# Patient Record
Sex: FEMALE | Race: WHITE | NOT HISPANIC OR LATINO | URBAN - METROPOLITAN AREA
[De-identification: names, ages, dates, MRNs, and addresses within clinical notes are randomized per-mention and may not be internally consistent; named-entity substitution may affect disease eponyms.]

---

## 2017-01-03 ENCOUNTER — OFFICE VISIT (OUTPATIENT)
Dept: PSYCHIATRY | Facility: CLINIC | Age: 47
End: 2017-01-03
Attending: PSYCHIATRY & NEUROLOGY
Payer: MEDICARE

## 2017-01-03 VITALS
HEART RATE: 54 BPM | SYSTOLIC BLOOD PRESSURE: 116 MMHG | BODY MASS INDEX: 21.05 KG/M2 | DIASTOLIC BLOOD PRESSURE: 69 MMHG | WEIGHT: 138.4 LBS

## 2017-01-03 DIAGNOSIS — F33.1 MAJOR DEPRESSIVE DISORDER, RECURRENT EPISODE, MODERATE (H): Primary | ICD-10-CM

## 2017-01-03 DIAGNOSIS — F51.01 PRIMARY INSOMNIA: ICD-10-CM

## 2017-01-03 PROCEDURE — 99212 OFFICE O/P EST SF 10 MIN: CPT | Mod: ZF

## 2017-01-03 RX ORDER — DEXMETHYLPHENIDATE HYDROCHLORIDE 10 MG/1
10 TABLET ORAL DAILY
Qty: 30 TABLET | Refills: 0 | Status: SHIPPED | OUTPATIENT
Start: 2017-01-03 | End: 2017-03-07

## 2017-01-03 RX ORDER — ESZOPICLONE 3 MG/1
3 TABLET, FILM COATED ORAL AT BEDTIME
Qty: 30 TABLET | Refills: 3 | Status: SHIPPED | OUTPATIENT
Start: 2017-01-03 | End: 2017-03-07

## 2017-01-03 RX ORDER — DEXMETHYLPHENIDATE HYDROCHLORIDE 10 MG/1
10 TABLET ORAL DAILY
Qty: 30 TABLET | Refills: 0 | Status: SHIPPED | OUTPATIENT
Start: 2017-01-03 | End: 2017-01-03

## 2017-01-03 NOTE — PROGRESS NOTES
PSYCHIATRY VISIT    The patient was seen for evaluation and management. [id number] . The patient was seen for anorexia and depression.   She was last seen 3 mo ago.    INTERIM HX: She reports that she is doing well. Holidays have gone well; visit to relatives in [place], [place].  The plan was to stop tube feedings and see how she did then if it went well, dc the tube. I wrote a letter in support of this plan but her therapist did not. Pt was very upset with her therapist. It has been going on for 2 months.  Finally, her therapist agreed to write a letter.  Apparently, her therapist thought that she was not bringing things into therapy. The patient has had years of therapy and not feeling that she has any current pressing issues. The patient is still seeing her therapist.  The tube feeding was stopped a month ago. She hasn't seen Dr. Zarate since Sept. Home health nurse, Daphne will check on pt monthly and report back to MD, then if all is stable for 3 months, then tube could be removed in 3 months.  She has less anxiety about food even over the holidays.  She knew what was to happen over the holidays.  Mood is good, energy is lacking.  She usually goes to bed at 1030; she takes meds at 930 pm.  Every night she wakes 4 hrs later.  She tried to take meds a little later but still wakes at 2 am.  This has been over the past 2 months.  Once she awakens, she does not fall back asleep. She is tired when she goes to bed.    The Lunesta is definitely helping  ROS: Headaches. Syncopal episodes - random, 1/week.    - [place],  She makes odd comments.  [facility] worker- sees twice yearly, Trisha   Home health nurse: Daphne - good relationship      MEDICATIONS:   1. Lamictal 400 mg daily.   2. Zoloft 150 mg daily.   3. Focalin 10 mg daily. Beneficial for energy, mood and no indications of problematic use.   4. Synthroid 150 mcg  5. Zofran  7. Mitodrine  8. Lunesta 3 mg - this appears to have made a huge difference in her sleep     9. Carafate  11. Omeprazole  12. Pyridostigmine  15. Tizanidine   16. Fluorinef-   17. Maxalt for migraines. It works very well.  She has been having a few more migraines.  18. Prevastatin  /69 mmHg  Pulse 54  Wt 62.778 kg (138 lb 6.4 oz)      MENTAL STATUS EXAM: The patient is neat, oriented x3, on time for appointment. Mood is good. Good range of affect. Thought processes, associations are logical and goal directed. Thought content is normal. Fund of knowledge good, speech RRR, language intact, memory intact, concentration good, nsight and judgment are good,   ASSESSMENT: Anorexia nervosa, weight stable; major depressive disorder,, stable., stable; chronic sleep problems,improved; migraine HA, treated  with maxalt  PLAN:   Lunesta continue at 3 mg QHS . Continue other meds. Add Benadryl 25-50 mg QHS prn. This has helped in the past. She will continue to see her therapist, Noemi.  I do support the plan for discontinuation of tube feedings (she has been on them for 16 yrs) as she has been very stable. Anticipate she would have a few more months of no tube feedings before removing the tube.  All providers would need to be in agreement. She will return again in 2 mo. . Focalin Rxs printed.   RADHA MAURER MD   I spent a total of 30 minutes face-to-face with Keisha Somers during today's office visit. Over 50% of this time was spent counseling the patient and/or coordinating care regarding  discontinuing tube feedings.. See note for details.

## 2017-01-03 NOTE — NURSING NOTE
Chief Complaint   Patient presents with     Recheck Medication     anorexia nervose; syncope; major depressive disorder     Reviewed allergies, smoking status, and pharmacy preference  Administered abuse screening questions   Obtained weight, blood pressure and heart rate

## 2017-01-20 ENCOUNTER — RECORDS - HEALTHEAST (OUTPATIENT)
Dept: ADMINISTRATIVE | Facility: OTHER | Age: 47
End: 2017-01-20

## 2017-01-20 ENCOUNTER — HOSPITAL ENCOUNTER (OUTPATIENT)
Dept: CT IMAGING | Facility: HOSPITAL | Age: 47
Discharge: HOME | End: 2017-01-20
Attending: UROLOGY

## 2017-01-20 DIAGNOSIS — N20.0 KIDNEY STONES: ICD-10-CM

## 2017-01-20 ASSESSMENT — MIFFLIN-ST. JEOR: SCORE: 1275.86

## 2017-01-23 ENCOUNTER — SURGERY - HEALTHEAST (OUTPATIENT)
Dept: SURGERY | Facility: HOSPITAL | Age: 47
End: 2017-01-23

## 2017-01-23 ENCOUNTER — ANESTHESIA - HEALTHEAST (OUTPATIENT)
Dept: SURGERY | Facility: HOSPITAL | Age: 47
End: 2017-01-23

## 2017-01-30 ENCOUNTER — PRE VISIT (OUTPATIENT)
Dept: OTHER | Facility: CLINIC | Age: 47
End: 2017-01-30

## 2017-01-30 NOTE — TELEPHONE ENCOUNTER
"  HPI:   Keisha Somers is a pleasant 46 yo woman with symptoms that are primarily orthostatic in nature. In past 6 mos , fore  Unclear reasons, has had more orthostatic lightheadedness and syncope. Fortunately no injury    7/15/2017 Has not had any evident major illness or med change to account for why she is doing worse now than last summer. Main issue occurs after change of posture to upright position and is almost immediate suggesting \"immediate OH\"We reviewed prev recommendations re salt, volume, meds (midodrine 16 TID and fludrocortisone 0.1).  She lives independently but in an Assisted Living facility so she has access to help if needed.   Currently on 3 drugs (Fludro, midodrine and pyridiostigmine). At her last visit we reduced fludro to 0.1 mg daily.  Dx Autonomic dysfunction, Immediate OH syndrome on multiple meds that may be complicating her picture BUT none are clearcut causes of OH.  PLAN: Continue current medication regimen.  Support continued aggressive electrolyte-based hydration. Encourage recumbent exercise. We discussed options in detail for 30 min including standing training and lower body isometrics as well as lower body constrictive clothing (Spanx)    2/2/2017  Continues to see psychiatry for anorexia and depression.  Receives TF     PAST MEDICAL HISTORY:  Past Medical History   Diagnosis Date     Eating disorder      Thyroid disease      Hypotension, postural      Syncope and collapse      Syncope      Palpitations        CURRENT MEDICATIONS:  Current Outpatient Prescriptions   Medication Sig Dispense Refill     dexmethylphenidate (FOCALIN) 10 MG tablet Take 1 tablet (10 mg) by mouth daily 30 tablet 0     eszopiclone (LUNESTA) 3 MG tablet Take 1 tablet (3 mg) by mouth At Bedtime 30 tablet 3     diphenhydrAMINE HCl, Sleep, 25 MG TABS 1-2 tabs QHS prn sleep 60 tablet 3     midodrine (PROAMATINE) 5 MG tablet TAKE 3 TABLETS (15MG) BY MOUTH THREE TIMES A  tablet 5     lamoTRIgine (LAMICTAL) " 200 MG tablet Take 2 tablets (400 mg) by mouth daily 60 tablet 4     sertraline (ZOLOFT) 100 MG tablet Take 1.5 tablets (150 mg) by mouth daily 45 tablet 4     fludrocortisone (FLORINEF) 0.1 MG tablet TAKE 1 TABLET (0.1MG) BY MOUTH DAILY 90 tablet 3     levothyroxine (SYNTHROID, LEVOTHROID) 175 MCG tablet Take  by mouth daily.       Cholecalciferol (VITAMIN D PO) Take  by mouth. 50, 000 units every 14 days       Emollient (OINTMENT BASE EX) Externally apply  topically daily. Mypirocin 2% apply to G Tube ulsers daily       multivitamin (OCUVITE) TABS Take 1 tablet by mouth 2 times daily.       sucralfate (CARAFATE) 1 GM/10ML suspension Take 1 g by mouth 2 times daily. Take 10 Ml po daily       acetaminophen (TYLENOL) 500 MG tablet Take 500-1,000 mg by mouth every 6 hours as needed.       ibuprofen (IBU) 800 MG tablet Take 800 mg by mouth every 8 hours as needed.       Calcium Carbonate-Vitamin D (CALCIUM 600+D PO) Take 1 tablet by mouth 2 times daily.       docusate sodium (COLACE) 100 MG capsule Take 1 capsule by mouth 2 times daily.       metoclopramide (REGLAN) 10 MG tablet 2 times daily        omeprazole (PRILOSEC) 20 MG capsule Take 2 capsules by mouth 2 times daily.       pyridostigmine (MESTINON) 60 MG tablet Take 1 tablet by mouth daily.         PAST SURGICAL HISTORY:  Past Surgical History   Procedure Laterality Date     Gt placed[  2001       ALLERGIES:     Allergies   Allergen Reactions     Codeine Rash     Codeine Sulfate Rash       FAMILY HISTORY:  None pertinent    SOCIAL HISTORY:  Social History   Substance Use Topics     Smoking status: Never Smoker      Smokeless tobacco: Never Used     Alcohol Use: No       ROS:   Constitutional: No fever, chills, or sweats. Weight stable.   ENT: No visual disturbance, ear ache, epistaxis, sore throat.   Cardiovascular: As per HPI.   Respiratory: No cough, hemoptysis.    GI: No nausea, vomiting, hematemesis, melena, or hematochezia.   : No hematuria.   Integument:  Negative.   Psychiatric: Negative.   Hematologic: no easy bleeding.  Neuro: Negative.   Endocrinology: No significant heat or cold intolerance   Musculoskeletal: No myalgia.    Exam:  GENERAL APPEARANCE: healthy, alert and no distress  HEENT: no icterus, no xanthelasmas.  NECK: no adenopathy, no asymmetry, masses, or scars, thyroid normal to palpation and no bruits, JVP not elevated  RESPIRATORY: lungs clear to auscultation - no rales, rhonchi or wheezes, no use of accessory muscles, no retractions, respirations are unlabored, normal respiratory rate  CARDIOVASCULAR: regular rhythm, normal S1 with physiologic split S2, no S3 or S4 and no murmur, click or rub,   ABDOMEN: soft, non tender, without hepatosplenomegaly,  no abdominal bruits. G-tube.     Labs:  CBC RESULTS:   Lab Results   Component Value Date    WBC 6.9 07/23/2015    RBC 3.94 07/23/2015    HGB 12.0 07/23/2015    HCT 35.8 07/23/2015    MCV 91 07/23/2015    MCH 30.5 07/23/2015    MCHC 33.5 07/23/2015    RDW 13.4 07/23/2015     07/23/2015       BMP RESULTS:  Lab Results   Component Value Date     07/23/2015    POTASSIUM 3.4 07/23/2015    CHLORIDE 108 07/23/2015    CO2 27 07/23/2015    ANIONGAP 8 07/23/2015    GLC 98 07/23/2015    BUN 15 07/23/2015    CR 0.78 07/23/2015    GFRESTIMATED 79 07/23/2015    GFRESTBLACK >90   GFR Calc   07/23/2015    KENNETH 9.4 07/23/2015        Procedures:  11/25/2013: Tilt/Autonomic Study   The following maneuvers were done sequentially:  1- Sitting position for 15 minutes: /80 mmHg,  bpm. No significant change in hemodynamic profile. No symptoms.  2- Orthostatism for 5 minutes:   A. Immediate: Chad BP 84/51 mmHg,  bpm, No symptoms.   B. Delayed: Chad /87 mmHg,  bpm, No symptoms.   In 5 minutes after patient was standing HR showed 32 bpm increase compared to sitting position.   3- Maneuvers in sitting position:   A. Carotid sinus massage:   Left: Chad /80 mmHg, HR  103 bpm, , No symptoms.   Right: Chad /77 mmHg, HR 99 bpm, , No symptoms.   B. Valsalva:   - Phase 1: present.   - Phase 2: present   - Phase 3: present   - Phase 4: present   Chad BP was 75/60 and HR was 121 during valsalva maneuver. Patient felt somewhat dizzy during valsalva however symptom immediately improved after she stopped valsalva.   C. Cough:   Couplets: Chad /81 mmHg,  bpm, No symptoms.   Spasm: Chad /78 mmHg,  bpm, No symptoms.   4- Supine for 10-15 minutes: /75 mmHg, HR 90 bpm. No significant change in hemodynamic profile. No symptoms.  5- TILT at 70 degrees for 20 minutes: BP 97/62 mmHg, HR 101pm. No significant change in hemodynamic profile. No symptoms.  6 TILT at 70 degrees AFTER ABDOMINAL BINDER for 5 minutes: /77 mmHg, HR 103pm. No significant change in hemodynamic profile. No symptoms.   7 TILT at 70 degrees AFTER 1 LITER OF FLUID for 5 minutes: /83 mmHg, HR 89pm. No significant change in hemodynamic profile. No symptoms.   DISCUSSION:  - Autonomic function test: consistent with normal physiologic response.  - TILT table test:   POTS   No vasovagal syncope  ECHOCARDIOGRAM: 11/25/2013    Interpretation Summary  Poor image quality Technically difficult study 1. Grossly normal LV size   and systolic function. Cannot assess diastolic function 2. Normal RV size   and function    Assessment and Plan:   46 yo woman with previous POTS-like complaints but now seems nikolay like Immediate OH presumably due to Autonomic  Dysfunction of unknown cause.

## 2017-02-02 ENCOUNTER — OFFICE VISIT (OUTPATIENT)
Dept: CARDIOLOGY | Facility: CLINIC | Age: 47
End: 2017-02-02
Attending: INTERNAL MEDICINE
Payer: MEDICARE

## 2017-02-02 VITALS
DIASTOLIC BLOOD PRESSURE: 77 MMHG | HEART RATE: 115 BPM | BODY MASS INDEX: 20.81 KG/M2 | WEIGHT: 137.3 LBS | OXYGEN SATURATION: 99 % | SYSTOLIC BLOOD PRESSURE: 107 MMHG | HEIGHT: 68 IN

## 2017-02-02 DIAGNOSIS — R55 SYNCOPE: ICD-10-CM

## 2017-02-02 DIAGNOSIS — G90.A POSTURAL ORTHOSTATIC TACHYCARDIA SYNDROME: ICD-10-CM

## 2017-02-02 DIAGNOSIS — R55 SYNCOPE, UNSPECIFIED SYNCOPE TYPE: Primary | ICD-10-CM

## 2017-02-02 DIAGNOSIS — R55 SYNCOPE, UNSPECIFIED SYNCOPE TYPE: ICD-10-CM

## 2017-02-02 LAB
ANION GAP SERPL CALCULATED.3IONS-SCNC: 8 MMOL/L (ref 3–14)
BUN SERPL-MCNC: 12 MG/DL (ref 7–30)
CALCIUM SERPL-MCNC: 9.1 MG/DL (ref 8.5–10.1)
CHLORIDE SERPL-SCNC: 105 MMOL/L (ref 94–109)
CO2 SERPL-SCNC: 29 MMOL/L (ref 20–32)
CREAT SERPL-MCNC: 0.9 MG/DL (ref 0.52–1.04)
CRP SERPL HS-MCNC: 0.5 MG/L
ERYTHROCYTE [DISTWIDTH] IN BLOOD BY AUTOMATED COUNT: 12.9 % (ref 10–15)
ERYTHROCYTE [SEDIMENTATION RATE] IN BLOOD BY WESTERGREN METHOD: 19 MM/H (ref 0–20)
GFR SERPL CREATININE-BSD FRML MDRD: 67 ML/MIN/1.7M2
GLUCOSE SERPL-MCNC: 112 MG/DL (ref 70–99)
HCT VFR BLD AUTO: 37 % (ref 35–47)
HGB BLD-MCNC: 11.7 G/DL (ref 11.7–15.7)
MCH RBC QN AUTO: 30.2 PG (ref 26.5–33)
MCHC RBC AUTO-ENTMCNC: 31.6 G/DL (ref 31.5–36.5)
MCV RBC AUTO: 96 FL (ref 78–100)
PLATELET # BLD AUTO: 313 10E9/L (ref 150–450)
POTASSIUM SERPL-SCNC: 4.2 MMOL/L (ref 3.4–5.3)
RBC # BLD AUTO: 3.87 10E12/L (ref 3.8–5.2)
SODIUM SERPL-SCNC: 142 MMOL/L (ref 133–144)
WBC # BLD AUTO: 6.4 10E9/L (ref 4–11)

## 2017-02-02 PROCEDURE — 99213 OFFICE O/P EST LOW 20 MIN: CPT | Mod: ZP | Performed by: INTERNAL MEDICINE

## 2017-02-02 PROCEDURE — 99212 OFFICE O/P EST SF 10 MIN: CPT | Mod: ZF

## 2017-02-02 PROCEDURE — 86141 C-REACTIVE PROTEIN HS: CPT | Performed by: INTERNAL MEDICINE

## 2017-02-02 PROCEDURE — 36415 COLL VENOUS BLD VENIPUNCTURE: CPT | Performed by: INTERNAL MEDICINE

## 2017-02-02 PROCEDURE — 93010 ELECTROCARDIOGRAM REPORT: CPT | Mod: ZP | Performed by: INTERNAL MEDICINE

## 2017-02-02 PROCEDURE — 80048 BASIC METABOLIC PNL TOTAL CA: CPT | Performed by: INTERNAL MEDICINE

## 2017-02-02 PROCEDURE — 85652 RBC SED RATE AUTOMATED: CPT | Performed by: INTERNAL MEDICINE

## 2017-02-02 PROCEDURE — 85027 COMPLETE CBC AUTOMATED: CPT | Performed by: INTERNAL MEDICINE

## 2017-02-02 PROCEDURE — 93005 ELECTROCARDIOGRAM TRACING: CPT | Mod: ZF

## 2017-02-02 NOTE — PATIENT INSTRUCTIONS
You will be scheduled for a follow up visit in 6 months with lab.   Lab today. Lu will mail your results if they are normal (call with any medication changes)    Instructions from today:   1. Continue with fluids- but try to use 1/2 strength pedialyte (1/2 water, 1/2 pedialyte)- instead of propel. This is due to higher amount of sodium.     2. Try to work on isometric exercises. Examples: rowing machine, swimming, recumbent bike, exercise bands.     If you have any questions regarding to your visit please contact your care team:     Cardiology  Telephone Number    Lu Howe -[phone]   Or send a message to your provider via my chart.   For scheduling appts or procedures:    Etelvina Donnelly     [phone] or  [phone]   For the Device Clinic (Pacemakers and ICD's)   RN's :   Willow Haney  During business hours: [phone]    After business hours:   [phone]- select option 4 and ask for job code [phone].    You can also contact us using My Chart. If you need assistance in setting this up, please contact our office or ask at your follow up visit.     If you need a medication refill please contact your pharmacy. Please allow 3 business days for your refill to be completed.     As always, Thank you for trusting us with your health care needs!

## 2017-02-02 NOTE — PROGRESS NOTES
"  HPI:   Keisha Somers is a pleasant 48 yo woman with symptoms that are primarily orthostatic in nature. In past 6 mos , fore  Unclear reasons, has had more orthostatic lightheadedness and syncope. Fortunately no injury    7/15/2016 Has not had any evident major illness or med change to account for why she is doing worse now than last summer. Main issue occurs after change of posture to upright position and is almost immediate suggesting \"immediate OH\"We reviewed prev recommendations re salt, volume, meds (midodrine 16 TID and fludrocortisone 0.1).  She lives independently but in an Assisted Living facility so she has access to help if needed.   Currently on 3 drugs (Fludro, midodrine and pyridiostigmine). At her last visit we reduced fludro to 0.1 mg daily.  Dx Autonomic dysfunction, Immediate OH syndrome on multiple meds that may be complicating her picture BUT none are clearcut causes of OH.  PLAN: Continue current medication regimen.  Support continued aggressive electrolyte-based hydration. Encourage recumbent exercise. We discussed options in detail for 30 min including standing training and lower body isometrics as well as lower body constrictive clothing (Spanx)    2/2/2017  Continues to see psychiatry for anorexia and depression.   Will have syncope 1-2x/ week usually occur from sitting to standing all times during the day.  Multiple times a day presyncope/dizziness.  Symptoms include vision going and palpitation.  Has hurt herself on a coffee table.  Other weeks no syncope but h as not found a pattern.  Does have life alert and goes off frequently.   Taking midodrine 5mg TID morning, noon, evening, florinef 0.1mg  in the am, and pyridostigmine in the pm.   Hydration drinking 50-60 oz half of it is propel.   Adding salt at table.  Standing training BID,  Not wearing Spandx or doing recumbent exercises.  Concerned about Feet, nose and fingers turn white particularly in the winter.    Currently has a feeding " last time used was 2-3 months ago.  BP at home .   Has Fluid by her at night.      Today's EKG - Normal sinus rhythm with ventriclar rate of 61    PAST MEDICAL HISTORY:  Past Medical History   Diagnosis Date     Eating disorder      Thyroid disease      Hypotension, postural      Syncope and collapse      Syncope      Palpitations        CURRENT MEDICATIONS:  Current Outpatient Prescriptions   Medication Sig Dispense Refill     dexmethylphenidate (FOCALIN) 10 MG tablet Take 1 tablet (10 mg) by mouth daily 30 tablet 0     eszopiclone (LUNESTA) 3 MG tablet Take 1 tablet (3 mg) by mouth At Bedtime 30 tablet 3     diphenhydrAMINE HCl, Sleep, 25 MG TABS 1-2 tabs QHS prn sleep 60 tablet 3     midodrine (PROAMATINE) 5 MG tablet TAKE 3 TABLETS (15MG) BY MOUTH THREE TIMES A  tablet 5     lamoTRIgine (LAMICTAL) 200 MG tablet Take 2 tablets (400 mg) by mouth daily 60 tablet 4     sertraline (ZOLOFT) 100 MG tablet Take 1.5 tablets (150 mg) by mouth daily 45 tablet 4     fludrocortisone (FLORINEF) 0.1 MG tablet TAKE 1 TABLET (0.1MG) BY MOUTH DAILY 90 tablet 3     levothyroxine (SYNTHROID, LEVOTHROID) 175 MCG tablet Take  by mouth daily.       Cholecalciferol (VITAMIN D PO) Take  by mouth. 50, 000 units every 14 days       Emollient (OINTMENT BASE EX) Externally apply  topically daily. Mypirocin 2% apply to G Tube ulsers daily       multivitamin (OCUVITE) TABS Take 1 tablet by mouth 2 times daily.       sucralfate (CARAFATE) 1 GM/10ML suspension Take 1 g by mouth 2 times daily. Take 10 Ml po daily       acetaminophen (TYLENOL) 500 MG tablet Take 500-1,000 mg by mouth every 6 hours as needed.       ibuprofen (IBU) 800 MG tablet Take 800 mg by mouth every 8 hours as needed.       Calcium Carbonate-Vitamin D (CALCIUM 600+D PO) Take 1 tablet by mouth 2 times daily.       docusate sodium (COLACE) 100 MG capsule Take 1 capsule by mouth 2 times daily.       metoclopramide (REGLAN) 10 MG tablet 2 times daily         omeprazole (PRILOSEC) 20 MG capsule Take 2 capsules by mouth 2 times daily.       pyridostigmine (MESTINON) 60 MG tablet Take 1 tablet by mouth daily.         PAST SURGICAL HISTORY:  Past Surgical History   Procedure Laterality Date     Gt placed[  2001       ALLERGIES:     No Known Allergies    FAMILY HISTORY:  None pertinent    SOCIAL HISTORY:  Social History   Substance Use Topics     Smoking status: Never Smoker      Smokeless tobacco: Never Used     Alcohol Use: No       ROS:   Constitutional: No fever, chills, or sweats. Weight stable.   ENT: No visual disturbance, ear ache, epistaxis, sore throat.   Cardiovascular: As per HPI.   Respiratory: No cough, hemoptysis.    GI: No nausea, vomiting, hematemesis, melena, or hematochezia.   : No hematuria.   Integument: Negative.   Psychiatric: Negative.   Hematologic: no easy bleeding.  Neuro: Negative.   Endocrinology: No significant heat or cold intolerance   Musculoskeletal: No myalgia.    Exam:   Orthostatic Vitals   BP Pulse Position Site Cuff Size Time Date   107/77 115 Standing Left Arm Regular 11:12 AM 2/2/2017   97/67 112 Standing Left Arm Regular 11:10 AM 2/2/2017   112/73 75 Sitting Left Arm Regular 11:09 AM 2/2/2017   123/78 66 Supine Left Arm Regular 11:07 AM 2/2/2017     No repeat blood pressure data filed.  No peak flow data filed.  No pain information filed.    GENERAL APPEARANCE: healthy, alert and no distress  HEENT: no icterus, no xanthelasmas.  NECK:  no asymmetry, masses, or scars, and no bruits, JVP not elevated  RESPIRATORY: lungs clear to auscultation - no rales, rhonchi or wheezes, no use of accessory muscles, no retractions, respirations are unlabored, normal respiratory rate  CARDIOVASCULAR: regular rhythm, normal S1 with physiologic split S2, no S3 or S4 and no murmur, click or rub,   ABDOMEN: soft, non tender, without hepatosplenomegaly,  no abdominal bruits. G-tube.     Labs:  CBC RESULTS:   Lab Results   Component Value Date    WBC 6.9  07/23/2015    RBC 3.94 07/23/2015    HGB 12.0 07/23/2015    HCT 35.8 07/23/2015    MCV 91 07/23/2015    MCH 30.5 07/23/2015    MCHC 33.5 07/23/2015    RDW 13.4 07/23/2015     07/23/2015       BMP RESULTS:  Lab Results   Component Value Date     07/23/2015    POTASSIUM 3.4 07/23/2015    CHLORIDE 108 07/23/2015    CO2 27 07/23/2015    ANIONGAP 8 07/23/2015    GLC 98 07/23/2015    BUN 15 07/23/2015    CR 0.78 07/23/2015    GFRESTIMATED 79 07/23/2015    GFRESTBLACK >90   GFR Calc   07/23/2015    KENNETH 9.4 07/23/2015        Procedures:  11/25/2013: Tilt/Autonomic Study   The following maneuvers were done sequentially:  1- Sitting position for 15 minutes: /80 mmHg,  bpm. No significant change in hemodynamic profile. No symptoms.  2- Orthostatism for 5 minutes:   A. Immediate: Chad BP 84/51 mmHg,  bpm, No symptoms.   B. Delayed: Chad /87 mmHg,  bpm, No symptoms.   In 5 minutes after patient was standing HR showed 32 bpm increase compared to sitting position.   3- Maneuvers in sitting position:   A. Carotid sinus massage:   Left: Chad /80 mmHg,  bpm, , No symptoms.   Right: Chad /77 mmHg, HR 99 bpm, , No symptoms.   B. Valsalva:   - Phase 1: present.   - Phase 2: present   - Phase 3: present   - Phase 4: present   Chad BP was 75/60 and HR was 121 during valsalva maneuver. Patient felt somewhat dizzy during valsalva however symptom immediately improved after she stopped valsalva.   C. Cough:   Couplets: Chad /81 mmHg,  bpm, No symptoms.   Spasm: Chad /78 mmHg,  bpm, No symptoms.   4- Supine for 10-15 minutes: /75 mmHg, HR 90 bpm. No significant change in hemodynamic profile. No symptoms.  5- TILT at 70 degrees for 20 minutes: BP 97/62 mmHg, HR 101pm. No significant change in hemodynamic profile. No symptoms.  6 TILT at 70 degrees AFTER ABDOMINAL BINDER for 5 minutes: /77 mmHg, HR 103pm. No significant change  in hemodynamic profile. No symptoms.   7 TILT at 70 degrees AFTER 1 LITER OF FLUID for 5 minutes: /83 mmHg, HR 89pm. No significant change in hemodynamic profile. No symptoms.   DISCUSSION:  - Autonomic function test: consistent with normal physiologic response.  - TILT table test:   POTS   No vasovagal syncope  ECHOCARDIOGRAM: 11/25/2013    Interpretation Summary  Poor image quality Technically difficult study 1. Grossly normal LV size   and systolic function. Cannot assess diastolic function 2. Normal RV size   and function    Assessment and Plan:   46 yo woman with previous POTS-like complaints but now seems nikolay like Immediate OH presumably due to Autonomic  Dysfunction of unknown cause.     1)  CBC, BNP, and CRP today and before 6 month visit  2)  Continue to consume 50-60 oz of water.  1/2 pedialyte,  (switch off propel and start pedialyte)  3)  Start isometric exercises  4)  Use spandx  5)  Move coffee table to avoid trauma  6)  When going from standing to sitting.  Stand still for 15 seconds before moving.   7)  Return in 6 months with labs prior.          I  appreciated the opportunity to see and assess Ms Keisha Somers in the clinic today.     Please do not hesitate to contact my office if you have any questions or concerns.        Lilly Rebolledo, APRN, CNP

## 2017-02-02 NOTE — MR AVS SNAPSHOT
After Visit Summary   2/2/2017    Keisha Somers    MRN: [id number]           Patient Information     Date Of Birth          1970        Visit Information        Provider Department      2/2/2017 11:30 AM Thang Galarza MD [facility]        Today's Diagnoses     Syncope, unspecified syncope type    -  1     Syncope           Care Instructions    You will be scheduled for a follow up visit in 6 months with lab.   Lab today. Lu will mail your results if they are normal (call with any medication changes)    Instructions from today:   1. Continue with fluids- but try to use 1/2 strength pedialyte (1/2 water, 1/2 pedialyte)- instead of propel. This is due to higher amount of sodium.     2. Try to work on isometric exercises. Examples: rowing machine, swimming, recumbent bike, exercise bands.     If you have any questions regarding to your visit please contact your care team:     Cardiology  Telephone Number    Lu Howe [phone]   Or send a message to your provider via my chart.   For scheduling appts or procedures:    Etelvina Donnelly     [phone] or  [phone]   For the Device Clinic (Pacemakers and ICD's)   RN's :   Willow Haney  During business hours: [phone]    After business hours:   [phone]- select option 4 and ask for job code [id number].    You can also contact us using My Chart. If you need assistance in setting this up, please contact our office or ask at your follow up visit.     If you need a medication refill please contact your pharmacy. Please allow 3 business days for your refill to be completed.     As always, Thank you for trusting us with your health care needs!        Follow-ups after your visit        Follow-up notes from your care team     Return in about 6 months (around 8/2/2017) for benditt, labs.      Your next 10 appointments already scheduled     Feb 02, 2017 12:45 PM   Lab with  LAB   [facility] ([facility])    [address]  [address] [facility] [address]   [phone]            Mar 07, 2017  2:00 PM   Adult Med Follow UP with Romana Tena MD   [facility] ([facility])    [address]  [address] [facility] [address]   [phone]            Aug 03, 2017 11:00 AM   Lab with  LAB   [facility] ([facility])    [address] [place] [address]  [address] [facility] [address]   [phone]            Aug 03, 2017 11:30 AM   (Arrive by 11:15 AM)   RETURN ARRHYTHMIA with Thang Galarza MD   [facility] ([facility])    [address] [place] [address]  [address] [facility] [address] [phone]              Future tests that were ordered for you today     Open Future Orders        Priority Expected Expires Ordered    Basic metabolic panel Routine 2/2/2017 2/2/2018 2/2/2017    CBC with platelets Routine 2/2/2017 2/2/2018 2/2/2017    Erythrocyte sedimentation rate auto Routine 2/2/2017 2/2/2018 2/2/2017    CRP cardiac risk Routine 2/2/2017 2/2/2018 2/2/2017            Who to contact     If you have questions or need follow up information about today's clinic visit or your schedule please contact [facility] directly at [phone].  Normal or non-critical lab and imaging results will be communicated to you by ilohohart, letter or phone within 4 business days after the clinic has received the results. If you do not hear from us within 7 days, please contact the clinic through Instabugt or phone. If you have a critical or abnormal lab result, we will notify you by phone as soon as possible.  Submit refill requests through [email or web address] or call your pharmacy and they will forward the refill request to us. Please allow 3 business days for your refill to be completed.          Additional Information About Your Visit        ilohohart Information     [email or web address] lets you send messages to your  "doctor, view your test results, renew your prescriptions, schedule appointments and more. To sign up, go to [email or web address] . Click on \"Log in\" on the left side of the screen, which will take you to the Welcome page. Then click on \"Sign up Now\" on the right side of the page.     You will be asked to enter the access code listed below, as well as some personal information. Please follow the directions to create your username and password.     Your access code is: [email or web address]  Expires: 2017  6:30 AM     Your access code will  in 90 days. If you need help or a new code, please call your [facility] clinic or [phone].        Care EveryWhere ID     This is your Care EveryWhere ID. This could be used by other organizations to access your [facility] medical records  [id number]        Your Vitals Were     Height BMI (Body Mass Index) Pulse Oximetry             1.727 m (5' 8\") 20.88 kg/m2 99%          Blood Pressure from Last 3 Encounters:   17 116/69   10/11/16 111/71   16 121/70    Weight from Last 3 Encounters:   17 62.279 kg (137 lb 4.8 oz)   17 62.778 kg (138 lb 6.4 oz)   10/11/16 62.506 kg (137 lb 12.8 oz)              We Performed the Following     EKG 12-lead, tracing only (Future)        Primary Care Provider Office Phone # Fax #    Lavonne Zarate [phone]       [facility] [address]  [place] [address]        Thank you!     Thank you for choosing [facility]  for your care. Our goal is always to provide you with excellent care. Hearing back from our patients is one way we can continue to improve our services. Please take a few minutes to complete the written survey that you may receive in the mail after your visit with us. Thank you!             Your Updated Medication List - Protect others around you: Learn how to safely use, store and throw away your medicines at www.disposemymeds.org.          This list is accurate as of: " 2/2/17 12:16 PM.  Always use your most recent med list.                   Brand Name Dispense Instructions for use    acetaminophen 500 MG tablet    TYLENOL     Take 500-1,000 mg by mouth every 6 hours as needed.       CALCIUM 600+D PO      Take 1 tablet by mouth 2 times daily.       CARAFATE 1 GM/10ML suspension   Generic drug:  sucralfate      Take 1 g by mouth 2 times daily. Take 10 Ml po daily       dexmethylphenidate 10 MG tablet    FOCALIN    30 tablet    Take 1 tablet (10 mg) by mouth daily       diphenhydrAMINE HCl (Sleep) 25 MG Tabs     60 tablet    1-2 tabs QHS prn sleep       docusate sodium 100 MG capsule    COLACE     Take 1 capsule by mouth 2 times daily.       eszopiclone 3 MG tablet    LUNESTA    30 tablet    Take 1 tablet (3 mg) by mouth At Bedtime       fludrocortisone 0.1 MG tablet    FLORINEF    90 tablet    TAKE 1 TABLET (0.1MG) BY MOUTH DAILY        MG tablet   Generic drug:  ibuprofen      Take 800 mg by mouth every 8 hours as needed.       lamoTRIgine 200 MG tablet    LaMICtal    60 tablet    Take 2 tablets (400 mg) by mouth daily       levothyroxine 175 MCG tablet    SYNTHROID/LEVOTHROID     Take  by mouth daily.       metoclopramide 10 MG tablet    REGLAN     2 times daily       midodrine 5 MG tablet    PROAMATINE    270 tablet    TAKE 3 TABLETS (15MG) BY MOUTH THREE TIMES A DAY       multivitamin Tabs tablet      Take 1 tablet by mouth 2 times daily.       OINTMENT BASE EX      Externally apply  topically daily. Mypirocin 2% apply to G Tube ulsers daily       omeprazole 20 MG CR capsule    priLOSEC     Take 2 capsules by mouth 2 times daily.       pyridostigmine 60 MG tablet    MESTINON     Take 1 tablet by mouth daily.       sertraline 100 MG tablet    ZOLOFT    45 tablet    Take 1.5 tablets (150 mg) by mouth daily       VITAMIN D PO      Take  by mouth. 50, 000 units every 14 days

## 2017-02-02 NOTE — NURSING NOTE
Chief Complaint   Patient presents with     Follow Up For     EKG and ortho.  Last visit 7/2015.  on 3 drugs (Fludro, midodrine and pyridiostigmine).  reduced fludro to 0.1 mg daily. receives TF for anorexia.  Tx for depression

## 2017-02-02 NOTE — Clinical Note
"2/2/2017      RE: Keisha Somers  [address]    [facility] [id number]       Dear Colleague,    Thank you for the opportunity to participate in the care of your patient, Keisha Somers, at the [facility] HEART [facility] at [facility]. Please see a copy of my visit note below.      HPI:   Keisha Somers is a pleasant 46 yo woman with symptoms that are primarily orthostatic in nature. In past 6 mos , fore  Unclear reasons, has had more orthostatic lightheadedness and syncope. Fortunately no injury    7/15/2016 Has not had any evident major illness or med change to account for why she is doing worse now than last summer. Main issue occurs after change of posture to upright position and is almost immediate suggesting \"immediate OH\"We reviewed prev recommendations re salt, volume, meds (midodrine 16 TID and fludrocortisone 0.1).  She lives independently but in an Assisted Living facility so she has access to help if needed.   Currently on 3 drugs (Fludro, midodrine and pyridiostigmine). At her last visit we reduced fludro to 0.1 mg daily.  Dx Autonomic dysfunction, Immediate OH syndrome on multiple meds that may be complicating her picture BUT none are clearcut causes of OH.  PLAN: Continue current medication regimen.  Support continued aggressive electrolyte-based hydration. Encourage recumbent exercise. We discussed options in detail for 30 min including standing training and lower body isometrics as well as lower body constrictive clothing (Spanx)    2/2/2017  Continues to see psychiatry for anorexia and depression.   Will have syncope 1-2x/ week usually occur from sitting to standing all times during the day.  Multiple times a day presyncope/dizziness.  Symptoms include vision going and palpitation.  Has hurt herself on a coffee table.  Other weeks no syncope but h as not found a pattern.  Does have life alert and goes off frequently.   Taking midodrine 5mg TID " morning, noon, evening, florinef 0.1mg  in the am, and pyridostigmine in the pm.   Hydration drinking 50-60 oz half of it is propel.   Adding salt at table.  Standing training BID,  Not wearing Spandx or doing recumbent exercises.  Concerned about Feet, nose and fingers turn white particularly in the winter.    Currently has a feeding last time used was 2-3 months ago.  BP at home .   Has Fluid by her at night.      Today's EKG - Normal sinus rhythm with ventriclar rate of 61    PAST MEDICAL HISTORY:  Past Medical History   Diagnosis Date     Eating disorder      Thyroid disease      Hypotension, postural      Syncope and collapse      Syncope      Palpitations        CURRENT MEDICATIONS:  Current Outpatient Prescriptions   Medication Sig Dispense Refill     dexmethylphenidate (FOCALIN) 10 MG tablet Take 1 tablet (10 mg) by mouth daily 30 tablet 0     eszopiclone (LUNESTA) 3 MG tablet Take 1 tablet (3 mg) by mouth At Bedtime 30 tablet 3     diphenhydrAMINE HCl, Sleep, 25 MG TABS 1-2 tabs QHS prn sleep 60 tablet 3     midodrine (PROAMATINE) 5 MG tablet TAKE 3 TABLETS (15MG) BY MOUTH THREE TIMES A  tablet 5     lamoTRIgine (LAMICTAL) 200 MG tablet Take 2 tablets (400 mg) by mouth daily 60 tablet 4     sertraline (ZOLOFT) 100 MG tablet Take 1.5 tablets (150 mg) by mouth daily 45 tablet 4     fludrocortisone (FLORINEF) 0.1 MG tablet TAKE 1 TABLET (0.1MG) BY MOUTH DAILY 90 tablet 3     levothyroxine (SYNTHROID, LEVOTHROID) 175 MCG tablet Take  by mouth daily.       Cholecalciferol (VITAMIN D PO) Take  by mouth. 50, 000 units every 14 days       Emollient (OINTMENT BASE EX) Externally apply  topically daily. Mypirocin 2% apply to G Tube ulsers daily       multivitamin (OCUVITE) TABS Take 1 tablet by mouth 2 times daily.       sucralfate (CARAFATE) 1 GM/10ML suspension Take 1 g by mouth 2 times daily. Take 10 Ml po daily       acetaminophen (TYLENOL) 500 MG tablet Take 500-1,000 mg by mouth every 6 hours as  needed.       ibuprofen (IBU) 800 MG tablet Take 800 mg by mouth every 8 hours as needed.       Calcium Carbonate-Vitamin D (CALCIUM 600+D PO) Take 1 tablet by mouth 2 times daily.       docusate sodium (COLACE) 100 MG capsule Take 1 capsule by mouth 2 times daily.       metoclopramide (REGLAN) 10 MG tablet 2 times daily        omeprazole (PRILOSEC) 20 MG capsule Take 2 capsules by mouth 2 times daily.       pyridostigmine (MESTINON) 60 MG tablet Take 1 tablet by mouth daily.         PAST SURGICAL HISTORY:  Past Surgical History   Procedure Laterality Date     Gt placed[  2001       ALLERGIES:     No Known Allergies    FAMILY HISTORY:  None pertinent    SOCIAL HISTORY:  Social History   Substance Use Topics     Smoking status: Never Smoker      Smokeless tobacco: Never Used     Alcohol Use: No       ROS:   Constitutional: No fever, chills, or sweats. Weight stable.   ENT: No visual disturbance, ear ache, epistaxis, sore throat.   Cardiovascular: As per HPI.   Respiratory: No cough, hemoptysis.    GI: No nausea, vomiting, hematemesis, melena, or hematochezia.   : No hematuria.   Integument: Negative.   Psychiatric: Negative.   Hematologic: no easy bleeding.  Neuro: Negative.   Endocrinology: No significant heat or cold intolerance   Musculoskeletal: No myalgia.    Exam:   Orthostatic Vitals   BP Pulse Position Site Cuff Size Time Date   107/77 115 Standing Left Arm Regular 11:12 AM 2/2/2017   97/67 112 Standing Left Arm Regular 11:10 AM 2/2/2017   112/73 75 Sitting Left Arm Regular 11:09 AM 2/2/2017   123/78 66 Supine Left Arm Regular 11:07 AM 2/2/2017     No repeat blood pressure data filed.  No peak flow data filed.  No pain information filed.    GENERAL APPEARANCE: healthy, alert and no distress  HEENT: no icterus, no xanthelasmas.  NECK:  no asymmetry, masses, or scars, and no bruits, JVP not elevated  RESPIRATORY: lungs clear to auscultation - no rales, rhonchi or wheezes, no use of accessory muscles, no  retractions, respirations are unlabored, normal respiratory rate  CARDIOVASCULAR: regular rhythm, normal S1 with physiologic split S2, no S3 or S4 and no murmur, click or rub,   ABDOMEN: soft, non tender, without hepatosplenomegaly,  no abdominal bruits. G-tube.     Labs:  CBC RESULTS:   Lab Results   Component Value Date    WBC 6.9 07/23/2015    RBC 3.94 07/23/2015    HGB 12.0 07/23/2015    HCT 35.8 07/23/2015    MCV 91 07/23/2015    MCH 30.5 07/23/2015    MCHC 33.5 07/23/2015    RDW 13.4 07/23/2015     07/23/2015       BMP RESULTS:  Lab Results   Component Value Date     07/23/2015    POTASSIUM 3.4 07/23/2015    CHLORIDE 108 07/23/2015    CO2 27 07/23/2015    ANIONGAP 8 07/23/2015    GLC 98 07/23/2015    BUN 15 07/23/2015    CR 0.78 07/23/2015    GFRESTIMATED 79 07/23/2015    GFRESTBLACK >90   GFR Calc   07/23/2015    KENNETH 9.4 07/23/2015        Procedures:  11/25/2013: Tilt/Autonomic Study   The following maneuvers were done sequentially:  1- Sitting position for 15 minutes: /80 mmHg,  bpm. No significant change in hemodynamic profile. No symptoms.  2- Orthostatism for 5 minutes:   A. Immediate: Chad BP 84/51 mmHg,  bpm, No symptoms.   B. Delayed: Chad /87 mmHg,  bpm, No symptoms.   In 5 minutes after patient was standing HR showed 32 bpm increase compared to sitting position.   3- Maneuvers in sitting position:   A. Carotid sinus massage:   Left: Chad /80 mmHg,  bpm, , No symptoms.   Right: Chad /77 mmHg, HR 99 bpm, , No symptoms.   B. Valsalva:   - Phase 1: present.   - Phase 2: present   - Phase 3: present   - Phase 4: present   Chad BP was 75/60 and HR was 121 during valsalva maneuver. Patient felt somewhat dizzy during valsalva however symptom immediately improved after she stopped valsalva.   C. Cough:   Couplets: Chad /81 mmHg,  bpm, No symptoms.   Spasm: Chad /78 mmHg,  bpm, No symptoms.   4- Supine  for 10-15 minutes: /75 mmHg, HR 90 bpm. No significant change in hemodynamic profile. No symptoms.  5- TILT at 70 degrees for 20 minutes: BP 97/62 mmHg, HR 101pm. No significant change in hemodynamic profile. No symptoms.  6 TILT at 70 degrees AFTER ABDOMINAL BINDER for 5 minutes: /77 mmHg, HR 103pm. No significant change in hemodynamic profile. No symptoms.   7 TILT at 70 degrees AFTER 1 LITER OF FLUID for 5 minutes: /83 mmHg, HR 89pm. No significant change in hemodynamic profile. No symptoms.   DISCUSSION:  - Autonomic function test: consistent with normal physiologic response.  - TILT table test:   POTS   No vasovagal syncope  ECHOCARDIOGRAM: 11/25/2013    Interpretation Summary  Poor image quality Technically difficult study 1. Grossly normal LV size   and systolic function. Cannot assess diastolic function 2. Normal RV size   and function    Assessment and Plan:   46 yo woman with previous POTS-like complaints but now seems nikolay like Immediate OH presumably due to Autonomic  Dysfunction of unknown cause.     1)  CBC, BNP, and CRP today and before 6 month visit  2)  Continue to consume 50-60 oz of water.  1/2 pedialyte,  (switch off propel and start pedialyte)  3)  Start isometric exercises  4)  Use spandx  5)  Move coffee table to avoid trauma  6)  When going from standing to sitting.  Stand still for 15 seconds before moving.   7)  Return in 6 months with labs prior.          I  appreciated the opportunity to see and assess Ms Keisha Somers in the clinic today.     Please do not hesitate to contact my office if you have any questions or concerns.        LACHELLE Munoz, CNP          HPI:   Keisha Somers is a pleasant 46 yo woman with symptoms that are primarily orthostatic in nature. In past 6 mos , fore  Unclear reasons, has had more orthostatic lightheadedness and syncope. Fortunately no injury    7/15/2016 Has not had any evident major illness or med change to account for why she is  "doing worse now than last summer. Main issue occurs after change of posture to upright position and is almost immediate suggesting \"immediate OH\"We reviewed prev recommendations re salt, volume, meds (midodrine 16 TID and fludrocortisone 0.1).  She lives independently but in an Assisted Living facility so she has access to help if needed.   Currently on 3 drugs (Fludro, midodrine and pyridiostigmine). At her last visit we reduced fludro to 0.1 mg daily.  Dx Autonomic dysfunction, Immediate OH syndrome on multiple meds that may be complicating her picture BUT none are clearcut causes of OH.  PLAN: Continue current medication regimen.  Support continued aggressive electrolyte-based hydration. Encourage recumbent exercise. We discussed options in detail for 30 min including standing training and lower body isometrics as well as lower body constrictive clothing (Spanx)    2/2/2017  Continues to see psychiatry for anorexia and depression.   Will have syncope 1-2x/ week usually occur from sitting to standing all times during the day.  Multiple times a day presyncope/dizziness.  Symptoms include vision going and palpitation.  Has hurt herself on a coffee table.  Other weeks no syncope but h as not found a pattern.  Does have life alert and goes off frequently.   Taking midodrine 5mg TID morning, noon, evening, florinef 0.1mg  in the am, and pyridostigmine in the pm.   Hydration drinking 50-60 oz half of it is propel.   Adding salt at table.  Standing training BID,  Not wearing Spandx or doing recumbent exercises.  Concerned about Feet, nose and fingers turn white particularly in the winter.    Currently has a feeding last time used was 2-3 months ago.  BP at home .   Has Fluid by her at night.      Today's EKG - Normal sinus rhythm with ventriclar rate of 61    PAST MEDICAL HISTORY:  Past Medical History   Diagnosis Date     Eating disorder      Thyroid disease      Hypotension, postural      Syncope and collapse  "     Syncope      Palpitations        CURRENT MEDICATIONS:  Current Outpatient Prescriptions   Medication Sig Dispense Refill     dexmethylphenidate (FOCALIN) 10 MG tablet Take 1 tablet (10 mg) by mouth daily 30 tablet 0     eszopiclone (LUNESTA) 3 MG tablet Take 1 tablet (3 mg) by mouth At Bedtime 30 tablet 3     diphenhydrAMINE HCl, Sleep, 25 MG TABS 1-2 tabs QHS prn sleep 60 tablet 3     midodrine (PROAMATINE) 5 MG tablet TAKE 3 TABLETS (15MG) BY MOUTH THREE TIMES A  tablet 5     lamoTRIgine (LAMICTAL) 200 MG tablet Take 2 tablets (400 mg) by mouth daily 60 tablet 4     sertraline (ZOLOFT) 100 MG tablet Take 1.5 tablets (150 mg) by mouth daily 45 tablet 4     fludrocortisone (FLORINEF) 0.1 MG tablet TAKE 1 TABLET (0.1MG) BY MOUTH DAILY 90 tablet 3     levothyroxine (SYNTHROID, LEVOTHROID) 175 MCG tablet Take  by mouth daily.       Cholecalciferol (VITAMIN D PO) Take  by mouth. 50, 000 units every 14 days       Emollient (OINTMENT BASE EX) Externally apply  topically daily. Mypirocin 2% apply to G Tube ulsers daily       multivitamin (OCUVITE) TABS Take 1 tablet by mouth 2 times daily.       sucralfate (CARAFATE) 1 GM/10ML suspension Take 1 g by mouth 2 times daily. Take 10 Ml po daily       acetaminophen (TYLENOL) 500 MG tablet Take 500-1,000 mg by mouth every 6 hours as needed.       ibuprofen (IBU) 800 MG tablet Take 800 mg by mouth every 8 hours as needed.       Calcium Carbonate-Vitamin D (CALCIUM 600+D PO) Take 1 tablet by mouth 2 times daily.       docusate sodium (COLACE) 100 MG capsule Take 1 capsule by mouth 2 times daily.       metoclopramide (REGLAN) 10 MG tablet 2 times daily        omeprazole (PRILOSEC) 20 MG capsule Take 2 capsules by mouth 2 times daily.       pyridostigmine (MESTINON) 60 MG tablet Take 1 tablet by mouth daily.         PAST SURGICAL HISTORY:  Past Surgical History   Procedure Laterality Date     Gt placed[  2001       ALLERGIES:     No Known Allergies    FAMILY HISTORY:  None  pertinent    SOCIAL HISTORY:  Social History   Substance Use Topics     Smoking status: Never Smoker      Smokeless tobacco: Never Used     Alcohol Use: No       ROS:   Constitutional: No fever, chills, or sweats. Weight stable.   ENT: No visual disturbance, ear ache, epistaxis, sore throat.   Cardiovascular: As per HPI.   Respiratory: No cough, hemoptysis.    GI: No nausea, vomiting, hematemesis, melena, or hematochezia.   : No hematuria.   Integument: Negative.   Psychiatric: Negative.   Hematologic: no easy bleeding.  Neuro: Negative.   Endocrinology: No significant heat or cold intolerance   Musculoskeletal: No myalgia.    Exam:   Orthostatic Vitals   BP Pulse Position Site Cuff Size Time Date   107/77 115 Standing Left Arm Regular 11:12 AM 2/2/2017   97/67 112 Standing Left Arm Regular 11:10 AM 2/2/2017   112/73 75 Sitting Left Arm Regular 11:09 AM 2/2/2017   123/78 66 Supine Left Arm Regular 11:07 AM 2/2/2017     No repeat blood pressure data filed.  No peak flow data filed.  No pain information filed.    GENERAL APPEARANCE: healthy, alert and no distress  HEENT: no icterus, no xanthelasmas.  NECK:  no asymmetry, masses, or scars, and no bruits, JVP not elevated  RESPIRATORY: lungs clear to auscultation - no rales, rhonchi or wheezes, no use of accessory muscles, no retractions, respirations are unlabored, normal respiratory rate  CARDIOVASCULAR: regular rhythm, normal S1 with physiologic split S2, no S3 or S4 and no murmur, click or rub,   ABDOMEN: soft, non tender, without hepatosplenomegaly,  no abdominal bruits. G-tube.     Labs:  CBC RESULTS:   Lab Results   Component Value Date    WBC 6.4 02/02/2017    RBC 3.87 02/02/2017    HGB 11.7 02/02/2017    HCT 37.0 02/02/2017    MCV 96 02/02/2017    MCH 30.2 02/02/2017    MCHC 31.6 02/02/2017    RDW 12.9 02/02/2017     02/02/2017       BMP RESULTS:  Lab Results   Component Value Date     02/02/2017    POTASSIUM 4.2 02/02/2017    CHLORIDE 105  02/02/2017    CO2 29 02/02/2017    ANIONGAP 8 02/02/2017    * 02/02/2017    BUN 12 02/02/2017    CR 0.90 02/02/2017    GFRESTIMATED 67 02/02/2017    GFRESTBLACK 82 02/02/2017    KENNETH 9.1 02/02/2017        Procedures:  11/25/2013: Tilt/Autonomic Study   The following maneuvers were done sequentially:  1- Sitting position for 15 minutes: /80 mmHg,  bpm. No significant change in hemodynamic profile. No symptoms.  2- Orthostatism for 5 minutes:   A. Immediate: Chad BP 84/51 mmHg,  bpm, No symptoms.   B. Delayed: Chad /87 mmHg,  bpm, No symptoms.   In 5 minutes after patient was standing HR showed 32 bpm increase compared to sitting position.   3- Maneuvers in sitting position:   A. Carotid sinus massage:   Left: Chad /80 mmHg,  bpm, , No symptoms.   Right: Chad /77 mmHg, HR 99 bpm, , No symptoms.   B. Valsalva:   - Phase 1: present.   - Phase 2: present   - Phase 3: present   - Phase 4: present   Chad BP was 75/60 and HR was 121 during valsalva maneuver. Patient felt somewhat dizzy during valsalva however symptom immediately improved after she stopped valsalva.   C. Cough:   Couplets: Chad /81 mmHg,  bpm, No symptoms.   Spasm: Chad /78 mmHg,  bpm, No symptoms.   4- Supine for 10-15 minutes: /75 mmHg, HR 90 bpm. No significant change in hemodynamic profile. No symptoms.  5- TILT at 70 degrees for 20 minutes: BP 97/62 mmHg, HR 101pm. No significant change in hemodynamic profile. No symptoms.  6 TILT at 70 degrees AFTER ABDOMINAL BINDER for 5 minutes: /77 mmHg, HR 103pm. No significant change in hemodynamic profile. No symptoms.   7 TILT at 70 degrees AFTER 1 LITER OF FLUID for 5 minutes: /83 mmHg, HR 89pm. No significant change in hemodynamic profile. No symptoms.   DISCUSSION:  - Autonomic function test: consistent with normal physiologic response.  - TILT table test:   POTS   No vasovagal syncope  ECHOCARDIOGRAM:  11/25/2013    Interpretation Summary  Poor image quality Technically difficult study 1. Grossly normal LV size   and systolic function. Cannot assess diastolic function 2. Normal RV size   and function    Assessment and Plan:   48 yo woman with previous POTS-like complaints but now seems nikolay like Immediate OH presumably due to Autonomic  Dysfunction of unknown cause.     1)  CBC, BNP, and CRP today and before 6 month visit  2)  Continue to consume 50-60 oz of water.  1/2 pedialyte,  (switch off propel and start pedialyte)  3)  Start isometric exercises  4)  Use spandx  5)  Move coffee table to avoid trauma  6)  When going from standing to sitting.  Stand still for 15 seconds before moving.   7)  Return in 6 months with labs prior.          I  appreciated the opportunity to see and assess Ms Keisha Somers in the clinic today.     Please do not hesitate to contact my office if you have any questions or concerns.        Lilly Rebolledo, LACHELLE, CNP    I very much appreciated the opportunity to see and assess Mrs Keisha Somers in the clinic with Lilly Rebolledo CNP . Please do not hesitate to contact my office if you have any questions or concerns.      Thang Galarza MD  Cardiac Arrhythmia Service  [facility]  [phone]

## 2017-02-02 NOTE — PROGRESS NOTES
"  HPI:   Keisha Somers is a pleasant 48 yo woman with symptoms that are primarily orthostatic in nature. In past 6 mos , fore  Unclear reasons, has had more orthostatic lightheadedness and syncope. Fortunately no injury    7/15/2016 Has not had any evident major illness or med change to account for why she is doing worse now than last summer. Main issue occurs after change of posture to upright position and is almost immediate suggesting \"immediate OH\"We reviewed prev recommendations re salt, volume, meds (midodrine 16 TID and fludrocortisone 0.1).  She lives independently but in an Assisted Living facility so she has access to help if needed.   Currently on 3 drugs (Fludro, midodrine and pyridiostigmine). At her last visit we reduced fludro to 0.1 mg daily.  Dx Autonomic dysfunction, Immediate OH syndrome on multiple meds that may be complicating her picture BUT none are clearcut causes of OH.  PLAN: Continue current medication regimen.  Support continued aggressive electrolyte-based hydration. Encourage recumbent exercise. We discussed options in detail for 30 min including standing training and lower body isometrics as well as lower body constrictive clothing (Spanx)    2/2/2017  Continues to see psychiatry for anorexia and depression.   Will have syncope 1-2x/ week usually occur from sitting to standing all times during the day.  Multiple times a day presyncope/dizziness.  Symptoms include vision going and palpitation.  Has hurt herself on a coffee table.  Other weeks no syncope but h as not found a pattern.  Does have life alert and goes off frequently.   Taking midodrine 5mg TID morning, noon, evening, florinef 0.1mg  in the am, and pyridostigmine in the pm.   Hydration drinking 50-60 oz half of it is propel.   Adding salt at table.  Standing training BID,  Not wearing Spandx or doing recumbent exercises.  Concerned about Feet, nose and fingers turn white particularly in the winter.    Currently has a feeding " last time used was 2-3 months ago.  BP at home .   Has Fluid by her at night.      Today's EKG - Normal sinus rhythm with ventriclar rate of 61    PAST MEDICAL HISTORY:  Past Medical History   Diagnosis Date     Eating disorder      Thyroid disease      Hypotension, postural      Syncope and collapse      Syncope      Palpitations        CURRENT MEDICATIONS:  Current Outpatient Prescriptions   Medication Sig Dispense Refill     dexmethylphenidate (FOCALIN) 10 MG tablet Take 1 tablet (10 mg) by mouth daily 30 tablet 0     eszopiclone (LUNESTA) 3 MG tablet Take 1 tablet (3 mg) by mouth At Bedtime 30 tablet 3     diphenhydrAMINE HCl, Sleep, 25 MG TABS 1-2 tabs QHS prn sleep 60 tablet 3     midodrine (PROAMATINE) 5 MG tablet TAKE 3 TABLETS (15MG) BY MOUTH THREE TIMES A  tablet 5     lamoTRIgine (LAMICTAL) 200 MG tablet Take 2 tablets (400 mg) by mouth daily 60 tablet 4     sertraline (ZOLOFT) 100 MG tablet Take 1.5 tablets (150 mg) by mouth daily 45 tablet 4     fludrocortisone (FLORINEF) 0.1 MG tablet TAKE 1 TABLET (0.1MG) BY MOUTH DAILY 90 tablet 3     levothyroxine (SYNTHROID, LEVOTHROID) 175 MCG tablet Take  by mouth daily.       Cholecalciferol (VITAMIN D PO) Take  by mouth. 50, 000 units every 14 days       Emollient (OINTMENT BASE EX) Externally apply  topically daily. Mypirocin 2% apply to G Tube ulsers daily       multivitamin (OCUVITE) TABS Take 1 tablet by mouth 2 times daily.       sucralfate (CARAFATE) 1 GM/10ML suspension Take 1 g by mouth 2 times daily. Take 10 Ml po daily       acetaminophen (TYLENOL) 500 MG tablet Take 500-1,000 mg by mouth every 6 hours as needed.       ibuprofen (IBU) 800 MG tablet Take 800 mg by mouth every 8 hours as needed.       Calcium Carbonate-Vitamin D (CALCIUM 600+D PO) Take 1 tablet by mouth 2 times daily.       docusate sodium (COLACE) 100 MG capsule Take 1 capsule by mouth 2 times daily.       metoclopramide (REGLAN) 10 MG tablet 2 times daily         omeprazole (PRILOSEC) 20 MG capsule Take 2 capsules by mouth 2 times daily.       pyridostigmine (MESTINON) 60 MG tablet Take 1 tablet by mouth daily.         PAST SURGICAL HISTORY:  Past Surgical History   Procedure Laterality Date     Gt placed[  2001       ALLERGIES:     No Known Allergies    FAMILY HISTORY:  None pertinent    SOCIAL HISTORY:  Social History   Substance Use Topics     Smoking status: Never Smoker      Smokeless tobacco: Never Used     Alcohol Use: No       ROS:   Constitutional: No fever, chills, or sweats. Weight stable.   ENT: No visual disturbance, ear ache, epistaxis, sore throat.   Cardiovascular: As per HPI.   Respiratory: No cough, hemoptysis.    GI: No nausea, vomiting, hematemesis, melena, or hematochezia.   : No hematuria.   Integument: Negative.   Psychiatric: Negative.   Hematologic: no easy bleeding.  Neuro: Negative.   Endocrinology: No significant heat or cold intolerance   Musculoskeletal: No myalgia.    Exam:   Orthostatic Vitals   BP Pulse Position Site Cuff Size Time Date   107/77 115 Standing Left Arm Regular 11:12 AM 2/2/2017   97/67 112 Standing Left Arm Regular 11:10 AM 2/2/2017   112/73 75 Sitting Left Arm Regular 11:09 AM 2/2/2017   123/78 66 Supine Left Arm Regular 11:07 AM 2/2/2017     No repeat blood pressure data filed.  No peak flow data filed.  No pain information filed.    GENERAL APPEARANCE: healthy, alert and no distress  HEENT: no icterus, no xanthelasmas.  NECK:  no asymmetry, masses, or scars, and no bruits, JVP not elevated  RESPIRATORY: lungs clear to auscultation - no rales, rhonchi or wheezes, no use of accessory muscles, no retractions, respirations are unlabored, normal respiratory rate  CARDIOVASCULAR: regular rhythm, normal S1 with physiologic split S2, no S3 or S4 and no murmur, click or rub,   ABDOMEN: soft, non tender, without hepatosplenomegaly,  no abdominal bruits. G-tube.     Labs:  CBC RESULTS:   Lab Results   Component Value Date    WBC 6.4  02/02/2017    RBC 3.87 02/02/2017    HGB 11.7 02/02/2017    HCT 37.0 02/02/2017    MCV 96 02/02/2017    MCH 30.2 02/02/2017    MCHC 31.6 02/02/2017    RDW 12.9 02/02/2017     02/02/2017       BMP RESULTS:  Lab Results   Component Value Date     02/02/2017    POTASSIUM 4.2 02/02/2017    CHLORIDE 105 02/02/2017    CO2 29 02/02/2017    ANIONGAP 8 02/02/2017    * 02/02/2017    BUN 12 02/02/2017    CR 0.90 02/02/2017    GFRESTIMATED 67 02/02/2017    GFRESTBLACK 82 02/02/2017    KENNETH 9.1 02/02/2017        Procedures:  11/25/2013: Tilt/Autonomic Study   The following maneuvers were done sequentially:  1- Sitting position for 15 minutes: /80 mmHg,  bpm. No significant change in hemodynamic profile. No symptoms.  2- Orthostatism for 5 minutes:   A. Immediate: Chad BP 84/51 mmHg,  bpm, No symptoms.   B. Delayed: Chad /87 mmHg,  bpm, No symptoms.   In 5 minutes after patient was standing HR showed 32 bpm increase compared to sitting position.   3- Maneuvers in sitting position:   A. Carotid sinus massage:   Left: Chad /80 mmHg,  bpm, , No symptoms.   Right: Chad /77 mmHg, HR 99 bpm, , No symptoms.   B. Valsalva:   - Phase 1: present.   - Phase 2: present   - Phase 3: present   - Phase 4: present   Chad BP was 75/60 and HR was 121 during valsalva maneuver. Patient felt somewhat dizzy during valsalva however symptom immediately improved after she stopped valsalva.   C. Cough:   Couplets: Chad /81 mmHg,  bpm, No symptoms.   Spasm: Chad /78 mmHg,  bpm, No symptoms.   4- Supine for 10-15 minutes: /75 mmHg, HR 90 bpm. No significant change in hemodynamic profile. No symptoms.  5- TILT at 70 degrees for 20 minutes: BP 97/62 mmHg, HR 101pm. No significant change in hemodynamic profile. No symptoms.  6 TILT at 70 degrees AFTER ABDOMINAL BINDER for 5 minutes: /77 mmHg, HR 103pm. No significant change in hemodynamic profile. No  symptoms.   7 TILT at 70 degrees AFTER 1 LITER OF FLUID for 5 minutes: /83 mmHg, HR 89pm. No significant change in hemodynamic profile. No symptoms.   DISCUSSION:  - Autonomic function test: consistent with normal physiologic response.  - TILT table test:   POTS   No vasovagal syncope  ECHOCARDIOGRAM: 11/25/2013    Interpretation Summary  Poor image quality Technically difficult study 1. Grossly normal LV size   and systolic function. Cannot assess diastolic function 2. Normal RV size   and function    Assessment and Plan:   48 yo woman with previous POTS-like complaints but now seems nikolay like Immediate OH presumably due to Autonomic  Dysfunction of unknown cause.     1)  CBC, BNP, and CRP today and before 6 month visit  2)  Continue to consume 50-60 oz of water.  1/2 pedialyte,  (switch off propel and start pedialyte)  3)  Start isometric exercises  4)  Use spandx  5)  Move coffee table to avoid trauma  6)  When going from standing to sitting.  Stand still for 15 seconds before moving.   7)  Return in 6 months with labs prior.          I  appreciated the opportunity to see and assess Ms Keisha Somers in the clinic today.     Please do not hesitate to contact my office if you have any questions or concerns.        Lilly Rebolledo, LACHELLE, CNP    I very much appreciated the opportunity to see and assess Mrs Keisha Somers in the clinic with Lilly Rebolledo CNP . Please do not hesitate to contact my office if you have any questions or concerns.      Thang Galarza MD  Cardiac Arrhythmia Service  [facility]  [phone]

## 2017-02-03 LAB — INTERPRETATION ECG - MUSE: NORMAL

## 2017-03-07 ENCOUNTER — OFFICE VISIT (OUTPATIENT)
Dept: PSYCHIATRY | Facility: CLINIC | Age: 47
End: 2017-03-07
Attending: PSYCHIATRY & NEUROLOGY
Payer: MEDICARE

## 2017-03-07 VITALS
BODY MASS INDEX: 20.37 KG/M2 | HEART RATE: 108 BPM | DIASTOLIC BLOOD PRESSURE: 84 MMHG | SYSTOLIC BLOOD PRESSURE: 117 MMHG | WEIGHT: 134 LBS

## 2017-03-07 DIAGNOSIS — F51.01 PRIMARY INSOMNIA: ICD-10-CM

## 2017-03-07 DIAGNOSIS — F33.1 MAJOR DEPRESSIVE DISORDER, RECURRENT EPISODE, MODERATE (H): ICD-10-CM

## 2017-03-07 DIAGNOSIS — F33.0 MAJOR DEPRESSIVE DISORDER, RECURRENT EPISODE, MILD (H): ICD-10-CM

## 2017-03-07 PROCEDURE — 99212 OFFICE O/P EST SF 10 MIN: CPT | Mod: ZF

## 2017-03-07 RX ORDER — DEXMETHYLPHENIDATE HYDROCHLORIDE 10 MG/1
10 TABLET ORAL DAILY
Qty: 30 TABLET | Refills: 0 | Status: SHIPPED | OUTPATIENT
Start: 2017-03-07 | End: 2017-03-07

## 2017-03-07 RX ORDER — ESZOPICLONE 3 MG/1
3 TABLET, FILM COATED ORAL AT BEDTIME
Qty: 30 TABLET | Refills: 3 | Status: SHIPPED | OUTPATIENT
Start: 2017-03-07 | End: 2017-07-07

## 2017-03-07 RX ORDER — DEXMETHYLPHENIDATE HYDROCHLORIDE 10 MG/1
10 TABLET ORAL DAILY
Qty: 30 TABLET | Refills: 0 | Status: SHIPPED | OUTPATIENT
Start: 2017-03-07 | End: 2017-05-02

## 2017-03-07 RX ORDER — SERTRALINE HYDROCHLORIDE 100 MG/1
150 TABLET, FILM COATED ORAL DAILY
Qty: 45 TABLET | Refills: 4 | Status: SHIPPED | OUTPATIENT
Start: 2017-03-07 | End: 2017-08-08

## 2017-03-07 RX ORDER — LAMOTRIGINE 200 MG/1
400 TABLET ORAL DAILY
Qty: 60 TABLET | Refills: 4 | Status: SHIPPED | OUTPATIENT
Start: 2017-03-07 | End: 2017-08-08

## 2017-03-07 NOTE — MR AVS SNAPSHOT
After Visit Summary   3/7/2017    Keisha Somers    MRN: [id number]           Patient Information     Date Of Birth          1970        Visit Information        Provider Department      3/7/2017 2:00 PM Romana Tena MD Psychiatry Clinic        Today's Diagnoses     Major depressive disorder, recurrent episode, moderate (H)        Major depressive disorder, recurrent episode, mild (H)        Primary insomnia           Follow-ups after your visit        Follow-up notes from your care team     Return in about 2 months (around 5/7/2017).      Your next 10 appointments already scheduled     May 02, 2017  1:00 PM CDT   Adult Med Follow UP with Romana Tena MD   Psychiatry Clinic ([facility])    [address]  [address] [facility] [id number] [phone]            Aug 03, 2017 11:00 AM CDT   Lab with  LAB    Health Lab ([facility])    [address]  1st Floor  [facility] [address] [phone]            Aug 03, 2017 11:30 AM CDT   (Arrive by 11:15 AM)   RETURN ARRHYTHMIA with Thang Galarza MD   [facility] ([facility])    [address]  3rd Floor  [facility] [address] [phone]              Who to contact     Please call your clinic at [phone] to:    Ask questions about your health    Make or cancel appointments    Discuss your medicines    Learn about your test results    Speak to your doctor   If you have compliments or concerns about an experience at your clinic, or if you wish to file a complaint, please contact [facility] Patient Relations at [phone] or email us at [email or web address]         Additional Information About Your Visit        MyChart Information     [employer] is an electronic gateway that provides easy, online access to your medical records. With [employer], you can request a clinic  appointment, read your test results, renew a prescription or communicate with your care team.     To sign up for [employer] visit the website at [email or web address]   You will be asked to enter the access code listed below, as well as some personal information. Please follow the directions to create your username and password.     Your access code is: [email or web address]  Expires: 2017  6:30 AM     Your access code will  in 90 days. If you need help or a new code, please contact your [facility] or call [phone] for assistance.        Care EveryWhere ID     This is your Care EveryWhere ID. This could be used by other organizations to access your [place] medical records  [id number]        Your Vitals Were     Pulse BMI (Body Mass Index)                108 20.37 kg/m2           Blood Pressure from Last 3 Encounters:   17 117/84   17 116/69   10/11/16 111/71    Weight from Last 3 Encounters:   17 60.8 kg (134 lb)   17 62.3 kg (137 lb 4.8 oz)   17 62.8 kg (138 lb 6.4 oz)              Today, you had the following     No orders found for display         Today's Medication Changes          These changes are accurate as of: 3/7/17  2:36 PM.  If you have any questions, ask your nurse or doctor.               Start taking these medicines.        Dose/Directions    dexmethylphenidate 10 MG tablet   Commonly known as:  FOCALIN   Used for:  Major depressive disorder, recurrent episode, moderate (H)   Started by:  Romana Tena MD        Dose:  10 mg   Take 1 tablet (10 mg) by mouth daily   Quantity:  30 tablet   Refills:  0            Where to get your medicines      These medications were [place] [facility] [place] [address], [place] [address]     Phone:  [phone]     lamoTRIgine 200 MG tablet    sertraline 100 MG tablet         Some of these will need a paper prescription and others can  be bought over the counter.  Ask your nurse if you have questions.     Bring a paper prescription for each of these medications     dexmethylphenidate 10 MG tablet    eszopiclone 3 MG tablet                Primary Care Provider Office Phone # Fax #    Lavonne Zarate [phone]       [facility] [address]  [place] [address]        Thank you!     Thank you for choosing PSYCHIATRY CLINIC  for your care. Our goal is always to provide you with excellent care. Hearing back from our patients is one way we can continue to improve our services. Please take a few minutes to complete the written survey that you may receive in the mail after your visit with us. Thank you!             Your Updated Medication List - Protect others around you: Learn how to safely use, store and throw away your medicines at www.disposemymeds.org.          This list is accurate as of: 3/7/17  2:36 PM.  Always use your most recent med list.                   Brand Name Dispense Instructions for use    acetaminophen 500 MG tablet    TYLENOL     Take 500-1,000 mg by mouth every 6 hours as needed.       CALCIUM 600+D PO      Take 1 tablet by mouth 2 times daily.       CARAFATE 1 GM/10ML suspension   Generic drug:  sucralfate      Take 1 g by mouth 2 times daily. Take 10 Ml po daily       dexmethylphenidate 10 MG tablet    FOCALIN    30 tablet    Take 1 tablet (10 mg) by mouth daily       diphenhydrAMINE HCl (Sleep) 25 MG Tabs     60 tablet    1-2 tabs QHS prn sleep       docusate sodium 100 MG capsule    COLACE     Take 1 capsule by mouth 2 times daily.       eszopiclone 3 MG tablet    LUNESTA    30 tablet    Take 1 tablet (3 mg) by mouth At Bedtime       fludrocortisone 0.1 MG tablet    FLORINEF    90 tablet    TAKE 1 TABLET (0.1MG) BY MOUTH DAILY        MG tablet   Generic drug:  ibuprofen      Take 800 mg by mouth every 8 hours as needed.       lamoTRIgine 200 MG tablet    LaMICtal    60 tablet    Take 2  tablets (400 mg) by mouth daily       levothyroxine 175 MCG tablet    SYNTHROID/LEVOTHROID     Take  by mouth daily.       metoclopramide 10 MG tablet    REGLAN     2 times daily       midodrine 5 MG tablet    PROAMATINE    270 tablet    TAKE 3 TABLETS (15MG) BY MOUTH THREE TIMES A DAY       multivitamin Tabs tablet      Take 1 tablet by mouth 2 times daily.       OINTMENT BASE EX      Externally apply  topically daily. Mypirocin 2% apply to G Tube ulsers daily       omeprazole 20 MG CR capsule    priLOSEC     Take 2 capsules by mouth 2 times daily.       pyridostigmine 60 MG tablet    MESTINON     Take 1 tablet by mouth daily.       sertraline 100 MG tablet    ZOLOFT    45 tablet    Take 1.5 tablets (150 mg) by mouth daily       VITAMIN D PO      Take  by mouth. 50, 000 units every 14 days

## 2017-03-07 NOTE — PROGRESS NOTES
Office Visit     3/7/2017  Psychiatry Clinic    Romana Tena MD   Psychiatry    Major depressive disorder, recurrent episode, moderate (H) +1 more   Dx    Recheck Medication ; Referred by Lavonne Zarate MD   Reason for visit      Progress Notes   Romana Tena MD (Physician)     Psychiatry      PSYCHIATRY VISIT     The patient was seen for evaluation and management. [id number] . The patient was seen for anorexia and depression.   She was last seen 2 mo ago.    INTERIM HX: Since the last visit she developed kidney stones and needed surgery and a stent placed.  This was same day surgery.  She had 9 stones.  She has had more migraines again.  No clear etiology.  Pt saw PCP in Jan regarding removal of tube.  She is awaiting a response from her MD about the details of this.  Patient says she is more than ready.  She does admit that she is a little scared, understandably since it has been in for yrs. . The last tube feeding was Jan early.  Weight has not changed with eating on her own.    Patient says she was really frustrated with her therapist. Her therapist said that she wasn't bringing her any concerns. They have since resolved the issues.   The patient saw Dr. Galarza regarding syncopal episodes. He was going to change meds but instead pushed fluids.  The episodes vary from 0-3/week and are unpredictable excep tmore so in the ams. One episode she injured her ribs.     Mood is good, energy is pretty good.Sleep is improved with addition of  benadryl at 2/hs. She takes meds between 930-1000pm This seems to work well.    The Lunesta is definitely helping especially in combination with benadryl 50 QHS  ROS: Headaches. Syncopal episodes - random,   - Aracelis [place],  She makes odd comments.  [facility] worker- sees twice yearly, Trisha   Home health nurse: Daphne - good relationship  , maybe monthly once the tube is out     MEDICATIONS:   1. Lamictal 400 mg daily.   2. Zoloft 150 mg daily.   3. Focalin 10 mg daily.  Beneficial for energy, mood and no indications of problematic use.   4. Synthroid 150 mcg  5. Zofran  6. Benadryl 50 mg/hs  7. Mitodrine  8. Lunesta 3 mg - this appears to have made a huge difference in her sleep    9. Carafate  11. Omeprazole  12. Pyridostigmine  15. Tizanidine   16. Fluorinef-   17. Maxalt for migraines. It works very well.  She has been having a few more migraines.  18. Prevastatin  19. Mg- added recently  /84  Pulse 108  Wt 60.8 kg (134 lb)  BMI 20.37 kg/m2          MENTAL STATUS EXAM: The patient is neat, oriented x3, on time for appointment. Mood is good. Good range of affect. Thought processes, associations are logical and goal directed. Thought content is normal. Fund of knowledge good, speech RRR, language intact, memory intact, concentration good, nsight and judgment are good,   ASSESSMENT: Anorexia nervosa, weight stable; major depressive disorder,, stable.,; chronic sleep problems,improved; migraine HA, treated  with maxalt, syncopal episodes  PLAN:   No change in medications.    She will continue to see her therapist, Noemi.    I do support the plan for discontinuation of tube feedings (she has been on them for 14 yrs) as she has been very stable. She will return again in 2 mo. . Focalin Rxs printed.   RADHA MAURER MD   I spent a total of 30 minutes face-to-face with Keisha Somers during today's office visit. Over 50% of this time was spent counseling the patient and/or coordinating care regarding discontinuing tube feedings.. See note for details.

## 2017-03-08 ENCOUNTER — TELEPHONE (OUTPATIENT)
Dept: PSYCHIATRY | Facility: CLINIC | Age: 47
End: 2017-03-08

## 2017-03-08 NOTE — LETTER
March 8, 2017      Re: Keisha Somers   [address]    [facility] [address]         Dear Dr. Zarate    I have been Keisha's psychiatrist for many years related to her anorexia nervosa and depression. She has been eating entirely on her own without any tube feedings for 2 months and weight has been stable. I totally support the removal of her tube. She is stable psychiatrically and has many supports in place.       Sincerely,      Romana Tena MD

## 2017-03-13 ENCOUNTER — TELEPHONE (OUTPATIENT)
Dept: PSYCHIATRY | Facility: CLINIC | Age: 47
End: 2017-03-13

## 2017-04-19 DIAGNOSIS — F33.1 MAJOR DEPRESSIVE DISORDER, RECURRENT EPISODE, MODERATE (H): ICD-10-CM

## 2017-04-19 DIAGNOSIS — F51.01 PRIMARY INSOMNIA: ICD-10-CM

## 2017-04-19 NOTE — TELEPHONE ENCOUNTER
Medication Requested: Dephenhydramine 25 mg caps  Last Written RX Date: 1-3-17  Last Pharmacy Fill Date: 2-28-17 (100 caps for insurance reasons)  Last RX Quantity: 60,   # refills: 3    Last Office Visit: 3-4-17  Recommended RTC: 2 mo  Next Scheduled Office Visit: 5-2-17    Since last Visit: # of CX 0 ,# of NOS 0    Last Visit Recommendations for:  Medications: no change  Labs: 0    Will refill 100 day supply per protocol and to  Meet insurance requirements.    Kathleen M Doege RN

## 2017-05-02 ENCOUNTER — OFFICE VISIT (OUTPATIENT)
Dept: PSYCHIATRY | Facility: CLINIC | Age: 47
End: 2017-05-02
Attending: PSYCHIATRY & NEUROLOGY
Payer: MEDICARE

## 2017-05-02 VITALS
HEART RATE: 87 BPM | SYSTOLIC BLOOD PRESSURE: 127 MMHG | WEIGHT: 136 LBS | DIASTOLIC BLOOD PRESSURE: 85 MMHG | BODY MASS INDEX: 20.68 KG/M2

## 2017-05-02 DIAGNOSIS — F51.01 PRIMARY INSOMNIA: ICD-10-CM

## 2017-05-02 DIAGNOSIS — F33.1 MAJOR DEPRESSIVE DISORDER, RECURRENT EPISODE, MODERATE (H): ICD-10-CM

## 2017-05-02 PROCEDURE — 99212 OFFICE O/P EST SF 10 MIN: CPT | Mod: ZF

## 2017-05-02 RX ORDER — DEXMETHYLPHENIDATE HYDROCHLORIDE 10 MG/1
10 TABLET ORAL DAILY
Qty: 30 TABLET | Refills: 0 | Status: SHIPPED | OUTPATIENT
Start: 2017-05-02 | End: 2017-08-01

## 2017-05-02 RX ORDER — DEXMETHYLPHENIDATE HYDROCHLORIDE 10 MG/1
10 TABLET ORAL DAILY
Qty: 30 TABLET | Refills: 0 | Status: SHIPPED | OUTPATIENT
Start: 2017-05-02 | End: 2017-05-02

## 2017-05-02 NOTE — PROGRESS NOTES
"     []Hide copied text  Office Visit   5/2//2017  Psychiatry Clinic    Romana Tena MD   Psychiatry    Major depressive disorder, recurrent episode, moderate (H) +1 more   Dx    Recheck Medication ; Referred by Lavonne Ritchie MD   Reason for visit       Progress Notes   Romana Tena MD (Physician)     Psychiatry       PSYCHIATRY VISIT      The patient was seen for evaluation and management.The patient was seen for anorexia and depression.   She was last seen 2 mo ago.    INTERIM HX:  The patient had no appts for 2 weeks then now a flurry of them. She has ongoing HA's for 2 months and saw neurologist who recommended steroid injection which occurred yesterday.  Problem is connected with the nerve.  She passed out from the steroid. It has not yet helped the HA. She will go to PT.     No further problems with kidney stones.    The g-tube is now out - 2 mo ago.  She is adjusting to it. Eating is ok, she has a home nurse, she will see Dr. Ritchie monthly for 3 months. She reports she is eating regularly, seeing her therapist regularly.  It is sore at the moment. Although planned The syncopal episodes still occur unexpectedly.    Sleep is a bit more interrupted due to HA/s.  Mood is good, energy down a little, good interests, motivation.  Memory is a bit \"off.\" She is distracted .     Sleep is improved with addition of benadryl at 2/hs. She takes meds between 930-1000pm This seems to work well.    The Lunesta is definitely helping especially in combination with benadryl 50 QHS  ROS: Headaches. Syncopal episodes - random,   - [facility] [employer] worker- sees twice yearly, [facility] health nurse: new person.       MEDICATIONS:   1. Lamictal 400 mg daily.   2. Zoloft 150 mg daily.   3. Focalin 10 mg daily. Beneficial for energy, mood and no indications of problematic use.   4. Synthroid 150 mcg  5. Zofran  6. Benadryl 50 mg/hs  7. Mitodrine  8. Lunesta 3 mg - this appears to have made a huge difference " in her sleep    9. Carafate  11. Omeprazole  12. Pyridostigmine  15. Tizanidine   16. Fluorinef-   17. Maxalt for migraines. It works very well.  She has been having a few more migraines.  18. Prevastatin  19. Mg- added recently  /85  Pulse 87  Wt 61.7 kg (136 lb)  BMI 20.68 kg/m2        MENTAL STATUS EXAM: The patient is neat, oriented x3, on time for appointment. Mood is good. Good range of affect. Thought processes, associations are logical and goal directed. Thought content is normal. Fund of knowledge good, speech RRR, language intact, memory intact, concentration good, insight and judgment are good,   ASSESSMENT: Anorexia nervosa, weight stable; major depressive disorder,, stable.,; chronic sleep problems,improved; migraine HA, treated  with maxalt, syncopal episodes  PLAN:   No change in medications.   She will continue to see her therapist, Noemi.  She will return again in 2 mo. . Focalin Rxs printed.   RADHA MAURER MD   I spent a total of 30 minutes face-to-face with Keisha Somers during today's office visit. Over 50% of this time was spent counseling the patient and/or coordinating care regarding maintaining maintaining weight with tube out. .. See note for details.

## 2017-05-02 NOTE — MR AVS SNAPSHOT
After Visit Summary   5/2/2017    Keisha Somers    MRN: [id number]           Patient Information     Date Of Birth          1970        Visit Information        Provider Department      5/2/2017 1:00 PM Romana Tena MD [facility]iatry Clinic        Today's Diagnoses     Primary insomnia        Major depressive disorder, recurrent episode, moderate (H)           Follow-ups after your visit        Follow-up notes from your care team     Return in about 2 months (around 7/2/2017).      Your next 10 appointments already scheduled     Jul 18, 2017  1:00 PM CDT   Adult Med Follow UP with Romana Tena MD   Psychiatry Clinic ([facility])    [address]  [address] [facility] [id number] [phone]            Aug 03, 2017 11:00 AM CDT   Lab with  LAB    Health Lab ([facility])    [address] [place] [address]  [address] [facility] [id number] [phone]            Aug 03, 2017 11:30 AM CDT   (Arrive by 11:15 AM)   RETURN ARRHYTHMIA with Thang Galarza MD   [facility] ([facility])    [address] [place] [address]  [address] [facility] [id number] [phone]              Who to contact     Please call your clinic at [phone] to:    Ask questions about your health    Make or cancel appointments    Discuss your medicines    Learn about your test results    Speak to your doctor   If you have compliments or concerns about an experience at your clinic, or if you wish to file a complaint, please contact [facility] Patient Relations at [phone] or email us at [email or web address]         Additional Information About Your Visit        MyChart Information     [employer] is an electronic gateway that provides easy, online access to your medical records. With [employer], you can request a clinic appointment, read your test results, renew a prescription or  communicate with your care team.     To sign up for [employer] visit the website at [email or web address]   You will be asked to enter the access code listed below, as well as some personal information. Please follow the directions to create your username and password.     Your access code is: [email or web address]  Expires: 2017  5:10 PM     Your access code will  in 90 days. If you need help or a new code, please contact your [facility] or call [phone] for assistance.        Care EveryWhere ID     This is your Care EveryWhere ID. This could be used by other organizations to access your [place] medical records  [id number]        Your Vitals Were     Pulse BMI (Body Mass Index)                87 20.68 kg/m2           Blood Pressure from Last 3 Encounters:   17 127/85   17 117/84   17 116/69    Weight from Last 3 Encounters:   17 61.7 kg (136 lb)   17 60.8 kg (134 lb)   17 62.3 kg (137 lb 4.8 oz)              Today, you had the following     No orders found for display         Today's Medication Changes          These changes are accurate as of: 17  5:10 PM.  If you have any questions, ask your nurse or doctor.               Start taking these medicines.        Dose/Directions    dexmethylphenidate 10 MG tablet   Commonly known as:  FOCALIN   Used for:  Major depressive disorder, recurrent episode, moderate (H)   Started by:  Romana Tena MD        Dose:  10 mg   Take 1 tablet (10 mg) by mouth daily   Quantity:  30 tablet   Refills:  0            Where to get your medicines      These medications [place] [facility] [place] [address], [place] [address]     Phone:  [phone]     diphenhydrAMINE HCl (Sleep) 25 MG Tabs         Some of these will need a paper prescription and others can be bought over the counter.  Ask your nurse if you have questions.     Bring a  paper prescription for each of these medications     dexmethylphenidate 10 MG tablet                Primary Care Provider Office Phone # Fax #    Lavonne Zarate [phone]       [facility] [address]  [place] [address]        Thank you!     Thank you for choosing PSYCHIATRY CLINIC  for your care. Our goal is always to provide you with excellent care. Hearing back from our patients is one way we can continue to improve our services. Please take a few minutes to complete the written survey that you may receive in the mail after your visit with us. Thank you!             Your Updated Medication List - Protect others around you: Learn how to safely use, store and throw away your medicines at www.disposemymeds.org.          This list is accurate as of: 5/2/17  5:10 PM.  Always use your most recent med list.                   Brand Name Dispense Instructions for use    acetaminophen 500 MG tablet    TYLENOL     Take 500-1,000 mg by mouth every 6 hours as needed.       CALCIUM 600+D PO      Take 1 tablet by mouth 2 times daily.       CARAFATE 1 GM/10ML suspension   Generic drug:  sucralfate      Take 1 g by mouth 2 times daily. Take 10 Ml po daily       dexmethylphenidate 10 MG tablet    FOCALIN    30 tablet    Take 1 tablet (10 mg) by mouth daily       diphenhydrAMINE HCl (Sleep) 25 MG Tabs     60 tablet    1-2 tabs QHS prn sleep       docusate sodium 100 MG capsule    COLACE     Take 1 capsule by mouth 2 times daily.       eszopiclone 3 MG tablet    LUNESTA    30 tablet    Take 1 tablet (3 mg) by mouth At Bedtime       fludrocortisone 0.1 MG tablet    FLORINEF    90 tablet    TAKE 1 TABLET (0.1MG) BY MOUTH DAILY        MG tablet   Generic drug:  ibuprofen      Take 800 mg by mouth every 8 hours as needed.       lamoTRIgine 200 MG tablet    LaMICtal    60 tablet    Take 2 tablets (400 mg) by mouth daily       levothyroxine 175 MCG tablet    SYNTHROID/LEVOTHROID     Take  by  mouth daily.       metoclopramide 10 MG tablet    REGLAN     2 times daily       midodrine 5 MG tablet    PROAMATINE    270 tablet    TAKE 3 TABLETS (15MG) BY MOUTH THREE TIMES A DAY       multivitamin Tabs tablet      Take 1 tablet by mouth 2 times daily.       OINTMENT BASE EX      Externally apply  topically daily. Mypirocin 2% apply to G Tube ulsers daily       omeprazole 20 MG CR capsule    priLOSEC     Take 2 capsules by mouth 2 times daily.       pyridostigmine 60 MG tablet    MESTINON     Take 1 tablet by mouth daily.       sertraline 100 MG tablet    ZOLOFT    45 tablet    Take 1.5 tablets (150 mg) by mouth daily       VITAMIN D PO      Take  by mouth. 50, 000 units every 14 days

## 2017-05-11 DIAGNOSIS — G90.A POSTURAL ORTHOSTATIC TACHYCARDIA SYNDROME: ICD-10-CM

## 2017-05-12 RX ORDER — MIDODRINE HYDROCHLORIDE 5 MG/1
TABLET ORAL
Qty: 270 TABLET | Refills: 5 | Status: SHIPPED | OUTPATIENT
Start: 2017-05-12 | End: 2017-11-01

## 2017-07-07 DIAGNOSIS — F51.01 PRIMARY INSOMNIA: ICD-10-CM

## 2017-07-07 RX ORDER — ESZOPICLONE 3 MG/1
3 TABLET, FILM COATED ORAL AT BEDTIME
Qty: 30 TABLET | Refills: 0
Start: 2017-07-07 | End: 2017-09-07

## 2017-07-07 NOTE — TELEPHONE ENCOUNTER
Last seen: 5/2/17  RTC: 2 months  Cancel: none  No-show: none  Next appt: 7/18/17    Incoming refill from [employer] [facility] via fax    Medication requested: Lunesta 3 mg tab  Directions: Take 1 tab qhs  Qty: 30  Last refilled: 6/13/17 per [place]     Medication refill approved per refill protocol       Statement Selected

## 2017-07-23 ENCOUNTER — PRE VISIT (OUTPATIENT)
Dept: CARDIOLOGY | Facility: CLINIC | Age: 47
End: 2017-07-23

## 2017-07-23 DIAGNOSIS — R55 SYNCOPE AND COLLAPSE: Primary | ICD-10-CM

## 2017-07-24 ENCOUNTER — OFFICE VISIT (OUTPATIENT)
Dept: CARDIOLOGY | Facility: CLINIC | Age: 47
End: 2017-07-24
Attending: NURSE PRACTITIONER
Payer: MEDICARE

## 2017-07-24 VITALS
WEIGHT: 136.7 LBS | DIASTOLIC BLOOD PRESSURE: 92 MMHG | SYSTOLIC BLOOD PRESSURE: 136 MMHG | HEIGHT: 68 IN | BODY MASS INDEX: 20.72 KG/M2 | HEART RATE: 109 BPM

## 2017-07-24 DIAGNOSIS — R55 SYNCOPE AND COLLAPSE: ICD-10-CM

## 2017-07-24 LAB
ANION GAP SERPL CALCULATED.3IONS-SCNC: 8 MMOL/L (ref 3–14)
BUN SERPL-MCNC: 7 MG/DL (ref 7–30)
CALCIUM SERPL-MCNC: 8.7 MG/DL (ref 8.5–10.1)
CHLORIDE SERPL-SCNC: 106 MMOL/L (ref 94–109)
CO2 SERPL-SCNC: 28 MMOL/L (ref 20–32)
CREAT SERPL-MCNC: 0.83 MG/DL (ref 0.52–1.04)
CRP SERPL-MCNC: <2.9 MG/L (ref 0–8)
ERYTHROCYTE [DISTWIDTH] IN BLOOD BY AUTOMATED COUNT: 13.1 % (ref 10–15)
ERYTHROCYTE [SEDIMENTATION RATE] IN BLOOD BY WESTERGREN METHOD: 18 MM/H (ref 0–20)
GFR SERPL CREATININE-BSD FRML MDRD: 74 ML/MIN/1.7M2
GLUCOSE SERPL-MCNC: 103 MG/DL (ref 70–99)
HCT VFR BLD AUTO: 33.7 % (ref 35–47)
HGB BLD-MCNC: 10.8 G/DL (ref 11.7–15.7)
MCH RBC QN AUTO: 30.3 PG (ref 26.5–33)
MCHC RBC AUTO-ENTMCNC: 32 G/DL (ref 31.5–36.5)
MCV RBC AUTO: 95 FL (ref 78–100)
PLATELET # BLD AUTO: 247 10E9/L (ref 150–450)
POTASSIUM SERPL-SCNC: 3.5 MMOL/L (ref 3.4–5.3)
RBC # BLD AUTO: 3.56 10E12/L (ref 3.8–5.2)
SODIUM SERPL-SCNC: 142 MMOL/L (ref 133–144)
WBC # BLD AUTO: 5 10E9/L (ref 4–11)

## 2017-07-24 PROCEDURE — 85652 RBC SED RATE AUTOMATED: CPT | Performed by: NURSE PRACTITIONER

## 2017-07-24 PROCEDURE — 93010 ELECTROCARDIOGRAM REPORT: CPT | Mod: ZP | Performed by: INTERNAL MEDICINE

## 2017-07-24 PROCEDURE — 99214 OFFICE O/P EST MOD 30 MIN: CPT | Mod: ZP | Performed by: NURSE PRACTITIONER

## 2017-07-24 PROCEDURE — 80048 BASIC METABOLIC PNL TOTAL CA: CPT | Performed by: NURSE PRACTITIONER

## 2017-07-24 PROCEDURE — 86140 C-REACTIVE PROTEIN: CPT | Performed by: NURSE PRACTITIONER

## 2017-07-24 PROCEDURE — 36415 COLL VENOUS BLD VENIPUNCTURE: CPT | Performed by: NURSE PRACTITIONER

## 2017-07-24 PROCEDURE — 93005 ELECTROCARDIOGRAM TRACING: CPT | Mod: ZF

## 2017-07-24 PROCEDURE — 85027 COMPLETE CBC AUTOMATED: CPT | Performed by: NURSE PRACTITIONER

## 2017-07-24 ASSESSMENT — PAIN SCALES - GENERAL: PAINLEVEL: NO PAIN (0)

## 2017-07-24 NOTE — MR AVS SNAPSHOT
After Visit Summary   7/24/2017    Keisha Somers    MRN: [id number]           Patient Information     Date Of Birth          1970        Visit Information        Provider Department      7/24/2017 10:00 AM Lilly Rebolledo APRN CNP [facility]        Today's Diagnoses     Syncope and collapse          Care Instructions    Cardiology Provider you saw in clinic today: LACHELLE Munoz, CNP    Medication Changes:    1.  Abdomininal binder daily  2.  Increase fluids to 10-12 more oz per day and add salt to those 10 oz  3.  Try midodrine 10 mg in the morning.  Discussed the possiblity of increasing the noon dose to midodrine 10 mg as well.    4.  Establish care with Dr. Sierra  5.  Double check your vitamin has iron in it.  Please take with Vitamin C.        Labs/Tests needed:  Lower hgb therefore recommended primary care      Follow-up Visit:  Schedule with Dr. Sierra to establish care    Further Instructions:      You will receive all normal lab and testing results via [employer] or mail if not reviewed in clinic today. Please contact our office if you need assistance with setting up [email or web address].    If you need a medication refill please contact your pharmacy. Please allow 3 business days for your refill to be completed.     As always, thank you for trusting us with your health care needs!    If you have any questions regarding your visit please contact your care team:   Cardiology  Telephone Number    EP RN  Electrophysiology Nurse Coordinator [phone]     Call for EP procedure scheduling concerns  IMELDA Brady  [phone]           Device Clinic (Pacemakers, ICDs, Loop)   RN's : Keisha Daugherty Connie, Dawn During business hours: [phone]    After business hours:   [phone]- select option 4 and ask for job code [id number].                  Follow-ups after your visit        Follow-up notes from your care team     Return in about 6 months (around  "2018) for [place].      Your next 10 appointments already scheduled     Sep 19, 2017  2:00 PM CDT   Adult Med Follow UP with Romana Tena MD   Psychiatry Clinic ([facility])    [place] [id number]  [address] [facility] [address] [phone]            2018 12:30 PM CST   (Arrive by 12:15 PM)   POSTURAL ORTHOSTATIC SYCOPE with Thang Galarza MD   [facility] ([facility])    [address] [facility]  [address] [facility] [address] [phone]              Who to contact     If you have questions or need follow up information about today's clinic visit or your schedule please contact [facility] directly at [phone].  Normal or non-critical lab and imaging results will be communicated to you by [employer], letter or phone within 4 business days after the clinic has received the results. If you do not hear from us within 7 days, please contact the clinic through [employer] or phone. If you have a critical or abnormal lab result, we will notify you by phone as soon as possible.  Submit refill requests through [employer] or call your pharmacy and they will forward the refill request to us. Please allow 3 business days for your refill to be completed.          Additional Information About Your Visit        [employer] Information     [employer] lets you send messages to your doctor, view your test results, renew your prescriptions, schedule appointments and more. To sign up, go to [email or web address] . Click on \"Log in\" on the left side of the screen, which will take you to the Welcome page. Then click on \"Sign up Now\" on the right side of the page.     You will be asked to enter the access code listed below, as well as some personal information. Please follow the directions to create your username and password.     Your access code is: [email or web address]  Expires: 2017  5:10 PM     Your access code will  in 90 " "days. If you need help or a new code, please call your [place] clinic or [phone].        Care EveryWhere ID     This is your Care EveryWhere ID. This could be used by other organizations to access your [place] medical records  [id number]        Your Vitals Were     Height BMI (Body Mass Index)                1.715 m (5' 7.5\") 21.09 kg/m2           Blood Pressure from Last 3 Encounters:   02/25/16 111/78   07/23/15 123/80   12/11/14 109/70    Weight from Last 3 Encounters:   07/24/17 62 kg (136 lb 11.2 oz)   02/02/17 62.3 kg (137 lb 4.8 oz)   07/23/15 59 kg (130 lb)              We Performed the Following     EKG 12-lead, tracing only (Future)     Erythrocyte sedimentation rate auto [TYP248]        Primary Care Provider Office Phone # Fax #    Lavonne Zarate [phone]       [facility] [address]  [place] [address]        Equal Access to Services     [facility]: Hadii aad ku hadasho Soomaali, waaxda luqadaha, qaybta kaalmada adeegyada, waxay jacobin haymarylin weathers . So [facility] [phone].    ATENCIÓN: Si habla español, tiene a schmid disposición servicios gratuitos de asistencia lingüística. Llame al [phone].    We comply with applicable federal civil rights laws and [place] laws. We do not discriminate on the basis of race, color, national origin, age, disability sex, sexual orientation or gender identity.            Thank you!     Thank you for choosing [facility]  for your care. Our goal is always to provide you with excellent care. Hearing back from our patients is one way we can continue to improve our services. Please take a few minutes to complete the written survey that you may receive in the mail after your visit with us. Thank you!             Your Updated Medication List - Protect others around you: Learn how to safely use, store and throw away your medicines at www.disposemymeds.org.          This list is accurate as of: 7/24/17 11:37 " AM.  Always use your most recent med list.                   Brand Name Dispense Instructions for use Diagnosis    acetaminophen 500 MG tablet    TYLENOL     Take 500-1,000 mg by mouth every 6 hours as needed.        CALCIUM 600+D PO      Take 1 tablet by mouth 2 times daily.        CARAFATE 1 GM/10ML suspension   Generic drug:  sucralfate      Take 1 g by mouth 2 times daily. Take 10 Ml po daily        dexmethylphenidate 10 MG tablet    FOCALIN    30 tablet    Take 1 tablet (10 mg) by mouth daily    Major depressive disorder, recurrent episode, moderate (H)       diphenhydrAMINE HCl (Sleep) 25 MG Tabs     60 tablet    1-2 tabs QHS prn sleep    Primary insomnia       docusate sodium 100 MG capsule    COLACE     Take 1 capsule by mouth 2 times daily.        eszopiclone 3 MG tablet    LUNESTA    30 tablet    Take 1 tablet (3 mg) by mouth At Bedtime    Primary insomnia       fludrocortisone 0.1 MG tablet    FLORINEF    90 tablet    TAKE 1 TABLET (0.1MG) BY MOUTH DAILY    Hypotension, unspecified hypotension type        MG tablet   Generic drug:  ibuprofen      Take 800 mg by mouth every 8 hours as needed.        lamoTRIgine 200 MG tablet    LaMICtal    60 tablet    Take 2 tablets (400 mg) by mouth daily    Major depressive disorder, recurrent episode, mild (H)       levothyroxine 175 MCG tablet    SYNTHROID/LEVOTHROID     Take  by mouth daily.        metoclopramide 10 MG tablet    REGLAN     2 times daily        midodrine 5 MG tablet    PROAMATINE    270 tablet    TAKE 3 TABLETS (15MG) BY MOUTH THREE TIMES A DAY    Postural orthostatic tachycardia syndrome       multivitamin Tabs tablet      Take 1 tablet by mouth 2 times daily.        OINTMENT BASE EX      Externally apply  topically daily. Mypirocin 2% apply to G Tube ulsers daily        omeprazole 20 MG CR capsule    priLOSEC     Take 2 capsules by mouth 2 times daily.        pyridostigmine 60 MG tablet    MESTINON     Take 1 tablet by mouth daily.         sertraline 100 MG tablet    ZOLOFT    45 tablet    Take 1.5 tablets (150 mg) by mouth daily    Major depressive disorder, recurrent episode, moderate (H)       VITAMIN D PO      Take  by mouth. 50, 000 units every 14 days

## 2017-07-24 NOTE — PROGRESS NOTES
"  HPI:   Keisha Somers is a pleasant 46 yo woman with symptoms that are primarily orthostatic in nature. In past 6 mos , fore  Unclear reasons, has had more orthostatic lightheadedness and syncope. Fortunately no injury    7/15/2016 Has not had any evident major illness or med change to account for why she is doing worse now than last summer. Main issue occurs after change of posture to upright position and is almost immediate suggesting \"immediate OH\"We reviewed prev recommendations re salt, volume, meds (midodrine 16 TID and fludrocortisone 0.1).  She lives independently but in an Assisted Living facility so she has access to help if needed.   Currently on 3 drugs (Fludro, midodrine and pyridiostigmine). At her last visit we reduced fludro to 0.1 mg daily.  Dx Autonomic dysfunction, Immediate OH syndrome on multiple meds that may be complicating her picture BUT none are clearcut causes of OH.  PLAN: Continue current medication regimen.  Support continued aggressive electrolyte-based hydration. Encourage recumbent exercise. We discussed options in detail for 30 min including standing training and lower body isometrics as well as lower body constrictive clothing (Spanx)    2/2/2017  Continues to see psychiatry for anorexia and depression.   Will have syncope 1-2x/ week usually occur from sitting to standing all times during the day.  Multiple times a day presyncope/dizziness.  Symptoms include vision going and palpitation.  Has hurt herself on a coffee table.  Other weeks no syncope but h as not found a pattern.  Does have life alert and goes off frequently.   Taking midodrine 5mg TID morning, noon, evening, florinef 0.1mg  in the am, and pyridostigmine in the pm.   Hydration drinking 50-60 oz half of it is propel.   Adding salt at table.  Standing training BID,  Not wearing Spandx or doing recumbent exercises.  Concerned about Feet, nose and fingers turn white particularly in the winter.    Currently has a feeding " last time used was 2-3 months ago.  BP at home .   Has Fluid by her at night.  Plan will include  CBC, BNP, and CRP today and before 6 month visit, Continue to consume 50-60 oz of water.  1/2 pedialyte,  (switch off propel and start pedialyte), Start isometric exercises,  Use spandx,   Move coffee table to avoid trauma, When going from standing to sitting.  Stand still for 15 seconds before moving.     7/24/2017  Today she presents feeling up and and down. Had kidney stones and a ureter stint since we saw her last.   Blood pressures have been higher than her average which are usually 110-115 at times her BP has been SBP=90. However she states she continues to have syncope 1-2x/ week usually occur from sitting to standing all times during the day which is unchanged but has not worsened.   Had her tube feeding has been taken out but the site is erythematous and painful, she is following with wound clinic.   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    Taking midodrine 5 mg at morning, noon, and evening (5pm), florinef 0.1 mg, mestinon 60 mg daily.  Lab work today discussed her hgb has dropped.    Today's EKG normal sinus rhythm with left atrial enlargement.     PAST MEDICAL HISTORY:  Past Medical History:   Diagnosis Date     Eating disorder      Hypotension, postural      Palpitations      Syncope      Syncope and collapse      Thyroid disease        CURRENT MEDICATIONS:  Current Outpatient Prescriptions   Medication Sig Dispense Refill     eszopiclone (LUNESTA) 3 MG tablet Take 1 tablet (3 mg) by mouth At Bedtime 30 tablet 0     midodrine (PROAMATINE) 5 MG tablet TAKE 3 TABLETS (15MG) BY MOUTH THREE TIMES A  tablet 5     diphenhydrAMINE HCl, Sleep, 25 MG TABS 1-2 tabs QHS prn sleep 60 tablet 6     dexmethylphenidate (FOCALIN) 10 MG tablet Take 1 tablet (10 mg) by mouth daily 30 tablet 0     sertraline (ZOLOFT) 100 MG tablet Take 1.5 tablets (150 mg) by mouth daily 45 tablet 4     lamoTRIgine  (LAMICTAL) 200 MG tablet Take 2 tablets (400 mg) by mouth daily 60 tablet 4     fludrocortisone (FLORINEF) 0.1 MG tablet TAKE 1 TABLET (0.1MG) BY MOUTH DAILY 90 tablet 3     levothyroxine (SYNTHROID, LEVOTHROID) 175 MCG tablet Take  by mouth daily.       Cholecalciferol (VITAMIN D PO) Take  by mouth. 50, 000 units every 14 days       Emollient (OINTMENT BASE EX) Externally apply  topically daily. Mypirocin 2% apply to G Tube ulsers daily       multivitamin (OCUVITE) TABS Take 1 tablet by mouth 2 times daily.       sucralfate (CARAFATE) 1 GM/10ML suspension Take 1 g by mouth 2 times daily. Take 10 Ml po daily       acetaminophen (TYLENOL) 500 MG tablet Take 500-1,000 mg by mouth every 6 hours as needed.       ibuprofen (IBU) 800 MG tablet Take 800 mg by mouth every 8 hours as needed.       Calcium Carbonate-Vitamin D (CALCIUM 600+D PO) Take 1 tablet by mouth 2 times daily.       docusate sodium (COLACE) 100 MG capsule Take 1 capsule by mouth 2 times daily.       metoclopramide (REGLAN) 10 MG tablet 2 times daily        omeprazole (PRILOSEC) 20 MG capsule Take 2 capsules by mouth 2 times daily.       pyridostigmine (MESTINON) 60 MG tablet Take 1 tablet by mouth daily.         PAST SURGICAL HISTORY:  Past Surgical History:   Procedure Laterality Date     GT placed[  2001       ALLERGIES:     No Known Allergies    FAMILY HISTORY:  None pertinent    SOCIAL HISTORY:  Social History   Substance Use Topics     Smoking status: Never Smoker     Smokeless tobacco: Never Used     Alcohol use No       ROS:   Constitutional: No fever, chills, or sweats. Weight stable.   ENT: No visual disturbance, ear ache, epistaxis, sore throat.   Cardiovascular: As per HPI.   Respiratory: No cough, hemoptysis.    GI: No nausea, vomiting, hematemesis, melena, or hematochezia.   : No hematuria.   Integument: Negative.   Psychiatric: Negative.   Hematologic: no easy bleeding.  Neuro: Negative.   Endocrinology: No significant heat or cold  intolerance   Musculoskeletal: No myalgia.    Exam: Extended Vitals not filed for this encounter.    Orthostatic Vitals   BP Pulse Position Site Cuff Size Time Date   136/92 109 Standing Left Arm Regular 10:42 AM 7/24/2017        Comment:  lightheaded and dizzy      120/74 101 Standing Left Arm Regular 10:42 AM 7/24/2017        Comment:  lightheaded      147/84 78 Supine Left Arm Regular 10:41 AM 7/24/2017   133/79 86 Sitting Left Arm Regular 10:41 AM 7/24/2017     No repeat blood pressure data filed.  No peak flow data filed.  No pain information filed.    GENERAL APPEARANCE: healthy, alert and no distress  HEENT: no icterus, no xanthelasmas.  NECK:  no asymmetry, masses, or scars, and no bruits, JVP not elevated  RESPIRATORY: lungs clear to auscultation - no rales, rhonchi or wheezes, no use of accessory muscles, no retractions, respirations are unlabored, normal respiratory rate  CARDIOVASCULAR: regular rhythm, normal S1 with physiologic split S2, no S3 or S4 and no murmur, click or rub,   ABDOMEN: soft, non tender, without hepatosplenomegaly,  no abdominal bruits. G-tube.     Labs:  CBC RESULTS:      Component      Latest Ref Rng & Units 2/2/2017 7/24/2017   WBC      4.0 - 11.0 10e9/L 6.4 5.0   RBC Count      3.8 - 5.2 10e12/L 3.87 3.56 (L)   Hemoglobin      11.7 - 15.7 g/dL 11.7 10.8 (L)   Hematocrit      35.0 - 47.0 % 37.0 33.7 (L)   MCV      78 - 100 fl 96 95   MCH      26.5 - 33.0 pg 30.2 30.3   MCHC      31.5 - 36.5 g/dL 31.6 32.0   RDW      10.0 - 15.0 % 12.9 13.1   Platelet Count      150 - 450 10e9/L 313 247     Procedures:  11/25/2013: Tilt/Autonomic Study   The following maneuvers were done sequentially:  1- Sitting position for 15 minutes: /80 mmHg,  bpm. No significant change in hemodynamic profile. No symptoms.  2- Orthostatism for 5 minutes:   A. Immediate: Chad BP 84/51 mmHg,  bpm, No symptoms.   B. Delayed: Chad /87 mmHg,  bpm, No symptoms.   In 5 minutes after  patient was standing HR showed 32 bpm increase compared to sitting position.   3- Maneuvers in sitting position:   A. Carotid sinus massage:   Left: Chad /80 mmHg,  bpm, , No symptoms.   Right: Chad /77 mmHg, HR 99 bpm, , No symptoms.   B. Valsalva:   - Phase 1: present.   - Phase 2: present   - Phase 3: present   - Phase 4: present   Chad BP was 75/60 and HR was 121 during valsalva maneuver. Patient felt somewhat dizzy during valsalva however symptom immediately improved after she stopped valsalva.   C. Cough:   Couplets: Chad /81 mmHg,  bpm, No symptoms.   Spasm: Chad /78 mmHg,  bpm, No symptoms.   4- Supine for 10-15 minutes: /75 mmHg, HR 90 bpm. No significant change in hemodynamic profile. No symptoms.  5- TILT at 70 degrees for 20 minutes: BP 97/62 mmHg, HR 101pm. No significant change in hemodynamic profile. No symptoms.  6 TILT at 70 degrees AFTER ABDOMINAL BINDER for 5 minutes: /77 mmHg, HR 103pm. No significant change in hemodynamic profile. No symptoms.   7 TILT at 70 degrees AFTER 1 LITER OF FLUID for 5 minutes: /83 mmHg, HR 89pm. No significant change in hemodynamic profile. No symptoms.   DISCUSSION:  - Autonomic function test: consistent with normal physiologic response.  - TILT table test:   POTS   No vasovagal syncope  ECHOCARDIOGRAM: 11/25/2013    Interpretation Summary  Poor image quality Technically difficult study 1. Grossly normal LV size   and systolic function. Cannot assess diastolic function 2. Normal RV size   and function    Assessment and Plan:   48 yo woman with previous POTS-like complaints but now seems nikolay like Immediate OH presumably due to Autonomic  Dysfunction of unknown cause.     1.  Abdomininal binder daily  2.  Increase fluids to 10-12 more oz per day and add salt to those 10 oz  3.  Try midodrine 10 mg in the morning.  Discussed the possiblity of increasing the noon dose to midodrine 10 mg as well.    4.   Establish care with primary care  5.  Double check your vitamin has iron in it.  Please take with Vitamin C  6.  Continue with exercises  7. Follow up with [facility] in 6 months.       LACHELLE Munoz, CNP

## 2017-07-24 NOTE — NURSING NOTE
Chief Complaint   Patient presents with     Follow Up For     EKG; ortho statics; should have had labs today     Vitals were taken and medications were reconciled. EKG was performed    Aiden Martell MA  9:55 AM

## 2017-07-24 NOTE — LETTER
"7/24/2017      RE: Keisha Somers  [address]    [facility] [address]       Dear Colleague,    Thank you for the opportunity to participate in the care of your patient, Keisha Somers, at the [facility] HE[facility]. Please see a copy of my visit note below.      HPI:   Keisha Somers is a pleasant 46 yo woman with symptoms that are primarily orthostatic in nature. In past 6 mos , fore  Unclear reasons, has had more orthostatic lightheadedness and syncope. Fortunately no injury    7/15/2016 Has not had any evident major illness or med change to account for why she is doing worse now than last summer. Main issue occurs after change of posture to upright position and is almost immediate suggesting \"immediate OH\"We reviewed prev recommendations re salt, volume, meds (midodrine 16 TID and fludrocortisone 0.1).  She lives independently but in an Assisted Living facility so she has access to help if needed.   Currently on 3 drugs (Fludro, midodrine and pyridiostigmine). At her last visit we reduced fludro to 0.1 mg daily.  Dx Autonomic dysfunction, Immediate OH syndrome on multiple meds that may be complicating her picture BUT none are clearcut causes of OH.  PLAN: Continue current medication regimen.  Support continued aggressive electrolyte-based hydration. Encourage recumbent exercise. We discussed options in detail for 30 min including standing training and lower body isometrics as well as lower body constrictive clothing (Spanx)    2/2/2017  Continues to see psychiatry for anorexia and depression.   Will have syncope 1-2x/ week usually occur from sitting to standing all times during the day.  Multiple times a day presyncope/dizziness.  Symptoms include vision going and palpitation.  Has hurt herself on a coffee table.  Other weeks no syncope but h as not found a pattern.  Does have life alert and goes off frequently.   Taking midodrine 5mg TID " morning, noon, evening, florinef 0.1mg  in the am, and pyridostigmine in the pm.   Hydration drinking 50-60 oz half of it is propel.   Adding salt at table.  Standing training BID,  Not wearing Spandx or doing recumbent exercises.  Concerned about Feet, nose and fingers turn white particularly in the winter.    Currently has a feeding last time used was 2-3 months ago.  BP at home .   Has Fluid by her at night.  Plan will include  CBC, BNP, and CRP today and before 6 month visit, Continue to consume 50-60 oz of water.  1/2 pedialyte,  (switch off propel and start pedialyte), Start isometric exercises,  Use spandx,   Move coffee table to avoid trauma, When going from standing to sitting.  Stand still for 15 seconds before moving.     7/24/2017  Today she presents feeling up and and down. Had kidney stones and a ureter stint since we saw her last.   Blood pressures have been higher than her average which are usually 110-115 at times her BP has been SBP=90. However she states she continues to have syncope 1-2x/ week usually occur from sitting to standing all times during the day which is unchanged but has not worsened.   Had her tube feeding has been taken out but the site is erythematous and painful, she is following with wound clinic.   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    Taking midodrine 5 mg at morning, noon, and evening (5pm), florinef 0.1 mg, mestinon 60 mg daily.  Lab work today discussed her hgb has dropped.    Today's EKG normal sinus rhythm with left atrial enlargement.     PAST MEDICAL HISTORY:  Past Medical History:   Diagnosis Date     Eating disorder      Hypotension, postural      Palpitations      Syncope      Syncope and collapse      Thyroid disease        CURRENT MEDICATIONS:  Current Outpatient Prescriptions   Medication Sig Dispense Refill     eszopiclone (LUNESTA) 3 MG tablet Take 1 tablet (3 mg) by mouth At Bedtime 30 tablet 0     midodrine (PROAMATINE) 5 MG tablet TAKE 3 TABLETS  (15MG) BY MOUTH THREE TIMES A  tablet 5     diphenhydrAMINE HCl, Sleep, 25 MG TABS 1-2 tabs QHS prn sleep 60 tablet 6     dexmethylphenidate (FOCALIN) 10 MG tablet Take 1 tablet (10 mg) by mouth daily 30 tablet 0     sertraline (ZOLOFT) 100 MG tablet Take 1.5 tablets (150 mg) by mouth daily 45 tablet 4     lamoTRIgine (LAMICTAL) 200 MG tablet Take 2 tablets (400 mg) by mouth daily 60 tablet 4     fludrocortisone (FLORINEF) 0.1 MG tablet TAKE 1 TABLET (0.1MG) BY MOUTH DAILY 90 tablet 3     levothyroxine (SYNTHROID, LEVOTHROID) 175 MCG tablet Take  by mouth daily.       Cholecalciferol (VITAMIN D PO) Take  by mouth. 50, 000 units every 14 days       Emollient (OINTMENT BASE EX) Externally apply  topically daily. Mypirocin 2% apply to G Tube ulsers daily       multivitamin (OCUVITE) TABS Take 1 tablet by mouth 2 times daily.       sucralfate (CARAFATE) 1 GM/10ML suspension Take 1 g by mouth 2 times daily. Take 10 Ml po daily       acetaminophen (TYLENOL) 500 MG tablet Take 500-1,000 mg by mouth every 6 hours as needed.       ibuprofen (IBU) 800 MG tablet Take 800 mg by mouth every 8 hours as needed.       Calcium Carbonate-Vitamin D (CALCIUM 600+D PO) Take 1 tablet by mouth 2 times daily.       docusate sodium (COLACE) 100 MG capsule Take 1 capsule by mouth 2 times daily.       metoclopramide (REGLAN) 10 MG tablet 2 times daily        omeprazole (PRILOSEC) 20 MG capsule Take 2 capsules by mouth 2 times daily.       pyridostigmine (MESTINON) 60 MG tablet Take 1 tablet by mouth daily.         PAST SURGICAL HISTORY:  Past Surgical History:   Procedure Laterality Date     GT placed[  2001       ALLERGIES:     No Known Allergies    FAMILY HISTORY:  None pertinent    SOCIAL HISTORY:  Social History   Substance Use Topics     Smoking status: Never Smoker     Smokeless tobacco: Never Used     Alcohol use No       ROS:   Constitutional: No fever, chills, or sweats. Weight stable.   ENT: No visual disturbance, ear ache,  epistaxis, sore throat.   Cardiovascular: As per HPI.   Respiratory: No cough, hemoptysis.    GI: No nausea, vomiting, hematemesis, melena, or hematochezia.   : No hematuria.   Integument: Negative.   Psychiatric: Negative.   Hematologic: no easy bleeding.  Neuro: Negative.   Endocrinology: No significant heat or cold intolerance   Musculoskeletal: No myalgia.    Exam: Extended Vitals not filed for this encounter.    Orthostatic Vitals   BP Pulse Position Site Cuff Size Time Date   136/92 109 Standing Left Arm Regular 10:42 AM 7/24/2017        Comment:  lightheaded and dizzy      120/74 101 Standing Left Arm Regular 10:42 AM 7/24/2017        Comment:  lightheaded      147/84 78 Supine Left Arm Regular 10:41 AM 7/24/2017   133/79 86 Sitting Left Arm Regular 10:41 AM 7/24/2017     No repeat blood pressure data filed.  No peak flow data filed.  No pain information filed.    GENERAL APPEARANCE: healthy, alert and no distress  HEENT: no icterus, no xanthelasmas.  NECK:  no asymmetry, masses, or scars, and no bruits, JVP not elevated  RESPIRATORY: lungs clear to auscultation - no rales, rhonchi or wheezes, no use of accessory muscles, no retractions, respirations are unlabored, normal respiratory rate  CARDIOVASCULAR: regular rhythm, normal S1 with physiologic split S2, no S3 or S4 and no murmur, click or rub,   ABDOMEN: soft, non tender, without hepatosplenomegaly,  no abdominal bruits. G-tube.     Labs:  CBC RESULTS:      Component      Latest Ref Rng & Units 2/2/2017 7/24/2017   WBC      4.0 - 11.0 10e9/L 6.4 5.0   RBC Count      3.8 - 5.2 10e12/L 3.87 3.56 (L)   Hemoglobin      11.7 - 15.7 g/dL 11.7 10.8 (L)   Hematocrit      35.0 - 47.0 % 37.0 33.7 (L)   MCV      78 - 100 fl 96 95   MCH      26.5 - 33.0 pg 30.2 30.3   MCHC      31.5 - 36.5 g/dL 31.6 32.0   RDW      10.0 - 15.0 % 12.9 13.1   Platelet Count      150 - 450 10e9/L 313 247     Procedures:  11/25/2013: Tilt/Autonomic Study   The following maneuvers were  done sequentially:  1- Sitting position for 15 minutes: /80 mmHg,  bpm. No significant change in hemodynamic profile. No symptoms.  2- Orthostatism for 5 minutes:   A. Immediate: Chad BP 84/51 mmHg,  bpm, No symptoms.   B. Delayed: Chad /87 mmHg,  bpm, No symptoms.   In 5 minutes after patient was standing HR showed 32 bpm increase compared to sitting position.   3- Maneuvers in sitting position:   A. Carotid sinus massage:   Left: Chad /80 mmHg,  bpm, , No symptoms.   Right: Chad /77 mmHg, HR 99 bpm, , No symptoms.   B. Valsalva:   - Phase 1: present.   - Phase 2: present   - Phase 3: present   - Phase 4: present   Chad BP was 75/60 and HR was 121 during valsalva maneuver. Patient felt somewhat dizzy during valsalva however symptom immediately improved after she stopped valsalva.   C. Cough:   Couplets: Chad /81 mmHg,  bpm, No symptoms.   Spasm: Chad /78 mmHg,  bpm, No symptoms.   4- Supine for 10-15 minutes: /75 mmHg, HR 90 bpm. No significant change in hemodynamic profile. No symptoms.  5- TILT at 70 degrees for 20 minutes: BP 97/62 mmHg, HR 101pm. No significant change in hemodynamic profile. No symptoms.  6 TILT at 70 degrees AFTER ABDOMINAL BINDER for 5 minutes: /77 mmHg, HR 103pm. No significant change in hemodynamic profile. No symptoms.   7 TILT at 70 degrees AFTER 1 LITER OF FLUID for 5 minutes: /83 mmHg, HR 89pm. No significant change in hemodynamic profile. No symptoms.   DISCUSSION:  - Autonomic function test: consistent with normal physiologic response.  - TILT table test:   POTS   No vasovagal syncope  ECHOCARDIOGRAM: 11/25/2013    Interpretation Summary  Poor image quality Technically difficult study 1. Grossly normal LV size   and systolic function. Cannot assess diastolic function 2. Normal RV size   and function    Assessment and Plan:   48 yo woman with previous POTS-like complaints but now seems nikolay  like Immediate OH presumably due to Autonomic  Dysfunction of unknown cause.     1.  Abdomininal binder daily  2.  Increase fluids to 10-12 more oz per day and add salt to those 10 oz  3.  Try midodrine 10 mg in the morning.  Discussed the possiblity of increasing the noon dose to midodrine 10 mg as well.    4.  Establish care with primary care  5.  Double check your vitamin has iron in it.  Please take with Vitamin C  6.  Continue with exercises  7. Follow up with [facility] in 6 months.       LACHELLE Munoz, CNP

## 2017-07-24 NOTE — PATIENT INSTRUCTIONS
Cardiology Provider you saw in clinic today: LACHELLE Munoz, CNP    Medication Changes:    1.  Abdomininal binder daily  2.  Increase fluids to 10-12 more oz per day and add salt to those 10 oz  3.  Try midodrine 10 mg in the morning.  Discussed the possiblity of increasing the noon dose to midodrine 10 mg as well.    4.  Establish care with Dr. Sierra  5.  Double check your vitamin has iron in it.  Please take with Vitamin C.        Labs/Tests needed:  Lower hgb therefore recommended primary care      Follow-up Visit:  Schedule with Dr. Sierra to establish care    Further Instructions:      You will receive all normal lab and testing results via 1DayLater or mail if not reviewed in clinic today. Please contact our office if you need assistance with setting up [facility].    If you need a medication refill please contact your pharmacy. Please allow 3 business days for your refill to be completed.     As always, thank you for trusting us with your health care needs!    If you have any questions regarding your visit please contact your care team:   Cardiology  Telephone Number    EP RN  Electrophysiology Nurse Coordinator [phone]     Call for EP procedure scheduling concerns  IMELDA Brady  [phone]           Device Clinic (Pacemakers, ICDs, Loop)   RN's : Keihsa Daugherty Connie, Dawn During business hours: [phone]    After business hours:   [phone]- select option 4 and ask for job code [id number].

## 2017-07-25 LAB — INTERPRETATION ECG - MUSE: NORMAL

## 2017-08-01 ENCOUNTER — TELEPHONE (OUTPATIENT)
Dept: PSYCHIATRY | Facility: CLINIC | Age: 47
End: 2017-08-01

## 2017-08-01 ENCOUNTER — TELEPHONE (OUTPATIENT)
Dept: CARDIOLOGY | Facility: CLINIC | Age: 47
End: 2017-08-01

## 2017-08-01 DIAGNOSIS — F33.1 MAJOR DEPRESSIVE DISORDER, RECURRENT EPISODE, MODERATE (H): ICD-10-CM

## 2017-08-01 RX ORDER — DEXMETHYLPHENIDATE HYDROCHLORIDE 10 MG/1
10 TABLET ORAL DAILY
Qty: 30 TABLET | Refills: 0 | Status: SHIPPED | OUTPATIENT
Start: 2017-08-01 | End: 2017-08-29

## 2017-08-01 NOTE — TELEPHONE ENCOUNTER
Krzysztof, Program Nurse, calling from [facility]. [address], regarding patient's Midodrine dosage.  She has been taking 15 mg three times daily but in Lilly Rebolledo's last note she stated to take 10 mg in the morning with the possibility of increasing to another 10 mg around noon time.  The patient has had increased dizziness also.  Please call Krzysztof to clarify the dose of Midodrine.  [phone].

## 2017-08-01 NOTE — TELEPHONE ENCOUNTER
Med Refill  Received: Today       Deepa Marks, Manju MOLINA RN       Phone Number: [phone]                     Dr Tena/Geetha     Pt's pharmacy called asking for med refill.     Caller:  Trisha from [facility]   Medication: Dexmethylphenidate (10mg)   Pharmacy and location:  [facility]   Have you contacted the pharmacy: yes   How much of medication do you have left: not sure, last filled on June 30th   Pending appt: 9/19   Okay to leave VM:  no     Pt has no way to pick it up--is there a way to mail it?

## 2017-08-01 NOTE — TELEPHONE ENCOUNTER
Last seen: 5/2/17  RTC: 2 months  Cancel: 7/18/17  No-show: none  Next appt: 9/19/17    Medication requested: Focalin 10 mg tab  Directions: Take 1 tab daily  Qty: 30  Last refilled: 6/30/17 per [place]     In Dr. Tena's absence, msg sent to covering MD for approval to RF d/t missed appt.

## 2017-08-01 NOTE — TELEPHONE ENCOUNTER
Message  Received: Today       Manan Farr MD Blake, Katherine J RN       Caller: Unspecified (Today, 11:44 AM)                      Give enough until Specker is back.

## 2017-08-01 NOTE — TELEPHONE ENCOUNTER
Script signed by Dr. Farr    Mailed to [facility] at [address] [place] [address] [place] [address]

## 2017-08-02 NOTE — TELEPHONE ENCOUNTER
Called PELON Blankenship at [facility], [phone] and left .  During the visit with Keisha Somers I was understanding she only taking 10 mg of midodrine in the morning and 5 mg in the afternoon.   The maximum dosage is midodrine 15 mg three times daily.  LACHELLE Munoz, CNP

## 2017-08-07 DIAGNOSIS — I95.9 HYPOTENSION, UNSPECIFIED HYPOTENSION TYPE: ICD-10-CM

## 2017-08-08 DIAGNOSIS — F33.0 MAJOR DEPRESSIVE DISORDER, RECURRENT EPISODE, MILD (H): ICD-10-CM

## 2017-08-08 DIAGNOSIS — F33.1 MAJOR DEPRESSIVE DISORDER, RECURRENT EPISODE, MODERATE (H): ICD-10-CM

## 2017-08-08 RX ORDER — LAMOTRIGINE 200 MG/1
400 TABLET ORAL DAILY
Qty: 60 TABLET | Refills: 1 | Status: SHIPPED | OUTPATIENT
Start: 2017-08-08 | End: 2017-09-19

## 2017-08-08 RX ORDER — SERTRALINE HYDROCHLORIDE 100 MG/1
150 TABLET, FILM COATED ORAL DAILY
Qty: 45 TABLET | Refills: 1 | Status: SHIPPED | OUTPATIENT
Start: 2017-08-08 | End: 2017-09-19

## 2017-08-08 RX ORDER — FLUDROCORTISONE ACETATE 0.1 MG/1
TABLET ORAL
Qty: 90 TABLET | Refills: 3 | Status: SHIPPED | OUTPATIENT
Start: 2017-08-08 | End: 2018-07-16

## 2017-08-08 NOTE — TELEPHONE ENCOUNTER
Medication requested: Lamictal and Sertraline  Last refilled: 7-12-17  Qty: 30 days      Last seen: 5-2-17  RTC: 2 mo  Cancel: 1  No-show: 0  Next appt: 9-19-17    Refill decision: Refill pended and routed to the provider for review/determination due to cancellation x1.    Kathleen M Doege RN

## 2017-08-29 ENCOUNTER — TELEPHONE (OUTPATIENT)
Dept: PSYCHIATRY | Facility: CLINIC | Age: 47
End: 2017-08-29

## 2017-08-29 DIAGNOSIS — F33.1 MAJOR DEPRESSIVE DISORDER, RECURRENT EPISODE, MODERATE (H): ICD-10-CM

## 2017-08-29 RX ORDER — DEXMETHYLPHENIDATE HYDROCHLORIDE 10 MG/1
10 TABLET ORAL DAILY
Qty: 30 TABLET | Refills: 0 | Status: SHIPPED | OUTPATIENT
Start: 2017-08-29 | End: 2017-09-19

## 2017-08-29 NOTE — TELEPHONE ENCOUNTER
Last seen: 5/2/17  RTC: 2 months  Cancel: 7/18/17  No-show: none  Next appt: 9/19/17    Medication requested: Focalin 10 mg tab  Directions: Take 1 tab daily  Qty: 30  Last refilled: 8/4/17 per [place]     Script printed and placed in Dr. Tena's folder for signature.

## 2017-08-29 NOTE — TELEPHONE ENCOUNTER
Med Refill  Received: Today       Shawnee Pisano, Manju MOLINA RN       Phone Number: [phone]                     Caller:  Keishapaul Somers [birth year]   Medication:  Focalin   Pharmacy and location: [facility]   Have you contacted the pharmacy: N/A   How much of medication do you have left:  About a week   Pending appt: 9/19   Okay to leave VM:  Yes     Ph. Ext: 5

## 2017-08-30 NOTE — TELEPHONE ENCOUNTER
-Script signed by Dr. Tena  -Mailed to:    [facility]   Attn: Pharmacist   [address] [place] [id number]    -Attempted to reach pt but number provided in msg was for [employer]

## 2017-09-07 DIAGNOSIS — F51.01 PRIMARY INSOMNIA: ICD-10-CM

## 2017-09-07 RX ORDER — ESZOPICLONE 3 MG/1
3 TABLET, FILM COATED ORAL AT BEDTIME
Qty: 30 TABLET | Refills: 0
Start: 2017-09-07 | End: 2017-09-19

## 2017-09-07 NOTE — TELEPHONE ENCOUNTER
Last seen: 5/2/17  RTC: 2 months  Cancel: 7/18/17  No-show: none  Next appt: 9/19/17    Incoming refill from [facility] via fax and phone    Medication requested: Lunesta 3 mg tab  Directions: Take 1 tab qhs  Qty: 30  Last refilled: 8/9/17 per [place]     Medication refill approved per refill protocol

## 2017-09-19 ENCOUNTER — OFFICE VISIT (OUTPATIENT)
Dept: PSYCHIATRY | Facility: CLINIC | Age: 47
End: 2017-09-19
Attending: PSYCHIATRY & NEUROLOGY
Payer: MEDICARE

## 2017-09-19 VITALS
SYSTOLIC BLOOD PRESSURE: 108 MMHG | WEIGHT: 134.2 LBS | BODY MASS INDEX: 20.71 KG/M2 | DIASTOLIC BLOOD PRESSURE: 65 MMHG | HEART RATE: 66 BPM

## 2017-09-19 DIAGNOSIS — F33.1 MAJOR DEPRESSIVE DISORDER, RECURRENT EPISODE, MODERATE (H): ICD-10-CM

## 2017-09-19 DIAGNOSIS — F51.01 PRIMARY INSOMNIA: ICD-10-CM

## 2017-09-19 DIAGNOSIS — F33.0 MAJOR DEPRESSIVE DISORDER, RECURRENT EPISODE, MILD (H): ICD-10-CM

## 2017-09-19 PROCEDURE — 99212 OFFICE O/P EST SF 10 MIN: CPT | Mod: ZF

## 2017-09-19 RX ORDER — LAMOTRIGINE 200 MG/1
400 TABLET ORAL DAILY
Qty: 60 TABLET | Refills: 4 | Status: SHIPPED | OUTPATIENT
Start: 2017-09-19 | End: 2017-12-19

## 2017-09-19 RX ORDER — SERTRALINE HYDROCHLORIDE 100 MG/1
150 TABLET, FILM COATED ORAL DAILY
Qty: 45 TABLET | Refills: 4 | Status: SHIPPED | OUTPATIENT
Start: 2017-09-19 | End: 2017-12-19

## 2017-09-19 RX ORDER — ESZOPICLONE 3 MG/1
3 TABLET, FILM COATED ORAL AT BEDTIME
Qty: 30 TABLET | Refills: 4 | Status: SHIPPED | OUTPATIENT
Start: 2017-09-19 | End: 2017-12-19

## 2017-09-19 RX ORDER — DEXMETHYLPHENIDATE HYDROCHLORIDE 10 MG/1
10 TABLET ORAL DAILY
Qty: 30 TABLET | Refills: 0 | Status: SHIPPED | OUTPATIENT
Start: 2017-09-19 | End: 2017-09-19

## 2017-09-19 RX ORDER — DEXMETHYLPHENIDATE HYDROCHLORIDE 10 MG/1
10 TABLET ORAL DAILY
Qty: 30 TABLET | Refills: 0 | Status: SHIPPED | OUTPATIENT
Start: 2017-09-19 | End: 2017-12-19

## 2017-09-19 NOTE — PROGRESS NOTES
[]Hide copied text  Office Visit  9-19-17  Psychiatry Clinic    Romana Tena MD   Psychiatry    Major depressive disorder, recurrent episode, moderate (H) +1 more   Dx    Recheck Medication ; Referred by Lavonne Zarate MD   Reason for visit       Progress Notes   Romana Tena MD (Physician)     Psychiatry       PSYCHIATRY VISIT      The patient was seen for evaluation and management.The patient was seen for anorexia and depression.   She was last seen 4 mo ago.    INTERIM HX:The patient has been very busy with work.  She is dealing with migraines again.  These are dx as rebound HA's; she has had them everyday for months. Her neurologist Dr. Taylor has prescribed prednisone and Vit B2.  This has helped; she is now in week 3.  The syncope continues, not connected with HA's. Sometimes weekly. No medication changes other than above.   No further problems with kidney stones.  Eating is going well; a new issue is sleep eating -not realizing she was eating in the middle of the night.   For the most part, sleep is ok.The night eating started in the last few months.  She is eating breakfast and dinner and snacking  The tube has been out for 6 months. Weight has been stable.  She continues in therapy; Q 2 weeks.  SH: She has a new - Aracelis; [employer] worker also.  She does not have or need home health nurse.  ROS: Headaches. Syncopal episodes - random, remainder of 10 pt neg      MEDICATIONS:   1. Lamictal 400 mg daily.   2. Zoloft 150 mg daily.   3. Focalin 10 mg daily. Beneficial for energy, mood and no indications of problematic use.   4. Synthroid 150 mcg  5. Zofran  6. Benadryl 50 mg/hs  7. Mitodrine  8. Lunesta 3 mg - this appears to have made a huge difference in her sleep    9. Carafate  11. Omeprazole  12. Pyridostigmine  15. Tizanidine   16. Fluorinef-   17. Maxalt for migraines. It works very well.  Holding off until finishes prednisone  18. Prevastatin  19. Mg- added recently  20. Prednisone  taper  21. Vit B2  /65  Pulse 66  Wt 60.9 kg (134 lb 3.2 oz)  BMI 20.71 kg/m2          MENTAL STATUS EXAM: The patient is neat, oriented x3, on time for appointment. Mood is good. Good range of affect. Thought processes, associations are logical and goal directed. Thought content is normal. Fund of knowledge good, speech RRR, language intact, memory intact, concentration good, insight and judgment are good,   ASSESSMENT: Anorexia nervosa, weight stable; major depressive disorder,, stable.,; chronic sleep problems,improved; migraine HA, treated  with maxalt, syncopal episodes  PLAN:   No change in medications.   She will continue to see her therapist, Noemi.  She will return again in 2 mo. . Focalin Rxs printed.   RADHA MAURER MD   I spent a total of 30 minutes face-to-face with Keisha Somers during today's office visit. Over 50% of this time was spent counseling the patient and/or coordinating care regarding nighttime eating and maintaining weight. .. See note for details.

## 2017-09-19 NOTE — MR AVS SNAPSHOT
After Visit Summary   9/19/2017    Keisha Somers    MRN: [id number]           Patient Information     Date Of Birth          1970        Visit Information        Provider Department      9/19/2017 2:00 PM Romana Tena MD Psychiatry Clinic        Today's Diagnoses     Major depressive disorder, recurrent episode, moderate (H)        Major depressive disorder, recurrent episode, mild (H)        Primary insomnia           Follow-ups after your visit        Follow-up notes from your care team     Return in about 3 months (around 12/19/2017).      Your next 10 appointments already scheduled     Dec 19, 2017  1:00 PM CST   Adult Med Follow UP with Romana Tena MD   Psychiatry Clinic ([facility])    [place] [id number]  [address] [facility] [address] [phone]            Jan 18, 2018 12:30 PM CST   (Arrive by 12:15 PM)   POSTURAL ORTHOSTATIC SYCOPE with Thang Galarza MD   [facility] ([facility])    [address] [address] [phone]              Who to contact     Please call your clinic at [phone] to:    Ask questions about your health    Make or cancel appointments    Discuss your medicines    Learn about your test results    Speak to your doctor   If you have compliments or concerns about an experience at your clinic, or if you wish to file a complaint, please contact [facility] Patient Relations at [phone] or email us at [email or web address]         Additional Information About Your Visit        MyChart Information     [employer] is an electronic gateway that provides easy, online access to your medical records. With [employer], you can request a clinic appointment, read your test results, renew a prescription or communicate with your care team.     To sign up for [employer] visit the website at [email or web address]    You will be asked to enter the access code listed below, as well as some personal information. Please follow the directions to create your username and password.     Your access code is: [email or web address]  Expires: 2017 10:12 AM     Your access code will  in 90 days. If you need help or a new code, please contact your [facility] or call [phone] for assistance.        Care EveryWhere ID     This is your Care EveryWhere ID. This could be used by other organizations to access your [facility] medical records  [id number]        Your Vitals Were     Pulse BMI (Body Mass Index)                66 20.71 kg/m2           Blood Pressure from Last 3 Encounters:   17 108/65   17 127/85   17 117/84    Weight from Last 3 Encounters:   17 60.9 kg (134 lb 3.2 oz)   17 62 kg (136 lb 11.2 oz)   17 61.7 kg (136 lb)              Today, you had the following     No orders found for display         Today's Medication Changes          These changes are accurate as of: 17 11:59 PM.  If you have any questions, ask your nurse or doctor.               Start taking these medicines.        Dose/Directions    dexmethylphenidate 10 MG tablet   Commonly known as:  FOCALIN   Used for:  Major depressive disorder, recurrent episode, moderate (H)   Started by:  Romana Tena MD        Dose:  10 mg   Take 1 tablet (10 mg) by mouth daily   Quantity:  30 tablet   Refills:  0            Where to get your medicines      These medications were [place] [facility] [place] [address], [place] [address]     Phone:  [phone]     lamoTRIgine 200 MG tablet    sertraline 100 MG tablet         Some of these will need a paper prescription and others can be bought over the counter.  Ask your nurse if you have questions.     Bring a paper prescription for each of these medications     dexmethylphenidate 10 MG tablet     eszopiclone 3 MG tablet                Primary Care Provider Office Phone # Fax #    Lavonne Zarate [phone]       [facility] [address]  [place] [address]        Equal Access to Services     DARIANART DILAN AH: Hadkvng alvin ku vijio Sojoannaali, waaxda luqadaha, qaybta kaalmada adebillyda, mehnaz lopezn andrés moreira laQingmarylin bassett. So [facility] [phone].    ATENCIÓN: Si habla español, tiene a schmid disposición servicios gratuitos de asistencia lingüística. Llame al [phone].    We comply with applicable federal civil rights laws and [place] laws. We do not discriminate on the basis of race, color, national origin, age, disability, sex, sexual orientation, or gender identity.            Thank you!     Thank you for choosing PSYCHIATRY CLINIC  for your care. Our goal is always to provide you with excellent care. Hearing back from our patients is one way we can continue to improve our services. Please take a few minutes to complete the written survey that you may receive in the mail after your visit with us. Thank you!             Your Updated Medication List - Protect others around you: Learn how to safely use, store and throw away your medicines at www.disposemymeds.org.          This list is accurate as of: 9/19/17 11:59 PM.  Always use your most recent med list.                   Brand Name Dispense Instructions for use Diagnosis    acetaminophen 500 MG tablet    TYLENOL     Take 500-1,000 mg by mouth every 6 hours as needed.        CALCIUM 600+D PO      Take 1 tablet by mouth 2 times daily.        CARAFATE 1 GM/10ML suspension   Generic drug:  sucralfate      Take 1 g by mouth 2 times daily. Take 10 Ml po daily        dexmethylphenidate 10 MG tablet    FOCALIN    30 tablet    Take 1 tablet (10 mg) by mouth daily    Major depressive disorder, recurrent episode, moderate (H)       diphenhydrAMINE HCl (Sleep) 25 MG Tabs     60 tablet    1-2 tabs QHS prn sleep    Primary insomnia        docusate sodium 100 MG capsule    COLACE     Take 1 capsule by mouth 2 times daily.        eszopiclone 3 MG tablet    LUNESTA    30 tablet    Take 1 tablet (3 mg) by mouth At Bedtime    Primary insomnia       fludrocortisone 0.1 MG tablet    FLORINEF    90 tablet    TAKE 1 TABLET (0.1MG) BY MOUTH DAILY    Hypotension, unspecified hypotension type        MG tablet   Generic drug:  ibuprofen      Take 800 mg by mouth every 8 hours as needed.        lamoTRIgine 200 MG tablet    LaMICtal    60 tablet    Take 2 tablets (400 mg) by mouth daily    Major depressive disorder, recurrent episode, mild (H)       levothyroxine 175 MCG tablet    SYNTHROID/LEVOTHROID     Take  by mouth daily.        metoclopramide 10 MG tablet    REGLAN     2 times daily        midodrine 5 MG tablet    PROAMATINE    270 tablet    TAKE 3 TABLETS (15MG) BY MOUTH THREE TIMES A DAY    Postural orthostatic tachycardia syndrome       multivitamin Tabs tablet      Take 1 tablet by mouth 2 times daily.        OINTMENT BASE EX      Externally apply  topically daily. Mypirocin 2% apply to G Tube ulsers daily        omeprazole 20 MG CR capsule    priLOSEC     Take 2 capsules by mouth 2 times daily.        pyridostigmine 60 MG tablet    MESTINON     Take 1 tablet by mouth daily.        sertraline 100 MG tablet    ZOLOFT    45 tablet    Take 1.5 tablets (150 mg) by mouth daily    Major depressive disorder, recurrent episode, moderate (H)       VITAMIN D PO      Take  by mouth. 50, 000 units every 14 days

## 2017-10-19 ENCOUNTER — TELEPHONE (OUTPATIENT)
Dept: PSYCHIATRY | Facility: CLINIC | Age: 47
End: 2017-10-19

## 2017-10-19 NOTE — TELEPHONE ENCOUNTER
Returned call to pt letting her know that writer has not rec'd form.  Pt states she had mailed it to clinic about 3 weeks ago.  Pt called [employer] and they are sending her another form.  Once rec'd pt will fax this to clinic at [phone].  Shared with pt that writer would update Dr. Tena to see if form may have been sent to her directly.  Will call pt back if this is the case or when form is rec'd via fax.

## 2017-10-25 NOTE — TELEPHONE ENCOUNTER
-[employer] forms rec'd via fax  -Pt notified  -She requests that forms be mailed to her once completed  -Forms placed in Dr. Tena's folder for completion  -Will notify pt once forms have been completed and placed in the mail

## 2017-11-01 DIAGNOSIS — G90.A POSTURAL ORTHOSTATIC TACHYCARDIA SYNDROME: ICD-10-CM

## 2017-11-02 RX ORDER — MIDODRINE HYDROCHLORIDE 5 MG/1
TABLET ORAL
Qty: 270 TABLET | Refills: 3 | Status: SHIPPED | OUTPATIENT
Start: 2017-11-02 | End: 2018-02-23

## 2017-11-08 NOTE — TELEPHONE ENCOUNTER
-Called pt   -Relayed msg from Dr. Tena rec'd yesterday afternoon  -Shared with pt that Dr. Tena is out of clinic today so anticipate possibly receiving forms tomorrow  -Assured pt she would be notified once they are complete  -Will alert provider that pt is calling

## 2017-11-08 NOTE — TELEPHONE ENCOUNTER
Message  Received: Yesterday       Romana Tena MD Blake, Katherine J RN       Caller: Unspecified (2 weeks ago)                     I have the forms and will complete them tonite.   ss

## 2017-11-08 NOTE — TELEPHONE ENCOUNTER
Checking Status of Forms  Received: Today       Deepa Garcia Katherine J RN       Phone Number: [phone]                     Keisha Chau is calling to check the status of the [employer] Forms that she left for Dr. Tena to fill out.  She would like a call back at [phone].  It is ok to leave a detailed message.     Thanks,   Deepa ESTRADA

## 2017-11-20 NOTE — TELEPHONE ENCOUNTER
-Rec'd completed forms from Dr. Tena  -Pt notified and updates that she had to turn in forms as they were past due  -Apologized to pt for delay in returning forms  -Pt would still like copy of forms mailed to her  -[employer] mailed to pt at:    [address]      [facility] [address]  -Copy placed in scanning  -Copy retained in clinic until scanning confirmed

## 2017-12-19 ENCOUNTER — OFFICE VISIT (OUTPATIENT)
Dept: PSYCHIATRY | Facility: CLINIC | Age: 47
End: 2017-12-19
Attending: PSYCHIATRY & NEUROLOGY
Payer: MEDICARE

## 2017-12-19 VITALS
DIASTOLIC BLOOD PRESSURE: 81 MMHG | WEIGHT: 137.2 LBS | BODY MASS INDEX: 21.17 KG/M2 | SYSTOLIC BLOOD PRESSURE: 124 MMHG | HEART RATE: 89 BPM

## 2017-12-19 DIAGNOSIS — F51.01 PRIMARY INSOMNIA: ICD-10-CM

## 2017-12-19 DIAGNOSIS — F33.1 MAJOR DEPRESSIVE DISORDER, RECURRENT EPISODE, MODERATE (H): ICD-10-CM

## 2017-12-19 DIAGNOSIS — F33.0 MAJOR DEPRESSIVE DISORDER, RECURRENT EPISODE, MILD (H): ICD-10-CM

## 2017-12-19 PROCEDURE — 99212 OFFICE O/P EST SF 10 MIN: CPT | Mod: ZF

## 2017-12-19 RX ORDER — DEXMETHYLPHENIDATE HYDROCHLORIDE 10 MG/1
10 TABLET ORAL DAILY
Qty: 30 TABLET | Refills: 0 | Status: SHIPPED | OUTPATIENT
Start: 2017-12-19 | End: 2017-12-19

## 2017-12-19 RX ORDER — LAMOTRIGINE 200 MG/1
400 TABLET ORAL DAILY
Qty: 60 TABLET | Refills: 4 | Status: SHIPPED | OUTPATIENT
Start: 2017-12-19 | End: 2018-03-13

## 2017-12-19 RX ORDER — SERTRALINE HYDROCHLORIDE 100 MG/1
150 TABLET, FILM COATED ORAL DAILY
Qty: 45 TABLET | Refills: 4 | Status: SHIPPED | OUTPATIENT
Start: 2017-12-19 | End: 2018-03-13

## 2017-12-19 RX ORDER — ESZOPICLONE 3 MG/1
3 TABLET, FILM COATED ORAL AT BEDTIME
Qty: 30 TABLET | Refills: 4 | Status: SHIPPED | OUTPATIENT
Start: 2017-12-19 | End: 2018-03-13

## 2017-12-19 RX ORDER — DEXMETHYLPHENIDATE HYDROCHLORIDE 10 MG/1
10 TABLET ORAL DAILY
Qty: 30 TABLET | Refills: 0 | Status: SHIPPED | OUTPATIENT
Start: 2017-12-19 | End: 2018-03-13

## 2017-12-19 NOTE — PROGRESS NOTES
Progress Notes   Romana Tena MD (Physician)     Psychiatry       PSYCHIATRY VISIT      The patient was seen for evaluation and management.The patient was seen for anorexia and depression.   She was last seen 3 mo ago.    INTERIM HX: The patient reports that she has been sick for a month , starting as a UTI and developed kidney stones. This is the 4th occurrence and thought to be related to dehydration.  She was on pain meds; they did not need intervention.  With this, she began passing out, 4-5x/day. This is now resolved.  Her eating continued. For prevention, it is fluids.    Mood has been good except for frustration as above.  Energy pretty good, sleep -nightmares but not trauma.  She has seen Candy once in the last 3 mo.  Interests, motivation improved. She is working on taxes, finishing a big project. She enjoys it. Eating pattern : 2x/day.  Weight has been stable.    She continues in therapy; she thinks  Monthly would be fine.  SH: She has a new - Aracelis; [employer] worker also- not much contact (Ethel). .  ROS: Headaches- quite a few but not migraines. Syncopal episodes - back to usual., random, remainder of 10 pt neg. GI: infection at the site of the tube.      MEDICATIONS:   1. Lamictal 400 mg daily.   2. Zoloft 150 mg daily.   3. Focalin 10 mg daily. Beneficial for energy, mood and no indications of problematic use.   4. Synthroid 150 mcg  5. Zofran prn migraines  6. Benadryl 50 mg/hs  7. Mitodrine tid  8. Lunesta 3 mg - this appears to have made a huge difference in her sleep    9. Carafate  10. Pyridostigmine  11. Tizanidine   16. Fluorinef-   17. Maxalt for migraines. It works very well.  Holding off until finishes prednisone  19. Mg-   21. Vit B2  /81  Pulse 89  Wt 62.2 kg (137 lb 3.2 oz)  BMI 21.17 kg/m2          MENTAL STATUS EXAM: The patient is neat, oriented x3, on time for appointment. Mood is good. Good range of affect. Thought processes logical, associations are  clear and goal directed. Thought content is normal. Fund of knowledge good, speech RRR, language intact, memory intact, concentration good, insight and judgment are good, gaitsteady.  ASSESSMENT: Anorexia nervosa, weight stable; major depressive disorder,, stable.,; chronic sleep problems,improved; migraine HA, treated  with maxalt, syncopal episodes  PLAN:   No change in medications.   She will continue to see her therapist, Noemi.  She will return again in 3 mo. . Focalin Rxs printed.   RADHA MAURER MD   .          PSYCHIATRY CLINIC INDIVIDUAL PSYCHOTHERAPY NOTE                                                     [16]   Start time - 100        End time - 120  Date reviewed - created on 12-19-17       Date next due - 3-19-17    Subjective: This supportive psychotherapy session addressed issues related to family of origin namely parents,  and health including renal stones, infection gtube site, migraines.  Patient's reaction: Maintenance in the context of mental status appropriate for ambulatory setting.  Progress: good  Plan: RTC 3 mo  Psychotherapy services during this visit included  myself and the patient.   TREATMENT  PLAN          SYMPTOMS; PROBLEMS   MEASURABLE GOALS;    FUNCTIONAL IMPROVEMENT INTERVENTIONS;   GAINS MADE DISCHARGE CRITERIA   Depression: none  Anxiety: mild, situational, parents getting older  Sleep Problems: nightmares   reduce nightmares, make a plan to manage 2-3 anxiety-provoking situations, develop 2 strategies to cope with trauma triggers/intrusive memories and take medications as prescribed on a daily basis building on strengths  communication skills  medications   psychotherapy marked symptom improvement and symptom resolution   Eating Disorder: intense fear of weight gain and distorted body image   eating 3 meals/day, improvement in body image,maintain weight stress management  psychotherapy marked symptom improvement

## 2017-12-19 NOTE — MR AVS SNAPSHOT
After Visit Summary   12/19/2017    Keisha Somers    MRN: [id number]           Patient Information     Date Of Birth          1970        Visit Information        Provider Department      12/19/2017 1:00 PM Romana Tena MD Psychiatry Clinic        Today's Diagnoses     Major depressive disorder, recurrent episode, moderate (H)        Major depressive disorder, recurrent episode, mild (H)        Primary insomnia           Follow-ups after your visit        Follow-up notes from your care team     Return in about 3 months (around 3/19/2018).      Your next 10 appointments already scheduled     Feb 02, 2018 12:00 PM CST   (Arrive by 11:45 AM)   RETURN ARRHYTHMIA with LACHELLE Soto [facility] ([facility])    [address] [place] [address]  [address] [place] [address] [phone]            Mar 13, 2018  1:00 PM CDT   Adult Med Follow UP with Romana Tena MD   Psychiatry Clinic ([facility])    [place] [id number] [facility] [address] [phone]              Who to contact     Please call your clinic at [phone] to:    Ask questions about your health    Make or cancel appointments    Discuss your medicines    Learn about your test results    Speak to your doctor   If you have compliments or concerns about an experience at your clinic, or if you wish to file a complaint, please contact [facility] Patient Relations at [phone] or email us at [email or web address]         Additional Information About Your Visit        MyChart Information     happin!t is an electronic gateway that provides easy, online access to your medical records. With [employer], you can request a clinic appointment, read your test results, renew a prescription or communicate with your care team.     To sign up for happin!t visit the website at [email or web address]    You will be asked to enter the access code listed below, as well as some personal information. Please follow the directions to create your username and password.     Your access code is: [email or web address]  Expires: 2018  6:30 AM     Your access code will  in 90 days. If you need help or a new code, please contact your [facility] or call [phone] for assistance.        Care EveryWhere ID     This is your Care EveryWhere ID. This could be used by other organizations to access your [facility] medical records  [id number]        Your Vitals Were     Pulse BMI (Body Mass Index)                89 21.17 kg/m2           Blood Pressure from Last 3 Encounters:   17 124/81   17 108/65   17 127/85    Weight from Last 3 Encounters:   17 62.2 kg (137 lb 3.2 oz)   17 60.9 kg (134 lb 3.2 oz)   17 62 kg (136 lb 11.2 oz)              Today, you had the following     No orders found for display         Today's Medication Changes          These changes are accurate as of: 17 11:59 PM.  If you have any questions, ask your nurse or doctor.               Start taking these medicines.        Dose/Directions    dexmethylphenidate 10 MG tablet   Commonly known as:  FOCALIN   Used for:  Major depressive disorder, recurrent episode, moderate (H)   Started by:  Romana Tena MD        Dose:  10 mg   Take 1 tablet (10 mg) by mouth daily   Quantity:  30 tablet   Refills:  0            Where to get your medicines      These medications were [place] [facility] [place] [address], [place] [address]     Phone:  [phone]     diphenhydrAMINE HCl (Sleep) 25 MG Tabs    lamoTRIgine 200 MG tablet    sertraline 100 MG tablet         Some of these will need a paper prescription and others can be bought over the counter.  Ask your nurse if you have questions.     Bring a paper prescription for each of these  medications     dexmethylphenidate 10 MG tablet    eszopiclone 3 MG tablet                Primary Care Provider Office Phone # Fax #    Lavonne Zarate [phone]       [facility] [address]  [place] [address]        Equal Access to Services     DARIANART DILAN AH: Hadii aad ku hadtamio Soomaali, waaxda luqadaha, qaybta kaalmada adeegyada, waxay idiin hayarturn adeeg harish lakaeana serjio. So [facility] [phone].    ATENCIÓN: Si habla español, tiene a schmid disposición servicios gratuitos de asistencia lingüística. Llame al [phone].    We comply with applicable federal civil rights laws and [place] laws. We do not discriminate on the basis of race, color, national origin, age, disability, sex, sexual orientation, or gender identity.            Thank you!     Thank you for choosing PSYCHIATRY CLINIC  for your care. Our goal is always to provide you with excellent care. Hearing back from our patients is one way we can continue to improve our services. Please take a few minutes to complete the written survey that you may receive in the mail after your visit with us. Thank you!             Your Updated Medication List - Protect others around you: Learn how to safely use, store and throw away your medicines at www.disposemymeds.org.          This list is accurate as of: 12/19/17 11:59 PM.  Always use your most recent med list.                   Brand Name Dispense Instructions for use Diagnosis    acetaminophen 500 MG tablet    TYLENOL     Take 500-1,000 mg by mouth every 6 hours as needed.        CALCIUM 600+D PO      Take 1 tablet by mouth 2 times daily.        CARAFATE 1 GM/10ML suspension   Generic drug:  sucralfate      Take 1 g by mouth 2 times daily. Take 10 Ml po daily        dexmethylphenidate 10 MG tablet    FOCALIN    30 tablet    Take 1 tablet (10 mg) by mouth daily    Major depressive disorder, recurrent episode, moderate (H)       diphenhydrAMINE HCl (Sleep) 25 MG Tabs     60 tablet     1-2 tabs HS prn sleep    Primary insomnia       docusate sodium 100 MG capsule    COLACE     Take 1 capsule by mouth 2 times daily.        eszopiclone 3 MG tablet    LUNESTA    30 tablet    Take 1 tablet (3 mg) by mouth At Bedtime    Primary insomnia       fludrocortisone 0.1 MG tablet    FLORINEF    90 tablet    TAKE 1 TABLET (0.1MG) BY MOUTH DAILY    Hypotension, unspecified hypotension type        MG tablet   Generic drug:  ibuprofen      Take 800 mg by mouth every 8 hours as needed.        lamoTRIgine 200 MG tablet    LaMICtal    60 tablet    Take 2 tablets (400 mg) by mouth daily    Major depressive disorder, recurrent episode, mild (H)       levothyroxine 175 MCG tablet    SYNTHROID/LEVOTHROID     Take  by mouth daily.        metoclopramide 10 MG tablet    REGLAN     2 times daily        midodrine 5 MG tablet    PROAMATINE    270 tablet    TAKE 3 TABLETS (15MG) BY MOUTH THREE TIMES A DAY    Postural orthostatic tachycardia syndrome       multivitamin Tabs tablet      Take 1 tablet by mouth 2 times daily.        OINTMENT BASE EX      Externally apply  topically daily. Mypirocin 2% apply to G Tube ulsers daily        omeprazole 20 MG CR capsule    priLOSEC     Take 2 capsules by mouth 2 times daily.        pyridostigmine 60 MG tablet    MESTINON     Take 1 tablet by mouth daily.        sertraline 100 MG tablet    ZOLOFT    45 tablet    Take 1.5 tablets (150 mg) by mouth daily    Major depressive disorder, recurrent episode, moderate (H)       VITAMIN D PO      Take  by mouth. 50, 000 units every 14 days

## 2017-12-28 ENCOUNTER — TELEPHONE (OUTPATIENT)
Dept: PSYCHIATRY | Facility: CLINIC | Age: 47
End: 2017-12-28

## 2017-12-28 NOTE — TELEPHONE ENCOUNTER
Returned call to Trisha with [facility].  Shared that it appears 3 prescriptions were printed at pt's last appt on 12/19.  Trisha will f/u with pt's GH to see if they have scripts.  If there are any issues either she or GH will call writer back.

## 2017-12-28 NOTE — TELEPHONE ENCOUNTER
----- Message from Shawnee Pisano sent at 12/28/2017 10:01 AM CST -----  Regarding: Pharmacy Med request  Contact: [phone]  Pharmacy calling and location: [facility]  Name of person calling: Trisha  Medication: Focalin  Reason: [facility] would like pt's Focalin script for 3mo supply mailed to them.

## 2018-01-17 ENCOUNTER — TRANSFERRED RECORDS (OUTPATIENT)
Dept: HEALTH INFORMATION MANAGEMENT | Facility: CLINIC | Age: 48
End: 2018-01-17

## 2018-01-19 ENCOUNTER — TELEPHONE (OUTPATIENT)
Dept: PSYCHIATRY | Facility: CLINIC | Age: 48
End: 2018-01-19

## 2018-01-19 NOTE — TELEPHONE ENCOUNTER
-Incoming fax from: [facility]  -Requesting completion/signature of enclosed form(s): Diagnostic Assessment  -Purpose: To continue targeted case management services  -Due date: 1/31/18  -Return to: [facility] at [phone] (fax)  -LASHAWN included: Yes  -Placed in Dr. Tena's folder

## 2018-02-20 ENCOUNTER — PRE VISIT (OUTPATIENT)
Dept: CARDIOLOGY | Facility: CLINIC | Age: 48
End: 2018-02-20

## 2018-02-23 DIAGNOSIS — G90.A POSTURAL ORTHOSTATIC TACHYCARDIA SYNDROME: ICD-10-CM

## 2018-02-26 RX ORDER — MIDODRINE HYDROCHLORIDE 5 MG/1
TABLET ORAL
Qty: 270 TABLET | Refills: 1 | Status: SHIPPED | OUTPATIENT
Start: 2018-02-26 | End: 2018-06-19

## 2018-03-13 ENCOUNTER — OFFICE VISIT (OUTPATIENT)
Dept: PSYCHIATRY | Facility: CLINIC | Age: 48
End: 2018-03-13
Attending: PSYCHIATRY & NEUROLOGY
Payer: MEDICARE

## 2018-03-13 VITALS
HEART RATE: 97 BPM | DIASTOLIC BLOOD PRESSURE: 77 MMHG | SYSTOLIC BLOOD PRESSURE: 112 MMHG | BODY MASS INDEX: 21.33 KG/M2 | WEIGHT: 138.2 LBS

## 2018-03-13 DIAGNOSIS — F51.01 PRIMARY INSOMNIA: ICD-10-CM

## 2018-03-13 DIAGNOSIS — F33.1 MAJOR DEPRESSIVE DISORDER, RECURRENT EPISODE, MODERATE (H): ICD-10-CM

## 2018-03-13 DIAGNOSIS — F33.0 MAJOR DEPRESSIVE DISORDER, RECURRENT EPISODE, MILD (H): ICD-10-CM

## 2018-03-13 PROCEDURE — G0463 HOSPITAL OUTPT CLINIC VISIT: HCPCS | Mod: ZF

## 2018-03-13 RX ORDER — SERTRALINE HYDROCHLORIDE 100 MG/1
150 TABLET, FILM COATED ORAL DAILY
Qty: 45 TABLET | Refills: 4 | Status: SHIPPED | OUTPATIENT
Start: 2018-03-13 | End: 2018-08-14

## 2018-03-13 RX ORDER — LAMOTRIGINE 200 MG/1
400 TABLET ORAL DAILY
Qty: 60 TABLET | Refills: 4 | Status: SHIPPED | OUTPATIENT
Start: 2018-03-13 | End: 2018-08-14

## 2018-03-13 RX ORDER — DEXMETHYLPHENIDATE HYDROCHLORIDE 10 MG/1
10 TABLET ORAL DAILY
Qty: 30 TABLET | Refills: 0 | Status: SHIPPED | OUTPATIENT
Start: 2018-03-13 | End: 2018-03-13

## 2018-03-13 RX ORDER — ESZOPICLONE 3 MG/1
3 TABLET, FILM COATED ORAL AT BEDTIME
Qty: 30 TABLET | Refills: 4 | Status: SHIPPED | OUTPATIENT
Start: 2018-03-13 | End: 2018-06-26

## 2018-03-13 RX ORDER — DEXMETHYLPHENIDATE HYDROCHLORIDE 10 MG/1
10 TABLET ORAL DAILY
Qty: 30 TABLET | Refills: 0 | Status: SHIPPED | OUTPATIENT
Start: 2018-03-13 | End: 2018-06-26

## 2018-03-13 ASSESSMENT — PAIN SCALES - GENERAL: PAINLEVEL: NO PAIN (0)

## 2018-03-13 NOTE — MR AVS SNAPSHOT
After Visit Summary   3/13/2018    Keisha Somers    MRN: [id number]           Patient Information     Date Of Birth          1970        Visit Information        Provider Department      3/13/2018 1:00 PM Romana Tena MD Psychiatry Clinic        Today's Diagnoses     Major depressive disorder, recurrent episode, moderate (H)        Major depressive disorder, recurrent episode, mild (H)        Primary insomnia           Follow-ups after your visit        Follow-up notes from your care team     Return in about 3 months (around 2018).      Your next 10 appointments already scheduled     2018  1:00 PM CDT   Adult Med Follow UP with Romana Tena MD   Psychiatry Clinic ([facility])    [place] [id number] [address] [phone]              Who to contact     Please call your clinic at [phone] to:    Ask questions about your health    Make or cancel appointments    Discuss your medicines    Learn about your test results    Speak to your doctor            Additional Information About Your Visit        MyChart Information     [employer] is an electronic gateway that provides easy, online access to your medical records. With [employer], you can request a clinic appointment, read your test results, renew a prescription or communicate with your care team.     To sign up for [employer] visit the website at [email or web address]   You will be asked to enter the access code listed below, as well as some personal information. Please follow the directions to create your username and password.     Your access code is: [email or web address]  Expires: 2018  7:30 AM     Your access code will  in 90 days. If you need help or a new code, please contact your [facility] or call [phone] for assistance.        Care EveryWhere ID     This is your Care EveryWhere ID. This could be used by  other organizations to access your [facility] medical records  [id number]        Your Vitals Were     Pulse BMI (Body Mass Index)                97 21.33 kg/m2           Blood Pressure from Last 3 Encounters:   03/13/18 112/77   12/19/17 124/81   09/19/17 108/65    Weight from Last 3 Encounters:   03/13/18 62.7 kg (138 lb 3.2 oz)   12/19/17 62.2 kg (137 lb 3.2 oz)   09/19/17 60.9 kg (134 lb 3.2 oz)              Today, you had the following     No orders found for display         Today's Medication Changes          These changes are accurate as of 3/13/18  1:33 PM.  If you have any questions, ask your nurse or doctor.               Start taking these medicines.        Dose/Directions    dexmethylphenidate 10 MG tablet   Commonly known as:  FOCALIN   Used for:  Major depressive disorder, recurrent episode, moderate (H)   Started by:  Romana Tena MD        Dose:  10 mg   Take 1 tablet (10 mg) by mouth daily   Quantity:  30 tablet   Refills:  0            Where to get your medicines      These medications were sent to [facility] [place] - [address], [place] [address]     Phone:  [phone]     diphenhydrAMINE HCl (Sleep) 25 MG Tabs    lamoTRIgine 200 MG tablet    sertraline 100 MG tablet         Some of these will need a paper prescription and others can be bought over the counter.  Ask your nurse if you have questions.     Bring a paper prescription for each of these medications     dexmethylphenidate 10 MG tablet    eszopiclone 3 MG tablet                Primary Care Provider Office Phone # Fax #    Lavonne Zarate [phone]       [address]  [place] [address]        Equal Access to Services     ART KONG AH: Lisbeth Burdick, wajessica fraga, qaybta kaalmehnaz sanchez. So [facility] [phone].    ATENCIÓN: Si habla español, tiene a schmid disposición  servicios gratuitos de asistencia lingüística. Dina hendricks [phone].    We comply with applicable federal civil rights laws and [place] laws. We do not discriminate on the basis of race, color, national origin, age, disability, sex, sexual orientation, or gender identity.            Thank you!     Thank you for choosing PSYCHIATRY CLINIC  for your care. Our goal is always to provide you with excellent care. Hearing back from our patients is one way we can continue to improve our services. Please take a few minutes to complete the written survey that you may receive in the mail after your visit with us. Thank you!             Your Updated Medication List - Protect others around you: Learn how to safely use, store and throw away your medicines at www.disposemymeds.org.          This list is accurate as of 3/13/18  1:33 PM.  Always use your most recent med list.                   Brand Name Dispense Instructions for use Diagnosis    acetaminophen 500 MG tablet    TYLENOL     Take 500-1,000 mg by mouth every 6 hours as needed.        CALCIUM 600+D PO      Take 1 tablet by mouth 2 times daily.        CARAFATE 1 GM/10ML suspension   Generic drug:  sucralfate      Take 1 g by mouth 2 times daily. Take 10 Ml po daily        dexmethylphenidate 10 MG tablet    FOCALIN    30 tablet    Take 1 tablet (10 mg) by mouth daily    Major depressive disorder, recurrent episode, moderate (H)       diphenhydrAMINE HCl (Sleep) 25 MG Tabs     60 tablet    1-2 tabs QHS prn sleep    Primary insomnia       docusate sodium 100 MG capsule    COLACE     Take 1 capsule by mouth 2 times daily.        eszopiclone 3 MG tablet    LUNESTA    30 tablet    Take 1 tablet (3 mg) by mouth At Bedtime    Primary insomnia       fludrocortisone 0.1 MG tablet    FLORINEF    90 tablet    TAKE 1 TABLET (0.1MG) BY MOUTH DAILY    Hypotension, unspecified hypotension type        MG tablet   Generic drug:  ibuprofen      Take 800 mg by mouth every 8 hours  as needed.        lamoTRIgine 200 MG tablet    LaMICtal    60 tablet    Take 2 tablets (400 mg) by mouth daily    Major depressive disorder, recurrent episode, mild (H)       levothyroxine 175 MCG tablet    SYNTHROID/LEVOTHROID     Take  by mouth daily.        metoclopramide 10 MG tablet    REGLAN     2 times daily        midodrine 5 MG tablet    PROAMATINE    270 tablet    TAKE 3 TABLETS (15MG) BY MOUTH THREE TIMES A DAY    Postural orthostatic tachycardia syndrome       multivitamin Tabs tablet      Take 1 tablet by mouth 2 times daily.        OINTMENT BASE EX      Externally apply  topically daily. Mypirocin 2% apply to G Tube ulsers daily        omeprazole 20 MG CR capsule    priLOSEC     Take 2 capsules by mouth 2 times daily.        pyridostigmine 60 MG tablet    MESTINON     Take 1 tablet by mouth daily.        sertraline 100 MG tablet    ZOLOFT    45 tablet    Take 1.5 tablets (150 mg) by mouth daily    Major depressive disorder, recurrent episode, moderate (H)       VITAMIN D PO      Take  by mouth. 50, 000 units every 14 days

## 2018-03-13 NOTE — PROGRESS NOTES
Progress Notes   Romana Tena MD (Physician)     Psychiatry       PSYCHIATRY VISIT      The patient was seen for evaluation and management.The patient was seen for anorexia and depression.   She was last seen 12/19/2017     INTERIM HX: The patient had surgery for problems with g tube site -it was very painful for a long time.  She saw wound care then a surgeon.  There was a stitch internally and removed and now is pain free. She then developed the flu.  She also had a general physical and lump in breast, polyp cervical found and she will switch cardiologists. The polyp is now removed.   Patient also helps out with taxes.She has been very busy.    Mood is good, sleep decreased, staying asleep, energy usually ok, a little tired, Interests picking up, she does want to do things that are fun.  She has missed outings due to taxes. Eating is going  Well, she eats 1 1/2 -2 meals /days: breakfast and dinner.     There has been a few more syncopal episodes, 2/week max. The last one was bad; she fell in the closet and landed on wire hangers.     /77  Pulse 97  Wt 62.7 kg (138 lb 3.2 oz)  BMI 21.33 kg/m2    She will be seeing Candy monthly. Patient decided to decrease the appts since she was doing well.     SH: She has a - Aracelis; [employer] worker also- not much contact (Ethel). .    ROS: Headaches- quite a few . Syncopal episodes -as above, remainder of 10 pt neg.       MEDICATIONS:   1. Lamictal 400 mg daily.   2. Zoloft 150 mg daily.   3. Focalin 10 mg daily. Beneficial for energy, mood and no indications of problematic use.   4. Synthroid 150 mcg  5. Zofran prn migraines  6. Benadryl 50 mg/hs every night  7. Mitodrine tid  8. Lunesta 3 mg - this appears to have made a huge difference in her sleep    9. Carafate  10. Pyridostigmine  11. Tizanidine   16. Fluorinef-   17. Maxalt for migraines. It works very well.  Holding off until finishes prednisone  19. Mg-   21. Vit B2    /77   Pulse 97  Wt 62.7 kg (138 lb 3.2 oz)  BMI 21.33 kg/m2          MENTAL STATUS EXAM: The patient is neat, oriented x3, on time for appointment. Mood is good. Good range of affect. Thought processes logical, associations are clear and goal directed. Thought content is normal. Fund of knowledge good, speech RRR, language intact, memory intact, concentration good, insight and judgment are good, gaitsteady.    ASSESSMENT: Anorexia nervosa, weight stable; major depressive disorder,, stable.,; chronic sleep problems,improved; migraine HA, treated  with maxalt, syncopal episodes  PLAN:   No change in medications.   She will continue to see her therapist, Noemi.  She will return again in 3 mo. . Focalin Rxs printed.   RADHA MAURER MD   .          PSYCHIATRY CLINIC INDIVIDUAL PSYCHOTHERAPY NOTE                                                     [16]   Start time - 105pm        End time - 122  Date reviewed - created on 12-19-17       Date next due - 3-19-17    Subjective: This supportive psychotherapy session addressed issues related to family of origin namely parents,  and health including renal stones, infection gtube site, migraines.  Patient's reaction: Maintenance in the context of mental status appropriate for ambulatory setting.  Progress: good  Plan: RTC 3 mo  Psychotherapy services during this visit included  myself and the patient.   TREATMENT  PLAN          SYMPTOMS; PROBLEMS   MEASURABLE GOALS;    FUNCTIONAL IMPROVEMENT INTERVENTIONS;   GAINS MADE DISCHARGE CRITERIA   Depression: none  Anxiety: mild, situational, parents getting older  Sleep Problems: nightmares   reduce nightmares, make a plan to manage 2-3 anxiety-provoking situations, develop 2 strategies to cope with trauma triggers/intrusive memories and take medications as prescribed on a daily basis building on strengths  communication skills  medications   psychotherapy marked symptom improvement and symptom resolution   Eating Disorder: intense  fear of weight gain and distorted body image   eating 3 meals/day, improvement in body image,maintain weight stress management  psychotherapy marked symptom improvement

## 2018-03-14 NOTE — TELEPHONE ENCOUNTER
-Rec'd completed forms from Dr. Tena on 3/13/18  -Forms faxed to [facility] at [phone] today  -LVM for pt's VANNESSA De Souza with this update ([phone])  -Provided clinic number for c/b if forms not rec'd  -Original sent to scanning  -Copy retained in clinic until scanning confirmed

## 2018-03-15 ASSESSMENT — PATIENT HEALTH QUESTIONNAIRE - PHQ9: SUM OF ALL RESPONSES TO PHQ QUESTIONS 1-9: 3

## 2018-04-19 DIAGNOSIS — G90.A POTS (POSTURAL ORTHOSTATIC TACHYCARDIA SYNDROME): Primary | ICD-10-CM

## 2018-04-19 RX ORDER — MIDODRINE HYDROCHLORIDE 10 MG/1
TABLET ORAL
Qty: 405 TABLET | Refills: 3 | Status: SHIPPED | OUTPATIENT
Start: 2018-04-19 | End: 2018-06-19

## 2018-06-19 DIAGNOSIS — G90.A POSTURAL ORTHOSTATIC TACHYCARDIA SYNDROME: ICD-10-CM

## 2018-06-19 RX ORDER — MIDODRINE HYDROCHLORIDE 5 MG/1
TABLET ORAL
Qty: 270 TABLET | Refills: 1 | Status: SHIPPED | OUTPATIENT
Start: 2018-06-19 | End: 2018-08-13

## 2018-06-19 NOTE — TELEPHONE ENCOUNTER
Pt requesting sooner appt in order to refill midodrine.  Call to pt, refill sent. appt schd in September with Katelin.     Asked pt tcb if she is having symptoms and needs to be seen sooner.   Contact information left.

## 2018-06-21 ENCOUNTER — CARE COORDINATION (OUTPATIENT)
Dept: CARDIOLOGY | Facility: CLINIC | Age: 48
End: 2018-06-21

## 2018-06-21 NOTE — PROGRESS NOTES
Date: 6/21/2018    Time of Call: 4:39 PM     Diagnosis: POTS     [ TORB ] Ordering provider: Dr Galarza  Order:   Follow previous orders for midodrine dose given by Dr Rancho Griffith     Order received by: Lu Howe RN      Follow-up/additional notes:    Spoke with RN,  Order faxed to [phone]

## 2018-06-21 NOTE — PROGRESS NOTES
Cathleen form [employer] called and states that current refill by Dr. Galarza does not match the recent medication instructions from patients other provider, Rancho Jones. They need clarification on which one to give patient. Request call back at [phone].

## 2018-06-21 NOTE — PROGRESS NOTES
Call to nursing station, no answer.     Reviewed care everywhere. Pt did see Dr Egan in April and refilled midodrone with instructions called to [employer].     Pt had left msg for us to refill midodrine and wanted sooner appt (schd with Dr Galarza in September)    Dr Galarza and Dr Egan are both electrophysiologists.   Left msg clarifying this with nursing station.   Recommended pt decide who she would like to follow up with, Dr Galarza or Nacho     Contact Information left

## 2018-06-26 ENCOUNTER — OFFICE VISIT (OUTPATIENT)
Dept: PSYCHIATRY | Facility: CLINIC | Age: 48
End: 2018-06-26
Attending: PSYCHIATRY & NEUROLOGY
Payer: MEDICARE

## 2018-06-26 VITALS
BODY MASS INDEX: 21.36 KG/M2 | WEIGHT: 138.4 LBS | DIASTOLIC BLOOD PRESSURE: 75 MMHG | HEART RATE: 93 BPM | SYSTOLIC BLOOD PRESSURE: 108 MMHG

## 2018-06-26 DIAGNOSIS — F33.1 MAJOR DEPRESSIVE DISORDER, RECURRENT EPISODE, MODERATE (H): ICD-10-CM

## 2018-06-26 DIAGNOSIS — F51.01 PRIMARY INSOMNIA: ICD-10-CM

## 2018-06-26 PROCEDURE — G0463 HOSPITAL OUTPT CLINIC VISIT: HCPCS | Mod: ZF

## 2018-06-26 RX ORDER — ESZOPICLONE 2 MG/1
2 TABLET, FILM COATED ORAL AT BEDTIME
Qty: 30 TABLET | Refills: 3 | Status: SHIPPED | OUTPATIENT
Start: 2018-06-26 | End: 2018-08-14

## 2018-06-26 RX ORDER — DEXMETHYLPHENIDATE HYDROCHLORIDE 10 MG/1
10 TABLET ORAL DAILY
Qty: 30 TABLET | Refills: 0 | Status: SHIPPED | OUTPATIENT
Start: 2018-06-26 | End: 2018-08-14

## 2018-06-26 RX ORDER — GABAPENTIN 600 MG/1
600 TABLET, FILM COATED ORAL
COMMUNITY
End: 2020-03-17

## 2018-06-26 RX ORDER — DEXMETHYLPHENIDATE HYDROCHLORIDE 10 MG/1
10 TABLET ORAL DAILY
Qty: 30 TABLET | Refills: 0 | Status: SHIPPED | OUTPATIENT
Start: 2018-06-26 | End: 2018-06-26

## 2018-06-26 ASSESSMENT — PAIN SCALES - GENERAL: PAINLEVEL: NO PAIN (0)

## 2018-06-26 NOTE — PROGRESS NOTES
Progress Notes   Romana Tena MD (Physician)     Psychiatry       PSYCHIATRY VISIT      The patient was seen for evaluation and management.The patient was seen for anorexia and depression.   She was last seen 3/13/2018     INTERIM HX: No problems with g-tube site ; she said April was a difficult month. She fractured her collarbone when got up to go to bed and fell. She could not stand up but crawled back to the couch. In a few hours she called staff because she knew she wasn't ok. Ambulance called and went to hospital.  She was sent home in a sling.  After that , she went to orthpedist and surgery to place hardware.  Both pain and mobility continue to be problems. She is in PT. She is sleeping on the couch.      She is supposed to go back to cardiologist at the  but often appts are rescheduled. She has expertise in POTS.  Meds have been adjusted slightly. Syncope is happening twice a week now., better than daily with the change in Mitrodrine to7.5 qid..     It has been a year since tube out.  She is eating in the middle of the night and unaware of it. This is happening  Every night and the patient is very fearful of becoming obese.  She has not gained any weight the past 6 mo and this is somewhat reassuring to the patient.  We explored chronology of the problem;  Lunesta was started about 2 years ago and the sleep disturbance present for probably last year but more noticeable and distressing lately.       Mood is good,, energy usually ok, a little tired, Interests ok, she does want to do things that are fun.       /75  Pulse 93  Wt 62.8 kg (138 lb 6.4 oz)  BMI 21.36 kg/m2    She is seeing Candkathy monthly and this is going well.   SH: She has a - Aracelis; [employer] worker also- not much contact (Ethel). .    ROS: Headaches- quite a few migraines.  . Syncopal episodes -as above, remainder of 10 pt neg.       MEDICATIONS:   1. Lamictal 400 mg daily.   2. Zoloft 150 mg daily.   3. Focalin  10 mg daily. Beneficial for energy, mood and no indications of problematic use.   4. Synthroid 150 mcg  5. Zofran prn migraines  6. Benadryl 50 mg/hs - discontinued due to gabapentin start - early April  7. Mitodrine 7.5 qid - changed recently from tid.   8. Lunesta 3 mg - this appears to have made a huge difference in her sleep: probably 2 years., helps to stay asleep.    9. Carafate  10. Pyridostigmine  11. Tizanidine   16. Fluorinef-   17. Maxalt for migraines. It works very well.  Holding off until finishes prednisone  19. Mg-   21. Vit B2  22. Gabapentin 600 QHS - x3 mo;  Added for hot flashes, works.    /75  Pulse 93  Wt 62.8 kg (138 lb 6.4 oz)  BMI 21.36 kg/m2          MENTAL STATUS EXAM: The patient is neat, oriented x3, on time for appointment. Mood is good. Good range of affect. Thought processes logical, associations are clear and goal directed. Thought content is normal. Fund of knowledge good, speech RRR, language intact, memory intact, concentration good, insight and judgment are good, gaitsteady.    ASSESSMENT:   Anorexia nervosa, weight stable at 137;   major depressive disorder,, stable.,;   chronic sleep problems,improved with meds but has nocturnal eating which might be SE of Lunesta,    migraine HA, treated  with maxalt,   syncopal episodes- POTS  Clavicle fx- fixated    PLAN:   Decrease Lunesta to 2 mg to see if this improves the nocturnal eating.  She will continue to see her therapist, Noemi.  She will return again in 2 mo. . Focalin Rxs printed.   RADHA MAURER MD   .          PSYCHIATRY CLINIC INDIVIDUAL PSYCHOTHERAPY NOTE                                                     [16]   Start time - 105pm        End time - 121  Date reviewed - created on 12-19-17       Date next due -     Next visit  Subjective: This supportive psychotherapy session addressed issues related to weight and weight perception,  Patient's reaction: Maintenance in the context of mental status appropriate  for ambulatory setting.  Progress: good  Plan: RTC 2 mo  Psychotherapy services during this visit included  myself and the patient.   TREATMENT  PLAN          SYMPTOMS; PROBLEMS   MEASURABLE GOALS;    FUNCTIONAL IMPROVEMENT INTERVENTIONS;   GAINS MADE DISCHARGE CRITERIA   Depression: none  Anxiety: mild, situational, parents getting older  Sleep Problems: nightmares   reduce nightmares, make a plan to manage 2-3 anxiety-provoking situations, develop 2 strategies to cope with trauma triggers/intrusive memories and take medications as prescribed on a daily basis building on strengths  communication skills  medications   psychotherapy marked symptom improvement and symptom resolution   Eating Disorder: intense fear of weight gain and distorted body image   eating 3 meals/day, improvement in body image,maintain weight stress management  psychotherapy marked symptom improvement

## 2018-06-26 NOTE — MR AVS SNAPSHOT
After Visit Summary   2018    Keisha Somers    MRN: [id number]           Patient Information     Date Of Birth          1970        Visit Information        Provider Department      2018 1:00 PM Romana Tena MD [facility]iatry [facility]        Today's Diagnoses     Major depressive disorder, recurrent episode, moderate (H)        Primary insomnia           Follow-ups after your visit        Follow-up notes from your care team     Return in about 2 months (around 2018).      Your next 10 appointments already scheduled     Aug 14, 2018 12:00 PM CDT   Adult Med Follow UP with Romana Tena MD   Psychiatry Clinic ([facility])    [address]  [address] [address] [phone]            Sep 13, 2018 12:30 PM CDT   (Arrive by 12:15 PM)   POSTURAL ORTHOSTATIC SYCOPE with Thang Galarza MD   [facility] ([facility])    [address]  [address] [place] [id number] [phone]              Who to contact     Please call your clinic at [phone] to:    Ask questions about your health    Make or cancel appointments    Discuss your medicines    Learn about your test results    Speak to your doctor            Additional Information About Your Visit        MyChart Information     Trusted[employer]t is an electronic gateway that provides easy, online access to your medical records. With [employer] by [employer], you can request a clinic appointment, read your test results, renew a prescription or communicate with your care team.     To sign up for [employer]ed[employer]t visit the website at [email or web address]   You will be asked to enter the access code listed below, as well as some personal information. Please follow the directions to create your username and password.     Your access code is: [email or web address]  Expires: 2018  4:44 PM     Your access code will  in 90 days. If you need help or a new  code, please contact your [facility] or call [phone] for assistance.        Care EveryWhere ID     This is your Care EveryWhere ID. This could be used by other organizations to access your [place] medical records  [id number]        Your Vitals Were     Pulse BMI (Body Mass Index)                93 21.36 kg/m2           Blood Pressure from Last 3 Encounters:   06/26/18 108/75   03/13/18 112/77   12/19/17 124/81    Weight from Last 3 Encounters:   06/26/18 62.8 kg (138 lb 6.4 oz)   03/13/18 62.7 kg (138 lb 3.2 oz)   12/19/17 62.2 kg (137 lb 3.2 oz)              Today, you had the following     No orders found for display         Today's Medication Changes          These changes are accurate as of 6/26/18  1:45 PM.  If you have any questions, ask your nurse or doctor.               Start taking these medicines.        Dose/Directions    dexmethylphenidate 10 MG tablet   Commonly known as:  FOCALIN   Used for:  Major depressive disorder, recurrent episode, moderate (H)   Started by:  Romana Tena MD        Dose:  10 mg   Take 1 tablet (10 mg) by mouth daily   Quantity:  30 tablet   Refills:  0         These medicines have changed or have updated prescriptions.        Dose/Directions    eszopiclone 2 MG tablet   Commonly known as:  LUNESTA   This may have changed:    - medication strength  - how much to take   Used for:  Primary insomnia   Changed by:  Romana Tena MD        Dose:  2 mg   Take 1 tablet (2 mg) by mouth At Bedtime   Quantity:  30 tablet   Refills:  3            Where to get your medicines      Some of these will need a paper prescription and others can be bought over the counter.  Ask your nurse if you have questions.     Bring a paper prescription for each of these medications     dexmethylphenidate 10 MG tablet    eszopiclone 2 MG tablet                Primary Care Provider Office Phone # Fax #    Lavonne Zarate [phone]        [address]  [place] [address]        Equal Access to Services     DARIANART DILAN : Hadii alvin ku vijio Sojoannaali, waaxda luqadaha, qaybta kaalmada dajuanwander, mehnaz ross johnana mehtakaitlyneze bassett. So [facility] [phone].    ATENCIÓN: Si habla español, tiene a schmid disposición servicios gratuitos de asistencia lingüística. Llame al [phone].    We comply with applicable federal civil rights laws and [place] laws. We do not discriminate on the basis of race, color, national origin, age, disability, sex, sexual orientation, or gender identity.            Thank you!     Thank you for choosing PSYCHIATRY CLINIC  for your care. Our goal is always to provide you with excellent care. Hearing back from our patients is one way we can continue to improve our services. Please take a few minutes to complete the written survey that you may receive in the mail after your visit with us. Thank you!             Your Updated Medication List - Protect others around you: Learn how to safely use, store and throw away your medicines at www.disposemymeds.org.          This list is accurate as of 6/26/18  1:45 PM.  Always use your most recent med list.                   Brand Name Dispense Instructions for use Diagnosis    acetaminophen 500 MG tablet    TYLENOL     Take 500-1,000 mg by mouth every 6 hours as needed.        CALCIUM 600+D PO      Take 1 tablet by mouth 2 times daily.        CARAFATE 1 GM/10ML suspension   Generic drug:  sucralfate      Take 1 g by mouth 2 times daily. Take 10 Ml po daily        dexmethylphenidate 10 MG tablet    FOCALIN    30 tablet    Take 1 tablet (10 mg) by mouth daily    Major depressive disorder, recurrent episode, moderate (H)       diphenhydrAMINE HCl (Sleep) 25 MG Tabs     60 tablet    1-2 tabs QHS prn sleep    Primary insomnia       docusate sodium 100 MG capsule    COLACE     Take 1 capsule by mouth 2 times daily.        eszopiclone 2 MG tablet    LUNESTA    30  tablet    Take 1 tablet (2 mg) by mouth At Bedtime    Primary insomnia       fludrocortisone 0.1 MG tablet    FLORINEF    90 tablet    TAKE 1 TABLET (0.1MG) BY MOUTH DAILY    Hypotension, unspecified hypotension type       gabapentin 600 MG 24 hr tablet    GRALISE     Take 600 mg by mouth daily (with dinner)         MG tablet   Generic drug:  ibuprofen      Take 800 mg by mouth every 8 hours as needed.        lamoTRIgine 200 MG tablet    LaMICtal    60 tablet    Take 2 tablets (400 mg) by mouth daily    Major depressive disorder, recurrent episode, mild (H)       levothyroxine 175 MCG tablet    SYNTHROID/LEVOTHROID     Take  by mouth daily.        metoclopramide 10 MG tablet    REGLAN     2 times daily        midodrine 5 MG tablet    PROAMATINE    270 tablet    TAKE 3 TABLETS (15MG) BY MOUTH THREE TIMES A DAY    Postural orthostatic tachycardia syndrome       multivitamin Tabs tablet      Take 1 tablet by mouth 2 times daily.        OINTMENT BASE EX      Externally apply  topically daily. Mypirocin 2% apply to G Tube ulsers daily        omeprazole 20 MG CR capsule    priLOSEC     Take 2 capsules by mouth 2 times daily.        pyridostigmine 60 MG tablet    MESTINON     Take 1 tablet by mouth daily.        sertraline 100 MG tablet    ZOLOFT    45 tablet    Take 1.5 tablets (150 mg) by mouth daily    Major depressive disorder, recurrent episode, moderate (H)       VITAMIN D PO      Take  by mouth. 50, 000 units every 14 days

## 2018-07-16 DIAGNOSIS — I95.9 HYPOTENSION, UNSPECIFIED HYPOTENSION TYPE: ICD-10-CM

## 2018-07-16 RX ORDER — FLUDROCORTISONE ACETATE 0.1 MG/1
TABLET ORAL
Qty: 90 TABLET | Refills: 3 | Status: SHIPPED | OUTPATIENT
Start: 2018-07-16 | End: 2019-06-26

## 2018-08-13 DIAGNOSIS — G90.A POSTURAL ORTHOSTATIC TACHYCARDIA SYNDROME: ICD-10-CM

## 2018-08-13 RX ORDER — MIDODRINE HYDROCHLORIDE 5 MG/1
TABLET ORAL
Qty: 270 TABLET | Refills: 0 | Status: SHIPPED | OUTPATIENT
Start: 2018-08-13 | End: 2018-09-13

## 2018-08-14 ENCOUNTER — OFFICE VISIT (OUTPATIENT)
Dept: PSYCHIATRY | Facility: CLINIC | Age: 48
End: 2018-08-14
Attending: PSYCHIATRY & NEUROLOGY
Payer: MEDICARE

## 2018-08-14 VITALS
DIASTOLIC BLOOD PRESSURE: 64 MMHG | BODY MASS INDEX: 21.6 KG/M2 | SYSTOLIC BLOOD PRESSURE: 92 MMHG | WEIGHT: 140 LBS | HEART RATE: 79 BPM

## 2018-08-14 DIAGNOSIS — F51.01 PRIMARY INSOMNIA: ICD-10-CM

## 2018-08-14 DIAGNOSIS — F33.1 MAJOR DEPRESSIVE DISORDER, RECURRENT EPISODE, MODERATE (H): ICD-10-CM

## 2018-08-14 DIAGNOSIS — F33.0 MAJOR DEPRESSIVE DISORDER, RECURRENT EPISODE, MILD (H): ICD-10-CM

## 2018-08-14 PROCEDURE — G0463 HOSPITAL OUTPT CLINIC VISIT: HCPCS | Mod: ZF

## 2018-08-14 RX ORDER — DEXMETHYLPHENIDATE HYDROCHLORIDE 10 MG/1
10 TABLET ORAL DAILY
Qty: 30 TABLET | Refills: 0 | Status: SHIPPED | OUTPATIENT
Start: 2018-08-14 | End: 2018-08-14

## 2018-08-14 RX ORDER — LAMOTRIGINE 200 MG/1
400 TABLET ORAL DAILY
Qty: 60 TABLET | Refills: 4 | Status: SHIPPED | OUTPATIENT
Start: 2018-08-14 | End: 2018-11-13

## 2018-08-14 RX ORDER — ESZOPICLONE 2 MG/1
2 TABLET, FILM COATED ORAL AT BEDTIME
Qty: 30 TABLET | Refills: 3 | Status: SHIPPED | OUTPATIENT
Start: 2018-08-14 | End: 2018-12-11

## 2018-08-14 RX ORDER — DEXMETHYLPHENIDATE HYDROCHLORIDE 10 MG/1
10 TABLET ORAL DAILY
Qty: 30 TABLET | Refills: 0 | Status: SHIPPED | OUTPATIENT
Start: 2018-08-14 | End: 2018-11-13

## 2018-08-14 RX ORDER — SERTRALINE HYDROCHLORIDE 100 MG/1
150 TABLET, FILM COATED ORAL DAILY
Qty: 45 TABLET | Refills: 4 | Status: SHIPPED | OUTPATIENT
Start: 2018-08-14 | End: 2018-11-13

## 2018-08-14 ASSESSMENT — PAIN SCALES - GENERAL: PAINLEVEL: MODERATE PAIN (4)

## 2018-08-14 NOTE — PROGRESS NOTES
Progress Notes   Romana Tena MD (Physician)     Psychiatry       PSYCHIATRY VISIT      The patient was seen for evaluation and management.The patient was seen for anorexia and depression.   She was last seen 6/26/2018    INTERIM HX: The patient has been doing well.  Syncopal episodes are happening 2x/week, sometimes once//week.  BP ok in am but lower at night, in 80's.  A few months ago the mitodrine was changed to qid from tid but total dose unchanged. Pain from the fractures continues. The muscle might be rubbing on the plate and so PT ended since it exacerbates the pain.  Surgery was mid May.      Sleeping is not as great with the lower dose.  She is sleeping on the couch because she doesn't roll on her shoulder. It is also too hot. She sleeps right after eating at 5pm. She may sleep then, only 10 min. Encouraged not to sleep for long period of time. The night eating is better with lowering of Lunesta dose. There are nights when she doesn't get up to eat.    Mood is good, energy is down a bit, still active and motivation good.  She has only gone to the cabin once.  Eating is going well; usually 2/day meals with snack in between.     She is supposed to go back to cardiologist at the  but often appts are rescheduled. She has expertise in POTS. She thinks syncope is worse.     MED hx:  It has been over a year since tube out.  Patient has gained 3# in last several months.  Lunesta was started about 2 years ago and the sleep disturbance present for probably last year but more noticeable and distressing before June visit.       She is seeing Candy monthly and this is going well.   SH: She has a new temporary  which she likes better- [employer] worker Q6 mo.  also- not much contact (Ethel). .    ROS: Headaches- can last a week but not now for awhile.. Syncopal episodes -as above, remainder of 10 pt neg.       MEDICATIONS:   1. Lamictal 400 mg daily.   2. Zoloft 150 mg daily.   3. Focalin 10 mg  daily. Beneficial for energy, mood and no indications of problematic use.   4. Synthroid 150 mcg  5. Zofran prn migraines  6. Benadryl 50 mg/hs - discontinued due to gabapentin start - early April  7. Mitodrine 7.5 qid - changed recently from tid.   8. Lunesta 2 mg - Lunesta has made a huge difference in her sleep: probably 2 years., helps to stay asleep.  We reduced from 3 mg at last visit due to sleep eating  9. Carafate  10. Pyridostigmine  11. Tizanidine   16. Fluorinef-   17. Maxalt for migraines. It works very well.  Holding off until finishes prednisone  19. Mg-   21. Vit B2  22. Gabapentin 600 QHS -x6 mo;  Added for hot flashes, works.    BP 92/64  Pulse 79  Wt 63.5 kg (140 lb)  BMI 21.6 kg/m2     BP Readings from Last 3 Encounters:   08/14/18 92/64   06/26/18 108/75   03/13/18 112/77     Wt Readings from Last 4 Encounters:   08/14/18 63.5 kg (140 lb)   06/26/18 62.8 kg (138 lb 6.4 oz)   03/13/18 62.7 kg (138 lb 3.2 oz)   12/19/17 62.2 kg (137 lb 3.2 oz)     MENTAL STATUS EXAM: The patient is neat, oriented x3, on time for appointment. Mood is good. Good range of affect. Thought processes logical, associations are clear and goal directed. Thought content is normal. Fund of knowledge good, speech RRR, language intact, memory intact, concentration good, insight and judgment are good, gaitsteady.    ASSESSMENT:   Anorexia nervosa, weight stable    Major depressive disorder,, stable.,;   Chronic sleep problems,improved with meds but has nocturnal eating which might be SE of Lunesta, improved with lowered dose   Migraine HA, treated  with maxalt,   Syncopal episodes- POTS  Clavicle fx- fixated    PLAN:   Continue Lunesta at 2 mg and no change in any other medications.  She will continue to see her therapist, Noemi.  She will return again in 3 mo. . Focalin Rxs printed.   Follow-up with cardiology.  RADHA MAURER MD   .          PSYCHIATRY CLINIC INDIVIDUAL PSYCHOTHERAPY NOTE                                                      [16]   Start time - 1200pm        End time - 1216  Date reviewed - created on 12-19-17   last update: 8-14-18   Date next due -     11-14-18  Subjective: This supportive psychotherapy session addressed issues related to weight and weight perception,  Patient's reaction: Maintenance in the context of mental status appropriate for ambulatory setting.  Progress: good  Plan: RTC 2 mo  Psychotherapy services during this visit included  myself and the patient.   TREATMENT  PLAN          SYMPTOMS; PROBLEMS   MEASURABLE GOALS;    FUNCTIONAL IMPROVEMENT INTERVENTIONS;   GAINS MADE DISCHARGE CRITERIA   Depression: none  Anxiety: mild, situational, parents getting older  Sleep Problems: nightmares   reduce nightmares, make a plan to manage 2-3 anxiety-provoking situations, develop 2 strategies to cope with trauma triggers/intrusive memories and take medications as prescribed on a daily basis building on strengths  communication skills  medications   psychotherapy marked symptom improvement and symptom resolution   Eating Disorder: intense fear of weight gain and distorted body image    POTS- syncopal episodes eating 3 meals/day, improvement in body image,maintain weight       Minimal episodes stress management  psychotherapy         muliple meds, cardiologist following , management of sxs marked symptom improvement

## 2018-08-14 NOTE — MR AVS SNAPSHOT
After Visit Summary   8/14/2018    Keisha Somers    MRN: [id number]           Patient Information     Date Of Birth          1970        Visit Information        Provider Department      8/14/2018 12:00 PM Romana Tena MD [facility]        Today's Diagnoses     Major depressive disorder, recurrent episode, moderate (H)        Primary insomnia        Major depressive disorder, recurrent episode, mild (H)           Follow-ups after your visit        Follow-up notes from your care team     Return in about 3 months (around 11/14/2018).      Your next 10 appointments already scheduled     Sep 13, 2018 12:30 PM CDT   (Arrive by 12:15 PM)   POSTURAL ORTHOSTATIC SYCOPE with Thang Galarza MD   [facility] ([facility])    [address] [facility]  [address] [place] [address] [phone]            Nov 13, 2018  1:30 PM CST   Adult Med Follow UP with Romana Tena MD   [facility] ([facility])    [address] [address] [phone]              Who to contact     Please call your clinic at [phone] to:    Ask questions about your health    Make or cancel appointments    Discuss your medicines    Learn about your test results    Speak to your doctor            Additional Information About Your Visit        MyChart Information     [employer] is an electronic gateway that provides easy, online access to your medical records. With [employer], you can request a clinic appointment, read your test results, renew a prescription or communicate with your care team.     To sign up for [employer] visit the website at [email or web address]   You will be asked to enter the access code listed below, as well as some personal information. Please follow the directions to create your username and password.     Your access code is: [email or web address]  Expires: 9/19/2018  4:44 PM      Your access code will  in 90 days. If you need help or a new code, please contact your [facility] or call [phone] for assistance.        Care EveryWhere ID     This is your Care EveryWhere ID. This could be used by other organizations to access your [place] medical records  [id number]        Your Vitals Were     Pulse BMI (Body Mass Index)                79 21.6 kg/m2           Blood Pressure from Last 3 Encounters:   18 92/64   18 108/75   18 112/77    Weight from Last 3 Encounters:   18 63.5 kg (140 lb)   18 62.8 kg (138 lb 6.4 oz)   18 62.7 kg (138 lb 3.2 oz)              Today, you had the following     No orders found for display         Today's Medication Changes          These changes are accurate as of 18 12:40 PM.  If you have any questions, ask your nurse or doctor.               Start taking these medicines.        Dose/Directions    dexmethylphenidate 10 MG tablet   Commonly known as:  FOCALIN   Used for:  Major depressive disorder, recurrent episode, moderate (H)   Started by:  Romana Tena MD        Dose:  10 mg   Take 1 tablet (10 mg) by mouth daily   Quantity:  30 tablet   Refills:  0            Where to get your medicines      These medications were sent to [facility] [id number] - [place], [place] - [address], [place] [address]     Phone:  [phone]     lamoTRIgine 200 MG tablet    sertraline 100 MG tablet         Some of these will need a paper prescription and others can be bought over the counter.  Ask your nurse if you have questions.     Bring a paper prescription for each of these medications     dexmethylphenidate 10 MG tablet    eszopiclone 2 MG tablet                Primary Care Provider Office Phone # Fax #    Lavonne Zarate [phone]       [place] [id number] [facility] [address]  [place] [address]        Equal Access to Services     PARDEEP  GAAR : Hadii aad ku macho Burdick, waaxda luqadaha, qaybta kaalmada adewander, mehnaz jacobin hayaaana bowen harish jasiel . So [facility] [phone].    ATENCIÓN: Si habla lucretia, tiene a schmid disposición servicios gratuitos de asistencia lingüística. Llame al [phone].    We comply with applicable federal civil rights laws and [place] laws. We do not discriminate on the basis of race, color, national origin, age, disability, sex, sexual orientation, or gender identity.            Thank you!     Thank you for choosing [facility]  for your care. Our goal is always to provide you with excellent care. Hearing back from our patients is one way we can continue to improve our services. Please take a few minutes to complete the written survey that you may receive in the mail after your visit with us. Thank you!             Your Updated Medication List - Protect others around you: Learn how to safely use, store and throw away your medicines at www.disposemymeds.org.          This list is accurate as of 8/14/18 12:40 PM.  Always use your most recent med list.                   Brand Name Dispense Instructions for use Diagnosis    acetaminophen 500 MG tablet    TYLENOL     Take 500-1,000 mg by mouth every 6 hours as needed.        CALCIUM 600+D PO      Take 1 tablet by mouth 2 times daily.        CARAFATE 1 GM/10ML suspension   Generic drug:  sucralfate      Take 1 g by mouth 2 times daily. Take 10 Ml po daily        dexmethylphenidate 10 MG tablet    FOCALIN    30 tablet    Take 1 tablet (10 mg) by mouth daily    Major depressive disorder, recurrent episode, moderate (H)       diphenhydrAMINE HCl (Sleep) 25 MG Tabs     60 tablet    1-2 tabs QHS prn sleep    Primary insomnia       docusate sodium 100 MG capsule    COLACE     Take 1 capsule by mouth 2 times daily.        eszopiclone 2 MG tablet    LUNESTA    30 tablet    Take 1 tablet (2 mg) by mouth At Bedtime    Primary insomnia       fludrocortisone 0.1 MG tablet     FLORINEF    90 tablet    TAKE 1 TABLET (0.1MG) BY MOUTH DAILY    Hypotension, unspecified hypotension type       gabapentin 600 MG 24 hr tablet    GRALISE     Take 600 mg by mouth daily (with dinner)         MG tablet   Generic drug:  ibuprofen      Take 800 mg by mouth every 8 hours as needed.        lamoTRIgine 200 MG tablet    LaMICtal    60 tablet    Take 2 tablets (400 mg) by mouth daily    Major depressive disorder, recurrent episode, mild (H)       levothyroxine 175 MCG tablet    SYNTHROID/LEVOTHROID     Take  by mouth daily.        metoclopramide 10 MG tablet    REGLAN     2 times daily        midodrine 5 MG tablet    PROAMATINE    270 tablet    TAKE 3 TABLETS (15MG) BY MOUTH THREE TIMES A DAY    Postural orthostatic tachycardia syndrome       multivitamin Tabs tablet      Take 1 tablet by mouth 2 times daily.        OINTMENT BASE EX      Externally apply  topically daily. Mypirocin 2% apply to G Tube ulsers daily        omeprazole 20 MG CR capsule    priLOSEC     Take 2 capsules by mouth 2 times daily.        pyridostigmine 60 MG tablet    MESTINON     Take 1 tablet by mouth daily.        sertraline 100 MG tablet    ZOLOFT    45 tablet    Take 1.5 tablets (150 mg) by mouth daily    Major depressive disorder, recurrent episode, moderate (H)       VITAMIN D PO      Take  by mouth. 50, 000 units every 14 days

## 2018-09-12 ENCOUNTER — CARE COORDINATION (OUTPATIENT)
Dept: CARDIOLOGY | Facility: CLINIC | Age: 48
End: 2018-09-12

## 2018-09-12 ENCOUNTER — PRE VISIT (OUTPATIENT)
Dept: CARDIOLOGY | Facility: CLINIC | Age: 48
End: 2018-09-12

## 2018-09-12 NOTE — TELEPHONE ENCOUNTER
HPI:     Keisha Somers is a pleasant 47 yo woman with symptoms that are primarily orthostatic in nature.     She was last seen by Dr Griffith on 4/25/18 at  [facility]- at that time she was having worsening orthostatic intolerance symptoms (immediate OH) .   He discussed the importance of making sure that she is well hydrated with liberal salt intake as well as importance of recumbent exercises or pool exercise as well as preventive measures (when going from sitting to standing - stand for 10 secs or so before moving ) in hopes of avoiding falls/injury. Also discussed increasing midodrine 5 mgs QID and if no relief in one week then increase dose to 7.5 mgs QID.         PAST MEDICAL HISTORY:  Past Medical History:   Diagnosis Date     Eating disorder      Hypotension, postural      Palpitations      Syncope      Syncope and collapse      Thyroid disease        CURRENT MEDICATIONS:  Current Outpatient Prescriptions   Medication Sig Dispense Refill     acetaminophen (TYLENOL) 500 MG tablet Take 500-1,000 mg by mouth every 6 hours as needed.       Calcium Carbonate-Vitamin D (CALCIUM 600+D PO) Take 1 tablet by mouth 2 times daily.       Cholecalciferol (VITAMIN D PO) Take  by mouth. 50, 000 units every 14 days       dexmethylphenidate (FOCALIN) 10 MG tablet Take 1 tablet (10 mg) by mouth daily 30 tablet 0     diphenhydrAMINE HCl, Sleep, 25 MG TABS 1-2 tabs QHS prn sleep 60 tablet 6     docusate sodium (COLACE) 100 MG capsule Take 1 capsule by mouth 2 times daily.       Emollient (OINTMENT BASE EX) Externally apply  topically daily. Mypirocin 2% apply to G Tube ulsers daily       eszopiclone (LUNESTA) 2 MG tablet Take 1 tablet (2 mg) by mouth At Bedtime 30 tablet 3     fludrocortisone (FLORINEF) 0.1 MG tablet TAKE 1 TABLET (0.1MG) BY MOUTH DAILY 90 tablet 3     gabapentin (GRALISE) 600 MG 24 hr tablet Take 600 mg by mouth daily (with dinner)       ibuprofen (IBU) 800 MG tablet Take 800 mg by mouth every 8 hours as needed.        lamoTRIgine (LAMICTAL) 200 MG tablet Take 2 tablets (400 mg) by mouth daily 60 tablet 4     levothyroxine (SYNTHROID, LEVOTHROID) 175 MCG tablet Take  by mouth daily.       metoclopramide (REGLAN) 10 MG tablet 2 times daily        midodrine (PROAMATINE) 5 MG tablet TAKE 3 TABLETS (15MG) BY MOUTH THREE TIMES A  tablet 0     multivitamin (OCUVITE) TABS Take 1 tablet by mouth 2 times daily.       omeprazole (PRILOSEC) 20 MG capsule Take 2 capsules by mouth 2 times daily.       pyridostigmine (MESTINON) 60 MG tablet Take 1 tablet by mouth daily.       sertraline (ZOLOFT) 100 MG tablet Take 1.5 tablets (150 mg) by mouth daily 45 tablet 4     sucralfate (CARAFATE) 1 GM/10ML suspension Take 1 g by mouth 2 times daily. Take 10 Ml po daily         PAST SURGICAL HISTORY:  Past Surgical History:   Procedure Laterality Date     GT placed[  2001       ALLERGIES:     No Known Allergies    FAMILY HISTORY:  None pertinent    SOCIAL HISTORY:  Social History   Substance Use Topics     Smoking status: Never Smoker     Smokeless tobacco: Never Used     Alcohol use No       ROS:   Constitutional: No fever, chills, or sweats. Weight stable.   ENT: No visual disturbance, ear ache, epistaxis, sore throat.   Cardiovascular: As per HPI.   Respiratory: No cough, hemoptysis.    GI: No nausea, vomiting, hematemesis, melena, or hematochezia.   : No hematuria.   Integument: Negative.   Psychiatric: Negative.   Hematologic: no easy bleeding.  Neuro: Negative.   Endocrinology: No significant heat or cold intolerance   Musculoskeletal: No myalgia.    Exam: Extended Vitals not filed for this encounter.  Extended Vitals not filed for this encounter.    GENERAL APPEARANCE: healthy, alert and no distress  HEENT: no icterus, no xanthelasmas.  NECK:  no asymmetry, masses, or scars, and no bruits, JVP not elevated  RESPIRATORY: lungs clear to auscultation - no rales, rhonchi or wheezes, no use of accessory muscles, no retractions,  respirations are unlabored, normal respiratory rate  CARDIOVASCULAR: regular rhythm, normal S1 with physiologic split S2, no S3 or S4 and no murmur, click or rub,   ABDOMEN: soft, non tender, without hepatosplenomegaly,  no abdominal bruits. G-tube.     Labs:  CBC RESULTS:      Component      Latest Ref Rng & Units 2/2/2017 7/24/2017   WBC      4.0 - 11.0 10e9/L 6.4 5.0   RBC Count      3.8 - 5.2 10e12/L 3.87 3.56 (L)   Hemoglobin      11.7 - 15.7 g/dL 11.7 10.8 (L)   Hematocrit      35.0 - 47.0 % 37.0 33.7 (L)   MCV      78 - 100 fl 96 95   MCH      26.5 - 33.0 pg 30.2 30.3   MCHC      31.5 - 36.5 g/dL 31.6 32.0   RDW      10.0 - 15.0 % 12.9 13.1   Platelet Count      150 - 450 10e9/L 313 247     Procedures:  11/25/2013: Tilt/Autonomic Study   The following maneuvers were done sequentially:  1- Sitting position for 15 minutes: /80 mmHg,  bpm. No significant change in hemodynamic profile. No symptoms.  2- Orthostatism for 5 minutes:   A. Immediate: Chad BP 84/51 mmHg,  bpm, No symptoms.   B. Delayed: Chad /87 mmHg,  bpm, No symptoms.   In 5 minutes after patient was standing HR showed 32 bpm increase compared to sitting position.   3- Maneuvers in sitting position:   A. Carotid sinus massage:   Left: Chad /80 mmHg,  bpm, , No symptoms.   Right: Chad /77 mmHg, HR 99 bpm, , No symptoms.   B. Valsalva:   - Phase 1: present.   - Phase 2: present   - Phase 3: present   - Phase 4: present   Chad BP was 75/60 and HR was 121 during valsalva maneuver. Patient felt somewhat dizzy during valsalva however symptom immediately improved after she stopped valsalva.   C. Cough:   Couplets: Chad /81 mmHg,  bpm, No symptoms.   Spasm: Chad /78 mmHg,  bpm, No symptoms.   4- Supine for 10-15 minutes: /75 mmHg, HR 90 bpm. No significant change in hemodynamic profile. No symptoms.  5- TILT at 70 degrees for 20 minutes: BP 97/62 mmHg, HR 101pm. No  significant change in hemodynamic profile. No symptoms.  6 TILT at 70 degrees AFTER ABDOMINAL BINDER for 5 minutes: /77 mmHg, HR 103pm. No significant change in hemodynamic profile. No symptoms.   7 TILT at 70 degrees AFTER 1 LITER OF FLUID for 5 minutes: /83 mmHg, HR 89pm. No significant change in hemodynamic profile. No symptoms.   DISCUSSION:  - Autonomic function test: consistent with normal physiologic response.  - TILT table test:   POTS   No vasovagal syncope  ECHOCARDIOGRAM: 11/25/2013    Interpretation Summary  Poor image quality Technically difficult study 1. Grossly normal LV size   and systolic function. Cannot assess diastolic function 2. Normal RV size   and function    Assessment and Plan:   49 yo woman with previous POTS-like complaints but now seems more like Immediate OH presumably due to Autonomic  Dysfunction of unknown cause.     1.  Abdomininal binder daily  2.  Increase fluids to 10-12 more oz per day and add salt to those 10 oz  3.  Try midodrine 10 mg in the morning.  Discussed the possiblity of increasing the noon dose to midodrine 10 mg as well.    4.  Establish care with primary care  5.  Double check your vitamin has iron in it.  Please take with Vitamin C  6.  Continue with exercises        I very much appreciated the opportunity to see and assess this patient in the clinic with Cardiovascular Fellow        I agree with the above note which summarizes my findings and current recommendations.      Please do not hesitate to contact my office if you have any questions or concerns.       Thang Galarza MD  Cardiac Arrhythmia Service  [facility]  [phone]

## 2018-09-13 ENCOUNTER — OFFICE VISIT (OUTPATIENT)
Dept: CARDIOLOGY | Facility: CLINIC | Age: 48
End: 2018-09-13
Attending: INTERNAL MEDICINE
Payer: MEDICARE

## 2018-09-13 VITALS
DIASTOLIC BLOOD PRESSURE: 111 MMHG | HEIGHT: 68 IN | HEART RATE: 146 BPM | OXYGEN SATURATION: 95 % | SYSTOLIC BLOOD PRESSURE: 144 MMHG | WEIGHT: 142.8 LBS | BODY MASS INDEX: 21.64 KG/M2

## 2018-09-13 DIAGNOSIS — I10 HYPERTENSION, UNSPECIFIED TYPE: Primary | ICD-10-CM

## 2018-09-13 DIAGNOSIS — G90.A POSTURAL ORTHOSTATIC TACHYCARDIA SYNDROME: ICD-10-CM

## 2018-09-13 PROCEDURE — 99214 OFFICE O/P EST MOD 30 MIN: CPT | Mod: GC | Performed by: INTERNAL MEDICINE

## 2018-09-13 PROCEDURE — G0463 HOSPITAL OUTPT CLINIC VISIT: HCPCS

## 2018-09-13 RX ORDER — MIDODRINE HYDROCHLORIDE 5 MG/1
TABLET ORAL
Qty: 360 TABLET | Refills: 1 | Status: SHIPPED | OUTPATIENT
Start: 2018-09-13 | End: 2019-02-19

## 2018-09-13 RX ORDER — AMLODIPINE BESYLATE 5 MG/1
5 TABLET ORAL DAILY
Qty: 90 TABLET | Refills: 3 | Status: SHIPPED | OUTPATIENT
Start: 2018-09-13 | End: 2018-10-22

## 2018-09-13 ASSESSMENT — PAIN SCALES - GENERAL: PAINLEVEL: NO PAIN (0)

## 2018-09-13 NOTE — LETTER
9/13/2018      RE: Keisha Somers  [address]  [address]  [facility] [address]       Dear Colleague,    Thank you for the opportunity to participate in the care of your patient, Keisha Somers, at the [facility]. Please see a copy of my visit note below.      HPI:     .Keisha eckert being is a 48-year-old woman who has been followed in this clinic for orthostatic hypotension    Today Keisha indicates that since her last visit she has continued to have immediate orthostatic hypotensive symptoms.  On one occasion she had walked a couple steps after getting up from a chair and collapsed causing a clavicular fracture which required surgery.  The episodes are clearly associated with change of posture.  She did see Dr. Griffith at [facility] in April of this year- at that time she was having worsening orthostatic intolerance symptoms (immediate OH) .  He recommended increasing her midodrine to 7.5 mg 4 times daily and that is what he is currently taking.    Police recognizes that her daytime blood pressures are often low but is concerned about her nighttime blood pressure.  Measurements today in the clinic and to indicate higher than desirable blood pressures even during the daytime.  She did not show much of an orthostatic drop but did nonetheless have a 15 mm reduction with immediate standing.    Today we reviewed her history and focused on plans to diminish her daytime orthostasis while the preventing nighttime supine hypertension.  To this end I am adjusting her midodrine to frontloaded the day and have her use spanks undergarments to help prevent hypotension while upright.  We will additionally add amlodipine prior to bedtime in order to diminish the concerns regarding supine hypertension.  Finally, I have advocated the use of a walker especially at night in order to diminish the chance of her having a sudden collapse.    We will make  arrangements for Keisha to return in about 6 months time to ascertain the effectiveness of this change in treatment.  However I have asked her also to send us standing and supine blood pressure measurements through my chart in order to better understand whether the medication regimen is proving safe and effective.      PAST MEDICAL HISTORY:  Past Medical History:   Diagnosis Date     Eating disorder      Hypotension, postural      Palpitations      Syncope      Syncope and collapse      Thyroid disease        CURRENT MEDICATIONS:  Current Outpatient Prescriptions   Medication Sig Dispense Refill     acetaminophen (TYLENOL) 500 MG tablet Take 500-1,000 mg by mouth every 6 hours as needed.       Calcium Carbonate-Vitamin D (CALCIUM 600+D PO) Take 1 tablet by mouth 2 times daily.       Cholecalciferol (VITAMIN D PO) Take  by mouth. 50, 000 units every 14 days       dexmethylphenidate (FOCALIN) 10 MG tablet Take 1 tablet (10 mg) by mouth daily 30 tablet 0     diphenhydrAMINE HCl, Sleep, 25 MG TABS 1-2 tabs QHS prn sleep 60 tablet 6     docusate sodium (COLACE) 100 MG capsule Take 1 capsule by mouth 2 times daily.       Emollient (OINTMENT BASE EX) Externally apply  topically daily. Mypirocin 2% apply to G Tube ulsers daily       eszopiclone (LUNESTA) 2 MG tablet Take 1 tablet (2 mg) by mouth At Bedtime 30 tablet 3     fludrocortisone (FLORINEF) 0.1 MG tablet TAKE 1 TABLET (0.1MG) BY MOUTH DAILY 90 tablet 3     gabapentin (GRALISE) 600 MG 24 hr tablet Take 600 mg by mouth daily (with dinner)       ibuprofen (IBU) 800 MG tablet Take 800 mg by mouth every 8 hours as needed.       lamoTRIgine (LAMICTAL) 200 MG tablet Take 2 tablets (400 mg) by mouth daily 60 tablet 4     levothyroxine (SYNTHROID, LEVOTHROID) 175 MCG tablet Take  by mouth daily.       metoclopramide (REGLAN) 10 MG tablet 2 times daily        midodrine (PROAMATINE) 5 MG tablet TAKE 3 TABLETS (15MG) BY MOUTH THREE TIMES A  tablet 0     multivitamin  (OCUVITE) TABS Take 1 tablet by mouth 2 times daily.       omeprazole (PRILOSEC) 20 MG capsule Take 2 capsules by mouth 2 times daily.       pyridostigmine (MESTINON) 60 MG tablet Take 1 tablet by mouth daily.       sertraline (ZOLOFT) 100 MG tablet Take 1.5 tablets (150 mg) by mouth daily 45 tablet 4     sucralfate (CARAFATE) 1 GM/10ML suspension Take 1 g by mouth 2 times daily. Take 10 Ml po daily         PAST SURGICAL HISTORY:  Past Surgical History:   Procedure Laterality Date     GT placed[  2001       ALLERGIES:     No Known Allergies    FAMILY HISTORY:  None pertinent    SOCIAL HISTORY:  Social History   Substance Use Topics     Smoking status: Never Smoker     Smokeless tobacco: Never Used     Alcohol use No       ROS:   Constitutional: No fever, chills, or sweats. Weight stable.   ENT: No visual disturbance, ear ache, epistaxis, sore throat.   Cardiovascular: As per HPI.   Respiratory: No cough, hemoptysis.    GI: No nausea, vomiting, hematemesis, melena, or hematochezia.   : No hematuria.   Integument: Negative.   Psychiatric: Negative.   Hematologic: no easy bleeding.  Neuro: Negative.   Endocrinology: No significant heat or cold intolerance   Musculoskeletal: No myalgia.    Exam: Extended Vitals not filed for this encounter.  Extended Vitals not filed for this encounter.    GENERAL APPEARANCE: healthy, alert and no distress  HEENT: no icterus, no xanthelasmas.  NECK:  no asymmetry, masses, or scars, and no bruits, JVP not elevated  RESPIRATORY: lungs clear to auscultation - no rales, rhonchi or wheezes, no use of accessory muscles, no retractions, respirations are unlabored, normal respiratory rate  CARDIOVASCULAR: regular rhythm, normal S1 with physiologic split S2, no S3 or S4 and no murmur, click or rub,   ABDOMEN: soft, non tender, without hepatosplenomegaly,  no abdominal bruits. G-tube.     Labs:  CBC RESULTS:      Component      Latest Ref Rng & Units 2/2/2017 7/24/2017   WBC      4.0 - 11.0  10e9/L 6.4 5.0   RBC Count      3.8 - 5.2 10e12/L 3.87 3.56 (L)   Hemoglobin      11.7 - 15.7 g/dL 11.7 10.8 (L)   Hematocrit      35.0 - 47.0 % 37.0 33.7 (L)   MCV      78 - 100 fl 96 95   MCH      26.5 - 33.0 pg 30.2 30.3   MCHC      31.5 - 36.5 g/dL 31.6 32.0   RDW      10.0 - 15.0 % 12.9 13.1   Platelet Count      150 - 450 10e9/L 313 247     Procedures:  11/25/2013: Tilt/Autonomic Study   The following maneuvers were done sequentially:  1- Sitting position for 15 minutes: /80 mmHg,  bpm. No significant change in hemodynamic profile. No symptoms.  2- Orthostatism for 5 minutes:   A. Immediate: Chad BP 84/51 mmHg,  bpm, No symptoms.   B. Delayed: Chad /87 mmHg,  bpm, No symptoms.   In 5 minutes after patient was standing HR showed 32 bpm increase compared to sitting position.   3- Maneuvers in sitting position:   A. Carotid sinus massage:   Left: Chad /80 mmHg,  bpm, , No symptoms.   Right: Chad /77 mmHg, HR 99 bpm, , No symptoms.   B. Valsalva:   - Phase 1: present.   - Phase 2: present   - Phase 3: present   - Phase 4: present   Chad BP was 75/60 and HR was 121 during valsalva maneuver. Patient felt somewhat dizzy during valsalva however symptom immediately improved after she stopped valsalva.   C. Cough:   Couplets: Chad /81 mmHg,  bpm, No symptoms.   Spasm: Chad /78 mmHg,  bpm, No symptoms.   4- Supine for 10-15 minutes: /75 mmHg, HR 90 bpm. No significant change in hemodynamic profile. No symptoms.  5- TILT at 70 degrees for 20 minutes: BP 97/62 mmHg, HR 101pm. No significant change in hemodynamic profile. No symptoms.  6 TILT at 70 degrees AFTER ABDOMINAL BINDER for 5 minutes: /77 mmHg, HR 103pm. No significant change in hemodynamic profile. No symptoms.   7 TILT at 70 degrees AFTER 1 LITER OF FLUID for 5 minutes: /83 mmHg, HR 89pm. No significant change in hemodynamic profile. No symptoms.   DISCUSSION:  -  Autonomic function test: consistent with normal physiologic response.  - TILT table test:   POTS   No vasovagal syncope  ECHOCARDIOGRAM: 11/25/2013    Interpretation Summary  Poor image quality Technically difficult study 1. Grossly normal LV size   and systolic function. Cannot assess diastolic function 2. Normal RV size   and function    Assessment and Plan:   1.  Adjust midodrine dosing to 10 mg in the morning, 5 mg at noon and 5 mg in the late afternoon    2 he is spanks undergarments during the daytime hours     3 add amlodipine 5 mg at bedtime    4.  8-10 ounces of Pedialyte/water combination first thing in the morning upon arising    5.  Use a walker at night to prevent collapse    6.  Follow-up clinic appointment in 6 months time or earlier depending upon blood pressure measurements which she will be sending to my chart    I very much appreciated the opportunity to see and assess this patient in the clinic with Cardiovascular Fellow        I agree with the above note which summarizes my findings and current recommendations.      Please do not hesitate to contact my office if you have any questions or concerns.       Thang Galarza MD  Cardiac Arrhythmia Service  [facility]  [phone]

## 2018-09-13 NOTE — PATIENT INSTRUCTIONS
You will be scheduled for a follow up visit: 6 months with Dr Galarza    Medication changes:   1. Midodrine 10 mg in am (upon awakening)  Midodrine 5 mg at 12 noon  Midodrine 5 mg at 5:00 pm    2. Amlodipine 5 mg once daily at bedtime    3. 8-10 ounces fluid upon awakening (1/2 water, 1/2 pedialyte)    4. Walker at bedside for night time only    5. Send upright and supine blood pressures 3 times per week to my chart      We encourage you to use My Chart as your primary form of communication if possible. If you need assistance in setting this up, please contact our office or ask at your follow up visit.     If you need a medication refill please contact your pharmacy. Please allow at least 3 business days for your refill to be completed.       Cardiology  Telephone Number    Lu Howe [phone]   Or send a message to your provider via my chart.   For scheduling procedures:    [facility]    Clinic appointments       [phone]   For the Device Clinic (Pacemakers and ICD's)   RN's :   Willow Haney  During business hours: [phone]    After business hours:   [phone]- select option 4 and ask for job code [id number].          As always, Thank you for trusting us with your health care needs!

## 2018-09-13 NOTE — NURSING NOTE
Chief Complaint   Patient presents with     Follow Up For     ortho b/p, annual FU (SATYA Rebolledo 7/24/17) also seen by EP Dr Rancho Griffith at [facility] 4/25/18-     Vitals were taken and medications were reconciled.     Jeimy BRANDA  12:43 PM

## 2018-09-13 NOTE — MR AVS SNAPSHOT
After Visit Summary   9/13/2018    Keisha Somres    MRN: [id number]           Patient Information     Date Of Birth          1970        Visit Information        Provider Department      9/13/2018 12:30 PM Thang Galarza MD [facility]        Today's Diagnoses     HTN (hypertension)    -  1    Postural orthostatic tachycardia syndrome          Care Instructions    You will be scheduled for a follow up visit: 6 months with Dr Galarza    Medication changes:   1. Midodrine 10 mg in am (upon awakening)  Midodrine 5 mg at 12 noon  Midodrine 5 mg at 5:00 pm    2. Amlodipine 5 mg once daily at bedtime    3. 8-10 ounces fluid upon awakening (1/2 water, 1/2 pedialyte)    4. Walker at bedside for night time only    5. Send upright and supine blood pressures 3 times per week to my chart      We encourage you to use My Chart as your primary form of communication if possible. If you need assistance in setting this up, please contact our office or ask at your follow up visit.     If you need a medication refill please contact your pharmacy. Please allow at least 3 business days for your refill to be completed.       Cardiology  Telephone Number    Lu Howe [phone]   Or send a message to your provider via my chart.   For scheduling procedures:    [facility]    Clinic appointments       [phone]   For the Device Clinic (Pacemakers and ICD's)   RN's :   Willow Haney  During business hours: [phone]    After business hours:   [phone]- select option 4 and ask for job code [id number].          As always, Thank you for trusting us with your health care needs!          Follow-ups after your visit        Additional Services     Follow-Up with Electrophysiologist - 6 Months                 Your next 10 appointments already scheduled     Nov 13, 2018  1:30 PM CST   Adult Med Follow UP with Romana Tena MD   Psychiatry Clinic ([facility])  "   [address]  [address] [address]   [phone]            Mar 21, 2019 11:00 AM CDT   (Arrive by 10:45 AM)   RETURN ARRHYTHMIA with Thang Galarza MD   [facility])    [address]  [address]  [facility] [address] [phone]              Future tests that were ordered for you today     Open Future Orders        Priority Expected Expires Ordered    Follow-Up with Electrophysiologist - 6 Months Routine 3/12/2019 6/10/2019 9/13/2018            Who to contact     If you have questions or need follow up information about today's clinic visit or your schedule please contact [facility] directly at [phone].  Normal or non-critical lab and imaging results will be communicated to you by [employer], letter or phone within 4 business days after the clinic has received the results. If you do not hear from us within 7 days, please contact the clinic through [employer] or phone. If you have a critical or abnormal lab result, we will notify you by phone as soon as possible.  Submit refill requests through [employer] or call your pharmacy and they will forward the refill request to us. Please allow 3 business days for your refill to be completed.          Additional Information About Your Visit        [employer] Information     [employer] lets you send messages to your doctor, view your test results, renew your prescriptions, schedule appointments and more. To sign up, go to [email or web address] . Click on \"Log in\" on the left side of the screen, which will take you to the Welcome page. Then click on \"Sign up Now\" on the right side of the page.     You will be asked to enter the access code listed below, as well as some personal information. Please follow the directions to create your username and password.     Your access code is: [email or web address]  Expires: 12/12/2018  1:19 PM     Your access code will " " in 90 days. If you need help or a new code, please call your [place] clinic or [phone].        Care EveryWhere ID     This is your Care EveryWhere ID. This could be used by other organizations to access your [place] medical records  [id number]        Your Vitals Were     Pulse Height Pulse Oximetry BMI (Body Mass Index)          146 1.715 m (5' 7.5\") 95% 22.04 kg/m2         Blood Pressure from Last 3 Encounters:   18 (!) 144/111   16 111/78   07/23/15 123/80    Weight from Last 3 Encounters:   18 64.8 kg (142 lb 12.8 oz)   17 62 kg (136 lb 11.2 oz)   17 62.3 kg (137 lb 4.8 oz)                 Today's Medication Changes          These changes are accurate as of 18  1:25 PM.  If you have any questions, ask your nurse or doctor.               Start taking these medicines.        Dose/Directions    amLODIPine 5 MG tablet   Commonly known as:  NORVASC   Used for:  Postural orthostatic tachycardia syndrome, HTN (hypertension)   Started by:  Thang Galarza MD        Dose:  5 mg   Take 1 tablet (5 mg) by mouth daily At bedtime   Quantity:  90 tablet   Refills:  3         These medicines have changed or have updated prescriptions.        Dose/Directions    midodrine 5 MG tablet   Commonly known as:  PROAMATINE   This may have changed:  See the new instructions.   Used for:  Postural orthostatic tachycardia syndrome   Changed by:  Thang Galarza MD        10 mg in am, 5 mg at 12 noon, 5 mg at 5 pm   Quantity:  360 tablet   Refills:  1            Where to get your medicines      These medications were sent to [id number] - [place], [place] - [address]  [address], [place] [address]     Phone:  [phone]     amLODIPine 5 MG tablet    midodrine 5 MG tablet                Primary Care Provider Office Phone # Fax #    Lavonne Zarate [phone]       [address]  [place] [address]      "   Equal Access to Services     [facility]: Hadii alvin lindsay vijilexie Gennaro, waaxda luqadaha, qaybta kaalmajessie saldaña, mehnaz bassett. So [facility] [phone].    ATENCIÓN: Si habla esptracy, tiene a schmid disposición servicios gratuitos de asistencia lingüística. Andresame al [phone].    We comply with applicable federal civil rights laws and [place] laws. We do not discriminate on the basis of race, color, national origin, age, disability, sex, sexual orientation, or gender identity.            Thank you!     Thank you for choosing [facility]  for your care. Our goal is always to provide you with excellent care. Hearing back from our patients is one way we can continue to improve our services. Please take a few minutes to complete the written survey that you may receive in the mail after your visit with us. Thank you!             Your Updated Medication List - Protect others around you: Learn how to safely use, store and throw away your medicines at www.disposemymeds.org.          This list is accurate as of 9/13/18  1:25 PM.  Always use your most recent med list.                   Brand Name Dispense Instructions for use Diagnosis    acetaminophen 500 MG tablet    TYLENOL     Take 500-1,000 mg by mouth every 6 hours as needed.        amLODIPine 5 MG tablet    NORVASC    90 tablet    Take 1 tablet (5 mg) by mouth daily At bedtime    Postural orthostatic tachycardia syndrome, HTN (hypertension)       CALCIUM 600+D PO      Take 1 tablet by mouth 2 times daily.        CARAFATE 1 GM/10ML suspension   Generic drug:  sucralfate      Take 1 g by mouth 2 times daily. Take 10 Ml po daily        dexmethylphenidate 10 MG tablet    FOCALIN    30 tablet    Take 1 tablet (10 mg) by mouth daily    Major depressive disorder, recurrent episode, moderate (H)       diphenhydrAMINE HCl (Sleep) 25 MG Tabs     60 tablet    1-2 tabs QHS prn sleep    Primary insomnia       docusate sodium 100 MG capsule     COLACE     Take 1 capsule by mouth 2 times daily.        eszopiclone 2 MG tablet    LUNESTA    30 tablet    Take 1 tablet (2 mg) by mouth At Bedtime    Primary insomnia       fludrocortisone 0.1 MG tablet    FLORINEF    90 tablet    TAKE 1 TABLET (0.1MG) BY MOUTH DAILY    Hypotension, unspecified hypotension type       gabapentin 600 MG 24 hr tablet    GRALISE     Take 600 mg by mouth daily (with dinner)         MG tablet   Generic drug:  ibuprofen      Take 800 mg by mouth every 8 hours as needed.        lamoTRIgine 200 MG tablet    LaMICtal    60 tablet    Take 2 tablets (400 mg) by mouth daily    Major depressive disorder, recurrent episode, mild (H)       levothyroxine 175 MCG tablet    SYNTHROID/LEVOTHROID     Take  by mouth daily.        metoclopramide 10 MG tablet    REGLAN     2 times daily        midodrine 5 MG tablet    PROAMATINE    360 tablet    10 mg in am, 5 mg at 12 noon, 5 mg at 5 pm    Postural orthostatic tachycardia syndrome       multivitamin Tabs tablet      Take 1 tablet by mouth 2 times daily.        OINTMENT BASE EX      Externally apply  topically daily. Mypirocin 2% apply to G Tube ulsers daily        omeprazole 20 MG CR capsule    priLOSEC     Take 2 capsules by mouth 2 times daily.        pyridostigmine 60 MG tablet    MESTINON     Take 1 tablet by mouth daily.        sertraline 100 MG tablet    ZOLOFT    45 tablet    Take 1.5 tablets (150 mg) by mouth daily    Major depressive disorder, recurrent episode, moderate (H)       VITAMIN D PO      Take  by mouth. 50, 000 units every 14 days

## 2018-09-13 NOTE — PROGRESS NOTES
HPI:     .Keisha eckert being is a 48-year-old woman who has been followed in this clinic for orthostatic hypotension    Today Keisha indicates that since her last visit she has continued to have immediate orthostatic hypotensive symptoms.  On one occasion she had walked a couple steps after getting up from a chair and collapsed causing a clavicular fracture which required surgery.  The episodes are clearly associated with change of posture.  She did see Dr. Griffith at [facility] in April of this year- at that time she was having worsening orthostatic intolerance symptoms (immediate OH) .  He recommended increasing her midodrine to 7.5 mg 4 times daily and that is what he is currently taking.    Police recognizes that her daytime blood pressures are often low but is concerned about her nighttime blood pressure.  Measurements today in the clinic and to indicate higher than desirable blood pressures even during the daytime.  She did not show much of an orthostatic drop but did nonetheless have a 15 mm reduction with immediate standing.    Today we reviewed her history and focused on plans to diminish her daytime orthostasis while the preventing nighttime supine hypertension.  To this end I am adjusting her midodrine to frontloaded the day and have her use spanks undergarments to help prevent hypotension while upright.  We will additionally add amlodipine prior to bedtime in order to diminish the concerns regarding supine hypertension.  Finally, I have advocated the use of a walker especially at night in order to diminish the chance of her having a sudden collapse.    We will make arrangements for Keisha to return in about 6 months time to ascertain the effectiveness of this change in treatment.  However I have asked her also to send us standing and supine blood pressure measurements through my chart in order to better understand whether the medication regimen is proving safe and effective.      PAST MEDICAL  HISTORY:  Past Medical History:   Diagnosis Date     Eating disorder      Hypotension, postural      Palpitations      Syncope      Syncope and collapse      Thyroid disease        CURRENT MEDICATIONS:  Current Outpatient Prescriptions   Medication Sig Dispense Refill     acetaminophen (TYLENOL) 500 MG tablet Take 500-1,000 mg by mouth every 6 hours as needed.       Calcium Carbonate-Vitamin D (CALCIUM 600+D PO) Take 1 tablet by mouth 2 times daily.       Cholecalciferol (VITAMIN D PO) Take  by mouth. 50, 000 units every 14 days       dexmethylphenidate (FOCALIN) 10 MG tablet Take 1 tablet (10 mg) by mouth daily 30 tablet 0     diphenhydrAMINE HCl, Sleep, 25 MG TABS 1-2 tabs QHS prn sleep 60 tablet 6     docusate sodium (COLACE) 100 MG capsule Take 1 capsule by mouth 2 times daily.       Emollient (OINTMENT BASE EX) Externally apply  topically daily. Mypirocin 2% apply to G Tube ulsers daily       eszopiclone (LUNESTA) 2 MG tablet Take 1 tablet (2 mg) by mouth At Bedtime 30 tablet 3     fludrocortisone (FLORINEF) 0.1 MG tablet TAKE 1 TABLET (0.1MG) BY MOUTH DAILY 90 tablet 3     gabapentin (GRALISE) 600 MG 24 hr tablet Take 600 mg by mouth daily (with dinner)       ibuprofen (IBU) 800 MG tablet Take 800 mg by mouth every 8 hours as needed.       lamoTRIgine (LAMICTAL) 200 MG tablet Take 2 tablets (400 mg) by mouth daily 60 tablet 4     levothyroxine (SYNTHROID, LEVOTHROID) 175 MCG tablet Take  by mouth daily.       metoclopramide (REGLAN) 10 MG tablet 2 times daily        midodrine (PROAMATINE) 5 MG tablet TAKE 3 TABLETS (15MG) BY MOUTH THREE TIMES A  tablet 0     multivitamin (OCUVITE) TABS Take 1 tablet by mouth 2 times daily.       omeprazole (PRILOSEC) 20 MG capsule Take 2 capsules by mouth 2 times daily.       pyridostigmine (MESTINON) 60 MG tablet Take 1 tablet by mouth daily.       sertraline (ZOLOFT) 100 MG tablet Take 1.5 tablets (150 mg) by mouth daily 45 tablet 4     sucralfate (CARAFATE) 1 GM/10ML  suspension Take 1 g by mouth 2 times daily. Take 10 Ml po daily         PAST SURGICAL HISTORY:  Past Surgical History:   Procedure Laterality Date     GT placed[  2001       ALLERGIES:     No Known Allergies    FAMILY HISTORY:  None pertinent    SOCIAL HISTORY:  Social History   Substance Use Topics     Smoking status: Never Smoker     Smokeless tobacco: Never Used     Alcohol use No       ROS:   Constitutional: No fever, chills, or sweats. Weight stable.   ENT: No visual disturbance, ear ache, epistaxis, sore throat.   Cardiovascular: As per HPI.   Respiratory: No cough, hemoptysis.    GI: No nausea, vomiting, hematemesis, melena, or hematochezia.   : No hematuria.   Integument: Negative.   Psychiatric: Negative.   Hematologic: no easy bleeding.  Neuro: Negative.   Endocrinology: No significant heat or cold intolerance   Musculoskeletal: No myalgia.    Exam: Extended Vitals not filed for this encounter.  Extended Vitals not filed for this encounter.    GENERAL APPEARANCE: healthy, alert and no distress  HEENT: no icterus, no xanthelasmas.  NECK:  no asymmetry, masses, or scars, and no bruits, JVP not elevated  RESPIRATORY: lungs clear to auscultation - no rales, rhonchi or wheezes, no use of accessory muscles, no retractions, respirations are unlabored, normal respiratory rate  CARDIOVASCULAR: regular rhythm, normal S1 with physiologic split S2, no S3 or S4 and no murmur, click or rub,   ABDOMEN: soft, non tender, without hepatosplenomegaly,  no abdominal bruits. G-tube.     Labs:  CBC RESULTS:      Component      Latest Ref Rng & Units 2/2/2017 7/24/2017   WBC      4.0 - 11.0 10e9/L 6.4 5.0   RBC Count      3.8 - 5.2 10e12/L 3.87 3.56 (L)   Hemoglobin      11.7 - 15.7 g/dL 11.7 10.8 (L)   Hematocrit      35.0 - 47.0 % 37.0 33.7 (L)   MCV      78 - 100 fl 96 95   MCH      26.5 - 33.0 pg 30.2 30.3   MCHC      31.5 - 36.5 g/dL 31.6 32.0   RDW      10.0 - 15.0 % 12.9 13.1   Platelet Count      150 - 450 10e9/L 313  247     Procedures:  11/25/2013: Tilt/Autonomic Study   The following maneuvers were done sequentially:  1- Sitting position for 15 minutes: /80 mmHg,  bpm. No significant change in hemodynamic profile. No symptoms.  2- Orthostatism for 5 minutes:   A. Immediate: Chad BP 84/51 mmHg,  bpm, No symptoms.   B. Delayed: Chad /87 mmHg,  bpm, No symptoms.   In 5 minutes after patient was standing HR showed 32 bpm increase compared to sitting position.   3- Maneuvers in sitting position:   A. Carotid sinus massage:   Left: Chad /80 mmHg,  bpm, , No symptoms.   Right: Chad /77 mmHg, HR 99 bpm, , No symptoms.   B. Valsalva:   - Phase 1: present.   - Phase 2: present   - Phase 3: present   - Phase 4: present   Chad BP was 75/60 and HR was 121 during valsalva maneuver. Patient felt somewhat dizzy during valsalva however symptom immediately improved after she stopped valsalva.   C. Cough:   Couplets: Chad /81 mmHg,  bpm, No symptoms.   Spasm: Chad /78 mmHg,  bpm, No symptoms.   4- Supine for 10-15 minutes: /75 mmHg, HR 90 bpm. No significant change in hemodynamic profile. No symptoms.  5- TILT at 70 degrees for 20 minutes: BP 97/62 mmHg, HR 101pm. No significant change in hemodynamic profile. No symptoms.  6 TILT at 70 degrees AFTER ABDOMINAL BINDER for 5 minutes: /77 mmHg, HR 103pm. No significant change in hemodynamic profile. No symptoms.   7 TILT at 70 degrees AFTER 1 LITER OF FLUID for 5 minutes: /83 mmHg, HR 89pm. No significant change in hemodynamic profile. No symptoms.   DISCUSSION:  - Autonomic function test: consistent with normal physiologic response.  - TILT table test:   POTS   No vasovagal syncope  ECHOCARDIOGRAM: 11/25/2013    Interpretation Summary  Poor image quality Technically difficult study 1. Grossly normal LV size   and systolic function. Cannot assess diastolic function 2. Normal RV size   and  function    Assessment and Plan:   1.  Adjust midodrine dosing to 10 mg in the morning, 5 mg at noon and 5 mg in the late afternoon    2 he is spanks undergarments during the daytime hours     3 add amlodipine 5 mg at bedtime    4.  8-10 ounces of Pedialyte/water combination first thing in the morning upon arising    5.  Use a walker at night to prevent collapse    6.  Follow-up clinic appointment in 6 months time or earlier depending upon blood pressure measurements which she will be sending to my chart    I very much appreciated the opportunity to see and assess this patient in the clinic with Cardiovascular Fellow        I agree with the above note which summarizes my findings and current recommendations.      Please do not hesitate to contact my office if you have any questions or concerns.       Thang Galarza MD  Cardiac Arrhythmia Service  [facility]  [phone]

## 2018-10-10 ENCOUNTER — MEDICAL CORRESPONDENCE (OUTPATIENT)
Dept: HEALTH INFORMATION MANAGEMENT | Facility: CLINIC | Age: 48
End: 2018-10-10

## 2018-10-22 DIAGNOSIS — I10 HYPERTENSION, UNSPECIFIED TYPE: ICD-10-CM

## 2018-10-22 DIAGNOSIS — G90.A POSTURAL ORTHOSTATIC TACHYCARDIA SYNDROME: ICD-10-CM

## 2018-10-22 RX ORDER — AMLODIPINE BESYLATE 5 MG/1
TABLET ORAL
Qty: 30 TABLET | Refills: 11 | Status: SHIPPED | OUTPATIENT
Start: 2018-10-22 | End: 2019-10-21

## 2018-11-13 ENCOUNTER — OFFICE VISIT (OUTPATIENT)
Dept: PSYCHIATRY | Facility: CLINIC | Age: 48
End: 2018-11-13
Attending: PSYCHIATRY & NEUROLOGY
Payer: MEDICARE

## 2018-11-13 ENCOUNTER — TELEPHONE (OUTPATIENT)
Dept: CARDIOLOGY | Facility: CLINIC | Age: 48
End: 2018-11-13

## 2018-11-13 VITALS
DIASTOLIC BLOOD PRESSURE: 86 MMHG | BODY MASS INDEX: 21.76 KG/M2 | HEART RATE: 118 BPM | WEIGHT: 141 LBS | SYSTOLIC BLOOD PRESSURE: 133 MMHG

## 2018-11-13 DIAGNOSIS — F33.0 MAJOR DEPRESSIVE DISORDER, RECURRENT EPISODE, MILD (H): ICD-10-CM

## 2018-11-13 DIAGNOSIS — F33.1 MAJOR DEPRESSIVE DISORDER, RECURRENT EPISODE, MODERATE (H): ICD-10-CM

## 2018-11-13 PROCEDURE — G0463 HOSPITAL OUTPT CLINIC VISIT: HCPCS | Mod: ZF

## 2018-11-13 RX ORDER — DEXMETHYLPHENIDATE HYDROCHLORIDE 10 MG/1
10 TABLET ORAL DAILY
Qty: 30 TABLET | Refills: 0 | Status: SHIPPED | OUTPATIENT
Start: 2018-11-13 | End: 2018-11-13

## 2018-11-13 RX ORDER — LAMOTRIGINE 200 MG/1
400 TABLET ORAL DAILY
Qty: 60 TABLET | Refills: 4 | Status: SHIPPED | OUTPATIENT
Start: 2018-11-13 | End: 2019-02-19

## 2018-11-13 RX ORDER — LORAZEPAM 0.5 MG/1
0.5 TABLET ORAL 2 TIMES DAILY PRN
Qty: 15 TABLET | Refills: 0 | Status: SHIPPED | OUTPATIENT
Start: 2018-11-13 | End: 2019-06-12

## 2018-11-13 RX ORDER — DEXMETHYLPHENIDATE HYDROCHLORIDE 10 MG/1
10 TABLET ORAL DAILY
Qty: 30 TABLET | Refills: 0 | Status: SHIPPED | OUTPATIENT
Start: 2018-11-13 | End: 2018-12-11

## 2018-11-13 RX ORDER — SERTRALINE HYDROCHLORIDE 100 MG/1
150 TABLET, FILM COATED ORAL DAILY
Qty: 45 TABLET | Refills: 4 | Status: SHIPPED | OUTPATIENT
Start: 2018-11-13 | End: 2019-02-19

## 2018-11-13 ASSESSMENT — PAIN SCALES - GENERAL: PAINLEVEL: MILD PAIN (2)

## 2018-11-13 NOTE — NURSING NOTE
Chief Complaint   Patient presents with     RECHECK     Major depressive disorder, recurrent episode, moderate

## 2018-11-13 NOTE — TELEPHONE ENCOUNTER
[facility] Call Center    Phone Message    May a detailed message be left on voicemail: yes    Reason for Call: Other: They faxed over information on Oct 18th with Pt blood pressure readings and more - they need to know if you rec'd it? and if Dr review it? and if you want them to keep taking Pt blood pressure readings sitting and lying? Cierra call Cathleen at [employer] at [phone]     Action Taken: Message routed to:  [facility] ([facility] Cardiology Clinic

## 2018-11-13 NOTE — MR AVS SNAPSHOT
After Visit Summary   11/13/2018    Keisha Somers    MRN: [id number]           Patient Information     Date Of Birth          1970        Visit Information        Provider Department      11/13/2018 1:30 PM Romana Tena MD [facility]iat[facility]        Today's Diagnoses     Major depressive disorder, recurrent episode, moderate (H)        Major depressive disorder, recurrent episode, mild (H)          Care Instructions    Thank you for coming to the PSYCHIATRY CLINIC.    Lab Testing:  If you had lab testing today and your results are reassuring or normal they will be mailed to you or sent through [employer] within 7 days.   If the lab tests need quick action we will call you with the results.  The phone number we will call with results is # [phone] (home) . If this is not the best number please call our clinic and change the number.    Medication Refills:  If you need any refills please call your pharmacy and they will contact us. Our fax number for refills is [phone]. Please allow three business for refill processing.   If you need to  your refill at a new pharmacy, please contact the new pharmacy directly. The new pharmacy will help you get your medications transferred.     Scheduling:  If you have any concerns about today's visit or wish to schedule another appointment please call our office during normal business hours [phone] (8-5:00 M-F)    Contact Us:  Please call [phone] during business hours (8-5:00 M-F).  If after clinic hours, or on the weekend, please call  [phone].    Financial Assistance [phone]  [employer] Billing [phone]  [facility] Billing [phone]  Medical Records [phone]      MENTAL HEALTH CRISIS NUMBERS:  [facility]:   [facility] - [phone]   Crisis Residence \[facility]\"" - [facility] - [phone]   Walk-In Counseling Center \[facility]\"" - [phone]   COPE 24/7 [place] Mobile Team for Adults -  [[phone]]; Child - [[phone]]     Crisis Connection - [phone]     [facility]:   [facility] - [phone]   Walk-in counseling [facility] - [phone]   Walk-in counseling [facility] - [phone]   Crisis Residence [facility] - [phone]   Urgent Care Adult Mental Health:   --Drop-in, 24/7 crisis line, and [facility] Mobile Team [[phone]]    CRISIS TEXT LINE: Text [phone] from anywhere, anytime, any crisis 24/7;    OR SEE www.crisistextline.org     Poison Control Center - [phone]    CHILD: [facility] needs assessment team - [phone]     [facility] Lifeline - [phone]; or [employer] Lifeline - [phone]    If you have a medical emergency please call 911or go to the nearest ER.                    _____________________________________________    Again thank you for choosing PSYCHIATRY CLINIC and please let us know how we can best partner with you to improve you and your family's health.  You may be receiving a survey in the mail regarding this appointment. We would love to have your feedback, both positive and negative, so please fill out the survey and return it using the provided envelope. The survey is done by an external company, so your answers are anonymous.             Follow-ups after your visit        Follow-up notes from your care team     Return in about 4 weeks (around 12/11/2018).      Your next 10 appointments already scheduled     Dec 11, 2018  1:30 PM CST   Adult Med Follow UP with Romana Tena MD   Psychiatry Clinic ([facility])    [address]  [address] [address]   [phone]            Mar 21, 2019 11:00 AM CDT   (Arrive by 10:45 AM)   RETURN ARRHYTHMIA with Thang Galarza MD   [facility] ([facility])    [address] [facility]  [address] [place] [address]    [phone]              Who to contact     Please call your clinic at [phone] to:    Ask questions about your health    Make or cancel appointments    Discuss your medicines    Learn about your test results    Speak to your doctor            Additional Information About Your Visit        Care EveryWhere ID     This is your Care EveryWhere ID. This could be used by other organizations to access your [facility] medical records  [id number]        Your Vitals Were     Pulse BMI (Body Mass Index)                118 21.76 kg/m2           Blood Pressure from Last 3 Encounters:   11/13/18 133/86   09/13/18 (!) 144/111   08/14/18 92/64    Weight from Last 3 Encounters:   11/13/18 64 kg (141 lb)   09/13/18 64.8 kg (142 lb 12.8 oz)   08/14/18 63.5 kg (140 lb)              Today, you had the following     No orders found for display         Today's Medication Changes          These changes are accurate as of 11/13/18  2:13 PM.  If you have any questions, ask your nurse or doctor.               Start taking these medicines.        Dose/Directions    dexmethylphenidate 10 MG tablet   Commonly known as:  FOCALIN   Used for:  Major depressive disorder, recurrent episode, moderate (H)   Started by:  Romana Tena MD        Dose:  10 mg   Take 1 tablet (10 mg) by mouth daily   Quantity:  30 tablet   Refills:  0       LORazepam 0.5 MG tablet   Commonly known as:  ATIVAN   Used for:  Major depressive disorder, recurrent episode, moderate (H)   Started by:  Romana Tena MD        Dose:  0.5 mg   Take 1 tablet (0.5 mg) by mouth 2 times daily as needed for anxiety   Quantity:  15 tablet   Refills:  0         Stop taking these medicines if you haven't already. Please contact your care team if you have questions.     diphenhydrAMINE HCl (Sleep) 25 MG Tabs   Stopped by:  Romana Tena MD                Where to get your medicines      These medications were sent to [facility] - [place], [place] -  [address] [facility]  [address], [place] [address]     Phone:  [phone]     lamoTRIgine 200 MG tablet    sertraline 100 MG tablet         Some of these will need a paper prescription and others can be bought over the counter.  Ask your nurse if you have questions.     Bring a paper prescription for each of these medications     dexmethylphenidate 10 MG tablet    LORazepam 0.5 MG tablet                Primary Care Provider Office Phone # Fax #    Lavonne Zarate [phone]       [address]  [place] [address]        Equal Access to Services     [facility]: Hadii aad ku hadasho Soomaali, waaxda luqadaha, qaybta kaalmada adedonyajessie, mehnaz weathers . So [facility] [phone].    ATENCIÓN: Si habla español, tiene a schmid disposición servicios gratuitos de asistencia lingüística. [facility] [phone].    We comply with applicable federal civil rights laws and [place] laws. We do not discriminate on the basis of race, color, national origin, age, disability, sex, sexual orientation, or gender identity.            Thank you!     Thank you for choosing PSYCHIATRY CLINIC  for your care. Our goal is always to provide you with excellent care. Hearing back from our patients is one way we can continue to improve our services. Please take a few minutes to complete the written survey that you may receive in the mail after your visit with us. Thank you!             Your Updated Medication List - Protect others around you: Learn how to safely use, store and throw away your medicines at www.disposemymeds.org.          This list is accurate as of 11/13/18  2:13 PM.  Always use your most recent med list.                   Brand Name Dispense Instructions for use Diagnosis    acetaminophen 500 MG tablet    TYLENOL     Take 500-1,000 mg by mouth every 6 hours as needed.        amLODIPine 5 MG tablet    NORVASC    30 tablet    TAKE 1 TABLET BY MOUTH AT BEDTIME     Postural orthostatic tachycardia syndrome, Hypertension, unspecified type       CALCIUM 600+D PO      Take 1 tablet by mouth 2 times daily.        CARAFATE 1 GM/10ML suspension   Generic drug:  sucralfate      Take 1 g by mouth 2 times daily. Take 10 Ml po daily        dexmethylphenidate 10 MG tablet    FOCALIN    30 tablet    Take 1 tablet (10 mg) by mouth daily    Major depressive disorder, recurrent episode, moderate (H)       docusate sodium 100 MG capsule    COLACE     Take 1 capsule by mouth 2 times daily.        eszopiclone 2 MG tablet    LUNESTA    30 tablet    Take 1 tablet (2 mg) by mouth At Bedtime    Primary insomnia       fludrocortisone 0.1 MG tablet    FLORINEF    90 tablet    TAKE 1 TABLET (0.1MG) BY MOUTH DAILY    Hypotension, unspecified hypotension type       gabapentin 600 MG 24 hr tablet    GRALISE     Take 600 mg by mouth daily (with dinner)         MG tablet   Generic drug:  ibuprofen      Take 800 mg by mouth every 8 hours as needed.        lamoTRIgine 200 MG tablet    LaMICtal    60 tablet    Take 2 tablets (400 mg) by mouth daily    Major depressive disorder, recurrent episode, mild (H)       levothyroxine 175 MCG tablet    SYNTHROID/LEVOTHROID     Take  by mouth daily.        LORazepam 0.5 MG tablet    ATIVAN    15 tablet    Take 1 tablet (0.5 mg) by mouth 2 times daily as needed for anxiety    Major depressive disorder, recurrent episode, moderate (H)       metoclopramide 10 MG tablet    REGLAN     2 times daily        midodrine 5 MG tablet    PROAMATINE    360 tablet    10 mg in am, 5 mg at 12 noon, 5 mg at 5 pm    Postural orthostatic tachycardia syndrome       multivitamin Tabs tablet      Take 1 tablet by mouth 2 times daily.        OINTMENT BASE EX      Externally apply  topically daily. Mypirocin 2% apply to G Tube ulsers daily        omeprazole 20 MG CR capsule    priLOSEC     Take 2 capsules by mouth 2 times daily.        pyridostigmine 60 MG tablet    MESTINON     Take 1  tablet by mouth daily.        sertraline 100 MG tablet    ZOLOFT    45 tablet    Take 1.5 tablets (150 mg) by mouth daily    Major depressive disorder, recurrent episode, moderate (H)       VITAMIN D PO      Take  by mouth. 50, 000 units every 14 days

## 2018-11-13 NOTE — TELEPHONE ENCOUNTER
Found blood pressures. Will review with Dr Galarza Thursday.   msg left for staff- will update thursday
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
## 2018-11-15 NOTE — TELEPHONE ENCOUNTER
Reviewed blood pressures with Dr Galarza, look okay. No need to continue to monitor regularly  Spoke with normal, updated.

## 2018-12-11 ENCOUNTER — OFFICE VISIT (OUTPATIENT)
Dept: PSYCHIATRY | Facility: CLINIC | Age: 48
End: 2018-12-11
Attending: PSYCHIATRY & NEUROLOGY
Payer: MEDICARE

## 2018-12-11 VITALS
WEIGHT: 142 LBS | DIASTOLIC BLOOD PRESSURE: 81 MMHG | BODY MASS INDEX: 21.91 KG/M2 | HEART RATE: 89 BPM | SYSTOLIC BLOOD PRESSURE: 115 MMHG

## 2018-12-11 DIAGNOSIS — F51.01 PRIMARY INSOMNIA: ICD-10-CM

## 2018-12-11 DIAGNOSIS — F33.1 MAJOR DEPRESSIVE DISORDER, RECURRENT EPISODE, MODERATE (H): ICD-10-CM

## 2018-12-11 PROCEDURE — G0463 HOSPITAL OUTPT CLINIC VISIT: HCPCS | Mod: ZF

## 2018-12-11 RX ORDER — DEXMETHYLPHENIDATE HYDROCHLORIDE 10 MG/1
10 TABLET ORAL DAILY
Qty: 30 TABLET | Refills: 0 | Status: SHIPPED | OUTPATIENT
Start: 2018-12-11 | End: 2018-12-11

## 2018-12-11 RX ORDER — ESZOPICLONE 2 MG/1
2 TABLET, FILM COATED ORAL AT BEDTIME
Qty: 30 TABLET | Refills: 3 | Status: SHIPPED | OUTPATIENT
Start: 2018-12-11 | End: 2019-02-19

## 2018-12-11 RX ORDER — DEXMETHYLPHENIDATE HYDROCHLORIDE 10 MG/1
10 TABLET ORAL DAILY
Qty: 30 TABLET | Refills: 0 | Status: SHIPPED | OUTPATIENT
Start: 2018-12-11 | End: 2019-02-19

## 2018-12-11 ASSESSMENT — PATIENT HEALTH QUESTIONNAIRE - PHQ9: SUM OF ALL RESPONSES TO PHQ QUESTIONS 1-9: 4

## 2018-12-11 ASSESSMENT — PAIN SCALES - GENERAL: PAINLEVEL: NO PAIN (0)

## 2018-12-11 NOTE — PATIENT INSTRUCTIONS
Thank you for coming to the PSYCHIATRY CLINIC.    Lab Testing:  If you had lab testing today and your results are reassuring or normal they will be mailed to you or sent through [employer] within 7 days.   If the lab tests need quick action we will call you with the results.  The phone number we will call with results is # [phone] (home) . If this is not the best number please call our clinic and change the number.    Medication Refills:  If you need any refills please call your pharmacy and they will contact us. Our fax number for refills is [phone]. Please allow three business for refill processing.   If you need to  your refill at a new pharmacy, please contact the new pharmacy directly. The new pharmacy will help you get your medications transferred.     Scheduling:  If you have any concerns about today's visit or wish to schedule another appointment please call our office during normal business hours [phone] (8-5:00 M-F)    Contact Us:  Please call [phone] during business hours (8-5:00 M-F).  If after clinic hours, or on the weekend, please call  [phone].    Financial Assistance [phone]  [employer] Billing [phone]  [facility] Billing [phone]  Medical Records [phone]      MENTAL HEALTH CRISIS NUMBERS:  [facility]:   [facility] - [phone]   Crisis Residence [facility] - [phone]   Walk-In Counseling [facility] [phone]   COPE 24/7 [facility] Mobile Team for Adults - [[phone]]; Child - [[phone]]     Crisis Connection - [phone]     [facility]:   [facility] - [phone]   Walk-in counseling [facility] - [phone]   Walk-in counseling [facility] - [phone]   Crisis Residence [facility] - [phone]   Urgent Care Adult Mental Health:   --Drop-in, 24/7 crisis line, and [place] Mobile Team [[phone]]    CRISIS TEXT  LINE: Text 741-484 from anywhere, anytime, any crisis 24/7;    OR SEE www.crisistextline.org     Poison Control Center - [phone]    CHILD: [facility] needs assessment team - [phone]     [facility] - [phone]; or [employer] Lifeline - [phone]    If you have a medical emergency please call 911or go to the nearest ER.                    _____________________________________________    Again thank you for choosing PSYCHIATRY CLINIC and please let us know how we can best partner with you to improve you and your family's health.  You may be receiving a survey in the mail regarding this appointment. We would love to have your feedback, both positive and negative, so please fill out the survey and return it using the provided envelope. The survey is done by an external company, so your answers are anonymous.
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
## 2019-02-05 ENCOUNTER — TELEPHONE (OUTPATIENT)
Dept: CARDIOLOGY | Facility: CLINIC | Age: 49
End: 2019-02-05

## 2019-02-05 NOTE — TELEPHONE ENCOUNTER
[facility] Call Center    Phone Message    May a detailed message be left on voicemail: no    Reason for Call: Other: Cathleen from [email or web address] called to clarify when the Pt should be taken to the hospital based on her fluctuating blood pressure and POTS symptoms. They need parameters/protocols for when to transport the Pt to ER for instance of urgent blood pressure readings. Please call Cathleen back to discuss.     Action Taken: Message routed to:  [facility] ([facility]): [facility]ARDIOLOGY ADULT [facility]

## 2019-02-07 ENCOUNTER — MEDICAL CORRESPONDENCE (OUTPATIENT)
Dept: HEALTH INFORMATION MANAGEMENT | Facility: CLINIC | Age: 49
End: 2019-02-07

## 2019-02-07 DIAGNOSIS — R55 SYNCOPE AND COLLAPSE: ICD-10-CM

## 2019-02-07 DIAGNOSIS — G90.A POSTURAL ORTHOSTATIC TACHYCARDIA SYNDROME: Primary | ICD-10-CM

## 2019-02-15 ENCOUNTER — TELEPHONE (OUTPATIENT)
Dept: PSYCHIATRY | Facility: CLINIC | Age: 49
End: 2019-02-15

## 2019-02-15 NOTE — TELEPHONE ENCOUNTER
"Prior Authorization Retail Medication Request  URGENT  Medication/Dose: eszopiclone (LUNESTA) 2 MG tablet  ICD code (if different than what is on RX):  F51.01 (Primary Insomnia)  Previously Tried and Failed: Mirtazapine, diphenhydramine, Clonazepam  Rationale:  Per the patient \"Lunesta has made a huge difference in her sleep: probably 2 years., helps to stay asleep.\" If this medication is not approved the patient is at risk for not having adequate sleep, which could lead to decompensation and hospitalization.     Insurance Name:  [employer] Part D  Insurance ID:  [id number]      Pharmacy Information (if different than what is on RX)  Name:    Phone:      "

## 2019-02-16 NOTE — TELEPHONE ENCOUNTER
Central Prior Authorization Team   Phone: [phone]    PA Initiation COMPLETED OVER THE PHONE    Medication: eszopiclone (LUNESTA) 2 MG tablet  Insurance Company: [employer] - Phone [phone] Fax [phone]  Pharmacy Filling the Rx: [facility] [id number] - [place], [place] - [facility] [address]  Filling Pharmacy Phone: [phone]  Filling Pharmacy Fax:    Start Date: 2/16/2019      Pharmacy closed on the weekend, call Monday

## 2019-02-19 ENCOUNTER — OFFICE VISIT (OUTPATIENT)
Dept: PSYCHIATRY | Facility: CLINIC | Age: 49
End: 2019-02-19
Attending: PSYCHIATRY & NEUROLOGY
Payer: MEDICARE

## 2019-02-19 ENCOUNTER — TELEPHONE (OUTPATIENT)
Dept: CARDIOLOGY | Facility: CLINIC | Age: 49
End: 2019-02-19

## 2019-02-19 VITALS — HEART RATE: 96 BPM | SYSTOLIC BLOOD PRESSURE: 117 MMHG | DIASTOLIC BLOOD PRESSURE: 82 MMHG

## 2019-02-19 DIAGNOSIS — G90.A POSTURAL ORTHOSTATIC TACHYCARDIA SYNDROME: ICD-10-CM

## 2019-02-19 DIAGNOSIS — F33.0 MAJOR DEPRESSIVE DISORDER, RECURRENT EPISODE, MILD (H): ICD-10-CM

## 2019-02-19 DIAGNOSIS — F51.01 PRIMARY INSOMNIA: ICD-10-CM

## 2019-02-19 DIAGNOSIS — F33.1 MAJOR DEPRESSIVE DISORDER, RECURRENT EPISODE, MODERATE (H): ICD-10-CM

## 2019-02-19 PROCEDURE — G0463 HOSPITAL OUTPT CLINIC VISIT: HCPCS | Mod: ZF

## 2019-02-19 RX ORDER — SERTRALINE HYDROCHLORIDE 100 MG/1
150 TABLET, FILM COATED ORAL DAILY
Qty: 45 TABLET | Refills: 4 | Status: SHIPPED | OUTPATIENT
Start: 2019-02-19 | End: 2019-07-23

## 2019-02-19 RX ORDER — DEXMETHYLPHENIDATE HYDROCHLORIDE 10 MG/1
10 TABLET ORAL DAILY
Qty: 30 TABLET | Refills: 0 | Status: SHIPPED | OUTPATIENT
Start: 2019-02-19 | End: 2019-02-19

## 2019-02-19 RX ORDER — DEXMETHYLPHENIDATE HYDROCHLORIDE 10 MG/1
10 TABLET ORAL DAILY
Qty: 30 TABLET | Refills: 0 | Status: SHIPPED | OUTPATIENT
Start: 2019-02-19 | End: 2019-04-01

## 2019-02-19 RX ORDER — MIDODRINE HYDROCHLORIDE 5 MG/1
TABLET ORAL
Qty: 360 TABLET | Refills: 1 | Status: SHIPPED | OUTPATIENT
Start: 2019-02-19 | End: 2019-03-21

## 2019-02-19 RX ORDER — LAMOTRIGINE 200 MG/1
400 TABLET ORAL DAILY
Qty: 60 TABLET | Refills: 4 | Status: SHIPPED | OUTPATIENT
Start: 2019-02-19 | End: 2019-07-23

## 2019-02-19 RX ORDER — ESZOPICLONE 2 MG/1
2 TABLET, FILM COATED ORAL AT BEDTIME
Qty: 30 TABLET | Refills: 3 | Status: SHIPPED | OUTPATIENT
Start: 2019-02-19 | End: 2019-04-16

## 2019-02-19 ASSESSMENT — PAIN SCALES - GENERAL: PAINLEVEL: MODERATE PAIN (5)

## 2019-02-19 ASSESSMENT — PATIENT HEALTH QUESTIONNAIRE - PHQ9: SUM OF ALL RESPONSES TO PHQ QUESTIONS 1-9: 5
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
## 2019-02-19 NOTE — TELEPHONE ENCOUNTER
[facility] Call Center    Phone Message    May a detailed message be left on voicemail: yes    Reason for Call: Other: Cathleen at [email or web address]. asking for order for pt's Midodrine be faxed to them. Cathleen reports pt saw another doctor and they refilled the medication and completely messed it up. Please send order stating the following: Midodrine 5MG   -2 tabs every morning  -1 tab 12pm  -1 tab 5pm    Please fax to [phone] and call Cathleen at [phone] when sent. Thank you!      Action Taken: Message routed to:  [facility] ([facility]): Cardiology

## 2019-02-19 NOTE — TELEPHONE ENCOUNTER
Per Cathleen, can send to [facility] [id number] - [place], [place] - [facility] [address]   Rx sent.

## 2019-02-19 NOTE — TELEPHONE ENCOUNTER
Prior Authorization Approval    Authorization Effective Date: 2/19/2019  Authorization Expiration Date: 12/31/2019  Medication: eszopiclone (LUNESTA) 2 MG tablet - approved  Approved Dose/Quantity:   Reference #:     Insurance Company: [employer] - Phone [phone] Fax [phone]  Expected CoPay: n/a     CoPay Card Available:      Foundation Assistance Needed:    Which Pharmacy is filling the prescription (Not needed for infusion/clinic administered): [id number] - [place], [place] - [address]  Pharmacy Notified: Yes  Patient Notified: Yes

## 2019-03-21 ENCOUNTER — OFFICE VISIT (OUTPATIENT)
Dept: CARDIOLOGY | Facility: CLINIC | Age: 49
End: 2019-03-21
Attending: INTERNAL MEDICINE
Payer: MEDICARE

## 2019-03-21 VITALS — WEIGHT: 152 LBS | BODY MASS INDEX: 23.86 KG/M2 | HEIGHT: 67 IN | OXYGEN SATURATION: 95 %

## 2019-03-21 DIAGNOSIS — I10 HYPERTENSION, UNSPECIFIED TYPE: ICD-10-CM

## 2019-03-21 DIAGNOSIS — G90.A POSTURAL ORTHOSTATIC TACHYCARDIA SYNDROME: ICD-10-CM

## 2019-03-21 PROCEDURE — G0463 HOSPITAL OUTPT CLINIC VISIT: HCPCS | Mod: ZF

## 2019-03-21 PROCEDURE — 99214 OFFICE O/P EST MOD 30 MIN: CPT | Mod: ZP | Performed by: INTERNAL MEDICINE

## 2019-03-21 RX ORDER — ATORVASTATIN CALCIUM 10 MG/1
TABLET, FILM COATED ORAL DAILY
COMMUNITY

## 2019-03-21 RX ORDER — MIDODRINE HYDROCHLORIDE 5 MG/1
TABLET ORAL
Qty: 450 TABLET | Refills: 3 | Status: SHIPPED | OUTPATIENT
Start: 2019-03-21 | End: 2020-03-25

## 2019-03-21 ASSESSMENT — PAIN SCALES - GENERAL: PAINLEVEL: NO PAIN (0)

## 2019-03-21 ASSESSMENT — MIFFLIN-ST. JEOR: SCORE: 1347.1

## 2019-03-21 NOTE — NURSING NOTE
Med Reconcile: Reviewed and verified all current medications with the patient. The updated medication list was printed and given to the patient.    Return Appointment: Patient given instructions regarding scheduling next clinic visit. Patient demonstrated understanding of this information and agreed to call with further questions or concerns.    Medication Change: Increase Midodrine.  Patient was educated regarding prescribed medication change, including discussion of the indication, administration, side effects, and when to report to MD or RN. Patient demonstrated understanding of this information and agreed to call with further questions or concerns.    Patient stated she understood all health information given and agreed to call with further questions or concerns.    Klever Sue, RN  RN Care Coordinator  [facility]  [phone]

## 2019-03-21 NOTE — LETTER
2019      RE: Keisha Somers  [address]  [facility] [place] [facility] [address]   1970         To Whom It May Concern,    Ms Keisha Somers was evaluated in the cardiology clinic at the [facility] on .  At this time we are increasing her dose of Midodrine to 10 mg at 9 AM, 10 mg at noon, and 5 mg at 5 pm. If you have any questions, please give us a call at [phone].      Sincerely,        Thang Galarza MD

## 2019-03-21 NOTE — NURSING NOTE
Chief Complaint   Patient presents with     Follow Up      49 year old female with history of orthostatic hypotension presenting for follow up     Vitals were taken and medications were reconciled.     Lavonne Pineda CMA    10:49 AM

## 2019-03-21 NOTE — PATIENT INSTRUCTIONS
You will be scheduled for a follow up visit: Follow up with Dr Galarza in 6 months     Medication changes:  Please increase your Midodrine to 10 mg in the AM, 10 mg at noon and 5 mg at 5 pm.     New Medications: None.      If you have any questions regarding to your visit please contact your care team:      Cardiology  Telephone Number    Lu Howe [phone]   Or send a message to your provider via my chart.   For scheduling appts or procedures:    Etelvina Donnelly    [phone] or  [phone]   For the Device Clinic (Pacemakers and ICD's)   RN's :   Willow Haney  During business hours: [phone]     After business hours:   [phone]- select option 4 and ask for job code [id number].    You can also contact us using My Chart. If you need assistance in setting this up, please contact our office or ask at your follow up visit.      If you need a medication refill please contact your pharmacy. Please allow 3 business days for your refill to be completed.      As always, Thank you for trusting us with your health care needs!     We encourage you to use My Chart as your primary form of communication if possible. If you need assistance in setting this up, please contact our office or ask at your follow up visit.      If you need a medication refill please contact your pharmacy. Please allow at least 3 business days for your refill to be completed.         Cardiology  Telephone Number    Lu Howe [phone]   Or send a message to your provider via my chart.   For scheduling procedures:    Etelvina Donnelly     Clinic appointments       [phone]   For the Device Clinic (Pacemakers and ICD's)   RN's :   Willow Haney  During business hours: [phone]     After business hours:   [phone]- select option 4 and ask for job code [id number].             As always, Thank you for trusting us with your health care needs!

## 2019-03-21 NOTE — PROGRESS NOTES
HPI:     .eKisha this being is a 48-year-old woman who has been followed in this clinic for orthostatic hypotension    Since her last visit Keisha has clearly had a reduction in her orthostatic hypotensive spells.  I attribute this to the increase in midodrine dosing.  Nonetheless she still has some postural symptoms and we will need to make some additional changes.    In essence Keisha is being treated with Pedialyte and water for hydration, fludrocortisone, and midodrine 10, 5, 5.    As she has not had any injuries since her last visit.  She has at least had one episode where her blood pressure was reported to be about 50.  However this seems to be an exception.  For the most part, Keisha surprisingly does well in the morning and less well in the later part of the day.  Consequently, we will increase her midodrine dose at midday in the hope that this will have an extended effect.  She does take amlodipine at bedtime to try and diminish the risk of supine hypertension.    Midodrine dose will be increased to 10, 10, 5 with the last dose at around 4 or 5 in the afternoon.  Amlodipine will be taken at bedtime as before.    We will make arrangements for Keisha to return in about 6 months time to ascertain the effectiveness of this change in treatment.      Orthostatic blood pressures were obtained today and her blood pressures were well maintained over the standing..  Her heart rates increased from 70 supine to 100 with prolonged standing.  These findings are acceptable under the circumstances.  She was asymptomatic throughout.    In terms of the effectiveness assessment I have asked Keisha to send us my chart messages with her symptoms status and blood pressures.  If necessary we will further increase the midodrine dose as we still have room to move.  She indicated that she would use my chart for this purpose.    PAST MEDICAL HISTORY:  Past Medical History:   Diagnosis Date     Eating disorder      Hypotension, postural       Palpitations      Syncope      Syncope and collapse      Thyroid disease        CURRENT MEDICATIONS:  Current Outpatient Medications   Medication Sig Dispense Refill     acetaminophen (TYLENOL) 500 MG tablet Take 500-1,000 mg by mouth every 6 hours as needed.       amLODIPine (NORVASC) 5 MG tablet TAKE 1 TABLET BY MOUTH AT BEDTIME 30 tablet 11     atorvastatin (LIPITOR) 10 MG tablet Take by mouth daily Per pt takes this medicine but doesn't know the amount       Blood Pressure Monitor KIT Patient should be evaluated in the emergency department if her systolic blood pressure <65 1 kit 0     Calcium Carbonate-Vitamin D (CALCIUM 600+D PO) Take 1 tablet by mouth 2 times daily.       Cholecalciferol (VITAMIN D PO) Take  by mouth. 50, 000 units every 14 days       dexmethylphenidate (FOCALIN) 10 MG tablet Take 1 tablet (10 mg) by mouth daily 30 tablet 0     docusate sodium (COLACE) 100 MG capsule Take 1 capsule by mouth 2 times daily.       eszopiclone (LUNESTA) 2 MG tablet Take 1 tablet (2 mg) by mouth At Bedtime 30 tablet 3     fludrocortisone (FLORINEF) 0.1 MG tablet TAKE 1 TABLET (0.1MG) BY MOUTH DAILY 90 tablet 3     gabapentin (GRALISE) 600 MG 24 hr tablet Take 600 mg by mouth daily (with dinner)       ibuprofen (IBU) 800 MG tablet Take 800 mg by mouth every 8 hours as needed.       lamoTRIgine (LAMICTAL) 200 MG tablet Take 2 tablets (400 mg) by mouth daily 60 tablet 4     levothyroxine (SYNTHROID, LEVOTHROID) 175 MCG tablet Take  by mouth daily.       LORazepam (ATIVAN) 0.5 MG tablet Take 1 tablet (0.5 mg) by mouth 2 times daily as needed for anxiety 15 tablet 0     metoclopramide (REGLAN) 10 MG tablet 2 times daily        midodrine (PROAMATINE) 5 MG tablet 10 mg in am, 5 mg at 12 noon, 5 mg at 5 pm 360 tablet 1     multivitamin (OCUVITE) TABS Take 1 tablet by mouth 2 times daily.       omeprazole (PRILOSEC) 20 MG capsule Take 2 capsules by mouth 2 times daily.       pyridostigmine (MESTINON) 60 MG tablet Take 1  tablet by mouth daily.       sertraline (ZOLOFT) 100 MG tablet Take 1.5 tablets (150 mg) by mouth daily 45 tablet 4     sucralfate (CARAFATE) 1 GM/10ML suspension Take 1 g by mouth 2 times daily. Take 10 Ml po daily       Emollient (OINTMENT BASE EX) Externally apply  topically daily. Mypirocin 2% apply to G Tube ulsers daily         PAST SURGICAL HISTORY:  Past Surgical History:   Procedure Laterality Date     GT placed[  2001       ALLERGIES:     No Known Allergies    FAMILY HISTORY:  None pertinent    SOCIAL HISTORY:  Social History     Tobacco Use     Smoking status: Never Smoker     Smokeless tobacco: Never Used   Substance Use Topics     Alcohol use: No     Drug use: No       ROS:   Constitutional: No fever, chills, or sweats. Weight stable.   ENT: No visual disturbance, ear ache, epistaxis, sore throat.   Cardiovascular: As per HPI.   Respiratory: No cough, hemoptysis.    GI: No nausea, vomiting, hematemesis, melena, or hematochezia.   : No hematuria.   Integument: Negative.   Psychiatric: Negative.   Hematologic: no easy bleeding.  Neuro: Negative.   Endocrinology: No significant heat or cold intolerance   Musculoskeletal: No myalgia.    Exam: Orthostatic Vitals  BP Pulse Position Site Cuff Size Time Date   119/77 70 Supine Left arm Adult Regular 10:34 AM 3/21/2019   Comment: No symptoms per pt   111/73 72 Sitting Left arm Adult Regular 10:45 AM 3/21/2019   93/64 87 Standing Left arm Adult Regular 10:46 AM 3/21/2019   110/79 98 Standing Left arm Adult Regular 10:47 AM 3/21/2019   110/79 103 Standing Left arm Adult Regular 10:48 AM 3/21/2019     Pain Information  Score Location Time Date   No Pain (0) --- 10:34 AM 3/21/2019   Comment: Per pt no C/P or SOB   --- --- 10:45 AM 3/21/2019   Comment: No symptoms per pt   --- --- 10:46 AM 3/21/2019   Comment: No symptoms per pt   --- --- 10:47 AM 3/21/2019   Comment: No symptoms per pt   --- --- 10:48 AM 3/21/2019   Comment: No symptoms per pt   No repeat blood  pressure data filed.  No peak flow data filed.  Orthostatic Vitals  BP Pulse Position Site Cuff Size Time Date   119/77 70 Supine Left arm Adult Regular 10:34 AM 3/21/2019   Comment: No symptoms per pt   111/73 72 Sitting Left arm Adult Regular 10:45 AM 3/21/2019   93/64 87 Standing Left arm Adult Regular 10:46 AM 3/21/2019   110/79 98 Standing Left arm Adult Regular 10:47 AM 3/21/2019   110/79 103 Standing Left arm Adult Regular 10:48 AM 3/21/2019     Pain Information  Score Location Time Date   No Pain (0) --- 10:34 AM 3/21/2019   Comment: Per pt no C/P or SOB   --- --- 10:45 AM 3/21/2019   Comment: No symptoms per pt   --- --- 10:46 AM 3/21/2019   Comment: No symptoms per pt   --- --- 10:47 AM 3/21/2019   Comment: No symptoms per pt   --- --- 10:48 AM 3/21/2019   Comment: No symptoms per pt   No repeat blood pressure data filed.  No peak flow data filed.    GENERAL APPEARANCE: healthy, alert and no distress  HEENT: no icterus, no xanthelasmas.  NECK:  no asymmetry, masses, or scars, and no bruits, JVP not elevated  RESPIRATORY: lungs clear to auscultation - no rales, rhonchi or wheezes, no use of accessory muscles, no retractions, respirations are unlabored, normal respiratory rate  CARDIOVASCULAR: regular rhythm, normal S1 with physiologic split S2, no S3 or S4 and no murmur, click or rub,   ABDOMEN: soft, non tender, without hepatosplenomegaly,  no abdominal bruits. G-tube.     Labs:  CBC RESULTS:      Component      Latest Ref Rng & Units 2/2/2017 7/24/2017   WBC      4.0 - 11.0 10e9/L 6.4 5.0   RBC Count      3.8 - 5.2 10e12/L 3.87 3.56 (L)   Hemoglobin      11.7 - 15.7 g/dL 11.7 10.8 (L)   Hematocrit      35.0 - 47.0 % 37.0 33.7 (L)   MCV      78 - 100 fl 96 95   MCH      26.5 - 33.0 pg 30.2 30.3   MCHC      31.5 - 36.5 g/dL 31.6 32.0   RDW      10.0 - 15.0 % 12.9 13.1   Platelet Count      150 - 450 10e9/L 313 031     Procedures:  11/25/2013: Tilt/Autonomic Study   The following maneuvers were done  sequentially:  1- Sitting position for 15 minutes: /80 mmHg,  bpm. No significant change in hemodynamic profile. No symptoms.  2- Orthostatism for 5 minutes:   A. Immediate: Chad BP 84/51 mmHg,  bpm, No symptoms.   B. Delayed: Chad /87 mmHg,  bpm, No symptoms.   In 5 minutes after patient was standing HR showed 32 bpm increase compared to sitting position.   3- Maneuvers in sitting position:   A. Carotid sinus massage:   Left: Chad /80 mmHg,  bpm, , No symptoms.   Right: Chad /77 mmHg, HR 99 bpm, , No symptoms.   B. Valsalva:   - Phase 1: present.   - Phase 2: present   - Phase 3: present   - Phase 4: present   Chad BP was 75/60 and HR was 121 during valsalva maneuver. Patient felt somewhat dizzy during valsalva however symptom immediately improved after she stopped valsalva.   C. Cough:   Couplets: Chad /81 mmHg,  bpm, No symptoms.   Spasm: Chad /78 mmHg,  bpm, No symptoms.   4- Supine for 10-15 minutes: /75 mmHg, HR 90 bpm. No significant change in hemodynamic profile. No symptoms.  5- TILT at 70 degrees for 20 minutes: BP 97/62 mmHg, HR 101pm. No significant change in hemodynamic profile. No symptoms.  6 TILT at 70 degrees AFTER ABDOMINAL BINDER for 5 minutes: /77 mmHg, HR 103pm. No significant change in hemodynamic profile. No symptoms.   7 TILT at 70 degrees AFTER 1 LITER OF FLUID for 5 minutes: /83 mmHg, HR 89pm. No significant change in hemodynamic profile. No symptoms.   DISCUSSION:  - Autonomic function test: consistent with normal physiologic response.  - TILT table test:   POTS   No vasovagal syncope  ECHOCARDIOGRAM: 11/25/2013    Interpretation Summary  Poor image quality Technically difficult study 1. Grossly normal LV size   and systolic function. Cannot assess diastolic function 2. Normal RV size   and function    Assessment and Plan:    1.  Increase midodrine to 10, 10, 5 as described above.    2.   Monitor home blood pressures and send by my chart as convenient.    3.  Notify if any major hypotensive symptomatic episode occurs.    4.  Follow-up clinic appointment in 6 months time or earlier depending upon blood pressure measurements which she will be sending to my chart    I very much appreciated the opportunity to see and assess this patient in the clinic today.        I agree with the above note which summarizes my findings and current recommendations.      Please do not hesitate to contact my office if you have any questions or concerns.       Thang Galarza MD  Cardiac Arrhythmia Service  [facility]  [phone]

## 2019-03-21 NOTE — LETTER
3/21/2019      RE: Keisha Somers  [address]  [facility] [address]       Dear Colleague,    Thank you for the opportunity to participate in the care of your patient, Keisha Somers, at the [facility] at [facility]. Please see a copy of my visit note below.      HPI:     .Keisha eckert being is a 48-year-old woman who has been followed in this clinic for orthostatic hypotension    Since her last visit Keisha has clearly had a reduction in her orthostatic hypotensive spells.  I attribute this to the increase in midodrine dosing.  Nonetheless she still has some postural symptoms and we will need to make some additional changes.    In essence Keisha is being treated with Pedialyte and water for hydration, fludrocortisone, and midodrine 10, 5, 5.    As she has not had any injuries since her last visit.  She has at least had one episode where her blood pressure was reported to be about 50.  However this seems to be an exception.  For the most part, Keisha surprisingly does well in the morning and less well in the later part of the day.  Consequently, we will increase her midodrine dose at midday in the hope that this will have an extended effect.  She does take amlodipine at bedtime to try and diminish the risk of supine hypertension.    Midodrine dose will be increased to 10, 10, 5 with the last dose at around 4 or 5 in the afternoon.  Amlodipine will be taken at bedtime as before.    We will make arrangements for Keisha to return in about 6 months time to ascertain the effectiveness of this change in treatment.      Orthostatic blood pressures were obtained today and her blood pressures were well maintained over the standing..  Her heart rates increased from 70 supine to 100 with prolonged standing.  These findings are acceptable under the circumstances.  She was asymptomatic throughout.    In terms of the effectiveness assessment I have asked Keisha to send us  my chart messages with her symptoms status and blood pressures.  If necessary we will further increase the midodrine dose as we still have room to move.  She indicated that she would use my chart for this purpose.    PAST MEDICAL HISTORY:  Past Medical History:   Diagnosis Date     Eating disorder      Hypotension, postural      Palpitations      Syncope      Syncope and collapse      Thyroid disease        CURRENT MEDICATIONS:  Current Outpatient Medications   Medication Sig Dispense Refill     acetaminophen (TYLENOL) 500 MG tablet Take 500-1,000 mg by mouth every 6 hours as needed.       amLODIPine (NORVASC) 5 MG tablet TAKE 1 TABLET BY MOUTH AT BEDTIME 30 tablet 11     atorvastatin (LIPITOR) 10 MG tablet Take by mouth daily Per pt takes this medicine but doesn't know the amount       Blood Pressure Monitor KIT Patient should be evaluated in the emergency department if her systolic blood pressure <65 1 kit 0     Calcium Carbonate-Vitamin D (CALCIUM 600+D PO) Take 1 tablet by mouth 2 times daily.       Cholecalciferol (VITAMIN D PO) Take  by mouth. 50, 000 units every 14 days       dexmethylphenidate (FOCALIN) 10 MG tablet Take 1 tablet (10 mg) by mouth daily 30 tablet 0     docusate sodium (COLACE) 100 MG capsule Take 1 capsule by mouth 2 times daily.       eszopiclone (LUNESTA) 2 MG tablet Take 1 tablet (2 mg) by mouth At Bedtime 30 tablet 3     fludrocortisone (FLORINEF) 0.1 MG tablet TAKE 1 TABLET (0.1MG) BY MOUTH DAILY 90 tablet 3     gabapentin (GRALISE) 600 MG 24 hr tablet Take 600 mg by mouth daily (with dinner)       ibuprofen (IBU) 800 MG tablet Take 800 mg by mouth every 8 hours as needed.       lamoTRIgine (LAMICTAL) 200 MG tablet Take 2 tablets (400 mg) by mouth daily 60 tablet 4     levothyroxine (SYNTHROID, LEVOTHROID) 175 MCG tablet Take  by mouth daily.       LORazepam (ATIVAN) 0.5 MG tablet Take 1 tablet (0.5 mg) by mouth 2 times daily as needed for anxiety 15 tablet 0     metoclopramide (REGLAN)  10 MG tablet 2 times daily        midodrine (PROAMATINE) 5 MG tablet 10 mg in am, 5 mg at 12 noon, 5 mg at 5 pm 360 tablet 1     multivitamin (OCUVITE) TABS Take 1 tablet by mouth 2 times daily.       omeprazole (PRILOSEC) 20 MG capsule Take 2 capsules by mouth 2 times daily.       pyridostigmine (MESTINON) 60 MG tablet Take 1 tablet by mouth daily.       sertraline (ZOLOFT) 100 MG tablet Take 1.5 tablets (150 mg) by mouth daily 45 tablet 4     sucralfate (CARAFATE) 1 GM/10ML suspension Take 1 g by mouth 2 times daily. Take 10 Ml po daily       Emollient (OINTMENT BASE EX) Externally apply  topically daily. Mypirocin 2% apply to G Tube ulsers daily         PAST SURGICAL HISTORY:  Past Surgical History:   Procedure Laterality Date     GT placed[  2001       ALLERGIES:     No Known Allergies    FAMILY HISTORY:  None pertinent    SOCIAL HISTORY:  Social History     Tobacco Use     Smoking status: Never Smoker     Smokeless tobacco: Never Used   Substance Use Topics     Alcohol use: No     Drug use: No       ROS:   Constitutional: No fever, chills, or sweats. Weight stable.   ENT: No visual disturbance, ear ache, epistaxis, sore throat.   Cardiovascular: As per HPI.   Respiratory: No cough, hemoptysis.    GI: No nausea, vomiting, hematemesis, melena, or hematochezia.   : No hematuria.   Integument: Negative.   Psychiatric: Negative.   Hematologic: no easy bleeding.  Neuro: Negative.   Endocrinology: No significant heat or cold intolerance   Musculoskeletal: No myalgia.    Exam: Orthostatic Vitals  BP Pulse Position Site Cuff Size Time Date   119/77 70 Supine Left arm Adult Regular 10:34 AM 3/21/2019   Comment: No symptoms per pt   111/73 72 Sitting Left arm Adult Regular 10:45 AM 3/21/2019   93/64 87 Standing Left arm Adult Regular 10:46 AM 3/21/2019   110/79 98 Standing Left arm Adult Regular 10:47 AM 3/21/2019   110/79 103 Standing Left arm Adult Regular 10:48 AM 3/21/2019     Pain Information  Score Location Time  Date   No Pain (0) --- 10:34 AM 3/21/2019   Comment: Per pt no C/P or SOB   --- --- 10:45 AM 3/21/2019   Comment: No symptoms per pt   --- --- 10:46 AM 3/21/2019   Comment: No symptoms per pt   --- --- 10:47 AM 3/21/2019   Comment: No symptoms per pt   --- --- 10:48 AM 3/21/2019   Comment: No symptoms per pt   No repeat blood pressure data filed.  No peak flow data filed.  Orthostatic Vitals  BP Pulse Position Site Cuff Size Time Date   119/77 70 Supine Left arm Adult Regular 10:34 AM 3/21/2019   Comment: No symptoms per pt   111/73 72 Sitting Left arm Adult Regular 10:45 AM 3/21/2019   93/64 87 Standing Left arm Adult Regular 10:46 AM 3/21/2019   110/79 98 Standing Left arm Adult Regular 10:47 AM 3/21/2019   110/79 103 Standing Left arm Adult Regular 10:48 AM 3/21/2019     Pain Information  Score Location Time Date   No Pain (0) --- 10:34 AM 3/21/2019   Comment: Per pt no C/P or SOB   --- --- 10:45 AM 3/21/2019   Comment: No symptoms per pt   --- --- 10:46 AM 3/21/2019   Comment: No symptoms per pt   --- --- 10:47 AM 3/21/2019   Comment: No symptoms per pt   --- --- 10:48 AM 3/21/2019   Comment: No symptoms per pt   No repeat blood pressure data filed.  No peak flow data filed.    GENERAL APPEARANCE: healthy, alert and no distress  HEENT: no icterus, no xanthelasmas.  NECK:  no asymmetry, masses, or scars, and no bruits, JVP not elevated  RESPIRATORY: lungs clear to auscultation - no rales, rhonchi or wheezes, no use of accessory muscles, no retractions, respirations are unlabored, normal respiratory rate  CARDIOVASCULAR: regular rhythm, normal S1 with physiologic split S2, no S3 or S4 and no murmur, click or rub,   ABDOMEN: soft, non tender, without hepatosplenomegaly,  no abdominal bruits. G-tube.     Labs:  CBC RESULTS:      Component      Latest Ref Rng & Units 2/2/2017 7/24/2017   WBC      4.0 - 11.0 10e9/L 6.4 5.0   RBC Count      3.8 - 5.2 10e12/L 3.87 3.56 (L)   Hemoglobin      11.7 - 15.7 g/dL 11.7 10.8  (L)   Hematocrit      35.0 - 47.0 % 37.0 33.7 (L)   MCV      78 - 100 fl 96 95   MCH      26.5 - 33.0 pg 30.2 30.3   MCHC      31.5 - 36.5 g/dL 31.6 32.0   RDW      10.0 - 15.0 % 12.9 13.1   Platelet Count      150 - 450 10e9/L 313 247     Procedures:  11/25/2013: Tilt/Autonomic Study   The following maneuvers were done sequentially:  1- Sitting position for 15 minutes: /80 mmHg,  bpm. No significant change in hemodynamic profile. No symptoms.  2- Orthostatism for 5 minutes:   A. Immediate: Chad BP 84/51 mmHg,  bpm, No symptoms.   B. Delayed: Chad /87 mmHg,  bpm, No symptoms.   In 5 minutes after patient was standing HR showed 32 bpm increase compared to sitting position.   3- Maneuvers in sitting position:   A. Carotid sinus massage:   Left: Chad /80 mmHg,  bpm, , No symptoms.   Right: Chad /77 mmHg, HR 99 bpm, , No symptoms.   B. Valsalva:   - Phase 1: present.   - Phase 2: present   - Phase 3: present   - Phase 4: present   Chad BP was 75/60 and HR was 121 during valsalva maneuver. Patient felt somewhat dizzy during valsalva however symptom immediately improved after she stopped valsalva.   C. Cough:   Couplets: Chad /81 mmHg,  bpm, No symptoms.   Spasm: Chad /78 mmHg,  bpm, No symptoms.   4- Supine for 10-15 minutes: /75 mmHg, HR 90 bpm. No significant change in hemodynamic profile. No symptoms.  5- TILT at 70 degrees for 20 minutes: BP 97/62 mmHg, HR 101pm. No significant change in hemodynamic profile. No symptoms.  6 TILT at 70 degrees AFTER ABDOMINAL BINDER for 5 minutes: /77 mmHg, HR 103pm. No significant change in hemodynamic profile. No symptoms.   7 TILT at 70 degrees AFTER 1 LITER OF FLUID for 5 minutes: /83 mmHg, HR 89pm. No significant change in hemodynamic profile. No symptoms.   DISCUSSION:  - Autonomic function test: consistent with normal physiologic response.  - TILT table test:   POTS   No vasovagal  syncope  ECHOCARDIOGRAM: 11/25/2013    Interpretation Summary  Poor image quality Technically difficult study 1. Grossly normal LV size   and systolic function. Cannot assess diastolic function 2. Normal RV size   and function    Assessment and Plan:    1.  Increase midodrine to 10, 10, 5 as described above.    2.  Monitor home blood pressures and send by my chart as convenient.    3.  Notify if any major hypotensive symptomatic episode occurs.    4.  Follow-up clinic appointment in 6 months time or earlier depending upon blood pressure measurements which she will be sending to my chart    I very much appreciated the opportunity to see and assess this patient in the clinic today.        I agree with the above note which summarizes my findings and current recommendations.      Please do not hesitate to contact my office if you have any questions or concerns.       Thang Galarza MD  Cardiac Arrhythmia Service  [facility]  [phone]

## 2019-04-01 DIAGNOSIS — F33.1 MAJOR DEPRESSIVE DISORDER, RECURRENT EPISODE, MODERATE (H): ICD-10-CM

## 2019-04-03 NOTE — TELEPHONE ENCOUNTER
Last seen: 2/19/19  RTC: 3 months   Cancel: none   No-show: none   Next appt: 4/16/19    Incoming refill from pharmacy via Rx Auth     Medication requested: dexmethylphenidate (FOCALIN) 10 MG tablet  Directions: Take 1 tablet (10 mg) by mouth daily - Oral  Qty: 30  Last refilled: 4/2/19, 3/12/19, 2/25/19    Will route to provider for approval

## 2019-04-04 RX ORDER — DEXMETHYLPHENIDATE HYDROCHLORIDE 10 MG/1
TABLET ORAL
Qty: 30 TABLET | Refills: 0 | Status: SHIPPED | OUTPATIENT
Start: 2019-04-04 | End: 2019-04-16

## 2019-04-04 NOTE — TELEPHONE ENCOUNTER
Medication Refill (Focalin )     Manan Farr MD   You 21 minutes ago (11:12 AM)      OK to fill.    Routing Comment      Meds refilled per provides approval   Med tab changed to reflect this   Script printed and placed in covering providers folder to sign

## 2019-04-05 NOTE — TELEPHONE ENCOUNTER
Received signed script from provider   Script mailed out to [facility] in [place]   Patient notified of the refill     No further action needed by this writer

## 2019-04-16 ENCOUNTER — OFFICE VISIT (OUTPATIENT)
Dept: PSYCHIATRY | Facility: CLINIC | Age: 49
End: 2019-04-16
Attending: PSYCHIATRY & NEUROLOGY
Payer: MEDICARE

## 2019-04-16 VITALS
BODY MASS INDEX: 22.96 KG/M2 | WEIGHT: 146.6 LBS | HEART RATE: 86 BPM | DIASTOLIC BLOOD PRESSURE: 71 MMHG | SYSTOLIC BLOOD PRESSURE: 103 MMHG

## 2019-04-16 DIAGNOSIS — F33.1 MAJOR DEPRESSIVE DISORDER, RECURRENT EPISODE, MODERATE (H): ICD-10-CM

## 2019-04-16 DIAGNOSIS — F51.01 PRIMARY INSOMNIA: ICD-10-CM

## 2019-04-16 RX ORDER — DEXMETHYLPHENIDATE HYDROCHLORIDE 10 MG/1
10 TABLET ORAL DAILY
Qty: 30 TABLET | Refills: 0 | Status: SHIPPED | OUTPATIENT
Start: 2019-04-16 | End: 2019-04-16

## 2019-04-16 RX ORDER — DEXMETHYLPHENIDATE HYDROCHLORIDE 10 MG/1
10 TABLET ORAL DAILY
Qty: 30 TABLET | Refills: 0 | Status: SHIPPED | OUTPATIENT
Start: 2019-04-16 | End: 2019-07-23

## 2019-04-16 RX ORDER — ESZOPICLONE 3 MG/1
3 TABLET, FILM COATED ORAL AT BEDTIME
Qty: 30 TABLET | Refills: 3 | Status: SHIPPED | OUTPATIENT
Start: 2019-04-16 | End: 2019-07-23

## 2019-04-16 ASSESSMENT — PAIN SCALES - GENERAL: PAINLEVEL: NO PAIN (0)

## 2019-04-16 ASSESSMENT — PATIENT HEALTH QUESTIONNAIRE - PHQ9: SUM OF ALL RESPONSES TO PHQ QUESTIONS 1-9: 3
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
## 2019-06-10 ENCOUNTER — TELEPHONE (OUTPATIENT)
Dept: PSYCHIATRY | Facility: CLINIC | Age: 49
End: 2019-06-10

## 2019-06-10 NOTE — TELEPHONE ENCOUNTER
On 6/12/2018 the patient signed an LASHAWN authorizing medical records to be exchanged between [facility]  for the purpose of continuing. I sent the request to medical records and kept a copy in psychiatry until scanning is complete.  Jovita Malik CMA

## 2019-06-10 NOTE — LETTER
June 12, 2019      TO: Keisha Somers  [address]  [facility] [address]         To Whom it May Concern,    Keisha Somers is followed by Dr. Tena at the [facility] at the Psychiatry clinic last seen on 4/16/19. At this time patient has requested to discontinue LORazepam (ATIVAN) 0.5 MG tablet - Take 1 tablet (0.5 mg) by mouth 2 times daily as needed for anxiety - Oral last refilled on 11/13/18. Please use this letter to discontinue the medication. Call [phone] for further questions.       Sincerely,      Romana Tena MD

## 2019-06-10 NOTE — TELEPHONE ENCOUNTER
[facility] Call Center    Phone Message    May a detailed message be left on voicemail: yes    Reason for Call: Medication Question or concern regarding medication   Prescription Clarification  Name of Medication: lorazepam 0.5mg  Prescribing Provider: Romana Tena MD   What on the order needs clarification? Cathleen from [facility] called to request Dr. Tena discontinue the lorazepam because it is not being used. The patient stated she has not used it in about 8 months and would like it taken off of her medication list/records. The fax number is [phone].      Action Taken: Message routed to:  Other: Michelle Handley RNCC

## 2019-06-10 NOTE — TELEPHONE ENCOUNTER
Writer placed a call to Cathleen at [phone] and inform her we do not have a current LASHAWN on file. She agreed to have patient sign the release and fax it over. She at this time is requesting a letter informing them of discontinuing the LORazepam (ATIVAN) 0.5 MG tablet- Take 1 tablet (0.5 mg) by mouth 2 times daily as needed for anxiety - Oral #15 last refilled on 11/13/18. Writer agreed to reach out to provider to confirm if med is discontinued if so will writer a letter and fax to [phone] once the LASHAWN received.

## 2019-06-11 ENCOUNTER — TELEPHONE (OUTPATIENT)
Dept: PSYCHIATRY | Facility: CLINIC | Age: 49
End: 2019-06-11

## 2019-06-11 NOTE — TELEPHONE ENCOUNTER
Cathleen with [employer] called on this date, requesting documentation for discontinuation  of Lorazepam. LASHAWN on file.

## 2019-06-11 NOTE — TELEPHONE ENCOUNTER
On 6/10/2019 the patient signed an LASHAWN authorizing medical records to be exchanged between [facility]  for the purpose of continuing care. I sent the request to medical records via the tube system and kept a copy in psychiatry until scanning is complete.  Jovita Malik [facility]

## 2019-06-12 NOTE — TELEPHONE ENCOUNTER
Writer received signed letter from provider. Letter faxed to [phone]. Copy kept in the psychiatry clinic for future reference.

## 2019-06-12 NOTE — TELEPHONE ENCOUNTER
Medication Request (LORazepam (ATIVAN) 0.5 MG tablet)      Romana Tena MD  You 2 minutes ago (11:33 AM)      That is fine; we can discontinue it.   SS    Routing comment        You  Romana Tena MD 1 hour ago (10:29 AM)      Hi Dr. Tena,     Did we discontinue LORazepam (ATIVAN) 0.5 MG tablet for this patient. She reports she has not been taking it since 8 months. Last refill on 11/2018. If so I can draft a letter and place in your folder to sign. Please advise     Thanks,   Michelle BIRD     Routing comment      Letter drafted to discontinue LORazepam (ATIVAN) 0.5 MG tablet - Take 1 tablet (0.5 mg) by mouth 2 times daily as needed for anxiety - Oral and placed in providers folder to review and sign.     Routed to provider

## 2019-06-24 DIAGNOSIS — F51.01 PRIMARY INSOMNIA: ICD-10-CM

## 2019-06-26 DIAGNOSIS — I95.9 HYPOTENSION, UNSPECIFIED HYPOTENSION TYPE: ICD-10-CM

## 2019-06-26 DIAGNOSIS — F33.1 MAJOR DEPRESSIVE DISORDER, RECURRENT EPISODE, MODERATE (H): ICD-10-CM

## 2019-06-27 RX ORDER — DIPHENHYDRAMINE HCL 25 MG
CAPSULE ORAL
Qty: 100 CAPSULE | OUTPATIENT
Start: 2019-06-27

## 2019-06-27 NOTE — TELEPHONE ENCOUNTER
----- Message from Mookie Rubalcava sent at 10/19/2017  1:29 PM CDT -----  Regarding: Pt Care  Contact: [phone]  Pt call 10/19/17@1:29pm regarding forms for [employer]. Pt asked if either the nurse or provider Darinel could please give her a call. Would like know if forms has been fill out.    Thanks    detailed exam

## 2019-06-28 RX ORDER — DEXMETHYLPHENIDATE HYDROCHLORIDE 10 MG/1
TABLET ORAL
Qty: 30 TABLET | OUTPATIENT
Start: 2019-06-28

## 2019-06-28 NOTE — TELEPHONE ENCOUNTER
Last seen: 4/16/19  RTC:  3 months   Cancel: 7/9/19  No-show: none   Next appt: 7/23/19    Incoming refill from pharmcy via Rx auth     Medication requested: dexmethylphenidate (FOCALIN) 10 MG tablet  Directions: Take 1 tablet (10 mg) by mouth daily - Oral  Qty: 30  Last refilled: 4/2/19, 3/12/19, 2/25/19    Writer placed a call to pharmacy at [phone] and confirmed refills are on file. She also informed this writer, this refill was sent on accident and asked this writer to disregard the request. Writer agreed with the plan. Also confirmed with nurse Cathleen who inforemd this writer patient has been receiving this medication as scheduled.     No further action needed by this writer

## 2019-07-03 RX ORDER — FLUDROCORTISONE ACETATE 0.1 MG/1
0.1 TABLET ORAL DAILY
Qty: 30 TABLET | Refills: 3 | Status: SHIPPED | OUTPATIENT
Start: 2019-07-03 | End: 2019-10-21

## 2019-07-03 NOTE — TELEPHONE ENCOUNTER
fludrocortisone (FLORINEF) 0.1 MG tablet      Last Written Prescription Date:  7-16-18  Last Fill Quantity: 90,   # refills: 3  Last Office Visit : 3-21-19  Future Office visit:  9-26-19    Routing refill request to provider for review/approval because:  Not on protocol.      Kathleen M Doege RN

## 2019-07-19 DIAGNOSIS — F51.01 PRIMARY INSOMNIA: ICD-10-CM

## 2019-07-19 DIAGNOSIS — F33.1 MAJOR DEPRESSIVE DISORDER, RECURRENT EPISODE, MODERATE (H): ICD-10-CM

## 2019-07-19 DIAGNOSIS — F33.0 MAJOR DEPRESSIVE DISORDER, RECURRENT EPISODE, MILD (H): ICD-10-CM

## 2019-07-23 ENCOUNTER — OFFICE VISIT (OUTPATIENT)
Dept: PSYCHIATRY | Facility: CLINIC | Age: 49
End: 2019-07-23
Attending: PSYCHIATRY & NEUROLOGY
Payer: MEDICARE

## 2019-07-23 VITALS
SYSTOLIC BLOOD PRESSURE: 102 MMHG | HEART RATE: 108 BPM | DIASTOLIC BLOOD PRESSURE: 73 MMHG | BODY MASS INDEX: 21.46 KG/M2 | WEIGHT: 137 LBS

## 2019-07-23 DIAGNOSIS — F33.0 MAJOR DEPRESSIVE DISORDER, RECURRENT EPISODE, MILD (H): ICD-10-CM

## 2019-07-23 DIAGNOSIS — F51.01 PRIMARY INSOMNIA: ICD-10-CM

## 2019-07-23 DIAGNOSIS — F33.1 MAJOR DEPRESSIVE DISORDER, RECURRENT EPISODE, MODERATE (H): ICD-10-CM

## 2019-07-23 PROCEDURE — G0463 HOSPITAL OUTPT CLINIC VISIT: HCPCS | Mod: ZF

## 2019-07-23 RX ORDER — SERTRALINE HYDROCHLORIDE 100 MG/1
150 TABLET, FILM COATED ORAL DAILY
Qty: 45 TABLET | Refills: 4 | Status: SHIPPED | OUTPATIENT
Start: 2019-07-23 | End: 2019-12-17

## 2019-07-23 RX ORDER — SERTRALINE HYDROCHLORIDE 100 MG/1
TABLET, FILM COATED ORAL
Qty: 45 TABLET | Refills: 4 | OUTPATIENT
Start: 2019-07-23

## 2019-07-23 RX ORDER — ESZOPICLONE 3 MG/1
3 TABLET, FILM COATED ORAL AT BEDTIME
Qty: 30 TABLET | Refills: 3 | Status: SHIPPED | OUTPATIENT
Start: 2019-07-23 | End: 2019-11-22

## 2019-07-23 RX ORDER — DEXMETHYLPHENIDATE HYDROCHLORIDE 10 MG/1
10 TABLET ORAL DAILY
Qty: 30 TABLET | Refills: 0 | Status: SHIPPED | OUTPATIENT
Start: 2019-07-23 | End: 2019-10-02

## 2019-07-23 RX ORDER — DEXMETHYLPHENIDATE HYDROCHLORIDE 10 MG/1
10 TABLET ORAL DAILY
Qty: 30 TABLET | Refills: 0 | Status: SHIPPED | OUTPATIENT
Start: 2019-07-23 | End: 2019-07-23

## 2019-07-23 RX ORDER — LAMOTRIGINE 200 MG/1
TABLET ORAL
Qty: 60 TABLET | Refills: 4 | OUTPATIENT
Start: 2019-07-23

## 2019-07-23 RX ORDER — LAMOTRIGINE 200 MG/1
400 TABLET ORAL DAILY
Qty: 60 TABLET | Refills: 4 | Status: SHIPPED | OUTPATIENT
Start: 2019-07-23 | End: 2019-12-17

## 2019-07-23 RX ORDER — ESZOPICLONE 3 MG/1
TABLET, FILM COATED ORAL
Qty: 30 TABLET | OUTPATIENT
Start: 2019-07-23

## 2019-07-23 ASSESSMENT — PAIN SCALES - GENERAL: PAINLEVEL: NO PAIN (0)

## 2019-07-23 NOTE — NURSING NOTE
Chief Complaint   Patient presents with     Recheck Medication     Psychiatry Primary insomnia

## 2019-07-23 NOTE — PROGRESS NOTES
"         Progress Notes   Romana Tena MD (Physician)     Psychiatry       PSYCHIATRY VISIT      The patient was seen for evaluation and management.The patient was seen for anorexia and depression.   She was last seen April 16, 2019    INTERIM HX:At last visit the plan was:  Increase Lunesta to 3  mg and no change in any other medications.  She will continue to see her therapist, Noemi.  She will return again in 3 mo. . Focalin Rxs printed.   Follow-up with cardiology. Encourage pt to report drop in BP     Since her last visit, there have been babies and weddings.  It has been fun. 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 She wants to go to the cabin if for short period of time.    Mood is good. Sleep is \"different,\" she would oversleep with the increase in dose of Lunesta. She does not like getting up in the middle of the night eating.  She thinks it has been positive overall. Energy is pretty good. No PTSD sxs.     She has syncopal episodes without any predictability. At night when she gets up she is a little disoriented. For 3 weeks she has been having migraines, all day. She thought the humidity triggered it.  She plans to see the neurologist about this.    Eating has been good in the daytime overall. Her weight has been up and down but 6# lower than 6 mo ago. Not especially concerning.    MED: Next cardiologist appt Sept.    She is seeing Noemi monthly and this is going well. She just saw her last week.   SH: She has a permanent  which she sees once monthly. Her [employer] worker Q6 mo.      ROS: Headaches-daily.. Syncopal episodes -as above, remainder of 10 pt neg.except as above.       MEDICATIONS:   1. Lamictal 400 mg daily.   2. Zoloft 150 mg daily.   3. Focalin 10 mg daily. Beneficial for energy, mood and no indications of problematic use.   4. Synthroid 150 mcg  5. Zofran prn migraines  6. Benadryl 50 mg/hs - discontinued due to gabapentin start - early " April 7. Mitodrine 10/5/5 tid - changed from Dr. Galarza  8. Lunesta 3 mg - Lunesta has made a huge difference in her sleep: probably 2 years., helps to stay asleep.  We reduced from 3 mg at previous visit due to sleep eating  9. Carafate  10. Pyridostigmine   11. Tizanidine   16. Fluorinef-   17. Maxalt for migraines. It works very well.    19. Mg-   21. Vit B2  22. Gabapentin 600 QHS -;  Added for hot flashes, works.  23. Amlodipine 5 mg at bedtime- added by Dr. Galarza  24. Atorvastatin  25. Ibuprofen  /73   Pulse 108   Wt 62.1 kg (137 lb)   BMI 21.46 kg/m       BP Readings from Last 3 Encounters:   07/23/19 102/73   04/16/19 103/71   02/19/19 117/82     Wt Readings from Last 4 Encounters:   07/23/19 62.1 kg (137 lb)   04/16/19 66.5 kg (146 lb 9.6 oz)   03/21/19 68.9 kg (152 lb)   12/11/18 64.4 kg (142 lb)     MENTAL STATUS EXAM: The patient is neat, oriented x3, on time for appointment. Mood is euthymic, Good range of affect. Thought processes logical, associations are clear and goal directed. Thought content is normal. Fund of knowledge good, speech RRR, language intact, memory intact, concentration good, insight and judgment are good, gaitsteady.    She has been on a higher dose of Lunesta (3 mg) which helped more and didn't seem to have any worse nighttime eating.     ASSESSMENT:     Anorexia nervosa, weight stable    Major depressive disorder,, stable.,;   Chronic sleep problems,improved with meds but has nocturnal eating which might be SE of Lunesta, not improved with lowered dose   Recurrent syncopal episode(s), unpredictable  Migraine HA, treated  with maxalt, but currently problematic  POTS    PLAN:   Continue Lunesta  3  mg and no change in any other medications.  She will continue to see her therapist, Noemi.  She will return again in 3 mo. . Focalin Rxs printed.   Follow-up with cardiology and neurology  RADHA MAURER MD   .          PSYCHIATRY CLINIC INDIVIDUAL PSYCHOTHERAPY NOTE                                                      [16]   Start time - 1100        End time - 1116  Date reviewed - created on 12-19-17   last update: 8-14-18   Date next due -     8-18-19  Subjective: This supportive psychotherapy session addressed issues related to weight and weight perception,  Patient's reaction: Maintenance in the context of mental status appropriate for ambulatory setting.  Progress: good  Plan: RTC 3 mo  Psychotherapy services during this visit included  myself and the patient.   TREATMENT  PLAN          SYMPTOMS; PROBLEMS   MEASURABLE GOALS;    FUNCTIONAL IMPROVEMENT INTERVENTIONS;   GAINS MADE DISCHARGE CRITERIA   Depression: none  Anxiety: mild, situational, parents getting older  Sleep Problems: nightmares   reduce nightmares, make a plan to manage 2-3 anxiety-provoking situations, develop 2 strategies to cope with trauma triggers/intrusive memories and take medications as prescribed on a daily basis building on strengths  communication skills  medications   psychotherapy marked symptom improvement and symptom resolution   Eating Disorder: intense fear of weight gain and distorted body image    POTS- syncopal episodes eating 3 meals/day, improvement in body image,maintain weight       Minimal episodes stress management  psychotherapy         muliple meds, cardiologist following , management of sxs marked symptom improvement

## 2019-09-16 ENCOUNTER — DOCUMENTATION ONLY (OUTPATIENT)
Dept: CARE COORDINATION | Facility: CLINIC | Age: 49
End: 2019-09-16

## 2019-09-26 ENCOUNTER — OFFICE VISIT (OUTPATIENT)
Dept: CARDIOLOGY | Facility: CLINIC | Age: 49
End: 2019-09-26
Attending: INTERNAL MEDICINE
Payer: MEDICARE

## 2019-09-26 VITALS
HEART RATE: 120 BPM | HEIGHT: 68 IN | BODY MASS INDEX: 21.4 KG/M2 | SYSTOLIC BLOOD PRESSURE: 115 MMHG | WEIGHT: 141.2 LBS | DIASTOLIC BLOOD PRESSURE: 81 MMHG | OXYGEN SATURATION: 96 %

## 2019-09-26 DIAGNOSIS — G90.A POSTURAL ORTHOSTATIC TACHYCARDIA SYNDROME: ICD-10-CM

## 2019-09-26 PROCEDURE — G0463 HOSPITAL OUTPT CLINIC VISIT: HCPCS

## 2019-09-26 PROCEDURE — 99214 OFFICE O/P EST MOD 30 MIN: CPT | Mod: ZP | Performed by: INTERNAL MEDICINE

## 2019-09-26 ASSESSMENT — PAIN SCALES - GENERAL: PAINLEVEL: NO PAIN (0)

## 2019-09-26 ASSESSMENT — MIFFLIN-ST. JEOR: SCORE: 1306.04

## 2019-09-26 ASSESSMENT — PATIENT HEALTH QUESTIONNAIRE - PHQ9: SUM OF ALL RESPONSES TO PHQ QUESTIONS 1-9: 1

## 2019-09-26 NOTE — PATIENT INSTRUCTIONS
You were seen in the Electrophysiology Clinic today by: Dr. Galarza    Plan:     Medication Changes: None    Labs/Tests Needed: None    Follow up visit: With Dr. Galarza in 6 months    Further Instructions: None      Your Care Team:  [facility]   Telephone Number     Ivelisse River RN [phone]     For scheduling appts or procedures:    Etelvina Donnelly   [phone]   For the Device Clinic (Pacemakers, ICDs, Loop Recorders)    During business hours: [phone]  After business hours:   [phone]- select option 4 and ask for job code [phone].       Cardiovascular Clinic:   [address] [place] [address]. [place] [address]      As always, Thank you for trusting us with your health care needs!

## 2019-09-26 NOTE — LETTER
9/26/2019      RE: Keisha Somers  [address]  [facility] [address]       Dear Colleague,    Thank you for the opportunity to participate in the care of your patient, Keisha Somers, at the [facility]. Please see a copy of my visit note below.      HPI:     .Keisha eckert being is a 48-year-old woman who has been followed in this clinic for orthostatic hypotension associated with autonomic dysfunction although autonomic studies were not particularly abnormal.  Possibly they will merit being repeated if patient's blood pressure responses to therapy continue to be inadequate.    Since her last visit Keisha has had a number of apparent loss of conscious events usually at night.  This is not surprising given the fact that we are dosing her Midrin primarily during the daytime hours and using a low dose of amlodipine to prevent supine hypertension at night.  The problem with maintaining a higher blood pressure at night is potential long-term cerebrovascular effects.    I recommended that the drug dosing be maintained but that the patient raised the head of her bed 6 inches on blocks and that she have a walker available at night to provide protection when she gets up to go to the bathroom.    Keisha has no new cardiovascular complaints and it does not appear to have suffered any substantial injury.    Her amlodipine dose was reduced by her family doctor due to concerns related to falls at night.  However as noted above we do need to be cautious about letting her have too high blood pressure 24 hours a day.      PAST MEDICAL HISTORY:  Past Medical History:   Diagnosis Date     Eating disorder      Hypotension, postural      Palpitations      Syncope      Syncope and collapse      Thyroid disease        CURRENT MEDICATIONS:  Current Outpatient Medications   Medication Sig Dispense Refill     dexmethylphenidate (FOCALIN) 10 MG tablet Take 1 tablet  (10 mg) by mouth daily 30 tablet 0     lamoTRIgine (LAMICTAL) 200 MG tablet Take 2 tablets (400 mg) by mouth daily 60 tablet 4     levothyroxine (SYNTHROID, LEVOTHROID) 175 MCG tablet Take  by mouth daily.       metoclopramide (REGLAN) 10 MG tablet 2 times daily        multivitamin (OCUVITE) TABS Take 1 tablet by mouth 2 times daily.       acetaminophen (TYLENOL) 500 MG tablet Take 500-1,000 mg by mouth every 6 hours as needed.       amLODIPine (NORVASC) 5 MG tablet TAKE 1 TABLET BY MOUTH AT BEDTIME 30 tablet 11     atorvastatin (LIPITOR) 10 MG tablet Take by mouth daily Per pt takes this medicine but doesn't know the amount       Blood Pressure Monitor KIT Patient should be evaluated in the emergency department if her systolic blood pressure <65 1 kit 0     Calcium Carbonate-Vitamin D (CALCIUM 600+D PO) Take 1 tablet by mouth 2 times daily.       Cholecalciferol (VITAMIN D PO) Take  by mouth. 50, 000 units every 14 days       docusate sodium (COLACE) 100 MG capsule Take 1 capsule by mouth 2 times daily.       Emollient (OINTMENT BASE EX) Externally apply  topically daily. Mypirocin 2% apply to G Tube ulsers daily       eszopiclone (LUNESTA) 3 MG tablet Take 1 tablet (3 mg) by mouth At Bedtime 30 tablet 3     fludrocortisone (FLORINEF) 0.1 MG tablet Take 1 tablet (0.1 mg) by mouth daily 30 tablet 3     gabapentin (GRALISE) 600 MG 24 hr tablet Take 600 mg by mouth daily (with dinner)       ibuprofen (IBU) 800 MG tablet Take 800 mg by mouth every 8 hours as needed.       midodrine (PROAMATINE) 5 MG tablet 10 mg in am, 10 mg at 12 noon, 5 mg at 5 pm 450 tablet 3     omeprazole (PRILOSEC) 20 MG capsule Take 2 capsules by mouth 2 times daily.       pyridostigmine (MESTINON) 60 MG tablet Take 1 tablet by mouth daily.       sertraline (ZOLOFT) 100 MG tablet Take 1.5 tablets (150 mg) by mouth daily 45 tablet 4     sucralfate (CARAFATE) 1 GM/10ML suspension Take 1 g by mouth 2 times daily. Take 10 Ml po daily         PAST  SURGICAL HISTORY:  Past Surgical History:   Procedure Laterality Date     GT placed[  2001       ALLERGIES:     No Known Allergies    FAMILY HISTORY:  None pertinent    SOCIAL HISTORY:  Social History     Tobacco Use     Smoking status: Never Smoker     Smokeless tobacco: Never Used   Substance Use Topics     Alcohol use: No     Drug use: No       ROS:   Constitutional: No fever, chills, or sweats. Weight stable.   ENT: No visual disturbance, ear ache, epistaxis, sore throat.   Cardiovascular: As per HPI.   Respiratory: No cough, hemoptysis.    GI: No nausea, vomiting, hematemesis, melena, or hematochezia.   : No hematuria.   Integument: Negative.   Psychiatric: Negative.   Hematologic: no easy bleeding.  Neuro: Negative.   Endocrinology: No significant heat or cold intolerance   Musculoskeletal: No myalgia.    Exam: Repeat Blood Pressure:  BP Pulse Site Cuff Size Time Date   115/81 120 Right arm --- 11:23 AM 9/26/2019     Pain Information  Score Location Time Date   No Pain (0) Chest 11:23 AM 9/26/2019   No orthostatic vitals data filed.  No peak flow data filed.  Repeat Blood Pressure:  BP Pulse Site Cuff Size Time Date   115/81 120 Right arm --- 11:23 AM 9/26/2019     Pain Information  Score Location Time Date   No Pain (0) Chest 11:23 AM 9/26/2019   No orthostatic vitals data filed.  No peak flow data filed.    GENERAL APPEARANCE: healthy, alert and no distress  HEENT: no icterus, no xanthelasmas.  NECK:  no asymmetry, masses, or scars, and no bruits, JVP not elevated  RESPIRATORY: lungs clear to auscultation - no rales, rhonchi or wheezes, no use of accessory muscles, no retractions, respirations are unlabored, normal respiratory rate  CARDIOVASCULAR: regular rhythm, normal S1 with physiologic split S2, no S3 or S4 and no murmur, click or rub,   ABDOMEN: soft, non tender, without hepatosplenomegaly,  no abdominal bruits. G-tube.     Labs:  CBC RESULTS:      Component      Latest Ref Rng & Units 2/2/2017  7/24/2017   WBC      4.0 - 11.0 10e9/L 6.4 5.0   RBC Count      3.8 - 5.2 10e12/L 3.87 3.56 (L)   Hemoglobin      11.7 - 15.7 g/dL 11.7 10.8 (L)   Hematocrit      35.0 - 47.0 % 37.0 33.7 (L)   MCV      78 - 100 fl 96 95   MCH      26.5 - 33.0 pg 30.2 30.3   MCHC      31.5 - 36.5 g/dL 31.6 32.0   RDW      10.0 - 15.0 % 12.9 13.1   Platelet Count      150 - 450 10e9/L 313 247     Procedures:  11/25/2013: Tilt/Autonomic Study   The following maneuvers were done sequentially:  1- Sitting position for 15 minutes: /80 mmHg,  bpm. No significant change in hemodynamic profile. No symptoms.  2- Orthostatism for 5 minutes:   A. Immediate: Chad BP 84/51 mmHg,  bpm, No symptoms.   B. Delayed: Chad /87 mmHg,  bpm, No symptoms.   In 5 minutes after patient was standing HR showed 32 bpm increase compared to sitting position.   3- Maneuvers in sitting position:   A. Carotid sinus massage:   Left: Chad /80 mmHg,  bpm, , No symptoms.   Right: Chad /77 mmHg, HR 99 bpm, , No symptoms.   B. Valsalva:   - Phase 1: present.   - Phase 2: present   - Phase 3: present   - Phase 4: present   Chad BP was 75/60 and HR was 121 during valsalva maneuver. Patient felt somewhat dizzy during valsalva however symptom immediately improved after she stopped valsalva.   C. Cough:   Couplets: Chad /81 mmHg,  bpm, No symptoms.   Spasm: Chad /78 mmHg,  bpm, No symptoms.   4- Supine for 10-15 minutes: /75 mmHg, HR 90 bpm. No significant change in hemodynamic profile. No symptoms.  5- TILT at 70 degrees for 20 minutes: BP 97/62 mmHg, HR 101pm. No significant change in hemodynamic profile. No symptoms.  6 TILT at 70 degrees AFTER ABDOMINAL BINDER for 5 minutes: /77 mmHg, HR 103pm. No significant change in hemodynamic profile. No symptoms.   7 TILT at 70 degrees AFTER 1 LITER OF FLUID for 5 minutes: /83 mmHg, HR 89pm. No significant change in hemodynamic profile. No  symptoms.   DISCUSSION:  - Autonomic function test: consistent with normal physiologic response.  - TILT table test:   POTS   No vasovagal syncope  ECHOCARDIOGRAM: 11/25/2013    Interpretation Summary  Poor image quality Technically difficult study 1. Grossly normal LV size   and systolic function. Cannot assess diastolic function 2. Normal RV size   and function    Assessment and Plan:    1.  Maintain midodrine to 10, 10, 5 as described above.  Continue low-dose amlodipine at night to help diminish risk of supine hypertension    2.  Raise head of bed 6 inches to help reduce supine hypertension    3.  Notify if any major hypotensive symptomatic episode occurs.    4.  Follow-up clinic appointment in 6-8 months time or earlier as needed    I very much appreciated the opportunity to see and assess this patient in the clinic today.      Please do not hesitate to contact my office if you have any questions or concerns.       Thang Galarza MD  Cardiac Arrhythmia Service  [facility]  [phone]

## 2019-09-26 NOTE — PROGRESS NOTES
HPI:     .Keisha this being is a 48-year-old woman who has been followed in this clinic for orthostatic hypotension associated with autonomic dysfunction although autonomic studies were not particularly abnormal.  Possibly they will merit being repeated if patient's blood pressure responses to therapy continue to be inadequate.    Since her last visit Keisha has had a number of apparent loss of conscious events usually at night.  This is not surprising given the fact that we are dosing her Midrin primarily during the daytime hours and using a low dose of amlodipine to prevent supine hypertension at night.  The problem with maintaining a higher blood pressure at night is potential long-term cerebrovascular effects.    I recommended that the drug dosing be maintained but that the patient raised the head of her bed 6 inches on blocks and that she have a walker available at night to provide protection when she gets up to go to the bathroom.    Keisha has no new cardiovascular complaints and it does not appear to have suffered any substantial injury.    Her amlodipine dose was reduced by her family doctor due to concerns related to falls at night.  However as noted above we do need to be cautious about letting her have too high blood pressure 24 hours a day.      PAST MEDICAL HISTORY:  Past Medical History:   Diagnosis Date     Eating disorder      Hypotension, postural      Palpitations      Syncope      Syncope and collapse      Thyroid disease        CURRENT MEDICATIONS:  Current Outpatient Medications   Medication Sig Dispense Refill     dexmethylphenidate (FOCALIN) 10 MG tablet Take 1 tablet (10 mg) by mouth daily 30 tablet 0     lamoTRIgine (LAMICTAL) 200 MG tablet Take 2 tablets (400 mg) by mouth daily 60 tablet 4     levothyroxine (SYNTHROID, LEVOTHROID) 175 MCG tablet Take  by mouth daily.       metoclopramide (REGLAN) 10 MG tablet 2 times daily        multivitamin (OCUVITE) TABS Take 1 tablet by mouth 2 times  daily.       acetaminophen (TYLENOL) 500 MG tablet Take 500-1,000 mg by mouth every 6 hours as needed.       amLODIPine (NORVASC) 5 MG tablet TAKE 1 TABLET BY MOUTH AT BEDTIME 30 tablet 11     atorvastatin (LIPITOR) 10 MG tablet Take by mouth daily Per pt takes this medicine but doesn't know the amount       Blood Pressure Monitor KIT Patient should be evaluated in the emergency department if her systolic blood pressure <65 1 kit 0     Calcium Carbonate-Vitamin D (CALCIUM 600+D PO) Take 1 tablet by mouth 2 times daily.       Cholecalciferol (VITAMIN D PO) Take  by mouth. 50, 000 units every 14 days       docusate sodium (COLACE) 100 MG capsule Take 1 capsule by mouth 2 times daily.       Emollient (OINTMENT BASE EX) Externally apply  topically daily. Mypirocin 2% apply to G Tube ulsers daily       eszopiclone (LUNESTA) 3 MG tablet Take 1 tablet (3 mg) by mouth At Bedtime 30 tablet 3     fludrocortisone (FLORINEF) 0.1 MG tablet Take 1 tablet (0.1 mg) by mouth daily 30 tablet 3     gabapentin (GRALISE) 600 MG 24 hr tablet Take 600 mg by mouth daily (with dinner)       ibuprofen (IBU) 800 MG tablet Take 800 mg by mouth every 8 hours as needed.       midodrine (PROAMATINE) 5 MG tablet 10 mg in am, 10 mg at 12 noon, 5 mg at 5 pm 450 tablet 3     omeprazole (PRILOSEC) 20 MG capsule Take 2 capsules by mouth 2 times daily.       pyridostigmine (MESTINON) 60 MG tablet Take 1 tablet by mouth daily.       sertraline (ZOLOFT) 100 MG tablet Take 1.5 tablets (150 mg) by mouth daily 45 tablet 4     sucralfate (CARAFATE) 1 GM/10ML suspension Take 1 g by mouth 2 times daily. Take 10 Ml po daily         PAST SURGICAL HISTORY:  Past Surgical History:   Procedure Laterality Date     GT placed[  2001       ALLERGIES:     No Known Allergies    FAMILY HISTORY:  None pertinent    SOCIAL HISTORY:  Social History     Tobacco Use     Smoking status: Never Smoker     Smokeless tobacco: Never Used   Substance Use Topics     Alcohol use: No      Drug use: No       ROS:   Constitutional: No fever, chills, or sweats. Weight stable.   ENT: No visual disturbance, ear ache, epistaxis, sore throat.   Cardiovascular: As per HPI.   Respiratory: No cough, hemoptysis.    GI: No nausea, vomiting, hematemesis, melena, or hematochezia.   : No hematuria.   Integument: Negative.   Psychiatric: Negative.   Hematologic: no easy bleeding.  Neuro: Negative.   Endocrinology: No significant heat or cold intolerance   Musculoskeletal: No myalgia.    Exam: Repeat Blood Pressure:  BP Pulse Site Cuff Size Time Date   115/81 120 Right arm --- 11:23 AM 9/26/2019     Pain Information  Score Location Time Date   No Pain (0) Chest 11:23 AM 9/26/2019   No orthostatic vitals data filed.  No peak flow data filed.  Repeat Blood Pressure:  BP Pulse Site Cuff Size Time Date   115/81 120 Right arm --- 11:23 AM 9/26/2019     Pain Information  Score Location Time Date   No Pain (0) Chest 11:23 AM 9/26/2019   No orthostatic vitals data filed.  No peak flow data filed.    GENERAL APPEARANCE: healthy, alert and no distress  HEENT: no icterus, no xanthelasmas.  NECK:  no asymmetry, masses, or scars, and no bruits, JVP not elevated  RESPIRATORY: lungs clear to auscultation - no rales, rhonchi or wheezes, no use of accessory muscles, no retractions, respirations are unlabored, normal respiratory rate  CARDIOVASCULAR: regular rhythm, normal S1 with physiologic split S2, no S3 or S4 and no murmur, click or rub,   ABDOMEN: soft, non tender, without hepatosplenomegaly,  no abdominal bruits. G-tube.     Labs:  CBC RESULTS:      Component      Latest Ref Rng & Units 2/2/2017 7/24/2017   WBC      4.0 - 11.0 10e9/L 6.4 5.0   RBC Count      3.8 - 5.2 10e12/L 3.87 3.56 (L)   Hemoglobin      11.7 - 15.7 g/dL 11.7 10.8 (L)   Hematocrit      35.0 - 47.0 % 37.0 33.7 (L)   MCV      78 - 100 fl 96 95   MCH      26.5 - 33.0 pg 30.2 30.3   MCHC      31.5 - 36.5 g/dL 31.6 32.0   RDW      10.0 - 15.0 % 12.9 13.1    Platelet Count      150 - 450 10e9/L 313 247     Procedures:  11/25/2013: Tilt/Autonomic Study   The following maneuvers were done sequentially:  1- Sitting position for 15 minutes: /80 mmHg,  bpm. No significant change in hemodynamic profile. No symptoms.  2- Orthostatism for 5 minutes:   A. Immediate: Chad BP 84/51 mmHg,  bpm, No symptoms.   B. Delayed: Chad /87 mmHg,  bpm, No symptoms.   In 5 minutes after patient was standing HR showed 32 bpm increase compared to sitting position.   3- Maneuvers in sitting position:   A. Carotid sinus massage:   Left: Chad /80 mmHg,  bpm, , No symptoms.   Right: Chad /77 mmHg, HR 99 bpm, , No symptoms.   B. Valsalva:   - Phase 1: present.   - Phase 2: present   - Phase 3: present   - Phase 4: present   Chad BP was 75/60 and HR was 121 during valsalva maneuver. Patient felt somewhat dizzy during valsalva however symptom immediately improved after she stopped valsalva.   C. Cough:   Couplets: Chad /81 mmHg,  bpm, No symptoms.   Spasm: Chad /78 mmHg,  bpm, No symptoms.   4- Supine for 10-15 minutes: /75 mmHg, HR 90 bpm. No significant change in hemodynamic profile. No symptoms.  5- TILT at 70 degrees for 20 minutes: BP 97/62 mmHg, HR 101pm. No significant change in hemodynamic profile. No symptoms.  6 TILT at 70 degrees AFTER ABDOMINAL BINDER for 5 minutes: /77 mmHg, HR 103pm. No significant change in hemodynamic profile. No symptoms.   7 TILT at 70 degrees AFTER 1 LITER OF FLUID for 5 minutes: /83 mmHg, HR 89pm. No significant change in hemodynamic profile. No symptoms.   DISCUSSION:  - Autonomic function test: consistent with normal physiologic response.  - TILT table test:   POTS   No vasovagal syncope  ECHOCARDIOGRAM: 11/25/2013    Interpretation Summary  Poor image quality Technically difficult study 1. Grossly normal LV size   and systolic function. Cannot assess diastolic  function 2. Normal RV size   and function    Assessment and Plan:    1.  Maintain midodrine to 10, 10, 5 as described above.  Continue low-dose amlodipine at night to help diminish risk of supine hypertension    2.  Raise head of bed 6 inches to help reduce supine hypertension    3.  Notify if any major hypotensive symptomatic episode occurs.    4.  Follow-up clinic appointment in 6-8 months time or earlier as needed    I very much appreciated the opportunity to see and assess this patient in the clinic today.      Please do not hesitate to contact my office if you have any questions or concerns.       Thang Galarza MD  Cardiac Arrhythmia Service  [facility]  [phone]

## 2019-09-26 NOTE — NURSING NOTE
Chief Complaint   Patient presents with     Follow Up     49yoF w/ autonomic dysfunction, [place] presents for 6 month follow-up.     Vitals were taken and medications were reconciled.     Jeimy Pineda RMA  11:29 AM

## 2019-10-02 DIAGNOSIS — F33.1 MAJOR DEPRESSIVE DISORDER, RECURRENT EPISODE, MODERATE (H): ICD-10-CM

## 2019-10-02 RX ORDER — DEXMETHYLPHENIDATE HYDROCHLORIDE 10 MG/1
10 TABLET ORAL DAILY
Qty: 30 TABLET | Refills: 0 | Status: SHIPPED | OUTPATIENT
Start: 2019-10-02 | End: 2019-12-17

## 2019-10-02 NOTE — PROGRESS NOTES
Sent Rx for Focalin - patient will run out before her next appt as I needed to reschedule it for a later date.    MD Alina

## 2019-10-21 DIAGNOSIS — G90.A POSTURAL ORTHOSTATIC TACHYCARDIA SYNDROME: ICD-10-CM

## 2019-10-21 DIAGNOSIS — I10 HYPERTENSION, UNSPECIFIED TYPE: ICD-10-CM

## 2019-10-21 DIAGNOSIS — I95.9 HYPOTENSION, UNSPECIFIED HYPOTENSION TYPE: ICD-10-CM

## 2019-10-24 RX ORDER — AMLODIPINE BESYLATE 5 MG/1
5 TABLET ORAL AT BEDTIME
Qty: 90 TABLET | Refills: 0 | Status: SHIPPED | OUTPATIENT
Start: 2019-10-24 | End: 2019-10-30

## 2019-10-24 RX ORDER — FLUDROCORTISONE ACETATE 0.1 MG/1
0.1 TABLET ORAL DAILY
Qty: 90 TABLET | Refills: 3 | Status: SHIPPED | OUTPATIENT
Start: 2019-10-24 | End: 2020-10-01

## 2019-10-24 NOTE — TELEPHONE ENCOUNTER
amLODIPine (NORVASC) 5 MG tablet     fludrocortisone (FLORINEF) 0.1 MG tablet   Last Written Prescription Date:  9/25/19  Last Fill Quantity: 30,     # refills:   11  3  Last Office Visit : 9/26/19  Future Office visit:  3/26/20    Norvasc -Refilled for 90 days per protocol.  FYI sent to cardiology pool    Last Creatinine 1.49 on 1/4/19      Routing refill request to provider for review/approval because: Florinef...  Drug not on the G, P or [facility] refill protocol or controlled substance

## 2019-10-30 ENCOUNTER — TELEPHONE (OUTPATIENT)
Dept: CARDIOLOGY | Facility: CLINIC | Age: 49
End: 2019-10-30

## 2019-10-30 DIAGNOSIS — G90.A POSTURAL ORTHOSTATIC TACHYCARDIA SYNDROME: ICD-10-CM

## 2019-10-30 DIAGNOSIS — I10 HYPERTENSION, UNSPECIFIED TYPE: ICD-10-CM

## 2019-10-30 RX ORDER — AMLODIPINE BESYLATE 5 MG/1
2.5 TABLET ORAL AT BEDTIME
Qty: 90 TABLET | Refills: 0 | COMMUNITY
Start: 2019-10-30 | End: 2020-04-29

## 2019-10-30 NOTE — TELEPHONE ENCOUNTER
Called Cathleen and discussed I don't see documentation for need for change, although if PCP recommended change then patient should continue change in amlodipine dose. Cathleen advised I call patient and discuss. Cathleen will notify PCP change is okay and PCP will continue to monitor.    Called and left VM for Keisha to confirm change and dosage instructions. Told to call back with questions. Medication list has been updated.    Ivelisse River, JALEESAN, RN, PHN  Electrophysiology Nurse Coordinator

## 2019-10-30 NOTE — TELEPHONE ENCOUNTER
Health Call Center    Phone Message    May a detailed message be left on voicemail: yes    Reason for Call: Other: Cathleen from [facility], Ph # [phone] - Pt is on Amlopidine 5mg tablet at bedtime from Dr Galarza - Pt PCP Dr Lavonne Zarate put Pt on 2.5mg tablet daily at bedtime (same medication) - PCP lowered it to half - Question is the most recent Rx for this medication is the half dose from PCP - they need to know which is correct? - Pt has been on that new dose since 09/20/19. They rec'd a refill from Dr Galarza on 10/24/19 which has old dose. It was already delivered. They need to know which dosing to follow. Please call them back. Thanks.     Action Taken: Message routed to:  [facility] ([facility] Cardiology Clinc

## 2019-11-22 DIAGNOSIS — F51.01 PRIMARY INSOMNIA: ICD-10-CM

## 2019-11-25 RX ORDER — ESZOPICLONE 3 MG/1
TABLET, FILM COATED ORAL
Qty: 30 TABLET | Refills: 0 | Status: SHIPPED | OUTPATIENT
Start: 2019-11-25 | End: 2019-12-17

## 2019-11-25 NOTE — TELEPHONE ENCOUNTER
Last seen: 7/23/2019  RTC: 3 months  Cancel: 10/22/2019  No-show: N/A  Next appt: 12/17/2019     Incoming refill from patient via [employer]     Medication requested: eszopiclone (LUNESTA) 3 MG tablet  Directions: Take 1 tablet (3 mg) by mouth At Bedtime - Oral  Qty: 30 tablets  Last refilled: 10/25/2019, 9/25/2019, 8/26/2019     Routed to provided for approval due to appointment cancellation

## 2019-12-16 DIAGNOSIS — F33.1 MAJOR DEPRESSIVE DISORDER, RECURRENT EPISODE, MODERATE (H): ICD-10-CM

## 2019-12-16 DIAGNOSIS — F51.01 PRIMARY INSOMNIA: ICD-10-CM

## 2019-12-17 ENCOUNTER — OFFICE VISIT (OUTPATIENT)
Dept: PSYCHIATRY | Facility: CLINIC | Age: 49
End: 2019-12-17
Attending: PSYCHIATRY & NEUROLOGY
Payer: MEDICARE

## 2019-12-17 VITALS — SYSTOLIC BLOOD PRESSURE: 112 MMHG | DIASTOLIC BLOOD PRESSURE: 75 MMHG | HEART RATE: 68 BPM

## 2019-12-17 DIAGNOSIS — F33.0 MAJOR DEPRESSIVE DISORDER, RECURRENT EPISODE, MILD (H): ICD-10-CM

## 2019-12-17 DIAGNOSIS — F33.1 MAJOR DEPRESSIVE DISORDER, RECURRENT EPISODE, MODERATE (H): ICD-10-CM

## 2019-12-17 DIAGNOSIS — F51.01 PRIMARY INSOMNIA: ICD-10-CM

## 2019-12-17 PROCEDURE — G0463 HOSPITAL OUTPT CLINIC VISIT: HCPCS | Mod: ZF

## 2019-12-17 RX ORDER — DEXMETHYLPHENIDATE HYDROCHLORIDE 10 MG/1
10 TABLET ORAL DAILY
Qty: 30 TABLET | Refills: 0 | Status: SHIPPED | OUTPATIENT
Start: 2019-12-17 | End: 2019-12-17

## 2019-12-17 RX ORDER — SERTRALINE HYDROCHLORIDE 100 MG/1
150 TABLET, FILM COATED ORAL DAILY
Qty: 45 TABLET | Refills: 4 | Status: SHIPPED | OUTPATIENT
Start: 2019-12-17 | End: 2020-03-17

## 2019-12-17 RX ORDER — ESZOPICLONE 3 MG/1
3 TABLET, FILM COATED ORAL AT BEDTIME
Qty: 30 TABLET | Refills: 5 | Status: SHIPPED | OUTPATIENT
Start: 2019-12-17 | End: 2020-05-28

## 2019-12-17 RX ORDER — DEXMETHYLPHENIDATE HYDROCHLORIDE 10 MG/1
10 TABLET ORAL DAILY
Qty: 30 TABLET | Refills: 0 | Status: SHIPPED | OUTPATIENT
Start: 2019-12-17 | End: 2020-03-17

## 2019-12-17 RX ORDER — LAMOTRIGINE 200 MG/1
400 TABLET ORAL DAILY
Qty: 60 TABLET | Refills: 4 | Status: SHIPPED | OUTPATIENT
Start: 2019-12-17 | End: 2020-03-17

## 2019-12-17 RX ORDER — GABAPENTIN 100 MG/1
300 CAPSULE ORAL AT BEDTIME
Qty: 90 CAPSULE | Refills: 5 | Status: SHIPPED | OUTPATIENT
Start: 2019-12-17 | End: 2020-05-28

## 2019-12-17 ASSESSMENT — PAIN SCALES - GENERAL: PAINLEVEL: NO PAIN (0)

## 2019-12-17 NOTE — PATIENT INSTRUCTIONS
Thank you for coming to the PSYCHIATRY CLINIC.    Lab Testing:  If you had lab testing today and your results are reassuring or normal they will be mailed to you or sent through [employer] within 7 days.   If the lab tests need quick action we will call you with the results.  The phone number we will call with results is # [phone] (home) . If this is not the best number please call our clinic and change the number.    Medication Refills:  If you need any refills please call your pharmacy and they will contact us. Our fax number for refills is [phone]. Please allow three business for refill processing.   If you need to  your refill at a new pharmacy, please contact the new pharmacy directly. The new pharmacy will help you get your medications transferred.     Scheduling:  If you have any concerns about today's visit or wish to schedule another appointment please call our office during normal business hours [phone] (8-5:00 M-F)    Contact Us:  Please call [phone] during business hours (8-5:00 M-F).  If after clinic hours, or on the weekend, please call  [phone].    Financial Assistance [phone]  [facility] Billing [phone]  Central Billing Office, [facility]: [phone]  [facility] Billing [phone]  Medical Records [phone]      MENTAL HEALTH CRISIS NUMBERS:  [facility]:   [facility] - [phone]   Crisis Residence [facility] - [phone]   Walk-In Counseling [facility] [phone]   COPE 24/7 [place] Mobile Team for Adults - [[phone]]; Child - [[phone]]        [facility]:   [facility] - [phone]   Walk-in counseling [facility] - [phone]   Walk-in counseling [facility] - [phone]   Crisis Residence [facility] - [phone]   Urgent Care Adult Mental Health:   --Drop-in, 24/7 crisis line, and [place] Mobile Team  [[phone]]    CRISIS TEXT LINE: Text [phone] from anywhere, anytime, any crisis 24/7;    OR SEE www.crisistextline.org     Poison Control Center - [phone]    CHILD: [facility] needs assessment team - [phone]     [facility] - [phone]; or [facility] - [phone]    If you have a medical emergency please call 911or go to the nearest ER.                    _____________________________________________    Again thank you for choosing PSYCHIATRY CLINIC and please let us know how we can best partner with you to improve you and your family's health.  You may be receiving a survey in the mail regarding this appointment. We would love to have your feedback, both positive and negative, so please fill out the survey and return it using the provided envelope. The survey is done by an external company, so your answers are anonymous.

## 2019-12-17 NOTE — NURSING NOTE
Chief Complaint   Patient presents with     Recheck Medication     MDD     Patient declined weight today

## 2019-12-17 NOTE — PROGRESS NOTES
"         Progress Notes   Romana Tena MD (Physician)     Psychiatry       PSYCHIATRY VISIT      The patient was seen for evaluation and management.The patient was seen for anorexia and depression.   She was last seen July 2019    INTERIM HX:At last visit the plan was:  Continue Lunesta  3  mg and no change in any other medications.  She will continue to see her therapist, Noemi.  She will return again in 3 mo. . Focalin Rxs printed.   Follow-up with cardiology and neurology    She reports on the family holiday drama. Sister only wants to invite 1/2 of the family. It is unclear what the outcome will be this year. Yesterday she spent the whole day shopping and it was exhausting. She and her mom were arguing the whole time.    Mood has been good, sleep is \"bizarre,\" she falls asleep then up quite a few times and eating.    She has had dizzy spells but no syncopal.  She does reprort falling and injuring her tailbone.  She saw her cardiologist who did not change meds but her primary reduced her norvasc.   Eating is ok except for middle of the night when she gets up but has no memory. Weight is up, per her report but I do not weigh her.  Motivation, interests are all ok. PTSD sxs: right after court event but only occasional nightmare.        MED: no new problems  She is seeing Noemi monthly and this is going well. She just saw her last week.   SH: She has a permanent  which she sees once monthly. Her [employer] worker Q6 mo.      ROS: Dizzy as above. Syncopal episodes -as above, remainder of 10 pt neg.except as above.       MEDICATIONS:   1. Lamictal 400 mg daily.   2. Zoloft 150 mg daily.   3. Focalin 10 mg daily. Beneficial for energy, mood and no indications of problematic use.   4. Synthroid 150 mcg  5. Zofran prn migraines  6. Benadryl 50 mg/hs - discontinued due to gabapentin start - early April  7. Mitodrine 10/5/5 tid - changed from Dr. Galarza  8. Lunesta 3 mg - Lunesta has made a huge difference in " her sleep: probably 2 years., helps to stay asleep.  We reduced from 3 mg at a previous visit due to sleep eating  9. Carafate  10. Pyridostigmine   11. Tizanidine   16. Fluorinef-   17. Maxalt for migraines. It works very well.    19. Mg-   21. Vit B2  22. Gabapentin 600 QHS -;  Added for hot flashes, works.  23. Amlodipine lowered to 2.5 mg at bedtime- recently  24. Atorvastatin  25. Ibuprofen  /75   Pulse 68      BP Readings from Last 3 Encounters:   12/17/19 112/75   09/26/19 115/81   07/23/19 102/73     Wt Readings from Last 4 Encounters:   09/26/19 64 kg (141 lb 3.2 oz)   07/23/19 62.1 kg (137 lb)   04/16/19 66.5 kg (146 lb 9.6 oz)   03/21/19 68.9 kg (152 lb)     MENTAL STATUS EXAM: The patient is neat, oriented x3, on time for appointment. Mood is euthymic, Good range of affect. Thought processes logical, associations are clear and goal directed. Thought content is normal. Fund of knowledge good, speech RRR, language intact, memory intact, concentration good, insight and judgment are good, gaitsteady.    She has been on a higher dose of Lunesta (3 mg) which helped more and didn't seem to have any worse nighttime eating.     ASSESSMENT:     Anorexia nervosa, weight stable    Major depressive disorder,, stable.,;   Chronic sleep problems,improved with meds but has nocturnal eating which might be SE of Lunesta, not improved with lowered dose   Recurrent syncopal episode(s), unpredictable  Migraine HA, treated  with maxalt, but currently problematic  POTS    PLAN:   Continue Lunesta  3  mg   She will continue to see her therapist, Noemi.  She will return again in 3 mo. . Focalin Rxs printed.   Increase gabapentin to 900 mg to help more with sleep.   Follow-up with cardiology and neurology negrita MAURER MD   .          PSYCHIATRY CLINIC INDIVIDUAL PSYCHOTHERAPY NOTE                                                     [16]   Start time - 1100        End time - 1116  Date reviewed - created on  12-19-17   last update: 12-17-19 Date next due -     12-17-20  Subjective: This supportive psychotherapy session addressed issues related to weight and weight perception,  Patient's reaction: Maintenance in the context of mental status appropriate for ambulatory setting.  Progress: good  Plan: RTC 3 mo  Psychotherapy services during this visit included  myself and the patient.   TREATMENT  PLAN          SYMPTOMS; PROBLEMS   MEASURABLE GOALS;    FUNCTIONAL IMPROVEMENT INTERVENTIONS;   GAINS MADE DISCHARGE CRITERIA   Depression: none  Anxiety: mild, situational, parents getting older  Sleep Problems: nightmares   reduce nightmares, make a plan to manage 2-3 anxiety-provoking situations, develop 2 strategies to cope with trauma triggers/intrusive memories and take medications as prescribed on a daily basis building on strengths  communication skills  medications   psychotherapy marked symptom improvement and symptom resolution   Eating Disorder: intense fear of weight gain and distorted body image    POTS- syncopal episodes eating 3 meals/day, improvement in body image,maintain weight       Minimal episodes stress management  psychotherapy         muliple meds, cardiologist following , management of sxs marked symptom improvement

## 2019-12-18 RX ORDER — ESZOPICLONE 3 MG/1
TABLET, FILM COATED ORAL
Qty: 30 TABLET | OUTPATIENT
Start: 2019-12-18

## 2019-12-18 RX ORDER — SERTRALINE HYDROCHLORIDE 100 MG/1
TABLET, FILM COATED ORAL
Qty: 45 TABLET | Refills: 4 | OUTPATIENT
Start: 2019-12-18

## 2020-02-18 ENCOUNTER — TELEPHONE (OUTPATIENT)
Dept: PSYCHIATRY | Facility: CLINIC | Age: 50
End: 2020-02-18

## 2020-02-18 NOTE — TELEPHONE ENCOUNTER
Prior Authorization Approval    Authorization Effective Date: 2/18/2020  Authorization Expiration Date: 12/31/2020  Medication: eszopiclone (LUNESTA) 3 MG tablet- APPROVED  Approved Dose/Quantity:   Reference #:     Insurance Company: [employer] - [employer] [phone] Fax [phone]  Expected CoPay:       CoPay Card Available:      Foundation Assistance Needed:    Which Pharmacy is filling the prescription (Not needed for infusion/clinic administered): [facility] [id number] - [place], [place] - [address]  Pharmacy Notified: Yes  Patient Notified: No

## 2020-02-18 NOTE — TELEPHONE ENCOUNTER
Central Prior Authorization Team   Phone: [phone]      PA Initiation    Medication: eszopiclone (LUNESTA) 3 MG tablet   Insurance Company: [employer] - Phone [phone] Fax [phone]  Pharmacy Filling the Rx: [facility] [place] [id number] - [place], [place] - [facility] [address]  Filling Pharmacy Phone: [phone]  Filling Pharmacy Fax:    Start Date: 2/18/2020

## 2020-02-18 NOTE — TELEPHONE ENCOUNTER
"Prior Authorization Retail Medication Request     Medication/Dose: eszopiclone (LUNESTA) 3 MG tablet      ICD code: Primary insomnia [F51.01]      Previously Tried and Failed: Mirtazapine, diphenhydramine, Clonazepam     Rationale: Per the patient \"Lunesta has made a huge difference in her sleep, helps to stay asleep.\" If this medication is not approved the patient is at risk for not having adequate sleep, which could lead to decompensation and hospitalization.         Insurance Name:  Played  Insurance ID:  [id number]        Pharmacy Information   Name:  [facility]  Phone:  [phone]     Key: [id number]         "

## 2020-03-05 ENCOUNTER — DOCUMENTATION ONLY (OUTPATIENT)
Dept: CARE COORDINATION | Facility: CLINIC | Age: 50
End: 2020-03-05

## 2020-03-17 ENCOUNTER — ALLIED HEALTH/NURSE VISIT (OUTPATIENT)
Dept: PSYCHIATRY | Facility: CLINIC | Age: 50
End: 2020-03-17
Attending: PSYCHIATRY & NEUROLOGY
Payer: MEDICARE

## 2020-03-17 DIAGNOSIS — F33.0 MAJOR DEPRESSIVE DISORDER, RECURRENT EPISODE, MILD (H): ICD-10-CM

## 2020-03-17 DIAGNOSIS — F33.1 MAJOR DEPRESSIVE DISORDER, RECURRENT EPISODE, MODERATE (H): ICD-10-CM

## 2020-03-17 RX ORDER — DEXMETHYLPHENIDATE HYDROCHLORIDE 10 MG/1
10 TABLET ORAL DAILY
Qty: 30 TABLET | Refills: 0 | Status: SHIPPED | OUTPATIENT
Start: 2020-03-17 | End: 2020-03-17

## 2020-03-17 RX ORDER — DEXMETHYLPHENIDATE HYDROCHLORIDE 10 MG/1
10 TABLET ORAL DAILY
Qty: 30 TABLET | Refills: 0 | Status: SHIPPED | OUTPATIENT
Start: 2020-03-17 | End: 2020-06-23

## 2020-03-17 RX ORDER — LAMOTRIGINE 200 MG/1
400 TABLET ORAL DAILY
Qty: 60 TABLET | Refills: 4 | Status: SHIPPED | OUTPATIENT
Start: 2020-03-17 | End: 2020-06-23

## 2020-03-17 RX ORDER — SERTRALINE HYDROCHLORIDE 100 MG/1
150 TABLET, FILM COATED ORAL DAILY
Qty: 45 TABLET | Refills: 4 | Status: SHIPPED | OUTPATIENT
Start: 2020-03-17 | End: 2020-06-23

## 2020-03-17 NOTE — PROGRESS NOTES
"         Progress Notes   Romana Tena MD (Physician)     Psychiatry       PSYCHIATRY VISIT      The patient was seen for evaluation and management.The patient was seen for anorexia and depression.   She was last seen Dec 2019    INTERIM HX:At last visit the plan was:Continue Lunesta  3  mg and no change in any other medications.  Continue Lunesta  3  mg   She will continue to see her therapist, Noemi.  She will return again in 3 mo. . Focalin Rxs printed.   Increase gabapentin to 900 mg to help more with sleep.   Follow-up with cardiology and neurology prn    Patient visit is per telephone for depression.  Keisha Somers is a 50 year old female who is being evaluated via a billable telephone visit.  TT=25 min    The patient has been notified of following:     \"This telephone visit will be conducted via a call between you and your physician/provider. We have found that certain health care needs can be provided without the need for a physical exam.  This service lets us provide the care you need with a  phone conversation.  If a prescription is necessary we can send it directly to your pharmacy.  If lab work is needed we can place an order for that and you can then stop by our lab to have the test done at a later time.    No problems since last visit.  Mood is good; no problems . 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 He is encouraged to quit by his physician but he is resistant.  He has older clients too. She has to go physically to his home but has not been exposed to people.  Sleep is \"hit or miss\". She has had some problems with BP, low especially at night. She is careful when she stands up. In the last month she has dizzy spells but still has syncopal episodes. She had her last one 2 weeks ago and did not remember falling to the floor.  That whole week her BP was low. Fluid intake wasnn't different.  She wakes quite a few times but not dragging through the day.  Eating - night eating " "still bad.  We tried to change Lunesta a few times but that doesn't seem to matter.As long as she is on Lunesta she has nighttime eating.   She falls asleep at 1030-11 then wakes within an hour or two and doesn't recall eating.    MED: no new problems. HA go in \"waves\" and she gets rebound migraines. She saw her neurologist and was told she should take her meds soon enough.   She will see her cardiologist next week.    SH: She has a permanent  which she sees once monthly. Her [employer] worker Q6 mo . She will be getting a new one.  She has not been seeing Candy and doesn't feel a need at present.   ROS: Dizzy as above. Syncopal episodes -as above, remainder of 10 pt neg.except as above.   HA- better    MEDICATIONS:   1. Lamictal 400 mg daily.   2. Zoloft 150 mg daily.   3. Focalin 10 mg daily. Beneficial for energy, mood and no indications of problematic use.   4. Synthroid 150 mcg  5. Zofran prn migraines  7. Mitodrine 10/10/5 tid - changed from Dr. Galarza  8. Lunesta 3 mg - Lunesta has made a huge difference in her sleep: probably 2 years., helps to stay asleep.    9. Carafate  10. Pyridostigmine   11. Tizanidine   16. Fluorinef-   17. Maxalt for migraines. It works very well.    19. Mg-   21. Vit B2  22. Gabapentin 600 QHS - helps with sleep;  Added for hot flashes, works.  23. Amlodipine lowered to 2.5 mg at bedtime- recently  24. Atorvastatin  25. Ibuprofen  There were no vitals taken for this visit. - televisit    BP Readings from Last 3 Encounters:   12/17/19 112/75   09/26/19 115/81   07/23/19 102/73     Wt Readings from Last 4 Encounters:   09/26/19 64 kg (141 lb 3.2 oz)   07/23/19 62.1 kg (137 lb)   04/16/19 66.5 kg (146 lb 9.6 oz)   03/21/19 68.9 kg (152 lb)     MENTAL STATUS EXAM: The patient is interviewed via phone, oriented x3. Mood is euthymic, Good range of affect. Thought processes logical, associations are clear and goal directed. Thought content is normal. Fund of knowledge good, speech " RRR, language intact, memory intact, concentration good, insight and judgment are good, gait unable to assess.    ASSESSMENT:   Anorexia nervosa, weight stable    Major depressive disorder,, stable.,;   Chronic sleep problems,improved with meds but has nocturnal eating which might be SE of Lunesta, not improved on a lowered dose   Recurrent syncopal episode(s), unpredictable  Migraine HA, treated  with maxalt, but currently problematic  POTS    PLAN:   Continue Lunesta  3  mg   She will prn see her therapist, Noemi.  She will return again in 3 mo. . Focalin Rxs sent.   Gabapentin to 300 mg to help with sleep.   Follow-up with cardiology and neurology prn  RADHA MAURER MD   .          PSYCHIATRY CLINIC INDIVIDUAL PSYCHOTHERAPY NOTE                                                     [16]   Start time - 100        End time - 125  Date reviewed - created on 12-19-17   last update: 12-17-19 Date next due -     12-17-20  Subjective: This supportive psychotherapy session addressed issues related to weight and weight perception,  Patient's reaction: Maintenance in the context of mental status appropriate for ambulatory setting.  Progress: good  Plan: RTC 3 mo  Psychotherapy services during this visit included  myself and the patient.   TREATMENT  PLAN          SYMPTOMS; PROBLEMS   MEASURABLE GOALS;    FUNCTIONAL IMPROVEMENT INTERVENTIONS;   GAINS MADE DISCHARGE CRITERIA   Depression: none  Anxiety: mild, situational, parents getting older  Sleep Problems: nightmares   reduce nightmares, make a plan to manage 2-3 anxiety-provoking situations, develop 2 strategies to cope with trauma triggers/intrusive memories and take medications as prescribed on a daily basis building on strengths  communication skills  medications   psychotherapy marked symptom improvement and symptom resolution   Eating Disorder: intense fear of weight gain and distorted body image    POTS- syncopal episodes eating 3 meals/day, improvement in body  image,maintain weight       Minimal episodes stress management  psychotherapy         muliple meds, cardiologist following , management of sxs marked symptom improvement

## 2020-03-24 DIAGNOSIS — G90.A POSTURAL ORTHOSTATIC TACHYCARDIA SYNDROME: ICD-10-CM

## 2020-03-25 RX ORDER — MIDODRINE HYDROCHLORIDE 5 MG/1
TABLET ORAL
Qty: 450 TABLET | Refills: 3 | Status: SHIPPED | OUTPATIENT
Start: 2020-03-25 | End: 2021-02-25

## 2020-03-25 NOTE — TELEPHONE ENCOUNTER
midodrine (PROAMATINE) 5 MG tablet  Last Written Prescription Date:  3/21/19  Last Fill Quantity: 450,   # refills: 3  Last Office Visit : 9/26/19  Future Office visit:  3/26/20      Routing refill request to provider for review/approval because: not on protocol

## 2020-03-26 ENCOUNTER — VIRTUAL VISIT (OUTPATIENT)
Dept: CARDIOLOGY | Facility: CLINIC | Age: 50
End: 2020-03-26
Attending: INTERNAL MEDICINE
Payer: MEDICARE

## 2020-03-26 DIAGNOSIS — R55 SYNCOPE, UNSPECIFIED SYNCOPE TYPE: Primary | ICD-10-CM

## 2020-03-26 DIAGNOSIS — I95.1 DYSAUTONOMIA ORTHOSTATIC HYPOTENSION SYNDROME: ICD-10-CM

## 2020-03-26 PROCEDURE — 99442 ZZC PHYSICIAN TELEPHONE EVALUATION 11-20 MIN: CPT | Performed by: INTERNAL MEDICINE

## 2020-03-26 NOTE — PROGRESS NOTES
"Electrophysiology Clinic Telephone Visit    Keisha Somers is a 50 year old female who is being evaluated via a billable telephone visit.      The patient has been notified of following:     \"This telephone visit will be conducted via a call between you and your physician/provider. We have found that certain health care needs can be provided without the need for a physical exam.  This service lets us provide the care you need with a short phone conversation.  If a prescription is necessary we can send it directly to your pharmacy.  If lab work is needed we can place an order for that and you can then stop by our lab to have the test done at a later time.    If during the course of the call the physician/provider feels a telephone visit is not appropriate, you will not be charged for this service.\"     HPI:     Keisha quick is a 48-year-old woman who has been followed in this clinic for orthostatic hypotension associated with autonomic dysfunction although autonomic studies were not particularly abnormal.  .    Since her last visit Keisha has had a number of apparent collapse events usually at night.  It is much as she takes amlodipine prior to bedtime to diminish supine hypertension, we may need to make a modification in the dosing regimen.  Currently she checks her blood pressure after the amlodipine in the evening.  I have recommended that she check it before and if her systemic systolic blood pressure is 110 or less at that time she should not take the amlodipine.    I recommended that the drug dosing be maintained but that the patient raised the head of her bed 6 inches on blocks and that she have a walker available at night to provide protection when she gets up to go to the bathroom.    Keisha has no new cardiovascular complaints and it does not appear to have suffered any substantial injury.    Keisha does use a walker for protection if she gets up at night.    She does not currently have any new " cardiovascular complaints.  Despite the falls she indicates that she is only suffered some minor bruises but no significant injury.  I alerted her to the importance of being aware that her dizziness and tendency to collapse will be greater when she changes posture particularly getting up from bed or a chair.  She voiced understanding and recognition of that problem.    I suggested that please sign up for my chart so that she can advise us if there is any change in clinical status otherwise we would plan for a follow-up visit in 6 months time.      PAST MEDICAL HISTORY:  Past Medical History:   Diagnosis Date     Eating disorder      Hypotension, postural      Palpitations      Syncope      Syncope and collapse      Thyroid disease        CURRENT MEDICATIONS:  Current Outpatient Medications   Medication Sig Dispense Refill     acetaminophen (TYLENOL) 500 MG tablet Take 500-1,000 mg by mouth every 6 hours as needed.       amLODIPine (NORVASC) 5 MG tablet Take 0.5 tablets (2.5 mg) by mouth At Bedtime 90 tablet 0     atorvastatin (LIPITOR) 10 MG tablet Take by mouth daily Per pt takes this medicine but doesn't know the amount       Blood Pressure Monitor KIT Patient should be evaluated in the emergency department if her systolic blood pressure <65 1 kit 0     Calcium Carbonate-Vitamin D (CALCIUM 600+D PO) Take 1 tablet by mouth 2 times daily.       Cholecalciferol (VITAMIN D PO) Take  by mouth. 50, 000 units every 14 days       dexmethylphenidate (FOCALIN) 10 MG tablet Take 1 tablet (10 mg) by mouth daily 30 tablet 0     docusate sodium (COLACE) 100 MG capsule Take 1 capsule by mouth 2 times daily.       eszopiclone (LUNESTA) 3 MG tablet Take 1 tablet (3 mg) by mouth At Bedtime 30 tablet 5     fludrocortisone (FLORINEF) 0.1 MG tablet Take 1 tablet (0.1 mg) by mouth daily 90 tablet 3     gabapentin (NEURONTIN) 100 MG capsule Take 3 capsules (300 mg) by mouth At Bedtime 90 capsule 5     ibuprofen (IBU) 800 MG tablet Take  800 mg by mouth every 8 hours as needed.       lamoTRIgine (LAMICTAL) 200 MG tablet Take 2 tablets (400 mg) by mouth daily 60 tablet 4     levothyroxine (SYNTHROID, LEVOTHROID) 175 MCG tablet Take  by mouth daily.       metoclopramide (REGLAN) 10 MG tablet 2 times daily        midodrine (PROAMATINE) 5 MG tablet TAKE 2 TABLETS (10MG) EVERY MORNING TAKE 2 TABLETS (10MG) AT NOON, AND TAKE 1 TABLET (5MG) AT 5PM 450 tablet 3     multivitamin (OCUVITE) TABS Take 1 tablet by mouth 2 times daily.       pyridostigmine (MESTINON) 60 MG tablet Take 1 tablet by mouth daily.       sertraline (ZOLOFT) 100 MG tablet Take 1.5 tablets (150 mg) by mouth daily 45 tablet 4     sucralfate (CARAFATE) 1 GM/10ML suspension Take 1 g by mouth 2 times daily. Take 10 Ml po daily       omeprazole (PRILOSEC) 20 MG capsule Take 2 capsules by mouth 2 times daily.         PAST SURGICAL HISTORY:  Past Surgical History:   Procedure Laterality Date     GT placed[  2001       ALLERGIES:     No Known Allergies    FAMILY HISTORY:  None pertinent    SOCIAL HISTORY:  Social History     Tobacco Use     Smoking status: Never Smoker     Smokeless tobacco: Never Used   Substance Use Topics     Alcohol use: No     Drug use: No       ROS:   Constitutional: No fever, chills, or sweats. Weight stable.   ENT: No visual disturbance, ear ache, epistaxis, sore throat.   Cardiovascular: As per HPI.   Respiratory: No cough, hemoptysis.    GI: No nausea, vomiting, hematemesis, melena, or hematochezia.   : No hematuria.   Integument: Negative.   Psychiatric: Negative.   Hematologic: no easy bleeding.  Neuro: Negative.   Endocrinology: No significant heat or cold intolerance   Musculoskeletal: No myalgia.    Exam:     Vital signs not taken during telephone visit    Labs:  CBC RESULTS:      Component      Latest Ref Rng & Units 2/2/2017 7/24/2017   WBC      4.0 - 11.0 10e9/L 6.4 5.0   RBC Count      3.8 - 5.2 10e12/L 3.87 3.56 (L)   Hemoglobin      11.7 - 15.7 g/dL 11.7  10.8 (L)   Hematocrit      35.0 - 47.0 % 37.0 33.7 (L)   MCV      78 - 100 fl 96 95   MCH      26.5 - 33.0 pg 30.2 30.3   MCHC      31.5 - 36.5 g/dL 31.6 32.0   RDW      10.0 - 15.0 % 12.9 13.1   Platelet Count      150 - 450 10e9/L 313 247     Procedures:  11/25/2013: Tilt/Autonomic Study   The following maneuvers were done sequentially:  1- Sitting position for 15 minutes: /80 mmHg,  bpm. No significant change in hemodynamic profile. No symptoms.  2- Orthostatism for 5 minutes:   A. Immediate: Chad BP 84/51 mmHg,  bpm, No symptoms.   B. Delayed: Chad /87 mmHg,  bpm, No symptoms.   In 5 minutes after patient was standing HR showed 32 bpm increase compared to sitting position.   3- Maneuvers in sitting position:   A. Carotid sinus massage:   Left: Chad /80 mmHg,  bpm, , No symptoms.   Right: Chad /77 mmHg, HR 99 bpm, , No symptoms.   B. Valsalva:   - Phase 1: present.   - Phase 2: present   - Phase 3: present   - Phase 4: present   Chad BP was 75/60 and HR was 121 during valsalva maneuver. Patient felt somewhat dizzy during valsalva however symptom immediately improved after she stopped valsalva.   C. Cough:   Couplets: Chad /81 mmHg,  bpm, No symptoms.   Spasm: Chad /78 mmHg,  bpm, No symptoms.   4- Supine for 10-15 minutes: /75 mmHg, HR 90 bpm. No significant change in hemodynamic profile. No symptoms.  5- TILT at 70 degrees for 20 minutes: BP 97/62 mmHg, HR 101pm. No significant change in hemodynamic profile. No symptoms.  6 TILT at 70 degrees AFTER ABDOMINAL BINDER for 5 minutes: /77 mmHg, HR 103pm. No significant change in hemodynamic profile. No symptoms.   7 TILT at 70 degrees AFTER 1 LITER OF FLUID for 5 minutes: /83 mmHg, HR 89pm. No significant change in hemodynamic profile. No symptoms.   DISCUSSION:  - Autonomic function test: consistent with normal physiologic response.  - TILT table test:   POTS   No  vasovagal syncope  ECHOCARDIOGRAM: 11/25/2013    Interpretation Summary  Poor image quality Technically difficult study 1. Grossly normal LV size   and systolic function. Cannot assess diastolic function 2. Normal RV size   and function    Assessment and Plan:    1.  Maintain current medications except for the modification of her antihypertensive meds at night as described above specifically, she should not be taking her amlodipine at night if her systolic pressure is 110 or less at bedtime.      2..Follow-up clinic appointment in 6 months time or earlier as needed    3.  Arrange for my chart use    I have reviewed the note as documented above.  This accurately captures the substance of my conversation with the patient.    Phone call contact time  Call Started at 11;33  Call Ended at 11;45    I very much appreciated the opportunity to assess Keisha Somers in the clinic . Please do not hesitate to contact my office if you have any questions or concerns.      Thang Galarza MD  Cardiac Arrhythmia Service  [facility]  [phone]  THANG TONG

## 2020-03-27 ENCOUNTER — TELEPHONE (OUTPATIENT)
Dept: CARDIOLOGY | Facility: CLINIC | Age: 50
End: 2020-03-27

## 2020-03-27 NOTE — TELEPHONE ENCOUNTER
Cathleen Ngo's call and she stated she needs Dr Galarza's office note indicating the med change. Please fax to the number listed on the message from earlier today.    Mateo ALLEN

## 2020-03-27 NOTE — TELEPHONE ENCOUNTER
"Left message on nurse line at [facility] at [phone].   Per Dr Galarza's note on 3/26/20, \"Since her last visit Keisha has had a number of apparent collapse events usually at night.  It is much as she takes amlodipine prior to bedtime to diminish supine hypertension, we may need to make a modification in the dosing regimen.  Currently she checks her blood pressure after the amlodipine in the evening.  I have recommended that she check it before and if her systemic systolic blood pressure is 110 or less at that time she should not take the amlodipine.\"    Per return call, will fax note.       "

## 2020-03-27 NOTE — TELEPHONE ENCOUNTER
[facility] Call Center    Phone Message    May a detailed message be left on voicemail: yes     Reason for Call: Order(s): Other:   Reason for requested: Cathleen is calling to request that Keisha's Medication orders for amLODIPine (NORVASC) 5 MG tablet be faxed over to due the nature of this medication only being taken under specific conditions.   Date needed: 03/27/20   Provider name: Dr. Galarza    Please fax to [phone]  [employer] ATTN: Cathleen    If questions or concerns please call Cathleen at [phone]. Thank you.      Action Taken: Message routed to:  [facility] ([facility] Cardiology    Travel Screening: Not Applicable

## 2020-04-28 DIAGNOSIS — I10 HYPERTENSION, UNSPECIFIED TYPE: ICD-10-CM

## 2020-04-28 DIAGNOSIS — G90.A POSTURAL ORTHOSTATIC TACHYCARDIA SYNDROME: ICD-10-CM

## 2020-04-29 NOTE — TELEPHONE ENCOUNTER
amLODIPine (NORVASC) 5 MG tablet      Last Written Prescription Date:  10-30-19  Last Fill Quantity: 90,   # refills: 0  Last Office Visit : 3-  Future Office visit:  10-1-2020    Routing refill request to provider for review/approval because:  Request and medication order do not match.  Medication list 0.5 tabs (2.5 mg) and request is 1 tab (5 mg)    Clarification needed for refill.      Kathleen M Doege RN

## 2020-04-30 RX ORDER — AMLODIPINE BESYLATE 5 MG/1
2.5 TABLET ORAL AT BEDTIME
Qty: 90 TABLET | Refills: 0 | Status: SHIPPED | OUTPATIENT
Start: 2020-04-30 | End: 2020-10-29

## 2020-05-26 DIAGNOSIS — F51.01 PRIMARY INSOMNIA: ICD-10-CM

## 2020-05-28 RX ORDER — ESZOPICLONE 3 MG/1
TABLET, FILM COATED ORAL
Qty: 30 TABLET | Refills: 0 | Status: SHIPPED | OUTPATIENT
Start: 2020-05-28 | End: 2020-06-23

## 2020-05-28 RX ORDER — GABAPENTIN 100 MG/1
CAPSULE ORAL
Qty: 90 CAPSULE | Refills: 0 | Status: SHIPPED | OUTPATIENT
Start: 2020-05-28 | End: 2020-06-23

## 2020-05-28 NOTE — TELEPHONE ENCOUNTER
Last seen: 3/17  RTC: 3 months   Cancel: none   No-show: none   Next appt: 6/23    Incoming refill from pharmacy via Rx Auth     Medication requested: gabapentin (NEURONTIN) 100 MG capsule  Directions: Take 3 capsules (300 mg) by mouth At Bedtime - Oral   Qty: 90  Last refilled: 5/1 #90, 4/3 #90, 3/10 #90    Medication requested: eszopiclone (LUNESTA) 3 MG tablet  Directions:Take 1 tablet (3 mg) by mouth At Bedtime - Oral   Qty: 30  Last refilled:  5/1 #30, 4/3 #30, 3/10 #30    Will route to provider for approval

## 2020-05-29 NOTE — TELEPHONE ENCOUNTER
Left VM for patient informing her that refills have been signed/sent to preferred pharmacy. Encouraged call back with any questions/concerns.

## 2020-06-01 DIAGNOSIS — F51.01 PRIMARY INSOMNIA: ICD-10-CM

## 2020-06-02 RX ORDER — ESZOPICLONE 3 MG/1
TABLET, FILM COATED ORAL
Qty: 30 TABLET | OUTPATIENT
Start: 2020-06-02

## 2020-06-02 RX ORDER — GABAPENTIN 100 MG/1
CAPSULE ORAL
Qty: 90 CAPSULE | Refills: 0 | OUTPATIENT
Start: 2020-06-02

## 2020-06-22 ENCOUNTER — TELEPHONE (OUTPATIENT)
Dept: PSYCHIATRY | Facility: CLINIC | Age: 50
End: 2020-06-22

## 2020-06-22 NOTE — TELEPHONE ENCOUNTER
Writer placed a call to nurseCathleen at [phone] to gather additional information. She reports that patient was provided with a referral and Gabapentin is listed to take 600 mg at bedtime. The script they received is to take Gabapentin 300 mg HS. NurseCathleen is wanting clarification on the gabapentin dose patient should be taking. Writer agreed to reach out to provider for clarification.

## 2020-06-23 ENCOUNTER — VIRTUAL VISIT (OUTPATIENT)
Dept: PSYCHIATRY | Facility: CLINIC | Age: 50
End: 2020-06-23
Attending: PSYCHIATRY & NEUROLOGY
Payer: MEDICARE

## 2020-06-23 DIAGNOSIS — F33.1 MAJOR DEPRESSIVE DISORDER, RECURRENT EPISODE, MODERATE (H): ICD-10-CM

## 2020-06-23 DIAGNOSIS — F51.01 PRIMARY INSOMNIA: ICD-10-CM

## 2020-06-23 DIAGNOSIS — F33.0 MAJOR DEPRESSIVE DISORDER, RECURRENT EPISODE, MILD (H): ICD-10-CM

## 2020-06-23 RX ORDER — SERTRALINE HYDROCHLORIDE 100 MG/1
150 TABLET, FILM COATED ORAL DAILY
Qty: 45 TABLET | Refills: 4 | Status: SHIPPED | OUTPATIENT
Start: 2020-06-23 | End: 2020-09-15

## 2020-06-23 RX ORDER — GABAPENTIN 100 MG/1
300 CAPSULE ORAL AT BEDTIME
Qty: 90 CAPSULE | Refills: 4 | Status: SHIPPED | OUTPATIENT
Start: 2020-06-23 | End: 2020-12-15

## 2020-06-23 RX ORDER — DEXMETHYLPHENIDATE HYDROCHLORIDE 10 MG/1
10 TABLET ORAL DAILY
Qty: 30 TABLET | Refills: 0 | Status: SHIPPED | OUTPATIENT
Start: 2020-06-23 | End: 2020-06-23

## 2020-06-23 RX ORDER — ESZOPICLONE 3 MG/1
3 TABLET, FILM COATED ORAL AT BEDTIME
Qty: 30 TABLET | Refills: 3 | Status: SHIPPED | OUTPATIENT
Start: 2020-06-23 | End: 2020-09-15

## 2020-06-23 RX ORDER — LAMOTRIGINE 200 MG/1
400 TABLET ORAL DAILY
Qty: 60 TABLET | Refills: 4 | Status: SHIPPED | OUTPATIENT
Start: 2020-06-23 | End: 2020-11-24

## 2020-06-23 RX ORDER — DEXMETHYLPHENIDATE HYDROCHLORIDE 10 MG/1
10 TABLET ORAL DAILY
Qty: 30 TABLET | Refills: 0 | Status: SHIPPED | OUTPATIENT
Start: 2020-06-23 | End: 2020-09-15

## 2020-06-23 NOTE — PROGRESS NOTES
TELEPHONE VISIT  Keisha Somers is a 50 year old pt. who is being evaluated via a billable telephone visit.      The patient has been notified of the following:    We have found that certain health care needs can be provided without the need for a physical exam. This service lets us provide the care you need with a short phone conversation. If a prescription is necessary we can send it directly to your pharmacy. If lab work is needed we can place an order for that and you can then stop by our lab to have the test done at a later time. Insurers are generally covering virtual visits as they would in-office visits so billing should not be different than normal.  If for some reason you do get billed incorrectly, you should contact the billing office to correct it and that number is in the AVS .    Patient has given verbal consent for a telephone visit?:  y  How would the pt like to obtain the AVS?: declined  AVS SmartPhrase [PsychAVS] has been placed in 'Patient Instructions': na    Start Time:  1:06 PM          End Time:  130pm             Progress Notes   Romana Tena MD (Physician)     Psychiatry       PSYCHIATRY VISIT      The patient was seen for evaluation and management.The patient was seen for anorexia and depression.   She was last seen March 2020    INTERIM HX:At last visit the plan was:Continue Lunesta  3  mg and no change in any other medications.  Continue Lunesta  3  mg   She will continue to see her therapist, Noemi.  She will return again in 3 mo. . Focalin Rxs printed.   Increase gabapentin to 900 mg to help more with sleep.   Follow-up with cardiology and neurology prn    Patient visit is per telephone for depression.  Keisha Somers is a 50 year old female who is being evaluated via a billable telephone visit.  TT=25 min    She is doing well, staying home and being safe.  She has been getting outside most days. She sees her parents regularly and this has been good.  They celebrated their  "60th anniversary. They did go out and then back to their house.  Patient states it has been a boring tax season for her father but she still has work to do with it.  She does filing, shredding.      Her dizzy spells are still present;  She can have 1-2 weeks with good BP then it drops again.  Nothing seems to matter.  No syncopal episodes for the past month.    Mood has been good, energy good, sleep is variable;  When it is bad she is up several times but also sometimes ok. She maintains a routine.      She does not have to go down for meds anymore due to covid.  There was one case of covid in her building.  She has not been around others.     She is eating well, not restricting.  She goes with her mom to get food.  The staff at her apt have stopped doing trips.  Even [employer] is limiting their trips.      Eating - night eating still bad.  We tried to change Lunesta a few times but that doesn't seem to matter.As long as she is on Lunesta she has nighttime eating. Mostly she doesn't remember getting up.       She has not seen her therapist, Noemi, since DEc.2019    MED: no new problems. HA go in \"waves\" and she had one for 3 weeks. Now it is gone.   SH: She has a permanent  which she sees once monthly. Her [employer] worker Q6 mo . She will be getting a new one.  She has not been seeing Candy and doesn't feel a need at present.   ROS: Dizzy as above. Syncopal episodes -as above, remainder of 10 pt neg.except as above.   HA- lasting longer    MEDICATIONS:   1. Lamictal 400 mg daily.   2. Zoloft 150 mg daily.   3. Focalin 10 mg daily. Beneficial for energy, mood and no indications of problematic use.   4. Synthroid 150 mcg  5. Zofran prn migraines  7. Mitodrine 10/10/5 tid - changed from Dr. Galarza  8. Lunesta 3 mg - Lunesta has made a huge difference in her sleep: probably 2 years., helps to stay asleep.    9. Carafate  10. Pyridostigmine   11. Tizanidine   16. Fluorinef-   17. Maxalt for migraines. It " works very well.    19. Mg-   21. Vit B2  22. Gabapentin 600 QHS - helps with sleep;  Added for hot flashes, works.  23. Amlodipine lowered to 2.5 mg at bedtime- recently  24. Atorvastatin  25. Ibuprofen  There were no vitals taken for this visit. - televisit    BP Readings from Last 3 Encounters:   12/17/19 112/75   09/26/19 115/81   07/23/19 102/73     Wt Readings from Last 4 Encounters:   09/26/19 64 kg (141 lb 3.2 oz)   07/23/19 62.1 kg (137 lb)   04/16/19 66.5 kg (146 lb 9.6 oz)   03/21/19 68.9 kg (152 lb)     MENTAL STATUS EXAM: The patient is interviewed via phone, oriented x3. Mood is euthymic, Good range of affect. Thought processes logical, associations are clear and goal directed. Thought content is normal. Fund of knowledge good, speech RRR, language intact, memory intact, concentration good, insight and judgment are good, gait unable to assess.    ASSESSMENT:   Anorexia nervosa, weight stable    Major depressive disorder,, stable.,;   Chronic sleep problems,improved with meds but has nocturnal eating which might be SE of Lunesta, not improved on a lowered dose   Recurrent syncopal episode(s), unpredictable  Migraine HA, treated  with maxalt, but currently problematic  POTS    PLAN:   Continue Lunesta  3  mg for now  She will prn see her therapist, Noemi.  She will return again in 3 mo. . Focalin Rxs sent.   Gabapentin to 300 mg to help with sleep.   Follow-up with cardiology and neurology prn  RADHA MAURER MD   .          PSYCHIATRY CLINIC INDIVIDUAL PSYCHOTHERAPY NOTE                                                     [16]   Start time - 110pm        End time - 126  Date reviewed - created on 12-19-17   last update: 12-17-19 Date next due -     12-17-20  REVIEWED OVER PHONE 6-23-20 DUE TO TELE VISIT  Subjective: This supportive psychotherapy session addressed issues related to weight and weight perception,  Patient's reaction: Maintenance in the context of mental status appropriate for  ambulatory setting.  Progress: good  Plan: RTC 3 mo  Psychotherapy services during this visit included  myself and the patient.   TREATMENT  PLAN          SYMPTOMS; PROBLEMS   MEASURABLE GOALS;    FUNCTIONAL IMPROVEMENT INTERVENTIONS;   GAINS MADE DISCHARGE CRITERIA   Depression: none  Anxiety: mild, situational, parents getting older  Sleep Problems: nightmares   reduce nightmares, make a plan to manage 2-3 anxiety-provoking situations, develop 2 strategies to cope with trauma triggers/intrusive memories and take medications as prescribed on a daily basis building on strengths  communication skills  medications   psychotherapy marked symptom improvement and symptom resolution   Eating Disorder: intense fear of weight gain and distorted body image    POTS- syncopal episodes eating 3 meals/day, improvement in body image,maintain weight       Minimal episodes stress management  psychotherapy         muliple meds, cardiologist following , management of sxs marked symptom improvement

## 2020-06-25 DIAGNOSIS — F51.01 PRIMARY INSOMNIA: ICD-10-CM

## 2020-06-25 NOTE — TELEPHONE ENCOUNTER
Writer consulted with provider and received TORB:  1. Patient should be taking Gabapentin 300 mg HS     Writer placed a call to nurse, Cathleen at [phone] and updated her that patient should be taking Gabapentin 300 mg HS. Normal verbalized understanding.     No further action needed by this writer

## 2020-06-29 RX ORDER — GABAPENTIN 100 MG/1
CAPSULE ORAL
Qty: 90 CAPSULE | Refills: 4 | OUTPATIENT
Start: 2020-06-29

## 2020-09-15 ENCOUNTER — VIRTUAL VISIT (OUTPATIENT)
Dept: PSYCHIATRY | Facility: CLINIC | Age: 50
End: 2020-09-15
Attending: PSYCHIATRY & NEUROLOGY
Payer: MEDICARE

## 2020-09-15 DIAGNOSIS — F33.1 MAJOR DEPRESSIVE DISORDER, RECURRENT EPISODE, MODERATE (H): ICD-10-CM

## 2020-09-15 DIAGNOSIS — F51.01 PRIMARY INSOMNIA: ICD-10-CM

## 2020-09-15 RX ORDER — DEXMETHYLPHENIDATE HYDROCHLORIDE 10 MG/1
10 TABLET ORAL DAILY
Qty: 30 TABLET | Refills: 0 | Status: SHIPPED | OUTPATIENT
Start: 2020-09-15 | End: 2020-09-15

## 2020-09-15 RX ORDER — SERTRALINE HYDROCHLORIDE 100 MG/1
150 TABLET, FILM COATED ORAL DAILY
Qty: 45 TABLET | Refills: 4 | Status: SHIPPED | OUTPATIENT
Start: 2020-09-15 | End: 2020-12-15

## 2020-09-15 RX ORDER — ESZOPICLONE 3 MG/1
3 TABLET, FILM COATED ORAL AT BEDTIME
Qty: 30 TABLET | Refills: 3 | Status: SHIPPED | OUTPATIENT
Start: 2020-09-15 | End: 2020-12-15

## 2020-09-15 RX ORDER — DEXMETHYLPHENIDATE HYDROCHLORIDE 10 MG/1
10 TABLET ORAL DAILY
Qty: 30 TABLET | Refills: 0 | Status: SHIPPED | OUTPATIENT
Start: 2020-09-15 | End: 2021-02-16

## 2020-09-15 NOTE — PROGRESS NOTES
TELEPHONE VISIT  Keisha Somers is a 50 year old pt. who is being evaluated via a billable telephone visit.      The patient has been notified of the following:    We have found that certain health care needs can be provided without the need for a physical exam. This service lets us provide the care you need with a short phone conversation. If a prescription is necessary we can send it directly to your pharmacy. If lab work is needed we can place an order for that and you can then stop by our lab to have the test done at a later time. Insurers are generally covering virtual visits as they would in-office visits so billing should not be different than normal.  If for some reason you do get billed incorrectly, you should contact the billing office to correct it and that number is in the AVS .    Patient has given verbal consent for a telephone visit?:  Yes   How would the pt like to obtain the AVS?:  Patient declined  AVS SmartPhrase [PsychAVS] has been placed in 'Patient Instructions':  N/A     Start Time:  1:04 PM          End Time:  130pm         Progress Notes   Romana Tena MD (Physician)     Psychiatry       PSYCHIATRY VISIT      The patient was seen for evaluation and management.The patient was seen for anorexia and depression.   She was last seen June 2020    INTERIM HX:At last visit the plan was:Continue Lunesta  3  mg and no change in any other medications.  Continue Lunesta  3  mg   .She will return again in 3 mo. . Focalin Rxs printed.   Gabapentin 300 mg at bedtime to help with sleep.   Follow-up with cardiology and neurology prn    Patient visit is per telephone for depression.  Keisha Somers is a 50 year old female who is being evaluated via a billable telephone visit.      She is doing well, summer has been ok.  Covid has effected all activities, she is not getting out as much as she would like.  The [facility] building is discouraging visitors because there have been + cases including her  "nurse.   She does see her immediate family; usually work twice weekly.  The cabin will be open til October but has not been there in a few years.  She does not have plans to go there this year.      Mood has been good, stable.  Energy is pretty good; she would like to get together with people but doesn't because of covid.  Sleep - a little bit \"tricky\" subject;  She is not sleeping as well as she has a few months ago.  She gets to sleep but up more. Eating at night continues.  She thinks that her brain thinks its morning and she gets up to eat.  She will eat anything.  She doesn't recognize it occurred until the am.  During awakening she is more at risk for syncope.   There were a few times when she crawled on the floor.  Discussed seeing a sleep specialist but she has done this in the past.     Her dizzy spells are still present;  She can have 1-2 weeks with good BP then it drops again.  Nothing seems to matter.      She is eating well, not restricting. She has gained weight because of the night eating.  She goes with her mom to get food.  The staff at her apt have stopped doing trips.  Even [employer] is limiting their trips.      Eating - night eating still bad.  We tried to change Lunesta a few times but that doesn't seem to matter.As long as she is on Lunesta she has nighttime eating. Mostly she doesn't remember getting up.       She has not seen her therapist, Noemi, since DEc.2019    MED: no new problems. HA go in \"waves\" and she had one for 3 weeks. Now it is gone.   SH: She has a permanent  which she sees once monthly. Her [employer] worker Q6 mo . She will be getting a new one.  She has not been seeing Candy and doesn't feel a need at present.   ROS: Dizzy as above. Syncopal episodes -as above, remainder of 10 pt neg.except as above.   HA- migraines in cycles followed by \"regular\" HA's., effected by change in weather.     MEDICATIONS:   1. Lamictal 400 mg daily.   2. Zoloft 150 mg daily.   3. " Focalin 10 mg daily. Beneficial for energy, mood and no indications of problematic use.   4. Synthroid 150 mcg  5. Zofran prn migraines  7. Mitodrine 10/10/5 tid - changed from Dr. Galarza  8. Lunesta 3 mg - Lunesta has made a huge difference in her sleep: probably 2 years., helps to stay asleep.    9. Carafate  10. Pyridostigmine   11. Tizanidine   16. Fluorinef-   17. Maxalt for migraines. It works very well.    19. Mg-   21. Vit B2  22. Gabapentin 300 QHS - helps with sleep;  Added for hot flashes, works.  23. Amlodipine lowered to 2.5 mg at bedtime- recently  24. Atorvastatin  25. Ibuprofen  There were no vitals taken for this visit. - televisit    BP Readings from Last 3 Encounters:   12/17/19 112/75   09/26/19 115/81   07/23/19 102/73     Wt Readings from Last 4 Encounters:   09/26/19 64 kg (141 lb 3.2 oz)   07/23/19 62.1 kg (137 lb)   04/16/19 66.5 kg (146 lb 9.6 oz)   03/21/19 68.9 kg (152 lb)     MENTAL STATUS EXAM: The patient is interviewed via phone, oriented x3. Mood is euthymic, Good range of affect. Thought processes logical, associations are clear and goal directed. Thought content is normal. Fund of knowledge good, speech RRR, language intact, memory intact, concentration good, insight and judgment are good, gait unable to assess.    ASSESSMENT:   Anorexia nervosa, weight stable    Major depressive disorder,, stable.,;   Chronic sleep problems,improved with meds but has nocturnal eating which might be SE of Lunesta, not improved on a lowered dose   Recurrent syncopal episode(s), unpredictable  Migraine HA, treated  with maxalt, but currently problematic  POTS    PLAN:   Continue Lunesta  3  mg for now  She will prn see her therapist, Noemi.    She will return again in 3 mo. . Focalin Rxs sent.   Gabapentin to 300 mg to help with sleep.   Follow-up with cardiology and neurology prn. She has neuro appt in November.  Consider sleep consult for nighttime eating.    RADHA MAURER MD   .           PSYCHIATRY CLINIC INDIVIDUAL PSYCHOTHERAPY NOTE                                                     [16]   Start time - 110pm        End time - 126  Date reviewed - created on 12-19-17   last update: 12-17-19 Date next due -     12-17-20  REVIEWED OVER PHONE 6-23-20 DUE TO TELE VISIT  Subjective: This supportive psychotherapy session addressed issues related to weight and weight perception,  Patient's reaction: Maintenance in the context of mental status appropriate for ambulatory setting.  Progress: good  Plan: RTC 3 mo  Psychotherapy services during this visit included  myself and the patient.   TREATMENT  PLAN          SYMPTOMS; PROBLEMS   MEASURABLE GOALS;    FUNCTIONAL IMPROVEMENT INTERVENTIONS;   GAINS MADE DISCHARGE CRITERIA   Depression: none  Anxiety: mild, situational, parents getting older  Sleep Problems: nightmares   reduce nightmares, make a plan to manage 2-3 anxiety-provoking situations, develop 2 strategies to cope with trauma triggers/intrusive memories and take medications as prescribed on a daily basis building on strengths  communication skills  medications   psychotherapy marked symptom improvement and symptom resolution   Eating Disorder: intense fear of weight gain and distorted body image    POTS- syncopal episodes eating 3 meals/day, improvement in body image,maintain weight       Minimal episodes stress management  psychotherapy         muliple meds, cardiologist following , management of sxs marked symptom improvement

## 2020-09-28 DIAGNOSIS — I95.9 HYPOTENSION, UNSPECIFIED HYPOTENSION TYPE: ICD-10-CM

## 2020-10-01 ENCOUNTER — VIRTUAL VISIT (OUTPATIENT)
Dept: CARDIOLOGY | Facility: CLINIC | Age: 50
End: 2020-10-01
Attending: INTERNAL MEDICINE
Payer: MEDICARE

## 2020-10-01 DIAGNOSIS — R55 SYNCOPE, UNSPECIFIED SYNCOPE TYPE: Primary | ICD-10-CM

## 2020-10-01 PROCEDURE — 99443 PR PHYSICIAN TELEPHONE EVALUATION 21-30 MIN: CPT | Mod: 95 | Performed by: INTERNAL MEDICINE

## 2020-10-01 RX ORDER — FLUDROCORTISONE ACETATE 0.1 MG/1
TABLET ORAL
Qty: 90 TABLET | Refills: 3 | Status: SHIPPED | OUTPATIENT
Start: 2020-10-01 | End: 2021-09-14

## 2020-10-01 ASSESSMENT — PAIN SCALES - GENERAL: PAINLEVEL: NO PAIN (0)

## 2020-10-01 NOTE — TELEPHONE ENCOUNTER
FLUDROCORT 0.1MG TAB      Last Written Prescription Date:  10-24-19  Last Fill Quantity: 90,   # refills: 3  Last Office Visit : 10-1-20  Future Office visit:  none    Routing refill request to provider for review/approval because:  Med not on protocol

## 2020-10-01 NOTE — PATIENT INSTRUCTIONS
You were seen in the Electrophysiology Clinic today by: Thang Galarza MD    Plan:     Medication Changes:     You can take additional dose (5mg) of midodrine as needed for worsening symptoms during the day.    Labs/Tests Needed:    None from Dr. Galarza at this time.    Follow up visit:    6 months with Dr. Galarza or earlier if needed.    Your Care Team:  [facility]   Telephone Number     Ivelisse River RN [phone]     For scheduling appts or procedures:    Etelvina Donnelly   [phone]   For the Device Clinic (Pacemakers, ICDs, Loop Recorders)    During business hours: [phone]  After business hours:   [phone]- select option 4 and ask for job code [phone].       Cardiovascular Clinic:   [address] [place] [address]. [place] [address]      As always, Thank you for trusting us with your health care needs!

## 2020-10-01 NOTE — LETTER
"10/1/2020      RE: Keisha Somers  [address]  [facility] [address]       Dear Colleague,    Thank you for the opportunity to participate in the care of your patient, Keisha Somers, at the [facility] HEART [facility]. Please see a copy of my visit note below.    Keisha Somers is a 50 year old female who is being evaluated via a billable telephone visit.      The patient has been notified of following:     \"This telephone visit will be conducted via a call between you and your physician/provider. We have found that certain health care needs can be provided without the need for a physical exam.  This service lets us provide the care you need with a short phone conversation.  If a prescription is necessary we can send it directly to your pharmacy.  If lab work is needed we can place an order for that and you can then stop by our lab to have the test done at a later time.    Telephone visits are billed at different rates depending on your insurance coverage. During this emergency period, for some insurers they may be billed the same as an in-person visit.  Please reach out to your insurance provider with any questions.    If during the course of the call the physician/provider feels a telephone visit is not appropriate, you will not be charged for this service.\"    Patient has given verbal consent for Telephone visit?  Yes    What phone number would you like to be contacted at? [phone]    How would you like to obtain your AVS? Mail a copy      Vitals - Patient Reported  Systolic (Patient Reported): 106  Diastolic (Patient Reported): 63  Pulse (Patient Reported): 62  Pain Score: No Pain (0)(No SOB)     HPI:     Keisha  is a 50-year-old woman who has been followed in this clinic for orthostatic hypotension associated with autonomic dysfunction although autonomic studies were not particularly abnormal.  .     Since her last " visit Keisha has continued to have tendency to immediate orthostatic hypotension but has learned to adapt by waiting before she starts to walk after getting up from a chair or sofa.  Most often she may have episodes after she is walk some distance but this seems to be less frequent.  We did discuss the importance of pain attention to any symptoms since falls could result in injury.  So far no injuries have occurred.    Keisha indicates that her blood pressure control seems to be adequate but there are days in which it is evident that she is having more lightheadedness and lower blood pressures.  She does increase her salt and electrolyte intake on those days and I recommended that she could also take an extra midodrine dose.    Patient is without any new cardiac symptoms.  We had on our last visit try to contact you with my chart.  However the HangIt system did not seem to work for her and we will try again going forward.      PAST MEDICAL HISTORY:  Past Medical History:   Diagnosis Date     Eating disorder      Hypotension, postural      Palpitations      Syncope      Syncope and collapse      Thyroid disease        CURRENT MEDICATIONS:  Current Outpatient Medications   Medication Sig Dispense Refill     acetaminophen (TYLENOL) 500 MG tablet Take 500-1,000 mg by mouth every 6 hours as needed.       amLODIPine (NORVASC) 5 MG tablet Take 0.5 tablets (2.5 mg) by mouth At Bedtime 90 tablet 0     atorvastatin (LIPITOR) 10 MG tablet Take by mouth daily Per pt takes this medicine but doesn't know the amount       Blood Pressure Monitor KIT Patient should be evaluated in the emergency department if her systolic blood pressure <65 1 kit 0     Calcium Carbonate-Vitamin D (CALCIUM 600+D PO) Take 1 tablet by mouth 2 times daily.       Cholecalciferol (VITAMIN D PO) Take  by mouth. 50, 000 units every 14 days       dexmethylphenidate (FOCALIN) 10 MG tablet Take 1 tablet (10 mg) by mouth daily 30 tablet 0     docusate sodium  (COLACE) 100 MG capsule Take 1 capsule by mouth 2 times daily.       eszopiclone (LUNESTA) 3 MG tablet Take 1 tablet (3 mg) by mouth At Bedtime 30 tablet 3     fludrocortisone (FLORINEF) 0.1 MG tablet Take 1 tablet (0.1 mg) by mouth daily 90 tablet 3     gabapentin (NEURONTIN) 100 MG capsule Take 3 capsules (300 mg) by mouth At Bedtime 90 capsule 4     ibuprofen (IBU) 800 MG tablet Take 800 mg by mouth every 8 hours as needed.       lamoTRIgine (LAMICTAL) 200 MG tablet Take 2 tablets (400 mg) by mouth daily 60 tablet 4     levothyroxine (SYNTHROID, LEVOTHROID) 175 MCG tablet Take  by mouth daily.       metoclopramide (REGLAN) 10 MG tablet 2 times daily        midodrine (PROAMATINE) 5 MG tablet TAKE 2 TABLETS (10MG) EVERY MORNING TAKE 2 TABLETS (10MG) AT NOON, AND TAKE 1 TABLET (5MG) AT 5PM 450 tablet 3     multivitamin (OCUVITE) TABS Take 1 tablet by mouth 2 times daily.       pyridostigmine (MESTINON) 60 MG tablet Take 1 tablet by mouth daily.       sertraline (ZOLOFT) 100 MG tablet Take 1.5 tablets (150 mg) by mouth daily 45 tablet 4     sucralfate (CARAFATE) 1 GM/10ML suspension Take 1 g by mouth 2 times daily. Take 10 Ml po daily       omeprazole (PRILOSEC) 20 MG capsule Take 2 capsules by mouth 2 times daily.         PAST SURGICAL HISTORY:  Past Surgical History:   Procedure Laterality Date     GT placed[  2001       ALLERGIES:   No Known Allergies    FAMILY HISTORY:  No pertinent family history noted    SOCIAL HISTORY:  Social History     Tobacco Use     Smoking status: Never Smoker     Smokeless tobacco: Never Used   Substance Use Topics     Alcohol use: No     Drug use: No       ROS:   Constitutional: No fever, chills, or sweats. Weight stable.   ENT: No visual disturbance, ear ache, epistaxis, sore throat.   Cardiovascular: As per HPI.   Respiratory: No cough, hemoptysis.    GI: No nausea, vomiting, hematemesis   : No hematuria.   Integument: Negative.   Psychiatric: Negative.   Hematologic: no easy  bleeding.  Neuro: Negative.   Endocrinology: No significant heat or cold intolerance   Musculoskeletal: No myalgia.    Exam:  There were no vitals taken for this visit.    RESPIRATORY:no rales, rhonchi or wheezes, no use of accessory muscles, no retractions, respirations are unlabored, normal respiratory rate    NEURO: alert and oriented to person/place/time, normal speech,and affect      Labs:  CBC RESULTS:   Lab Results   Component Value Date    WBC 5.0 07/24/2017    RBC 3.56 (L) 07/24/2017    HGB 10.8 (L) 07/24/2017    HCT 33.7 (L) 07/24/2017    MCV 95 07/24/2017    MCH 30.3 07/24/2017    MCHC 32.0 07/24/2017    RDW 13.1 07/24/2017     07/24/2017       BMP RESULTS:  Lab Results   Component Value Date     07/24/2017    POTASSIUM 3.5 07/24/2017    CHLORIDE 106 07/24/2017    CO2 28 07/24/2017    ANIONGAP 8 07/24/2017     (H) 07/24/2017    BUN 7 07/24/2017    CR 0.83 07/24/2017    GFRESTIMATED 74 07/24/2017    GFRESTBLACK 89 07/24/2017    KENNETH 8.7 07/24/2017        INR RESULTS:  No results found for: INR    Procedures:  PULMONARY FUNCTION TESTS:   No flowsheet data found.      ECHOCARDIOGRAM:   No results found for this or any previous visit (from the past 8760 hour(s)).      Assessment and Plan:  1.  Orthostatic hypotension of the immediate and delayed form  2.  Autonomic dysfunction clinically stable    Plan  1.  Continue current therapy but may use additional dose of midodrine on days when she feels more problems with lightheadedness and dizziness  2.  Try again to connect her to my chart so she can let us know if any new issues arise  3.  Follow-up in 6 months time or earlier needed    Telephone on 11: 31am; telephone off 11: 45am  Patient at home; clinic [facility] heart    I very much appreciated the opportunity to  assess Keisha Somers in the clinic today. Please do not hesitate to contact my office if you have any questions or concerns.      Thang Galarza MD  Cardiac Arrhythmia  Service  [facility]  [phone]        THANG YEBOAH      Please do not hesitate to contact me if you have any questions/concerns.     Sincerely,     Thang Yeboah MD

## 2020-10-01 NOTE — PROGRESS NOTES
"Keisha Somers is a 50 year old female who is being evaluated via a billable telephone visit.      The patient has been notified of following:     \"This telephone visit will be conducted via a call between you and your physician/provider. We have found that certain health care needs can be provided without the need for a physical exam.  This service lets us provide the care you need with a short phone conversation.  If a prescription is necessary we can send it directly to your pharmacy.  If lab work is needed we can place an order for that and you can then stop by our lab to have the test done at a later time.    Telephone visits are billed at different rates depending on your insurance coverage. During this emergency period, for some insurers they may be billed the same as an in-person visit.  Please reach out to your insurance provider with any questions.    If during the course of the call the physician/provider feels a telephone visit is not appropriate, you will not be charged for this service.\"    Patient has given verbal consent for Telephone visit?  Yes    What phone number would you like to be contacted at? [phone]    How would you like to obtain your AVS? Mail a copy      Vitals - Patient Reported  Systolic (Patient Reported): 106  Diastolic (Patient Reported): 63  Pulse (Patient Reported): 62  Pain Score: No Pain (0)(No SOB)     HPI:     Keisha  is a 50-year-old woman who has been followed in this clinic for orthostatic hypotension associated with autonomic dysfunction although autonomic studies were not particularly abnormal.  .     Since her last visit Keisha has continued to have tendency to immediate orthostatic hypotension but has learned to adapt by waiting before she starts to walk after getting up from a chair or sofa.  Most often she may have episodes after she is walk some distance but this seems to be less frequent.  We did discuss the importance of pain attention to any symptoms since " falls could result in injury.  So far no injuries have occurred.    Keisha indicates that her blood pressure control seems to be adequate but there are days in which it is evident that she is having more lightheadedness and lower blood pressures.  She does increase her salt and electrolyte intake on those days and I recommended that she could also take an extra midodrine dose.    Patient is without any new cardiac symptoms.  We had on our last visit try to contact you with my chart.  However the Suitey system did not seem to work for her and we will try again going forward.      PAST MEDICAL HISTORY:  Past Medical History:   Diagnosis Date     Eating disorder      Hypotension, postural      Palpitations      Syncope      Syncope and collapse      Thyroid disease        CURRENT MEDICATIONS:  Current Outpatient Medications   Medication Sig Dispense Refill     acetaminophen (TYLENOL) 500 MG tablet Take 500-1,000 mg by mouth every 6 hours as needed.       amLODIPine (NORVASC) 5 MG tablet Take 0.5 tablets (2.5 mg) by mouth At Bedtime 90 tablet 0     atorvastatin (LIPITOR) 10 MG tablet Take by mouth daily Per pt takes this medicine but doesn't know the amount       Blood Pressure Monitor KIT Patient should be evaluated in the emergency department if her systolic blood pressure <65 1 kit 0     Calcium Carbonate-Vitamin D (CALCIUM 600+D PO) Take 1 tablet by mouth 2 times daily.       Cholecalciferol (VITAMIN D PO) Take  by mouth. 50, 000 units every 14 days       dexmethylphenidate (FOCALIN) 10 MG tablet Take 1 tablet (10 mg) by mouth daily 30 tablet 0     docusate sodium (COLACE) 100 MG capsule Take 1 capsule by mouth 2 times daily.       eszopiclone (LUNESTA) 3 MG tablet Take 1 tablet (3 mg) by mouth At Bedtime 30 tablet 3     fludrocortisone (FLORINEF) 0.1 MG tablet Take 1 tablet (0.1 mg) by mouth daily 90 tablet 3     gabapentin (NEURONTIN) 100 MG capsule Take 3 capsules (300 mg) by mouth At Bedtime 90 capsule 4      ibuprofen (IBU) 800 MG tablet Take 800 mg by mouth every 8 hours as needed.       lamoTRIgine (LAMICTAL) 200 MG tablet Take 2 tablets (400 mg) by mouth daily 60 tablet 4     levothyroxine (SYNTHROID, LEVOTHROID) 175 MCG tablet Take  by mouth daily.       metoclopramide (REGLAN) 10 MG tablet 2 times daily        midodrine (PROAMATINE) 5 MG tablet TAKE 2 TABLETS (10MG) EVERY MORNING TAKE 2 TABLETS (10MG) AT NOON, AND TAKE 1 TABLET (5MG) AT 5PM 450 tablet 3     multivitamin (OCUVITE) TABS Take 1 tablet by mouth 2 times daily.       pyridostigmine (MESTINON) 60 MG tablet Take 1 tablet by mouth daily.       sertraline (ZOLOFT) 100 MG tablet Take 1.5 tablets (150 mg) by mouth daily 45 tablet 4     sucralfate (CARAFATE) 1 GM/10ML suspension Take 1 g by mouth 2 times daily. Take 10 Ml po daily       omeprazole (PRILOSEC) 20 MG capsule Take 2 capsules by mouth 2 times daily.         PAST SURGICAL HISTORY:  Past Surgical History:   Procedure Laterality Date     GT placed[  2001       ALLERGIES:   No Known Allergies    FAMILY HISTORY:  No pertinent family history noted    SOCIAL HISTORY:  Social History     Tobacco Use     Smoking status: Never Smoker     Smokeless tobacco: Never Used   Substance Use Topics     Alcohol use: No     Drug use: No       ROS:   Constitutional: No fever, chills, or sweats. Weight stable.   ENT: No visual disturbance, ear ache, epistaxis, sore throat.   Cardiovascular: As per HPI.   Respiratory: No cough, hemoptysis.    GI: No nausea, vomiting, hematemesis   : No hematuria.   Integument: Negative.   Psychiatric: Negative.   Hematologic: no easy bleeding.  Neuro: Negative.   Endocrinology: No significant heat or cold intolerance   Musculoskeletal: No myalgia.    Exam:  There were no vitals taken for this visit.    RESPIRATORY:no rales, rhonchi or wheezes, no use of accessory muscles, no retractions, respirations are unlabored, normal respiratory rate    NEURO: alert and oriented to  person/place/time, normal speech,and affect      Labs:  CBC RESULTS:   Lab Results   Component Value Date    WBC 5.0 07/24/2017    RBC 3.56 (L) 07/24/2017    HGB 10.8 (L) 07/24/2017    HCT 33.7 (L) 07/24/2017    MCV 95 07/24/2017    MCH 30.3 07/24/2017    MCHC 32.0 07/24/2017    RDW 13.1 07/24/2017     07/24/2017       BMP RESULTS:  Lab Results   Component Value Date     07/24/2017    POTASSIUM 3.5 07/24/2017    CHLORIDE 106 07/24/2017    CO2 28 07/24/2017    ANIONGAP 8 07/24/2017     (H) 07/24/2017    BUN 7 07/24/2017    CR 0.83 07/24/2017    GFRESTIMATED 74 07/24/2017    GFRESTBLACK 89 07/24/2017    KENNETH 8.7 07/24/2017        INR RESULTS:  No results found for: INR    Procedures:  PULMONARY FUNCTION TESTS:   No flowsheet data found.      ECHOCARDIOGRAM:   No results found for this or any previous visit (from the past 8760 hour(s)).      Assessment and Plan:  1.  Orthostatic hypotension of the immediate and delayed form  2.  Autonomic dysfunction clinically stable    Plan  1.  Continue current therapy but may use additional dose of midodrine on days when she feels more problems with lightheadedness and dizziness  2.  Try again to connect her to my chart so she can let us know if any new issues arise  3.  Follow-up in 6 months time or earlier needed    Telephone on 11: 31am; telephone off 11: 45am  Patient at home; clinic [facility] heart    I very much appreciated the opportunity to  assess Keisha Somers in the clinic today. Please do not hesitate to contact my office if you have any questions or concerns.      Thang Galarza MD  Cardiac Arrhythmia Service  [facility]  [phone]      CC  THANG GALARZA

## 2020-10-22 ENCOUNTER — TELEPHONE (OUTPATIENT)
Dept: PSYCHIATRY | Facility: CLINIC | Age: 50
End: 2020-10-22

## 2020-10-22 NOTE — TELEPHONE ENCOUNTER
10 faxed pages of DA forms, including LASHAWN, was received from [facility]. Dr Tena returned completed, signed forms to this writer. The original copies were faxed to [facility] attn: Rubi De Souza at [phone]. The original copies were sent to scanning.Valery Juarez LPN

## 2020-10-26 DIAGNOSIS — G90.A POSTURAL ORTHOSTATIC TACHYCARDIA SYNDROME: ICD-10-CM

## 2020-10-26 DIAGNOSIS — I10 HYPERTENSION, UNSPECIFIED TYPE: ICD-10-CM

## 2020-10-29 RX ORDER — AMLODIPINE BESYLATE 5 MG/1
TABLET ORAL
Qty: 45 TABLET | Refills: 3 | Status: SHIPPED | OUTPATIENT
Start: 2020-10-29 | End: 2022-04-07

## 2020-10-29 NOTE — TELEPHONE ENCOUNTER
amLODIPine (NORVASC) 5 MG      Last Written Prescription Date:  4/30/20  Last Fill Quantity: 90,   # refills: 0  Last Office Visit : 10/1/20  Future Office visit:  NONE  Routing refill request to provider for review/approval because:  30 DAY RF PENDING  OVER DUE CR  7/24/17 ( REPEAT)

## 2020-11-20 DIAGNOSIS — F33.0 MAJOR DEPRESSIVE DISORDER, RECURRENT EPISODE, MILD (H): ICD-10-CM

## 2020-11-24 RX ORDER — LAMOTRIGINE 200 MG/1
TABLET ORAL
Qty: 60 TABLET | Refills: 0 | Status: SHIPPED | OUTPATIENT
Start: 2020-11-24 | End: 2020-12-15

## 2020-11-24 NOTE — TELEPHONE ENCOUNTER
lamoTRIgine (LAMICTAL) 200 MG   Last refilled: 6/23/20  Qty: 60 :4    Last seen: 9/15/20  RTC: 3  MOS  Cancel: 0    No-show: 0  Next appt: 12/15/20  Refill decision: Refilled for 30 days per protocol.

## 2020-12-09 DIAGNOSIS — F51.01 PRIMARY INSOMNIA: ICD-10-CM

## 2020-12-13 NOTE — TELEPHONE ENCOUNTER
Medication requested: GABAPENTIN 100MG CAP  Last refilled: 10/28/20  Qty: 90      Last seen: 9/15/20  RTC: 3 MONTHS  Cancel: 0  No-show: 0  Next appt: 9/15/20    Refill decision:   Refill pended and routed to the provider for review/determination due to   Refill team is not authorized to refill Gabapentin

## 2020-12-15 ENCOUNTER — VIRTUAL VISIT (OUTPATIENT)
Dept: PSYCHIATRY | Facility: CLINIC | Age: 50
End: 2020-12-15
Attending: PSYCHIATRY & NEUROLOGY
Payer: MEDICARE

## 2020-12-15 DIAGNOSIS — F33.1 MAJOR DEPRESSIVE DISORDER, RECURRENT EPISODE, MODERATE (H): ICD-10-CM

## 2020-12-15 DIAGNOSIS — F51.01 PRIMARY INSOMNIA: ICD-10-CM

## 2020-12-15 DIAGNOSIS — F33.0 MAJOR DEPRESSIVE DISORDER, RECURRENT EPISODE, MILD (H): ICD-10-CM

## 2020-12-15 PROCEDURE — 99443 PR PHYSICIAN TELEPHONE EVALUATION 21-30 MIN: CPT | Mod: 95 | Performed by: PSYCHIATRY & NEUROLOGY

## 2020-12-15 RX ORDER — GABAPENTIN 100 MG/1
300 CAPSULE ORAL AT BEDTIME
Qty: 90 CAPSULE | Refills: 4 | Status: SHIPPED | OUTPATIENT
Start: 2020-12-15 | End: 2021-04-19

## 2020-12-15 RX ORDER — ESZOPICLONE 1 MG/1
TABLET, FILM COATED ORAL
Qty: 30 TABLET | Refills: 1 | Status: SHIPPED | OUTPATIENT
Start: 2020-12-15 | End: 2021-01-06

## 2020-12-15 RX ORDER — LAMOTRIGINE 200 MG/1
400 TABLET ORAL DAILY
Qty: 60 TABLET | Refills: 4 | Status: SHIPPED | OUTPATIENT
Start: 2020-12-15 | End: 2021-04-20

## 2020-12-15 RX ORDER — SERTRALINE HYDROCHLORIDE 100 MG/1
150 TABLET, FILM COATED ORAL DAILY
Qty: 45 TABLET | Refills: 4 | Status: SHIPPED | OUTPATIENT
Start: 2020-12-15 | End: 2021-04-20

## 2020-12-15 NOTE — PROGRESS NOTES
TELEPHONE VISIT  Keisha Somers is a 50 year old pt. who is being evaluated via a billable telephone visit.      The patient has been notified of the following:    We have found that certain health care needs can be provided without the need for a physical exam. This service lets us provide the care you need with a short phone conversation. If a prescription is necessary we can send it directly to your pharmacy. If lab work is needed we can place an order for that and you can then stop by our lab to have the test done at a later time. Insurers are generally covering virtual visits as they would in-office visits so billing should not be different than normal.  If for some reason you do get billed incorrectly, you should contact the billing office to correct it and that number is in the AVS .    Patient has given verbal consent for a telephone visit?:  Yes   How would the pt like to obtain the AVS?:  Patient declined  AVS SmartPhrase [PsychAVS] has been placed in 'Patient Instructions':  N/A     Start Time:  2:04 PM          End Time:  230pm         Progress Notes   Romana Tena MD (Physician)     Psychiatry       PSYCHIATRY VISIT      The patient was seen for evaluation and management.The patient was seen for anorexia and depression.   She was last seen 3 mo ago  INTERIM HX:At last visit the plan was:  Continue Lunesta  3  mg for now  She will prn see her therapist, Noemi.    She will return again in 3 mo. . Focalin Rxs sent.   Gabapentin to 300 mg to help with sleep.   Follow-up with cardiology and neurology prn. She has neuro appt in November.  Consider sleep consult for nighttime eating.    Patient visit is per telephone for depression.  Keisha Somers is a 50 year old female who is being evaluated via a billable telephone visit.      She is doing well,  She is healthy generally.  A few weeks ago she ruptured her TM.  It is not healing ; the soonest ENT appt is tomorrow.    There have been 2 covid  cases where she lives; one a client   Her dizzy spells are still present;  She can have 1-2 weeks with good BP then it drops again.  Nothing seems to matter.      She is eating well, not restricting. She has gained weight because of the night eating.  She goes with her mom to get food.  The staff at her apt have stopped doing trips.  Even [employer] is limiting their trips.         She has not seen her therapist, Noemi, since DEc.2019    MED: no new problems. HA are better.    SH: She has a permanent  which she sees once monthly. Her [employer] worker Q6 mo . She will be getting a new one.  She has not been seeing Candy and doesn't feel a need at present.   ROS: Dizzy as above. Syncopal episodes -as above, remainder of 10 pt neg.except as above.   HA-some but not migraines.     MEDICATIONS:   1. Lamictal 400 mg daily.   2. Zoloft 150 mg daily.   3. Focalin 10 mg daily. Beneficial for energy, mood and no indications of problematic use.   4. Synthroid 150 mcg  5. Zofran prn migraines  7. Mitodrine 10/10/5 tid - changed from Dr. Galarza  8. Lunesta 3 mg - Lunesta has made a huge difference in her sleep: probably 2 years., helps to stay asleep.    9. Carafate  10. Pyridostigmine   11. Tizanidine   16. Fluorinef-   17. Maxalt for migraines with ibuprofen.  It works very well.    19. Mg-   21. Vit B2  22. Gabapentin 300 QHS - helps with sleep;  Added for hot flashes, works.  23. Amlodipine lowered to 2.5 mg at bedtime- recently  24. Atorvastatin  25. Ibuprofen  There were no vitals taken for this visit. - televisit    BP Readings from Last 3 Encounters:   12/17/19 112/75   09/26/19 115/81   07/23/19 102/73     Wt Readings from Last 4 Encounters:   09/26/19 64 kg (141 lb 3.2 oz)   07/23/19 62.1 kg (137 lb)   04/16/19 66.5 kg (146 lb 9.6 oz)   03/21/19 68.9 kg (152 lb)     MENTAL STATUS EXAM: The patient is interviewed via phone, oriented x3. Mood is euthymic, Good range of affect. Thought processes logical,  associations are clear and goal directed. Thought content is normal. Fund of knowledge good, speech RRR, language intact, memory intact, concentration good, insight and judgment are good, gait unable to assess.    ASSESSMENT:   Anorexia nervosa, weight stable    Major depressive disorder,, stable.,;   Chronic sleep problems,improved with meds but has nocturnal eating which might be SE of Lunesta, not improved on a lowered dose   Recurrent syncopal episode(s), unpredictable  Migraine HA, treated  with maxalt, but currently problematic  POTS    PLAN:   Continue Lunesta  3  mg for now  She will prn see her therapist, Noemi.    She will return again in 3 mo. . Focalin Rxs sent.   Gabapentin to 300 mg to help with sleep.   Follow-up with cardiology and neurology prn. She has neuro appt in November.  Consider sleep consult for nighttime eating.    RADHA MAURER MD   .          PSYCHIATRY CLINIC INDIVIDUAL PSYCHOTHERAPY NOTE                                                     [16]   Start time - 210pm        End time - 226pm    Date reviewed - created on 12-19-17   last update: 12-17-19 Date next due -     12-17-20  REVIEWED OVER PHONE 6-23-20 DUE TO TELE VISIT  Subjective: This supportive psychotherapy session addressed issues related to weight and weight perception,  Patient's reaction: Maintenance in the context of mental status appropriate for ambulatory setting.  Progress: good  Plan: RTC 3 mo  Psychotherapy services during this visit included  myself and the patient.   TREATMENT  PLAN          SYMPTOMS; PROBLEMS   MEASURABLE GOALS;    FUNCTIONAL IMPROVEMENT INTERVENTIONS;   GAINS MADE DISCHARGE CRITERIA   Depression: none  Anxiety: mild, situational, parents getting older  Sleep Problems: nightmares   reduce nightmares, make a plan to manage 2-3 anxiety-provoking situations, develop 2 strategies to cope with trauma triggers/intrusive memories and take medications as prescribed on a daily basis building on  strengths  communication skills  medications   psychotherapy marked symptom improvement and symptom resolution   Eating Disorder: intense fear of weight gain and distorted body image    POTS- syncopal episodes eating 3 meals/day, improvement in body image,maintain weight       Minimal episodes stress management  psychotherapy         muliple meds, cardiologist following , management of sxs marked symptom improvement

## 2020-12-16 RX ORDER — GABAPENTIN 100 MG/1
CAPSULE ORAL
Qty: 90 CAPSULE | Refills: 4 | Status: SHIPPED | OUTPATIENT
Start: 2020-12-16 | End: 2022-02-15

## 2021-01-05 ENCOUNTER — TELEPHONE (OUTPATIENT)
Dept: PSYCHIATRY | Facility: CLINIC | Age: 51
End: 2021-01-05

## 2021-01-05 DIAGNOSIS — F51.01 PRIMARY INSOMNIA: ICD-10-CM

## 2021-01-05 NOTE — TELEPHONE ENCOUNTER
Writer placed a call to patient to obtain additional information. Patient reports being on Lunesta 3 mg couple weeks ago and was experiencing difficulty sleeping with night time hunger. The plan was patient to taper lunesta to 2 mg for 10 days then 1 mg for 10 days then discontinue. Patient reports taking 1 mg since the past 9 days and shared sleeping about 6.5 hours. Alice waking up to eat and is wanting to know if she can continue 1 mg until seen with provider. Writer agreed to reach out to provider for further recommendations.

## 2021-01-05 NOTE — TELEPHONE ENCOUNTER
[facility] Call Center    Phone Message    May a detailed message be left on voicemail: yes     Reason for Call: Medication Question or concern regarding medication   Prescription Clarification  Name of Medication: lunesta  Prescribing Provider: juni   Pharmacy: [id number] - [place], [place] - [address]     What on the order needs clarification?  Pt reports she is tapering down to quit the lunesta due to side effects, but pt's side effects have gone away at 1 mg. Pt is wondering if she can continue to take the lunesta at this dosage rather than going off and starting something different.          Action Taken: Message routed to:  Other: nursing pool    Travel Screening: Not Applicable

## 2021-01-06 DIAGNOSIS — F51.01 PRIMARY INSOMNIA: ICD-10-CM

## 2021-01-06 RX ORDER — ESZOPICLONE 1 MG/1
TABLET, FILM COATED ORAL
Qty: 30 TABLET | Refills: 0 | Status: CANCELLED | OUTPATIENT
Start: 2021-01-06

## 2021-01-06 RX ORDER — ESZOPICLONE 1 MG/1
TABLET, FILM COATED ORAL
Qty: 30 TABLET | Refills: 3 | Status: SHIPPED | OUTPATIENT
Start: 2021-01-06 | End: 2021-04-19

## 2021-01-06 RX ORDER — ESZOPICLONE 1 MG/1
TABLET, FILM COATED ORAL
Qty: 30 TABLET | Refills: 0 | Status: SHIPPED | OUTPATIENT
Start: 2021-01-06 | End: 2021-01-06

## 2021-01-06 NOTE — TELEPHONE ENCOUNTER
Medication Question (lunesta )     Romana Tena MD  You Just now (10:15 AM)     Yes. Lunesta 1 mg at bedtime would be perffect.   Can you do rx?   SS    Message text      Writer placed a call to patient and relayed providers recommendations to continue Lunesta 1 mg at bedtime. Patient verbalized understanding. Patient is requesting a new script be sent to [facility]. Writer agreed with the plan. Meds refilled by provider. Med tab changed to reflect this. Patient notified.     No further action needed by this writer

## 2021-01-06 NOTE — TELEPHONE ENCOUNTER
Writer received incoming call from patient inquiring about her request for Lunesta dosing. Updated patient that provider has not responded yet. Writer will reach out to provider via text.

## 2021-01-19 ENCOUNTER — VIRTUAL VISIT (OUTPATIENT)
Dept: PSYCHIATRY | Facility: CLINIC | Age: 51
End: 2021-01-19
Attending: PSYCHIATRY & NEUROLOGY
Payer: MEDICARE

## 2021-01-19 DIAGNOSIS — F50.00 ANOREXIA NERVOSA (H): Primary | ICD-10-CM

## 2021-01-19 PROCEDURE — 99443 PR PHYSICIAN TELEPHONE EVALUATION 21-30 MIN: CPT | Mod: 95 | Performed by: PSYCHIATRY & NEUROLOGY

## 2021-01-19 NOTE — PROGRESS NOTES
TELEPHONE VISIT  Keisha Somers is a 50 year old pt. who is being evaluated via a billable telephone visit.      The patient has been notified of the following:    We have found that certain health care needs can be provided without the need for a physical exam. This service lets us provide the care you need with a short phone conversation. If a prescription is necessary we can send it directly to your pharmacy. If lab work is needed we can place an order for fortino t and you can then stop by our lab to have the test done at a later time. Insurers are generally covering virtual visits as they would in-office visits so billing should not be different than normal.  If for some reason you do get billed incorrectly, you should contact the billing office to correct it and that number is in the AVS .    Patient has given verbal consent for a telephone visit?:  Yes   How would the pt like to obtain the AVS?:  Patient declined  AVS SmartPhrase [PsychAVS] has been placed in 'Patient Instructions':  N/A     Start Time:  1:20 PM          End Time:  150pm         Progress Notes   Romana Tena MD (Physician)     Psychiatry       PSYCHIATRY VISIT      The patient was seen for evaluation and management.The patient was seen for anorexia and depression.   She was last seen 1 mo ago  INTERIM HX:At last visit the plan was:  Continue Lunesta  3  mg for now  She will prn see her therapist, Noemi.    She will return again in 3 mo. . Focalin Rxs sent.   Gabapentin to 300 mg to help with sleep.   Follow-up with cardiology and neurology prn. She has neuro appt in November.  Consider sleep consult for nighttime eating.    Patient visit is per telephone for depression.  Keisha Somers is a 50 year old female who is being evaluated via a billable telephone visit.      Patient called between visits wanting to change Lunesta dose.  She is doing better with Lunesta 1 mg and nighttime eating. She has been at this dose for 2 weeks.  The  eating was problematic with the 3 mg.  There has only been one day when she got up to eat.   Initial insomnia is better.  Sometimes she wakes within an hour but falls back asleep.  In the ams she feels rested.  She is a morning person.  Sleep has been a major challenge and has effected her well-being.  If she gets up at night she is more likely to have hypotensive episodes and fall so getting adequate sleep has been critical.  She also has problems with dissociating and Lunesta does not cause that.  We have been adjusting the dose.  Previously considered alternatives including belsomra but because of concern about abnormal sleep behaviors listed as a serious adverse effect, have decided it is ill advised as well as that Lunesta has been the most effective medication for her with the fewest SE.      Energy is good, motivation, interests good., mood good. Eating in general has  Been good too. She is eating more with her parents.  Around thanksgiving she was hospitalized for emboli and not doing too well because of her heart.        No syncopal episodes for one month.     She is healthy generally. 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 They will wait a few months.    She has not seen her therapist, Noemi, since DEc.2019    MED: no new problems. HA are better.    SH: She has a permanent  which she sees once monthly. Her [employer] worker Q6 mo . She will be getting a new one.  She has not been seeing Candkathy and doesn't feel a need at present.   ROS:no dizziness  MEDICATIONS:   1. Lamictal 400 mg daily.   2. Zoloft 150 mg daily.   3. Focalin 10 mg daily. Beneficial for energy, mood and no indications of problematic use.   4. Synthroid 150 mcg  5. Zofran prn migraines  7. Mitodrine 10/10/5 tid - changed from Dr. Galarza  8. Lunesta 1 mg - Lunesta has made a huge difference in her sleep: on this at least 3 years., helps to stay asleep.    9. Carafate  10. Pyridostigmine   11. Tizanidine   16. Fluorinef-   17. Maxalt for  migraines with ibuprofen.  It works very well.    19. Mg-   21. Vit B2  22. Gabapentin 300 QHS - helps with sleep;  Added for hot flashes, works.  23. Amlodipine lowered to 2.5 mg at bedtime- recently  24. Atorvastatin  25. Ibuprofen  There were no vitals taken for this visit. - televisit    BP Readings from Last 3 Encounters:   12/17/19 112/75   09/26/19 115/81   07/23/19 102/73     Wt Readings from Last 4 Encounters:   09/26/19 64 kg (141 lb 3.2 oz)   07/23/19 62.1 kg (137 lb)   04/16/19 66.5 kg (146 lb 9.6 oz)   03/21/19 68.9 kg (152 lb)     MENTAL STATUS EXAM: The patient is interviewed via phone, oriented x3. Mood is euthymic, Good range of affect. Thought processes logical, associations are clear and goal directed. Thought content is normal. Fund of knowledge good, speech RRR, language intact, memory intact, concentration good, insight and judgment are good, gait unable to assess.    ASSESSMENT:   Anorexia nervosa, weight stable    Major depressive disorder,, stable.,;   Chronic sleep problems and abnormal sleep behaviors including  nocturnal eating which now has improved on Lunesta at current dose.  Recurrent syncopal episode(s), unpredictable  Migraine HA, treated  with maxalt, but currently problematic  POTS    PLAN:   Continue Lunesta  1  mg QHS  She will prn see her therapist, Noemi.    She will return again in 3 mo. . Focalin Rxs sent.   Gabapentin to 300 mg to help with sleep.   Follow-up with cardiology and neurology prn. She has neuro appt in November.  Consider sleep consult for nighttime eating.    RADHA MAURER MD               40 min spent on the date of the encounter in chart review, patient visit, review of tests, documentation and/or discussion with other providers about the issues documented above.           .           PSYCHIATRY CLINIC INDIVIDUAL PSYCHOTHERAPY NOTE                                                     [16]   Start time - 120pm        End time -136pm    Date reviewed -  created on 12-19-17   last update: 12-17-19 Date next due -     12-17-20  REVIEWED OVER PHONE 6-23-20 DUE TO TELE VISIT  Subjective: This supportive psychotherapy session addressed issues related to weight and weight perception,  Patient's reaction: Maintenance in the context of mental status appropriate for ambulatory setting.  Progress: good  Plan: RTC 3 mo  Psychotherapy services during this visit included  myself and the patient.   TREATMENT  PLAN          SYMPTOMS; PROBLEMS   MEASURABLE GOALS;    FUNCTIONAL IMPROVEMENT INTERVENTIONS;   GAINS MADE DISCHARGE CRITERIA   Depression: none  Anxiety: mild, situational, parents getting older  Sleep Problems: nightmares   reduce nightmares, make a plan to manage 2-3 anxiety-provoking situations, develop 2 strategies to cope with trauma triggers/intrusive memories and take medications as prescribed on a daily basis building on strengths  communication skills  medications   psychotherapy marked symptom improvement and symptom resolution   Eating Disorder: intense fear of weight gain and distorted body image    POTS- syncopal episodes eating 3 meals/day, improvement in body image,maintain weight       Minimal episodes stress management  psychotherapy         muliple meds, cardiologist following , management of sxs marked symptom improvement

## 2021-01-25 ENCOUNTER — TELEPHONE (OUTPATIENT)
Dept: PSYCHIATRY | Facility: CLINIC | Age: 51
End: 2021-01-25

## 2021-01-25 NOTE — LETTER
February 1, 2021      TO: Keisha Somers  [address]  [facility] [address]     To Whom it May Concern,     Keisha Somers has a long standing history of insomnia with MDD. She is prescribed eszopiclone (LUNESTA) 1 MG tablet- Take 1 tab (1 mg) daily at bedtime #30 to target her symptoms. Patient has a extensive medical trial history and has noticed that other medications have not been effective. Provider does not feel that Belsomra would be a good medication choice for this patient due to her previous history. With Belsomra patient is at a higher risk for complex sleep related behavior that she has experienced in the past. Patient had amnesia with eating at night. She would eat at night but did not recall waking up and eating until she was awake in the morning and noticed cabinets open and food on the table. Patient's sleep behaviors has improved since decreasing Lunesta. Patient is at a higher risk for other complex sleep related behavior with Belsomra. She has been on Lunesta since 2015. She was previously on Lunesta 3 mg but due to nighttime eating patient has chose to decrease the dose to Lunesta 1 mg. Patient's nighttime eating has subsided with decrease in the dose of Lunesta to 1 mg. Patient continues to sleep 6.5-7 hours a night with decrease dose and helps her stay asleep to get adequate sleep throughout the night.  Patient denies any side effects with 1 mg and has noticed improvement in sleep. A change in medication or dose would place patient at high risk for decompensation and possible hospitalization.           Sincerely,       Romana Tena MD

## 2021-01-25 NOTE — TELEPHONE ENCOUNTER
On 01/25/2020 a prior authorization request from [employer] was received for eszopiclone 1mg tablet. This was routed to Danni Sue to complete. Samantha Penny CMA

## 2021-01-28 NOTE — TELEPHONE ENCOUNTER
Received 3 page fax from [employer] ([address] [place] [phone]), [employer] and [employer] requesting a PA be completed for Rx Eszopiclone 1 mg Tablets.  Initiated a PA through [employer] (Key: [id number] / PA # [id number] / Rx # [id number]) awaiting determination.  .Danni Sue LPN

## 2021-01-29 ENCOUNTER — TELEPHONE (OUTPATIENT)
Dept: PSYCHIATRY | Facility: CLINIC | Age: 51
End: 2021-01-29

## 2021-01-29 NOTE — TELEPHONE ENCOUNTER
Incoming call from Cathleen at [employer]. The PA for eszipiclone 1mg was declined because there was no clinic reason as to why the patient requires the medication and does not say if other medications have been tried but did not work.    Cathleen is requesting Dr. Tena resubmit a more thorough PA. Cathleen can be reached at [phone] ext [phone].

## 2021-02-01 ENCOUNTER — TELEPHONE (OUTPATIENT)
Dept: PSYCHIATRY | Facility: CLINIC | Age: 51
End: 2021-02-01

## 2021-02-01 NOTE — TELEPHONE ENCOUNTER
[facility] Call [facility]    Phone Message    May a detailed message be left on voicemail: yes     Reason for Call: Other: Pt RN calling Michelle back. Can be reached at number provided.     Action Taken: Other: Sent to Michelle BIRD    Travel Screening: Not Applicable

## 2021-02-01 NOTE — TELEPHONE ENCOUNTER
Danni Sue, LESAN  You 3 days ago     After lunch this is my first task to try and get approved before the end of that day.     Thank you   Nicki Camara text

## 2021-02-01 NOTE — TELEPHONE ENCOUNTER
Writer placed a call to nurseCathleen to obtain additional information on the current dose of Lunesta patient is taking. No answer at number provided. LVM, requesting a call back. Clinic number provided.

## 2021-02-01 NOTE — TELEPHONE ENCOUNTER
Writer returned a call to nurse, Cathleen and confirmed that patient will continue Lunesta 1 mg as she has been doing well on it. Patient has enough medication to last till Thursday. Writer agreed to submit a urgent PA appeal.

## 2021-02-02 NOTE — TELEPHONE ENCOUNTER
LPN in the clinic submitted the PA request and per notes clinic has already initiated an appeal.  PA team unable to do an appeal since clinic has submitted the PA and started the appeal.

## 2021-02-02 NOTE — TELEPHONE ENCOUNTER
Called [facility] appeal department, explained the situation with the PA for Rx Eszopiclone 1 mg Tablets.  Was given a expedited fax number of [phone] and was told to write urgent on the fax sheet, was also told it will take up to 24-72 hours for a decision to be made.  Faxed letter of medical necessity over to the appeal expedited claims dept. .Danni Sue LPN

## 2021-02-03 NOTE — TELEPHONE ENCOUNTER
Writer notified by Danni MOLINA LPN that appeal for Lunesta was denied. Writer discussed concerns with provider and was notified to appeal the appeal. Writer drafted an appeal letter and routed to Danni MOLINA LPN to submit to insurance. Provider also requested this writer reach out to patient and GH and have patient take Lunesta 0.5 mg today and tomorrow. Writer agreed with the plan.     Placed a call to nurse, Cathleen who agreed to administer patient Lunesta 0.5 mg today and tomorrow. Patient only have one tab left and therefore will take her last 0.5 mg dose tomorrow (2/4). Writer also updated her that the plan is to appeal the appeal at this time. She verbalized understanding.

## 2021-02-04 ENCOUNTER — TELEPHONE (OUTPATIENT)
Dept: PSYCHIATRY | Facility: CLINIC | Age: 51
End: 2021-02-04

## 2021-02-04 NOTE — TELEPHONE ENCOUNTER
Writer placed a call to [employer] at [phone] and spoke with several representatives and was notified that the second appeal for Lunesta 1 mg can take up to 7-10 business days. They are unable to give a decision over the phone to this writer.     Writer placed a call to [id number] - [place], [place] - [address] and spoke with pharmacist, Radha who confirmed that patient can pay for the mediation out of pocket. 5 day supply will cost $6.35 and 30 day supply will cost $20.59. Writer agreed to relay this to patient and provider for further guidance.     Placed a call to patient ([phone]) and confirmed she is willing to pay for 30 day supply. Will notify pharmacy.     Writer also confirmed with provider and received the following VORB:   1. Patient can refill Lunesta 1 mg #30 and pay out of pocket until the appeal decision is made     Placed a call to [id number] - [place], [place] - [address] [facility] and spoke with Radha who confirmed that she will be able to refill Lunesta 1 mg. Patient notified.     Placed a call to nurse, Cathleen at [phone] and updated her of the plan. No answer at number provided. LVM, requesting a call back. Clinic number provided.

## 2021-02-04 NOTE — TELEPHONE ENCOUNTER
[facility] Call [facility]    Phone Message    May a detailed message be left on voicemail: yes     Reason for Call: Other: Cathleen from [employer] called with questions regarding the pt's medication.      Action Taken: Other: [facility] psych pool    Travel Screening: Not Applicable

## 2021-02-04 NOTE — TELEPHONE ENCOUNTER
Writer spoke to Michelle DEWEY RN to verify information that is needed to send to [facility] regarding the 2nd Appeal of the 1st Appeal. Writer printed letter updated on 2/1/2021. Writer printed 11 pages of Progress Notes from 1/19/2021 appointment. A Quick Disclosure was entered. Writer obtained the 2nd Appeal forms (9 pages). A total of 21 pages was faxed to [facility] PA Appeal Dept at [phone]. Additionally, 21 pages was also faxed to [facility] Appeal Dept at [phone]. The original copies are being held in Nurse Triage.Valery Juarez LPN

## 2021-02-05 NOTE — TELEPHONE ENCOUNTER
Writer placed a [facility] to [facility] at [phone] and was notified that 2nd level appeal for eszopiclone (LUNESTA) 1 MG tablet was approved from 1/1/21 to 12/31/21.     Approval Number: [id number]  Reference Number: [id number]     Writer also received the approval letter via e-mail. Letter routed to Jovita PULLIAM CMA to place in scanning.     Placed a call to patient and updated her of the PA approval for Lunesta. No answer at number provided. LVM, requesting a call back and updating her that PA was approved for Lunesta. Requested a call back for further questions.     Placed a call to nurse, Cathleen to update her of approved PA. No answer at number provided. LVM, requesting a call back. Clinic number provided.

## 2021-02-05 NOTE — TELEPHONE ENCOUNTER
Writer placed a call to patient and updated her of the approved PA for Bob. She verbalized understanding.     No further action needed by this writer

## 2021-02-15 DIAGNOSIS — F33.1 MAJOR DEPRESSIVE DISORDER, RECURRENT EPISODE, MODERATE (H): ICD-10-CM

## 2021-02-16 RX ORDER — DEXMETHYLPHENIDATE HYDROCHLORIDE 10 MG/1
TABLET ORAL
Qty: 30 TABLET | Refills: 0 | Status: SHIPPED | OUTPATIENT
Start: 2021-02-16 | End: 2021-03-22

## 2021-02-16 NOTE — TELEPHONE ENCOUNTER
Last seen: 1/19  RTC: 3 months   Cancel: none   No-show: none   Next appt: 4/20    Incoming refill from pharmacy via Rx Auth     Medication requested: dexmethylphenidate (FOCALIN) 10 MG tablet  Directions: Take 1 tablet (10 mg) by mouth daily - Oral  Qty: 30  Last refilled: 1/8 #30, 12/18 #30, 11/16 #30    Will route to provider for approval

## 2021-02-23 DIAGNOSIS — G90.A POSTURAL ORTHOSTATIC TACHYCARDIA SYNDROME: ICD-10-CM

## 2021-02-25 RX ORDER — MIDODRINE HYDROCHLORIDE 5 MG/1
TABLET ORAL
Qty: 450 TABLET | Refills: 3 | Status: SHIPPED | OUTPATIENT
Start: 2021-02-25 | End: 2021-12-22

## 2021-02-25 NOTE — TELEPHONE ENCOUNTER
Midodrine HCl 5MG TABS      TAKE 2 TABLETS (10MG) EVERY MORNING TAKE 2 TABLETS (10MG) AT NOON, AND TAKE 1 TABLET (5MG) AT 5PM  Last Written Prescription Date:  3-25-20  Last Fill Quantity: 450,   # refills: 3  Last Office Visit : 10-1-20  Future Office visit:  4-8-21    Last clinic note:Plan  1.  Continue current therapy but may use additional dose of midodrine on days when she feels more problems with lightheadedness and dizziness      Routing refill request to provider for review/approval because:  Med not on cardiology protocol

## 2021-03-22 DIAGNOSIS — F33.1 MAJOR DEPRESSIVE DISORDER, RECURRENT EPISODE, MODERATE (H): ICD-10-CM

## 2021-03-22 RX ORDER — DEXMETHYLPHENIDATE HYDROCHLORIDE 10 MG/1
TABLET ORAL
Qty: 30 TABLET | Refills: 0 | Status: SHIPPED | OUTPATIENT
Start: 2021-03-22 | End: 2021-04-19

## 2021-03-22 NOTE — TELEPHONE ENCOUNTER
Last seen: 1/19  RTC: 3 months   Cancel: none   No-show: none   Next appt: 4/20    Incoming refill from pharmacy via Rx Auth     Medication requested: dexmethylphenidate (FOCALIN) 10 MG tablet  Directions: TAKE 1 TABLET BY MOUTH DAILY  Qty: 30  Last refilled: 2/17 #30, 1/8 #30, 12/18 #30    Will route to provider for approval

## 2021-04-08 ENCOUNTER — VIRTUAL VISIT (OUTPATIENT)
Dept: CARDIOLOGY | Facility: CLINIC | Age: 51
End: 2021-04-08
Attending: INTERNAL MEDICINE
Payer: MEDICARE

## 2021-04-08 DIAGNOSIS — I95.1 ORTHOSTATIC HYPOTENSION: Primary | ICD-10-CM

## 2021-04-08 DIAGNOSIS — Z53.9 NO SHOW: ICD-10-CM

## 2021-04-08 NOTE — LETTER
4/8/2021      RE: Keisha Somers  [address]  [facility] [address]       Dear Colleague,    Thank you for the opportunity to participate in the care of your patient, Keisha Somers, at the [facility] [place] [facility]. Please see a copy of my visit note below.    No Show      Please do not hesitate to contact me if you have any questions/concerns.     Sincerely,     Thang Galarza MD

## 2021-04-19 DIAGNOSIS — F51.01 PRIMARY INSOMNIA: ICD-10-CM

## 2021-04-19 DIAGNOSIS — F33.1 MAJOR DEPRESSIVE DISORDER, RECURRENT EPISODE, MODERATE (H): ICD-10-CM

## 2021-04-19 RX ORDER — ESZOPICLONE 1 MG/1
TABLET, FILM COATED ORAL
Qty: 30 TABLET | Refills: 2 | Status: SHIPPED | OUTPATIENT
Start: 2021-04-19 | End: 2021-07-13

## 2021-04-19 RX ORDER — GABAPENTIN 100 MG/1
CAPSULE ORAL
Qty: 90 CAPSULE | Refills: 2 | Status: SHIPPED | OUTPATIENT
Start: 2021-04-19 | End: 2021-07-13

## 2021-04-19 RX ORDER — DEXMETHYLPHENIDATE HYDROCHLORIDE 10 MG/1
10 TABLET ORAL DAILY
Qty: 30 TABLET | Refills: 0 | Status: SHIPPED | OUTPATIENT
Start: 2021-04-19 | End: 2021-04-20

## 2021-04-19 NOTE — TELEPHONE ENCOUNTER
Last seen: 1/19  RTC: 3 months  Cancel: none  No-show: none  Next appt: 4/20    Incoming refill from pharmacy via Rx auth    Medication requested: gabapentin (NEURONTIN) 100 MG capsule  Directions: TAKE 3 CAPSULES BY MOUTH EVERY NIGHT AT BEDTIME  Qty: 90  Last refilled: 3/25 #90, 2/25 #90, 2/9 #90    Medication requested: eszopiclone (LUNESTA) 1 MG tablet  Directions: Take 1 tab (1 mg) daily at bedtime  Qty: 30  Last refilled: 3/25 #30, 2/25 #30, 2/4 #30    Medication requested: dexmethylphenidate (FOCALIN) 10 MG tablet  Directions: TAKE 1 TABLET BY MOUTH DAILY  Qty: 30  Last refilled: 3/23 #30, 2/17 #30, 1/8 #30    Will route to provider for approval

## 2021-04-20 ENCOUNTER — VIRTUAL VISIT (OUTPATIENT)
Dept: PSYCHIATRY | Facility: CLINIC | Age: 51
End: 2021-04-20
Attending: PSYCHIATRY & NEUROLOGY
Payer: MEDICARE

## 2021-04-20 DIAGNOSIS — F33.1 MAJOR DEPRESSIVE DISORDER, RECURRENT EPISODE, MODERATE (H): ICD-10-CM

## 2021-04-20 DIAGNOSIS — F33.0 MAJOR DEPRESSIVE DISORDER, RECURRENT EPISODE, MILD (H): ICD-10-CM

## 2021-04-20 PROCEDURE — 99214 OFFICE O/P EST MOD 30 MIN: CPT | Mod: 95 | Performed by: PSYCHIATRY & NEUROLOGY

## 2021-04-20 PROCEDURE — 90833 PSYTX W PT W E/M 30 MIN: CPT | Mod: 95 | Performed by: PSYCHIATRY & NEUROLOGY

## 2021-04-20 RX ORDER — SERTRALINE HYDROCHLORIDE 100 MG/1
150 TABLET, FILM COATED ORAL DAILY
Qty: 45 TABLET | Refills: 4 | Status: SHIPPED | OUTPATIENT
Start: 2021-04-20 | End: 2021-07-13

## 2021-04-20 RX ORDER — DEXMETHYLPHENIDATE HYDROCHLORIDE 10 MG/1
10 TABLET ORAL DAILY
Qty: 30 TABLET | Refills: 0 | Status: SHIPPED | OUTPATIENT
Start: 2021-04-20 | End: 2021-07-13

## 2021-04-20 RX ORDER — DEXMETHYLPHENIDATE HYDROCHLORIDE 10 MG/1
10 TABLET ORAL DAILY
Qty: 30 TABLET | Refills: 0 | Status: SHIPPED | OUTPATIENT
Start: 2021-04-20 | End: 2021-04-20

## 2021-04-20 RX ORDER — LAMOTRIGINE 200 MG/1
400 TABLET ORAL DAILY
Qty: 60 TABLET | Refills: 4 | Status: SHIPPED | OUTPATIENT
Start: 2021-04-20 | End: 2021-07-13

## 2021-04-20 NOTE — PROGRESS NOTES
TELEPHONE VISIT  Keisha Somers is a 50 year old pt. who is being evaluated via a billable telephone visit.      The patient has been notified of the following:    We have found that certain health care needs can be provided without the need for a physical exam. This service lets us provide the care you need with a short phone conversation. If a prescription is necessary we can send it directly to your pharmacy. If lab work is needed we can place an order for fortino t and you can then stop by our lab to have the test done at a later time. Insurers are generally covering virtual visits as they would in-office visits so billing should not be different than normal.  If for some reason you do get billed incorrectly, you should contact the billing office to correct it and that number is in the AVS .    Patient has given verbal consent for a telephone visit?:  Yes   How would the pt like to obtain the AVS?:  Patient declined  AVS SmartPhrase [PsychAVS] has been placed in 'Patient Instructions':  N/A     Start Time:  2:01 PM          End Time:  230pm         Progress Notes   Romana Tena MD (Physician)     Psychiatry       PSYCHIATRY VISIT      The patient was seen for evaluation and management.The patient was seen for anorexia and depression.   She was last seen 3mo ago    INTERIM HX:At last visit the plan was:  Continue Lunesta  1  mg    She will return again in 3 mo. .  Focalin Rxs sent.   Gabapentin to 300 mg to help with sleep.   Follow-up with cardiology and neurology prn.  Consider sleep consult for nighttime eating.    Patient visit is per telephone for depression.  Keisha Somers is a 50 year old female who is being evaluated via a billable telephone visit.     Patient reports she is doing fine;  Mood pretty good.  Her family has been pretty good but mom has had some health scares wi th b lood clots, irregular heart rhythm.  She seems to be doing better.     Patient continues to help her father with  taxes.  The family would like him to retire.  It is more work for the family.  She spends time around her parents.      Sleep is her main difficulty.  She sleeps 6-7 hrs one night then up for a few nights.  There doesn't seem to be a pattern and never gets a full night's sleep.  She is taking LUnesta 1 mg/night because eating at night was better.  This is no longer the case; nighttime eating has returned.  Lunesta does help with sleep.  It is possible that the sleep eating is not related to Lunesta.    Eating has been ok.  Weight has been stable.  Energy ok, interests good.     She reports her thyroid has been too low. She also has been anemic but now corrected.     A few syncopal episodes in the last month,  She wakes with bruises. She has a phone appt with Dr. Galarza this week.     .    ROS: HA- one week with migraine lasting a few days.     SH: She has a permanent  which she sees remotely.  She will next see her in person.  once monthly. Her [employer] worker Q6 mo- not seen for some time . She will be getting a new one.      ROS:no dizziness  MEDICATIONS:   1. Lamictal 400 mg daily.   2. Zoloft 150 mg daily.   3. Focalin 10 mg daily. Beneficial for energy, mood and no indications of problematic use.   4. Synthroid 150 mcg - she is uncertain of dose.    5. Zofran prn migraines  7. Mitodrine 10/10/5 tid - changed from Dr. Galarza  8. Lunesta 1 mg - Lunesta has made a huge difference in her sleep: on this at least 3 years., helps to stay asleep.    9. Carafate  10. Pyridostigmine   11. Tizanidine   16. Fluorinef-   17. Maxalt for migraines with ibuprofen.  It works very well.    19. Mg-   21. Vit B2  22. Gabapentin 300 QHS - helps with sleep;  Added for hot flashes, works.  23. Amlodipine lowered to 2.5 mg at bedtime- recently  24. Atorvastatin  25. Ibuprofen  There were no vitals taken for this visit. - televisit    BP Readings from Last 3 Encounters:   12/17/19 112/75   09/26/19 115/81   07/23/19 102/73      Wt Readings from Last 4 Encounters:   09/26/19 64 kg (141 lb 3.2 oz)   07/23/19 62.1 kg (137 lb)   04/16/19 66.5 kg (146 lb 9.6 oz)   03/21/19 68.9 kg (152 lb)     MENTAL STATUS EXAM: The patient is interviewed via phone, oriented x3. Mood is euthymic, Good range of affect. Thought processes logical, associations are clear and goal directed. Thought content is normal. Fund of knowledge good, speech RRR, language intact, memory intact, concentration good, insight and judgment are good, gait unable to assess.    ASSESSMENT:   Anorexia nervosa, weight stable    Major depressive disorder,, stable.,;   Chronic sleep problems and abnormal sleep behaviors including  nocturnal eating which has not improved on Lunesta at current dose.  Recurrent syncopal episode(s), unpredictable  Migraine HA, treated  with maxalt, but currently problematic  POTS    PLAN: No change in meds.   Continue Lunesta  1  mg QHS  She will prn see her therapist, Noemi.    She will return again in 3 mo. . Focalin Rxs sent.   Gabapentin to 300 mg to help with sleep.   Follow-up with cardiology and neurology prn. She has neuro appt in November.  Consider sleep consult for nighttime eating.    RADHA MAURER MD                30 min spent on the date of the encounter in chart review, patient visit, review of tests, documentation and/or discussion with other providers about the issues documented above.           .           PSYCHIATRY CLINIC INDIVIDUAL PSYCHOTHERAPY NOTE                                                     [16]   Start time - 210pm        End time -226pm    Date reviewed - created on 12-19-17   last update: 12-17-19 Date next due -     12-17-20  REVIEWED OVER PHONE 6-23-20 DUE TO TELE VISIT  Subjective: This supportive psychotherapy session addressed issues related to weight and weight perception,  Patient's reaction: Maintenance in the context of mental status appropriate for ambulatory setting.  Progress: good  Plan: RTC 3  mo  Psychotherapy services during this visit included  myself and the patient.   TREATMENT  PLAN          SYMPTOMS; PROBLEMS   MEASURABLE GOALS;    FUNCTIONAL IMPROVEMENT INTERVENTIONS;   GAINS MADE DISCHARGE CRITERIA   Depression: none  Anxiety: mild, situational, parents getting older  Sleep Problems: nightmares   reduce nightmares, make a plan to manage 2-3 anxiety-provoking situations, develop 2 strategies to cope with trauma triggers/intrusive memories and take medications as prescribed on a daily basis building on strengths  communication skills  medications   psychotherapy marked symptom improvement and symptom resolution   Eating Disorder: intense fear of weight gain and distorted body image    POTS- syncopal episodes eating 3 meals/day, improvement in body image,maintain weight       Minimal episodes stress management  psychotherapy         muliple meds, cardiologist following , management of sxs marked symptom improvement

## 2021-04-22 ENCOUNTER — VIRTUAL VISIT (OUTPATIENT)
Dept: CARDIOLOGY | Facility: CLINIC | Age: 51
End: 2021-04-22
Attending: INTERNAL MEDICINE
Payer: MEDICARE

## 2021-04-22 DIAGNOSIS — R55 SYNCOPE AND COLLAPSE: ICD-10-CM

## 2021-04-22 DIAGNOSIS — I95.1 ORTHOSTATIC HYPOTENSION: Primary | ICD-10-CM

## 2021-04-22 PROCEDURE — 99442 PR PHYSICIAN TELEPHONE EVALUATION 11-20 MIN: CPT | Mod: 95 | Performed by: INTERNAL MEDICINE

## 2021-04-22 NOTE — PATIENT INSTRUCTIONS
You were seen in the Electrophysiology Clinic today by: Thang Galarza MD    Plan:     Follow up visit: 1 year      Your Care Team:  EP Cardiology   Telephone Number     Ivelisse River RN [phone]     For scheduling appts or procedures:    Etelvina Donnelly   [phone]   For the Device Clinic (Pacemakers, ICDs, Loop Recorders)    During business hours: [phone]  After business hours:   [phone]- select option 4 and ask for job code [id number].     On-call cardiologist for after hours or on weekends: [phone], option #4, and ask to speak to the on-call cardiologist.     Cardiovascular Clinic:   [address] [place] [address]. [place] [address]      As always, Thank you for trusting us with your health care needs!

## 2021-04-22 NOTE — LETTER
"4/22/2021      RE: Keisha Somers  [address]  [facility] [address]       Dear Colleague,    Thank you for the opportunity to participate in the care of your patient, Keisha Somers, at the [facility] [place] at [facility]. Please see a copy of my visit note below.    Keisha is a 51 year old who is being evaluated via a billable telephone visit.      What phone number would you like to be contacted at? [phone]  How would you like to obtain your AVS? Mail a copy     Vitals - Patient Reported  Systolic (Patient Reported): 101  Diastolic (Patient Reported): 57  Weight (Patient Reported): 64 kg (141 lb)  Height (Patient Reported): 171.5 cm (5' 7.5\")  BMI (Based on Pt Reported Ht/Wt): 21.76  Pulse (Patient Reported): 82  Pain Score: No Pain (0)(No SOB)      Vitals were taken and medications where reconciled.    Cuate Patricia, EMT  3:21 PM    HPI:     Keisha  is a 51-year-old woman with orthostatic hypotension associated with presumed autonomic dysfunction. Autonomic studies were not particularly abnormal.  .     Since her last visit Keisha believes that her situation is stable.  She does occasionally have some tendency to fall immediately after standing and I did discuss the potential role of muscle tensing before she stood up.  She also indicates as she has in the past that sometimes the lightheadedness would not occur until she is walked several feet..  She has not suffered any major harm as a result of this recently but has had some bruises.    Keisha is not encountered any other illnesses of note.  She has had her COVID-19 vaccinations.    Today we reviewed her medications and she continues to be treated with midodrine at the doses indicated below, fludrocortisone, and pyridostigmine.  She uses amlodipine at bedtime to prevent supine hypertension.    Keisha denies any cough or fever or chest discomfort or palpitations at the present " time.    PAST MEDICAL HISTORY:  Past Medical History:   Diagnosis Date     Eating disorder      Hypotension, postural      Palpitations      Syncope      Syncope and collapse      Thyroid disease        CURRENT MEDICATIONS:  Current Outpatient Medications   Medication Sig Dispense Refill     acetaminophen (TYLENOL) 500 MG tablet Take 500-1,000 mg by mouth every 6 hours as needed.       amLODIPine (NORVASC) 5 MG tablet TAKE 1/2 TABLET (2.5MG) BY MOUTH AT BEDTIME 45 tablet 3     atorvastatin (LIPITOR) 10 MG tablet Take by mouth daily Per pt takes this medicine but doesn't know the amount       Blood Pressure Monitor KIT Patient should be evaluated in the emergency department if her systolic blood pressure <65 1 kit 0     Calcium Carbonate-Vitamin D (CALCIUM 600+D PO) Take 1 tablet by mouth 2 times daily.       Cholecalciferol (VITAMIN D PO) Take  by mouth. 50, 000 units every 14 days       dexmethylphenidate (FOCALIN) 10 MG tablet Take 1 tablet (10 mg) by mouth daily 30 tablet 0     docusate sodium (COLACE) 100 MG capsule Take 1 capsule by mouth 2 times daily.       eszopiclone (LUNESTA) 1 MG tablet TAKE 1 TABLET AT BEDTIME 30 tablet 2     fludrocortisone (FLORINEF) 0.1 MG tablet TAKE 1 TABLET (0.1MG) BY MOUTH DAILY 90 tablet 3     gabapentin (NEURONTIN) 100 MG capsule TAKE 3 CAPSULES BY MOUTH EVERY NIGHT AT BEDTIME 90 capsule 2     gabapentin (NEURONTIN) 100 MG capsule TAKE 3 CAPSULES BY MOUTH EVERY NIGHT AT BEDTIME 90 capsule 4     ibuprofen (IBU) 800 MG tablet Take 800 mg by mouth every 8 hours as needed.       lamoTRIgine (LAMICTAL) 200 MG tablet Take 2 tablets (400 mg) by mouth daily 60 tablet 4     levothyroxine (SYNTHROID, LEVOTHROID) 175 MCG tablet Take  by mouth daily.       metoclopramide (REGLAN) 10 MG tablet 2 times daily        midodrine (PROAMATINE) 5 MG tablet TAKE 2 TABLETS (10MG) EVERY MORNING TAKE 2 TABLETS (10MG) AT NOON, AND TAKE 1 TABLET (5MG) AT 5PM 450 tablet 3     multivitamin (OCUVITE) TABS  Take 1 tablet by mouth 2 times daily.       pyridostigmine (MESTINON) 60 MG tablet Take 1 tablet by mouth daily.       sertraline (ZOLOFT) 100 MG tablet Take 1.5 tablets (150 mg) by mouth daily 45 tablet 4     omeprazole (PRILOSEC) 20 MG capsule Take 2 capsules by mouth 2 times daily.       sucralfate (CARAFATE) 1 GM/10ML suspension Take 1 g by mouth 2 times daily. Take 10 Ml po daily         PAST SURGICAL HISTORY:  Past Surgical History:   Procedure Laterality Date     GT placed[  2001       ALLERGIES:   No Known Allergies    FAMILY HISTORY:  No family history on file.      SOCIAL HISTORY:  Social History     Tobacco Use     Smoking status: Never Smoker     Smokeless tobacco: Never Used   Substance Use Topics     Alcohol use: No     Drug use: No       ROS:   Constitutional: No fever, chills, or sweats. Weight stable.   ENT: No visual disturbance, ear ache, epistaxis, sore throat.   Cardiovascular: As per HPI.   Respiratory: No cough, hemoptysis.    GI: No nausea, vomiting,  : No hematuria.   Integument: Negative.   Psychiatric: Negative.   Hematologic:  no easy bleeding.  Neuro: Negative.   Endocrinology: No significant heat or cold intolerance   Musculoskeletal: No myalgia.    Exam:  There were no vitals taken for this visit.  RESPIRATORY:  no rales, rhonchi or wheezes,    NEURO: alert and oriented to person/place/time, normal speech   SKIN: no ecchymoses, no rashes reported    Labs:  CBC RESULTS:   Lab Results   Component Value Date    WBC 5.0 07/24/2017    RBC 3.56 (L) 07/24/2017    HGB 10.8 (L) 07/24/2017    HCT 33.7 (L) 07/24/2017    MCV 95 07/24/2017    MCH 30.3 07/24/2017    MCHC 32.0 07/24/2017    RDW 13.1 07/24/2017     07/24/2017       BMP RESULTS:  Lab Results   Component Value Date     07/24/2017    POTASSIUM 3.5 07/24/2017    CHLORIDE 106 07/24/2017    CO2 28 07/24/2017    ANIONGAP 8 07/24/2017     (H) 07/24/2017    BUN 7 07/24/2017    CR 0.83 07/24/2017    GFRESTIMATED 74  07/24/2017    GFRESTBLACK 89 07/24/2017    KENNETH 8.7 07/24/2017        INR RESULTS:  No results found for: INR    Procedures:  PULMONARY FUNCTION TESTS:   No flowsheet data found.      ECHOCARDIOGRAM:   No new findings    Assessment and Plan:   1.  Symptomatic orthostatic hypotension-relatively stable on current treatment regimen but still susceptible to falls.  2.  Discussed removal of muscle tensing (thighs buttocks) prior to standing to diminish orthostatic risk    Plan  1.  Follow-up in 12 months or earlier if patient calls  2.  Contact the patient in about 6 months for need of refills    Telephone on 3: 30 p.m.; telephone off 3: 4 5 PM  Total elapsed time with documentation 30 minutes  Platform Doximity  Patient at home; clinic [facility] heart    I very much appreciated the opportunity to see and assess Keisha Somers in the clinic today. Please do not hesitate to contact my office if you have any questions or concerns.      Thang Galarza MD  Cardiac Arrhythmia Service  [facility]  [phone]      CC  SELF, REFERRED

## 2021-04-22 NOTE — PROGRESS NOTES
"Keisha is a 51 year old who is being evaluated via a billable telephone visit.      What phone number would you like to be contacted at? [phone]  How would you like to obtain your AVS? Mail a copy     Vitals - Patient Reported  Systolic (Patient Reported): 101  Diastolic (Patient Reported): 57  Weight (Patient Reported): 64 kg (141 lb)  Height (Patient Reported): 171.5 cm (5' 7.5\")  BMI (Based on Pt Reported Ht/Wt): 21.76  Pulse (Patient Reported): 82  Pain Score: No Pain (0)(No SOB)      Vitals were taken and medications where reconciled.    Cuate Patricia, EMT  3:21 PM    HPI:     Keisha  is a 51-year-old woman with orthostatic hypotension associated with presumed autonomic dysfunction. Autonomic studies were not particularly abnormal.  .     Since her last visit Keisha believes that her situation is stable.  She does occasionally have some tendency to fall immediately after standing and I did discuss the potential role of muscle tensing before she stood up.  She also indicates as she has in the past that sometimes the lightheadedness would not occur until she is walked several feet..  She has not suffered any major harm as a result of this recently but has had some bruises.    Keisha is not encountered any other illnesses of note.  She has had her COVID-19 vaccinations.    Today we reviewed her medications and she continues to be treated with midodrine at the doses indicated below, fludrocortisone, and pyridostigmine.  She uses amlodipine at bedtime to prevent supine hypertension.    Keisha denies any cough or fever or chest discomfort or palpitations at the present time.    PAST MEDICAL HISTORY:  Past Medical History:   Diagnosis Date     Eating disorder      Hypotension, postural      Palpitations      Syncope      Syncope and collapse      Thyroid disease        CURRENT MEDICATIONS:  Current Outpatient Medications   Medication Sig Dispense Refill     acetaminophen (TYLENOL) 500 MG tablet Take 500-1,000 mg by mouth " every 6 hours as needed.       amLODIPine (NORVASC) 5 MG tablet TAKE 1/2 TABLET (2.5MG) BY MOUTH AT BEDTIME 45 tablet 3     atorvastatin (LIPITOR) 10 MG tablet Take by mouth daily Per pt takes this medicine but doesn't know the amount       Blood Pressure Monitor KIT Patient should be evaluated in the emergency department if her systolic blood pressure <65 1 kit 0     Calcium Carbonate-Vitamin D (CALCIUM 600+D PO) Take 1 tablet by mouth 2 times daily.       Cholecalciferol (VITAMIN D PO) Take  by mouth. 50, 000 units every 14 days       dexmethylphenidate (FOCALIN) 10 MG tablet Take 1 tablet (10 mg) by mouth daily 30 tablet 0     docusate sodium (COLACE) 100 MG capsule Take 1 capsule by mouth 2 times daily.       eszopiclone (LUNESTA) 1 MG tablet TAKE 1 TABLET AT BEDTIME 30 tablet 2     fludrocortisone (FLORINEF) 0.1 MG tablet TAKE 1 TABLET (0.1MG) BY MOUTH DAILY 90 tablet 3     gabapentin (NEURONTIN) 100 MG capsule TAKE 3 CAPSULES BY MOUTH EVERY NIGHT AT BEDTIME 90 capsule 2     gabapentin (NEURONTIN) 100 MG capsule TAKE 3 CAPSULES BY MOUTH EVERY NIGHT AT BEDTIME 90 capsule 4     ibuprofen (IBU) 800 MG tablet Take 800 mg by mouth every 8 hours as needed.       lamoTRIgine (LAMICTAL) 200 MG tablet Take 2 tablets (400 mg) by mouth daily 60 tablet 4     levothyroxine (SYNTHROID, LEVOTHROID) 175 MCG tablet Take  by mouth daily.       metoclopramide (REGLAN) 10 MG tablet 2 times daily        midodrine (PROAMATINE) 5 MG tablet TAKE 2 TABLETS (10MG) EVERY MORNING TAKE 2 TABLETS (10MG) AT NOON, AND TAKE 1 TABLET (5MG) AT 5PM 450 tablet 3     multivitamin (OCUVITE) TABS Take 1 tablet by mouth 2 times daily.       pyridostigmine (MESTINON) 60 MG tablet Take 1 tablet by mouth daily.       sertraline (ZOLOFT) 100 MG tablet Take 1.5 tablets (150 mg) by mouth daily 45 tablet 4     omeprazole (PRILOSEC) 20 MG capsule Take 2 capsules by mouth 2 times daily.       sucralfate (CARAFATE) 1 GM/10ML suspension Take 1 g by mouth 2 times  daily. Take 10 Ml po daily         PAST SURGICAL HISTORY:  Past Surgical History:   Procedure Laterality Date     GT placed[  2001       ALLERGIES:   No Known Allergies    FAMILY HISTORY:  No family history on file.      SOCIAL HISTORY:  Social History     Tobacco Use     Smoking status: Never Smoker     Smokeless tobacco: Never Used   Substance Use Topics     Alcohol use: No     Drug use: No       ROS:   Constitutional: No fever, chills, or sweats. Weight stable.   ENT: No visual disturbance, ear ache, epistaxis, sore throat.   Cardiovascular: As per HPI.   Respiratory: No cough, hemoptysis.    GI: No nausea, vomiting,  : No hematuria.   Integument: Negative.   Psychiatric: Negative.   Hematologic:  no easy bleeding.  Neuro: Negative.   Endocrinology: No significant heat or cold intolerance   Musculoskeletal: No myalgia.    Exam:  There were no vitals taken for this visit.  RESPIRATORY:  no rales, rhonchi or wheezes,    NEURO: alert and oriented to person/place/time, normal speech   SKIN: no ecchymoses, no rashes reported    Labs:  CBC RESULTS:   Lab Results   Component Value Date    WBC 5.0 07/24/2017    RBC 3.56 (L) 07/24/2017    HGB 10.8 (L) 07/24/2017    HCT 33.7 (L) 07/24/2017    MCV 95 07/24/2017    MCH 30.3 07/24/2017    MCHC 32.0 07/24/2017    RDW 13.1 07/24/2017     07/24/2017       BMP RESULTS:  Lab Results   Component Value Date     07/24/2017    POTASSIUM 3.5 07/24/2017    CHLORIDE 106 07/24/2017    CO2 28 07/24/2017    ANIONGAP 8 07/24/2017     (H) 07/24/2017    BUN 7 07/24/2017    CR 0.83 07/24/2017    GFRESTIMATED 74 07/24/2017    GFRESTBLACK 89 07/24/2017    KENNETH 8.7 07/24/2017        INR RESULTS:  No results found for: INR    Procedures:  PULMONARY FUNCTION TESTS:   No flowsheet data found.      ECHOCARDIOGRAM:   No new findings    Assessment and Plan:   1.  Symptomatic orthostatic hypotension-relatively stable on current treatment regimen but still susceptible to falls.  2.   Discussed removal of muscle tensing (thighs buttocks) prior to standing to diminish orthostatic risk    Plan  1.  Follow-up in 12 months or earlier if patient calls  2.  Contact the patient in about 6 months for need of refills    Telephone on 3: 30 p.m.; telephone off 3: 4 5 PM  Total elapsed time with documentation 30 minutes  Platform Doximity  Patient at home; clinic [facility] heart    I very much appreciated the opportunity to see and assess Keisha Somers in the clinic today. Please do not hesitate to contact my office if you have any questions or concerns.      Thang Galarza MD  Cardiac Arrhythmia Service  [facility]  [phone]      CC  SELF, REFERRED

## 2021-05-25 ENCOUNTER — RECORDS - HEALTHEAST (OUTPATIENT)
Dept: ADMINISTRATIVE | Facility: CLINIC | Age: 51
End: 2021-05-25

## 2021-05-26 ENCOUNTER — RECORDS - HEALTHEAST (OUTPATIENT)
Dept: ADMINISTRATIVE | Facility: CLINIC | Age: 51
End: 2021-05-26

## 2021-05-27 ENCOUNTER — RECORDS - HEALTHEAST (OUTPATIENT)
Dept: ADMINISTRATIVE | Facility: CLINIC | Age: 51
End: 2021-05-27

## 2021-05-28 ENCOUNTER — RECORDS - HEALTHEAST (OUTPATIENT)
Dept: ADMINISTRATIVE | Facility: CLINIC | Age: 51
End: 2021-05-28

## 2021-05-29 ENCOUNTER — RECORDS - HEALTHEAST (OUTPATIENT)
Dept: ADMINISTRATIVE | Facility: CLINIC | Age: 51
End: 2021-05-29

## 2021-05-30 ENCOUNTER — RECORDS - HEALTHEAST (OUTPATIENT)
Dept: ADMINISTRATIVE | Facility: CLINIC | Age: 51
End: 2021-05-30

## 2021-05-30 VITALS — WEIGHT: 135 LBS | BODY MASS INDEX: 20.46 KG/M2 | HEIGHT: 68 IN

## 2021-05-31 ENCOUNTER — RECORDS - HEALTHEAST (OUTPATIENT)
Dept: ADMINISTRATIVE | Facility: CLINIC | Age: 51
End: 2021-05-31

## 2021-06-08 NOTE — ANESTHESIA CARE TRANSFER NOTE
Last vitals:   Vitals:    01/23/17 1420   BP: 133/81   Pulse: 95   Resp: 12   Temp:    SpO2: 100%     Patient's level of consciousness is drowsy  Spontaneous respirations: yes  Maintains airway independently: yes  Dentition unchanged: yes  Oropharynx: oropharynx clear of all foreign objects    QCDR Measures:  ASA# 20 - Surgical Safety Checklist: ASA20A - Safety Checks Done  PQRS# 430 - Adult PONV Prevention: 4558F - Pt received => 2 anti-emetic agents (different classes) preop & intraop  ASA# 8 - Peds PONV Prevention: NA - Not pediatric patient, not GA or 2 or more risk factors NOT present  PQRS# 424 - Leslye-op Temp Management: 4559F - At least one body temp DOCUMENTED => 35.5C or 95.9F within required timeframe  PQRS# 426 - PACU Transfer Protocol: - Transfer of care checklist used  ASA# 14 - Acute Post-op Pain: ASA14B - Patient did NOT experience pain >= 7 out of 10    I completed my SBAR handoff to the receiving nurse per policy and procedure.

## 2021-06-08 NOTE — ANESTHESIA POSTPROCEDURE EVALUATION
Patient: Keisha OVALLES Bisping  CYSTOSCOPY, LEFT URETEROSCOPY HOLMIUM LASER LITHOTRIPSY AND LEFT STENT PLACEMENT BILATERAL RETROGRADE PYELOGRAM   Anesthesia type: general    Patient location: PACU  Last vitals:   Vitals:    01/23/17 1550   BP: 124/77   Pulse: 75   Resp: 19   Temp:    SpO2: 96%     Post vital signs: stable  Level of consciousness: awake and responds to simple questions  Post-anesthesia pain: pain controlled  Post-anesthesia nausea and vomiting: no  Pulmonary: unassisted, return to baseline  Cardiovascular: stable and blood pressure at baseline  Hydration: adequate  Anesthetic events: no    QCDR Measures:  ASA# 11 - Leslye-op Cardiac Arrest: ASA11B - Patient did NOT experience unanticipated cardiac arrest  ASA# 12 - Leslye-op Mortality Rate: ASA12B - Patient did NOT die  ASA# 13 - PACU Re-Intubation Rate: ASA13B - Patient did NOT require a new airway mgmt  ASA# 10 - Composite Anes Safety: ASA10A - No serious adverse event  ASA# 38 - New Corneal Injury: ASA38A - No new exposure keratitis or corneal abrasion in PACU    Additional Notes:Prolonged recovery from general anesthesia, Very somnolent for 2hrs post-op.

## 2021-06-18 ENCOUNTER — COMMUNICATION - HEALTHEAST (OUTPATIENT)
Dept: SCHEDULING | Facility: CLINIC | Age: 51
End: 2021-06-18

## 2021-07-03 NOTE — ANESTHESIA PREPROCEDURE EVALUATION
Anesthesia Preprocedure Evaluation by Damien Saavedra MD at 1/23/2017 12:04 PM     Author: Damien Saavedra MD Service: -- Author Type: Physician    Filed: 1/23/2017 12:09 PM Date of Service: 1/23/2017 12:04 PM Status: Signed    : Damien Saavedra MD (Physician)       Anesthesia Evaluation      Patient summary reviewed     Airway   Mallampati: II   Pulmonary - negative ROS and normal exam                          Cardiovascular - normal exam  (+) dysrhythmias, ,     ECG reviewed        Neuro/Psych    (+) anxiety/panic attacks,     Comments: Anorexia    Endo/Other - negative ROS      GI/Hepatic/Renal    (+) GERD well controlled,   chronic renal disease,     Comments: nephrolithiasis     Other findings: Hb 11.6, K 4.2      Dental                             Anesthesia Plan  Planned anesthetic: general LMA    ASA 3   Induction: intravenous   Anesthetic plan and risks discussed with: patient    Post-op plan: routine recovery

## 2021-07-08 DIAGNOSIS — F51.01 PRIMARY INSOMNIA: ICD-10-CM

## 2021-07-13 ENCOUNTER — VIRTUAL VISIT (OUTPATIENT)
Dept: PSYCHIATRY | Facility: CLINIC | Age: 51
End: 2021-07-13
Attending: PSYCHIATRY & NEUROLOGY
Payer: MEDICARE

## 2021-07-13 DIAGNOSIS — F33.1 MAJOR DEPRESSIVE DISORDER, RECURRENT EPISODE, MODERATE (H): ICD-10-CM

## 2021-07-13 DIAGNOSIS — F33.0 MAJOR DEPRESSIVE DISORDER, RECURRENT EPISODE, MILD (H): ICD-10-CM

## 2021-07-13 DIAGNOSIS — F51.01 PRIMARY INSOMNIA: ICD-10-CM

## 2021-07-13 PROCEDURE — 99443 PR PHYSICIAN TELEPHONE EVALUATION 21-30 MIN: CPT | Mod: 95 | Performed by: PSYCHIATRY & NEUROLOGY

## 2021-07-13 RX ORDER — DEXMETHYLPHENIDATE HYDROCHLORIDE 10 MG/1
10 TABLET ORAL DAILY
Qty: 30 TABLET | Refills: 0 | Status: SHIPPED | OUTPATIENT
Start: 2021-07-13 | End: 2021-10-12

## 2021-07-13 RX ORDER — DEXMETHYLPHENIDATE HYDROCHLORIDE 10 MG/1
10 TABLET ORAL DAILY
Qty: 30 TABLET | Refills: 0 | Status: SHIPPED | OUTPATIENT
Start: 2021-07-13 | End: 2021-07-13

## 2021-07-13 RX ORDER — SERTRALINE HYDROCHLORIDE 100 MG/1
150 TABLET, FILM COATED ORAL DAILY
Qty: 45 TABLET | Refills: 4 | Status: SHIPPED | OUTPATIENT
Start: 2021-07-13 | End: 2021-10-12

## 2021-07-13 RX ORDER — LAMOTRIGINE 200 MG/1
400 TABLET ORAL DAILY
Qty: 60 TABLET | Refills: 4 | Status: SHIPPED | OUTPATIENT
Start: 2021-07-13 | End: 2021-10-12

## 2021-07-13 RX ORDER — GABAPENTIN 100 MG/1
CAPSULE ORAL
Qty: 90 CAPSULE | Refills: 2 | OUTPATIENT
Start: 2021-07-13

## 2021-07-13 RX ORDER — ESZOPICLONE 1 MG/1
TABLET, FILM COATED ORAL
Qty: 30 TABLET | Refills: 2 | Status: SHIPPED | OUTPATIENT
Start: 2021-07-13 | End: 2021-10-07

## 2021-07-13 RX ORDER — GABAPENTIN 100 MG/1
300 CAPSULE ORAL AT BEDTIME
Qty: 90 CAPSULE | Refills: 4 | Status: SHIPPED | OUTPATIENT
Start: 2021-07-13 | End: 2021-10-12

## 2021-07-13 RX ORDER — ESZOPICLONE 1 MG/1
TABLET, FILM COATED ORAL
Qty: 30 TABLET | OUTPATIENT
Start: 2021-07-13

## 2021-07-13 NOTE — PROGRESS NOTES
TELEPHONE VISIT  Keisha Somers is a 50 year old pt. who is being evaluated via a billable telephone visit.      The patient has been notified of the following:    We have found that certain health care needs can be provided without the need for a physical exam. This service lets us provide the care you need with a short phone conversation. If a prescription is necessary we can send it directly to your pharmacy. If lab work is needed we can place an order for fortino t and you can then stop by our lab to have the test done at a later time. Insurers are generally covering virtual visits as they would in-office visits so billing should not be different than normal.  If for some reason you do get billed incorrectly, you should contact the billing office to correct it and that number is in the AVS .    Patient has given verbal consent for a telephone visit?:  Yes    How would the pt like to obtain the AVS?:  Patient declined  AVS SmartPhrase [PsychAVS] has been placed in 'Patient Instructions':  N/A     Start Time:  1:38 PM          End Time:  205pm            Progress Notes   Romana Tena MD (Physician)     Psychiatry       PSYCHIATRY VISIT      The patient was seen for evaluation and management.The patient was seen for anorexia and depression.   She was last seen 3mo ago    INTERIM HX:At last visit the plan was:  Continue Lunesta  1  mg    She will return again in 3 mo. .  Focalin Rxs sent.   Gabapentin to 300 mg to help with sleep.   Follow-up with cardiology and neurology prn.  Consider sleep consult for nighttime eating.    Patient reports she has been better.  Two weeks ago she was in the hospital.  She started with a migraine and nothing helped. She was shaking and had fever, nausea, vomiting.  She was dxed with a kidney infection and sent home with  Antibiotics.  It was worse the next day and went to the ED. She was septic.  Also dxed with pneumonia but did not have much for sxs.  She recalls having acid  reflux but she is wondering hwo much was her lungs.  She was told she has fluid on her lungs and then pneumonia dxed.  Six days she was hospitalized.  She was vomiting for 4-5 days .     From this hospitalization she reports the  follow up visit was awful - reports a very slow rooming and she was not allowed by the physician to ask questions. She gave feedback on this the next day.     Energy is low still, concentration lacking.  She is still coughing. Sleeping - exhausted the first week after discharge.     Mood has been fine and now back on medications.   When in hospital, they stopped all her meds ; she wasn't able to keep them down. She is now back on all her psychiatric meds. Eating now is ok.  For the first few days at home she wasn't able to eat much and foods did not taste.        Her family has been supportive and mom was taking care of her cat.  They were wonderful at caring for the cat.     MEDICAL Update:  As above; She had 1-2 syncopal episodes before she got sick.  When she was in the hospital, her BP was high.     ROS: HA-migraine lasting a few days.     SH: She has a permanent  which she sees remotely.  She will next see her in person.  once monthly. Her [employer] worker Q6 mo- not seen for some time . She will be getting a new one.      MEDICATIONS:   1. Lamictal 400 mg daily.   2. Zoloft 150 mg daily.   3. Focalin 10 mg daily. Beneficial for energy, mood and no indications of problematic use.   4. Synthroid 150 mcg - she is uncertain of dose.    5. Zofran prn migraines  7. Mitodrine 10/10/5 tid - changed from Dr. Galarza   8. Lunesta 1 mg - Lunesta has made a huge difference in her sleep: on this at least 3 years., helps to stay asleep.    9. Carafate  10. Pyridostigmine - OFF now  11. Tizanidine   16. Fluorinef-   17. Maxalt for migraines with ibuprofen.  It works very well.    19. Mg-   21. Vit B2  22. Gabapentin 300 QHS - helps with sleep;  Added for hot flashes, works.  23. Amlodipine  lowered to 2.5 mg at bedtime- recently- OFF now  24. Atorvastatin  25. Ibuprofen  There were no vitals taken for this visit. - televisit    BP Readings from Last 3 Encounters:   12/17/19 112/75   09/26/19 115/81   07/23/19 102/73     Wt Readings from Last 4 Encounters:   09/26/19 64 kg (141 lb 3.2 oz)   07/23/19 62.1 kg (137 lb)   04/16/19 66.5 kg (146 lb 9.6 oz)   03/21/19 68.9 kg (152 lb)     MENTAL STATUS EXAM: The patient is interviewed via phone, oriented x3. Mood is euthymic, Good range of affect. Thought processes logical, associations are clear and goal directed. Thought content is normal. Fund of knowledge good, speech RRR, language intact, memory intact, concentration good, insight and judgment are good, gait unable to assess.    ASSESSMENT:   Anorexia nervosa, weight stable    Major depressive disorder,, stable.,;   Recent pneumonia and septicemia  Chronic sleep problems and abnormal sleep behaviors including  nocturnal eating which has not improved on Lunesta at current dose.  Recurrent syncopal episode(s), unpredictable  Migraine HA, treated  with maxalt, but currently problematic  POTS    PLAN: No change in meds.   Continue Lunesta  1  mg QHS  She will prn see her therapist, Noemi.    She will return again in 3 mo. . Focalin Rxs sent.   Gabapentin to 300 mg to help with sleep.   Follow-up with cardiology and neurology prn. She has neuro appt in November.  Consider sleep consult for nighttime eating.    RADHA MAURER MD     Level of Medical Decision Making:   Problems addressed: - At least 2 stable chronic problems    - Moderate due to: Engaged in prescription drug management during visit (discussed any medication benefits, side effects, alternatives, etc.)    .       PSYCHIATRY CLINIC INDIVIDUAL PSYCHOTHERAPY NOTE                                                     [16]   Start time - 140pm        End time -156pm    Date reviewed - created on 12-19-17   last update: 12-17-19 Date next due -      12-17-20  REVIEWED OVER PHONE 6-23-20 DUE TO TELE VISIT  Subjective: This supportive psychotherapy session addressed issues related to mood and medical problems.  Patient's reaction: Maintenance in the context of mental status appropriate for ambulatory setting.  Progress: good  Plan: RTC 3 mo  Psychotherapy services during this visit included  myself and the patient.   TREATMENT  PLAN          SYMPTOMS; PROBLEMS   MEASURABLE GOALS;    FUNCTIONAL IMPROVEMENT INTERVENTIONS;   GAINS MADE DISCHARGE CRITERIA   Depression: none  Anxiety: mild, situational, parents getting older  Sleep Problems: nightmares   reduce nightmares, make a plan to manage 2-3 anxiety-provoking situations, develop 2 strategies to cope with trauma triggers/intrusive memories and take medications as prescribed on a daily basis building on strengths  communication skills  medications   psychotherapy marked symptom improvement and symptom resolution   Eating Disorder: intense fear of weight gain and distorted body image    POTS- syncopal episodes eating 3 meals/day, improvement in body image,maintain weight       Minimal episodes stress management  psychotherapy         muliple meds, cardiologist following , management of sxs marked symptom improvement

## 2021-09-10 DIAGNOSIS — I95.9 HYPOTENSION, UNSPECIFIED HYPOTENSION TYPE: ICD-10-CM

## 2021-09-10 DIAGNOSIS — G90.A POTS (POSTURAL ORTHOSTATIC TACHYCARDIA SYNDROME): Primary | ICD-10-CM

## 2021-09-14 RX ORDER — FLUDROCORTISONE ACETATE 0.1 MG/1
0.1 TABLET ORAL DAILY
Qty: 90 TABLET | Refills: 3 | Status: SHIPPED | OUTPATIENT
Start: 2021-09-14 | End: 2022-04-07

## 2021-09-14 NOTE — TELEPHONE ENCOUNTER
Fludrocortisone Acetate 0.1MG TABS  Last Written Prescription Date:  10/1/2020  Last Fill Quantity: 90,   # refills: 3  Last Office Visit : 4/22/2021  Future Office visit:  10/28/2021    Routing refill request to provider for review/approval because:  Drug not on the [facility], P or [facility] refill protocol or controlled substance      Danni Gonzalez RN  Central Triage Red Flags/Med Refills     7 lbs +

## 2021-10-07 DIAGNOSIS — F51.01 PRIMARY INSOMNIA: ICD-10-CM

## 2021-10-07 RX ORDER — ESZOPICLONE 1 MG/1
TABLET, FILM COATED ORAL
Qty: 30 TABLET | Refills: 2 | Status: SHIPPED | OUTPATIENT
Start: 2021-10-07 | End: 2021-10-12

## 2021-10-07 NOTE — TELEPHONE ENCOUNTER
Last seen: 7/13  RTC: 3 months   Cancel: none   No-show: none   Next appt: 10/12    Incoming refill from pharmacy via Rx Auth     Medication requested: eszopiclone (LUNESTA) 1 MG tablet  Directions: TAKE 1 TABLET AT BEDTIME  Qty: 30  Last refilled: 9/16 #30, 8/16 #30, 7/15 #30    Will route to provider for approval

## 2021-10-12 ENCOUNTER — VIRTUAL VISIT (OUTPATIENT)
Dept: PSYCHIATRY | Facility: CLINIC | Age: 51
End: 2021-10-12
Attending: PSYCHIATRY & NEUROLOGY
Payer: MEDICARE

## 2021-10-12 DIAGNOSIS — F51.01 PRIMARY INSOMNIA: ICD-10-CM

## 2021-10-12 DIAGNOSIS — F33.0 MAJOR DEPRESSIVE DISORDER, RECURRENT EPISODE, MILD (H): ICD-10-CM

## 2021-10-12 DIAGNOSIS — F33.1 MAJOR DEPRESSIVE DISORDER, RECURRENT EPISODE, MODERATE (H): ICD-10-CM

## 2021-10-12 PROCEDURE — 99443 PR PHYSICIAN TELEPHONE EVALUATION 21-30 MIN: CPT | Mod: 95 | Performed by: PSYCHIATRY & NEUROLOGY

## 2021-10-12 RX ORDER — SERTRALINE HYDROCHLORIDE 100 MG/1
150 TABLET, FILM COATED ORAL DAILY
Qty: 45 TABLET | Refills: 4 | Status: SHIPPED | OUTPATIENT
Start: 2021-10-12 | End: 2022-02-15

## 2021-10-12 RX ORDER — LAMOTRIGINE 200 MG/1
400 TABLET ORAL DAILY
Qty: 60 TABLET | Refills: 4 | Status: SHIPPED | OUTPATIENT
Start: 2021-10-12 | End: 2022-02-15

## 2021-10-12 RX ORDER — DEXMETHYLPHENIDATE HYDROCHLORIDE 10 MG/1
10 TABLET ORAL DAILY
Qty: 30 TABLET | Refills: 0 | Status: SHIPPED | OUTPATIENT
Start: 2021-10-12 | End: 2021-10-12

## 2021-10-12 RX ORDER — DEXMETHYLPHENIDATE HYDROCHLORIDE 10 MG/1
10 TABLET ORAL DAILY
Qty: 30 TABLET | Refills: 0 | Status: SHIPPED | OUTPATIENT
Start: 2021-10-12 | End: 2022-01-04

## 2021-10-12 RX ORDER — ESZOPICLONE 1 MG/1
TABLET, FILM COATED ORAL
Qty: 30 TABLET | Refills: 2 | Status: SHIPPED | OUTPATIENT
Start: 2021-10-12 | End: 2022-01-18

## 2021-10-12 RX ORDER — GABAPENTIN 100 MG/1
300 CAPSULE ORAL AT BEDTIME
Qty: 90 CAPSULE | Refills: 4 | Status: SHIPPED | OUTPATIENT
Start: 2021-10-12 | End: 2022-01-04

## 2021-10-12 NOTE — PATIENT INSTRUCTIONS
Continue with current medications, no changes.   All sent to pharmacy  Appointment in 3 months.              **For crisis resources, please see the information at the end of this document**     Patient Education      Thank you for coming to the [facility] [place] [facility].    Lab Testing:  If you had lab testing today and your results are reassuring or normal they will be mailed to you or sent through [employer] within 7 days. If the lab tests need quick action we will call you with the results. The phone number we will call with results is # [phone] (home) . If this is not the best number please call our clinic and change the number.    Medication Refills:  If you need any refills please call your pharmacy and they will contact us. Our fax number for refills is [phone]. Please allow three business for refill processing. If you need to  your refill at a new pharmacy, please contact the new pharmacy directly. The new pharmacy will help you get your medications transferred.     Scheduling:  If you have any concerns about today's visit or wish to schedule another appointment please call our office during normal business hours [phone] (8-5:00 M-F)    Contact Us:  Please call [phone] during business hours (8-5:00 M-F).  If after clinic hours, or on the weekend, please call  [phone].    Financial Assistance [phone]  [employer] Billing [phone]  Central Billing Office, ealth: [phone]  [place] Billing [phone]  Medical Records [phone]  [place] Patient Bill of Rights [email or web address]       MENTAL HEALTH CRISIS NUMBERS:  For a medical emergency please call  911 or go to the nearest ER.     [facility]:   [facility] -[phone]   Crisis Residence [facility] -[phone]   Walk-In Counseling Center [facility] -[phone]   COPE 24/7  [facility] Mobile Team -[phone] (adults)/[phone] (child)  [facility]: [facility] - [phone]      [facility] - [phone]   Walk-in counseling [facility] - [phone]   Walk-in counseling [facility] - [phone]   Crisis Residence [facility] - [phone]  Urgent Care Adult Mental [place]-[phone] mobile unit/ 24/7 crisis line    National Crisis Numbers:   National Suicide Prevention Lifeline: 5-467-642-TALK (142-481-4916)  Poison Control Center - 1-184.255.6451  [employer]/resources for a list of additional resources (SOS)  Trans Lifeline a hotline for transgender people 0-307-606-9818  The [employer] a hotline for LGBT youth 1-418.361.8235  Crisis Text Line: For any crisis 24/7   To: 577909  see www.crisistextline.org  - IF MAKING A CALL FEELS TOO HARD, send a text!         Again thank you for choosing [facility] [place] [facility] and please let us know how we can best partner with you to improve you and your family's health.    You may be receiving a survey regarding this appointment. We would love to have your feedback, both positive and negative. The survey is done by an external company, so your answers are anonymous.

## 2021-10-12 NOTE — PROGRESS NOTES
TELEPHONE VISIT  Keisha Somers is a 50 year old pt. who is being evaluated via a billable telephone visit.      The patient has been notified of the following:    We have found that certain health care needs can be provided without the need for a physical exam. This service lets us provide the care you need with a short phone conversation. If a prescription is necessary we can send it directly to your pharmacy. If lab work is needed we can place an order for fortino t and you can then stop by our lab to have the test done at a later time. Insurers are generally covering virtual visits as they would in-office visits so billing should not be different than normal.  If for some reason you do get billed incorrectly, you should contact the billing office to correct it and that number is in the AVS .    Patient has given verbal consent for a telephone visit?:  Yes    How would the pt like to obtain the AVS?:  Patient declined  AVS SmartPhrase [PsychAVS] has been placed in 'Patient Instructions':  N/A     Start Time:  1:04 PM          End Time: 130pm            Progress Notes   Romana Tena MD (Physician)     Psychiatry       PSYCHIATRY VISIT      The patient was seen for evaluation and management.The patient was seen for anorexia and depression.   She was last seen 3mo ago    INTERIM HX:At last visit the plan was:  Continue Lunesta  1  mg    She will return again in 3 mo. .  Focalin Rxs sent.   Gabapentin to 300 mg to help with sleep.   Follow-up with cardiology and neurology prn.  Consider sleep consult for nighttime eating.    Patient reports she is doing pretty well.  She no longer has a  because she said patient didn't need her anymore. It was upsetting how it ended though.  Her place of residence offered a  and she may capitalize on it. The  has been helpful with working with the [facility]. Another change for the patient is the designation of her place as assisted living.   Her  medical transportation is on hold due to paperwork. The patient sets up her meds in the presence of a nurse.     Her physical health is ok, no problems since her hospitalization in June for pneumonia. She has not had migraines since the hospitalization. No new changes in meds since last visit.     There have been a few covid cases in her building.  She has had a few fun outings:  [employer] is her favorite.    Mood has been pretty good;  She has been stressed with changes. Sleep is hit or miss.  She realizes that the stressors affect her sleep.  Energy is pretty good. Concentration is a little lacking; she hasn't read since the hospital which is unusual for her.  She just isn't as interested in it.  Eating has been going well;  She is probably at the highest weight ever but she feels good physically. She now realizes how her thinking was off at low weight.      Her family has been supportive . It has been 2 years since she went to their cabin.      MEDICAL Update:  As above;   ROS: HA-migraine stable    SH: She has a [employer] worker- never met her. She is not responsive .     MEDICATIONS:   1. Lamictal 400 mg daily.   2. Zoloft 150 mg daily.   3. Focalin 10 mg daily. Beneficial for energy, mood and no indications of problematic use.   4. Synthroid 150 mcg - she is uncertain of dose.    5. Zofran prn migraines  7. Mitodrine 10/10/5 tid - changed from Dr. Galarza   8. Lunesta 1 mg - Lunesta has made a huge difference in her sleep: on this at least 3 years., helps to stay asleep.    9. Carafate  10. Pyridostigmine - OFF now  11. Tizanidine   16. Fluorinef-   17. Maxalt for migraines with ibuprofen.  It works very well.    19. Mg-   21. Vit B2  22. Gabapentin 300 QHS - helps with sleep;  Added for hot flashes, works.  23. Amlodipine lowered to 2.5 mg at bedtime- recently- OFF now  24. Atorvastatin  25. Ibuprofen- not allowed   There were no vitals taken for this visit. - televisit    BP Readings from Last 3 Encounters:    12/17/19 112/75   09/26/19 115/81   07/23/19 102/73     Wt Readings from Last 4 Encounters:   09/26/19 64 kg (141 lb 3.2 oz)   07/23/19 62.1 kg (137 lb)   04/16/19 66.5 kg (146 lb 9.6 oz)   03/21/19 68.9 kg (152 lb)     MENTAL STATUS EXAM: The patient is interviewed via phone, oriented x3. Mood is euthymic, Good range of affect. Thought processes logical, associations are clear and goal directed. Thought content is normal. Fund of knowledge good, speech RRR, language intact, memory intact, concentration good, insight and judgment are good, gait unable to assess.    ASSESSMENT:   Anorexia nervosa, weight stable , the highest weight she has been and feels much better emotionally and physically with her current weight.  She realizes the distortion in thinking she had when low weight.    Major depressive disorder,, stable.,;   Chronic sleep problems and abnormal sleep behaviors  Recurrent syncopal episode(s), unpredictable  Migraine HA, treated  with maxalt, not currently problematic  POTS    PLAN: No change in meds.   Continue Lunesta  1  mg QHS  She will prn see her therapist, Candy but not needed for many months.    She will return again in 3 mo. . Focalin Rxs sent.   Gabapentin to 300 mg to help with sleep.   Follow-up with cardiology and neurology prn. She has neuro appt in November.  Consider sleep consult for nighttime eating.    RADHA MAURER MD     Level of Medical Decision Making:   Problems addressed: - At least 2 stable chronic problems    - Moderate due to: Engaged in prescription drug management during visit (discussed any medication benefits, side effects, alternatives, etc.)    .       PSYCHIATRY CLINIC INDIVIDUAL PSYCHOTHERAPY NOTE                                                     [16]   Start time - 110pm        End time -126pm    Date reviewed - created on 12-19-17   last update: 10/12/21  Date next due -     10-17-22  REVIEWED OVER PHONE 6-23-20 DUE TO TELE VISIT  Subjective: This supportive  psychotherapy session addressed issues related to mood and medical problems.  Patient's reaction: Maintenance in the context of mental status appropriate for ambulatory setting.  Progress: good  Plan: RTC 3 mo  Psychotherapy services during this visit included  myself and the patient.   TREATMENT  PLAN          SYMPTOMS; PROBLEMS   MEASURABLE GOALS;    FUNCTIONAL IMPROVEMENT INTERVENTIONS;   GAINS MADE DISCHARGE CRITERIA   Depression: none  Anxiety: mild, situational, parents getting older  Sleep Problems: nightmares   reduce nightmares, make a plan to manage 2-3 anxiety-provoking situations, develop 2 strategies to cope with trauma triggers/intrusive memories and take medications as prescribed on a daily basis building on strengths  communication skills  medications   psychotherapy marked symptom improvement and symptom resolution   Eating Disorder: intense fear of weight gain and distorted body image    POTS- syncopal episodes eating 3 meals/day, improvement in body image,maintain weight       Minimal episodes stress management  psychotherapy         muliple meds, cardiologist following , management of sxs marked symptom improvement

## 2021-10-28 ENCOUNTER — VIRTUAL VISIT (OUTPATIENT)
Dept: CARDIOLOGY | Facility: CLINIC | Age: 51
End: 2021-10-28
Attending: INTERNAL MEDICINE
Payer: MEDICARE

## 2021-10-28 DIAGNOSIS — I95.1 ORTHOSTATIC HYPOTENSION: ICD-10-CM

## 2021-10-28 DIAGNOSIS — R55 SYNCOPE AND COLLAPSE: ICD-10-CM

## 2021-10-28 PROCEDURE — 99215 OFFICE O/P EST HI 40 MIN: CPT | Mod: 95 | Performed by: INTERNAL MEDICINE

## 2021-10-28 NOTE — LETTER
10/28/2021      RE: Keisha Somers  [address]  [address]  [facility] [address]       Dear Colleague,    Thank you for the opportunity to participate in the care of your patient, Keisha Somers, at the [facility] HEART [facility]. Please see a copy of my visit note below.    Keisha is a 51 year old who is being evaluated via a billable telephone visit.      What phone number would you like to be contacted at? [phone]  How would you like to obtain your AVS? Mail a copy    HPI:   Keisha  is a 51-year-old woman with orthostatic hypotension associated with presumed autonomic dysfunction. Autonomic studies were not particularly abnormal.      Since her last visit Keisha was hospitalized once last June for a urinary tract/kidney infection at [facility] in [place].  At that time her Mestinon was discontinued and amlodipine was also discontinued even though her blood pressure is seem to be relatively high.    In terms of her autonomic dysfunction she continues to show immediate orthostatic hypotension and has had some falls but no injury.  She indicates that her symptoms seem to be worse in the evening hours which is somewhat unusual.  In any case we will increase her midodrine dose at 5 PM to 2 tablets (5 mg x 2) whereas it had been just 1 tablet up to this time.    Patient has not had any other illnesses and no significant injury    We will fax a note regarding the midodrine dose change to her caretaker (Cathleen)     PAST MEDICAL HISTORY:  Past Medical History:   Diagnosis Date     Anemia      Anorexia      Depression      Eating disorder      GERD (gastroesophageal reflux disease)      Hydronephrosis      Hypercholesterolemia      Hypotension      Hypotension, postural      Palpitations      POTS (postural orthostatic tachycardia syndrome)      Syncope      Syncope and collapse      Thyroid disease        CURRENT MEDICATIONS:  Current  Outpatient Medications   Medication Sig Dispense Refill     acetaminophen (TYLENOL) 500 MG tablet Take 500-1,000 mg by mouth every 6 hours as needed.       atorvastatin (LIPITOR) 10 MG tablet Take by mouth daily Per pt takes this medicine but doesn't know the amount       Blood Pressure Monitor KIT Patient should be evaluated in the emergency department if her systolic blood pressure <65 1 kit 0     Calcium Carbonate-Vitamin D (CALCIUM 600+D PO) Take 1 tablet by mouth 2 times daily.       Cholecalciferol (VITAMIN D PO) Take  by mouth. 50, 000 units every 14 days       dexmethylphenidate (FOCALIN) 10 MG tablet Take 1 tablet (10 mg) by mouth daily 30 tablet 0     docusate sodium (COLACE) 100 MG capsule Take 1 capsule by mouth 2 times daily.       eszopiclone (LUNESTA) 1 MG tablet TAKE 1 TABLET BY MOUTH AT BEDTIME 30 tablet 2     fludrocortisone (FLORINEF) 0.1 MG tablet Take 1 tablet (0.1 mg) by mouth daily 90 tablet 3     gabapentin (NEURONTIN) 100 MG capsule Take 3 capsules (300 mg) by mouth At Bedtime 90 capsule 4     gabapentin (NEURONTIN) 100 MG capsule TAKE 3 CAPSULES BY MOUTH EVERY NIGHT AT BEDTIME 90 capsule 4     lamoTRIgine (LAMICTAL) 200 MG tablet Take 2 tablets (400 mg) by mouth daily 60 tablet 4     levothyroxine (SYNTHROID, LEVOTHROID) 175 MCG tablet Take  by mouth daily.       metoclopramide (REGLAN) 10 MG tablet 2 times daily        midodrine (PROAMATINE) 5 MG tablet TAKE 2 TABLETS (10MG) EVERY MORNING TAKE 2 TABLETS (10MG) AT NOON, AND TAKE 1 TABLET (5MG) AT 5PM 450 tablet 3     multivitamin (OCUVITE) TABS Take 1 tablet by mouth 2 times daily.       omeprazole (PRILOSEC) 20 MG capsule Take 2 capsules by mouth 2 times daily.       sertraline (ZOLOFT) 100 MG tablet Take 1.5 tablets (150 mg) by mouth daily 45 tablet 4     amLODIPine (NORVASC) 5 MG tablet TAKE 1/2 TABLET (2.5MG) BY MOUTH AT BEDTIME (Patient not taking: Reported on 10/28/2021) 45 tablet 3     ibuprofen (IBU) 800 MG tablet Take 800 mg by  mouth every 8 hours as needed. (Patient not taking: Reported on 10/28/2021)       pyridostigmine (MESTINON) 60 MG tablet Take 1 tablet by mouth daily. (Patient not taking: Reported on 10/28/2021)       sucralfate (CARAFATE) 1 GM/10ML suspension Take 1 g by mouth 2 times daily. Take 10 Ml po daily (Patient not taking: Reported on 10/28/2021)         PAST SURGICAL HISTORY:  Past Surgical History:   Procedure Laterality Date     CYSTOSCOPY W/ LASER LITHOTRIPSY Left 1/23/2017    Procedure: CYSTOSCOPY, LEFT URETEROSCOPY HOLMIUM LASER LITHOTRIPSY AND LEFT STENT PLACEMENT BILATERAL RETROGRADE PYELOGRAM ;  Surgeon: Jenniffer Romero MD;  [facility] [facility]  Service:      GASTROSTOMY, INSERT TUBE, COMBINED       GT placed[  2001     IR MISCELLANEOUS PROCEDURE  6/9/2004     PICC INSERTION - DOUBLE LUMEN  6/18/2021          TONSILLECTOMY         ALLERGIES:   No Known Allergies    FAMILY HISTORY:  No family history on file.  Family history reviewed and no pertinent findings    SOCIAL HISTORY:  Social History     Tobacco Use     Smoking status: Never Smoker     Smokeless tobacco: Never Used   Substance Use Topics     Alcohol use: No     Drug use: No       ROS:   Constitutional: No fever, chills, or sweats. Weight stable.   ENT: No  ear ache, epistaxis, sore throat.   Cardiovascular: As per HPI.   Respiratory: No cough, hemoptysis.    GI: No nausea, vomiting,  : No hematuria.   Integument: Negative.   Psychiatric: Negative.   Hematologic:   no easy bleeding.  Neuro: Negative.   Endocrinology: No significant heat or cold intolerance   Musculoskeletal: No myalgia.    Exam:  There were no vitals taken for this visit.  GENERAL APPEARANCE: healthy, alert and no distress  RESPIRATORY: no rales, rhonchi or wheezes, no use of accessory muscles, no retractions, respirations are unlabored, normal respiratory rate  NEURO: alert and oriented to person/place/time, normal speech, and affect    SKIN: no ecchymoses, no  silke    Labs:  CBC RESULTS:   Lab Results   Component Value Date    WBC 14.6 (H) 06/22/2021    WBC 5.0 07/24/2017    RBC 3.18 (L) 06/22/2021    RBC 3.56 (L) 07/24/2017    HGB 9.9 (L) 06/22/2021    HGB 10.8 (L) 07/24/2017    HCT 30.4 (L) 06/22/2021    HCT 33.7 (L) 07/24/2017    MCV 96 06/22/2021    MCV 95 07/24/2017    MCH 31.1 06/22/2021    MCH 30.3 07/24/2017    MCHC 32.6 06/22/2021    MCHC 32.0 07/24/2017    RDW 13.1 06/22/2021    RDW 13.1 07/24/2017     06/22/2021     07/24/2017       BMP RESULTS:  Lab Results   Component Value Date     06/22/2021     07/24/2017    POTASSIUM 4.0 06/22/2021    POTASSIUM 3.5 07/24/2017    CHLORIDE 108 (H) 06/22/2021    CHLORIDE 106 07/24/2017    CO2 26 06/22/2021    CO2 28 07/24/2017    ANIONGAP 7 06/22/2021    ANIONGAP 8 07/24/2017    GLC 96 06/22/2021     (H) 07/24/2017    BUN 11 06/22/2021    BUN 7 07/24/2017    CR 0.82 06/22/2021    CR 0.83 07/24/2017    GFRESTIMATED >60 06/22/2021    GFRESTIMATED 74 07/24/2017    GFRESTBLACK >60 06/22/2021    GFRESTBLACK 89 07/24/2017    KENNETH 9.1 06/22/2021    KENNETH 8.7 07/24/2017        INR RESULTS:  No results found for: INR    Procedures:  PULMONARY FUNCTION TESTS:   No flowsheet data found.      ECHOCARDIOGRAM:   No results found for this or any previous visit (from the past 8760 hour(s)).      Assessment and Plan:   1.  Autonomic dysfunction with orthostatic hypotension-problems more in the evening and during the day    Plan  1.  Increase 5 PM midodrine to 10 mg  -Send a fax to. BREANNA verifying increase in midodrine dose change at [phone]    2.  Follow-up visit in 6 months    Total elapsed time today with chart review, visit and documentation 40 minutes    Patient at home; clinic [facility] heart  Platform Doximity  Telephone on 11: 32; telephone off 11: 45    I very much appreciated the opportunity to  assess Keisha Somers in the clinic today. Please do not hesitate to contact my office if you have any  questions or concerns.      Thang Galarza MD  Cardiac Arrhythmia Service  [facility]  [phone]

## 2021-10-28 NOTE — PROGRESS NOTES
Keisha is a 51 year old who is being evaluated via a billable telephone visit.      What phone number would you like to be contacted at? [phone]  How would you like to obtain your AVS? Mail a copy    HPI:   Keisha  is a 51-year-old woman with orthostatic hypotension associated with presumed autonomic dysfunction. Autonomic studies were not particularly abnormal.      Since her last visit Keisha was hospitalized once last June for a urinary tract/kidney infection at [facility] in [place].  At that time her Mestinon was discontinued and amlodipine was also discontinued even though her blood pressure is seem to be relatively high.    In terms of her autonomic dysfunction she continues to show immediate orthostatic hypotension and has had some falls but no injury.  She indicates that her symptoms seem to be worse in the evening hours which is somewhat unusual.  In any case we will increase her midodrine dose at 5 PM to 2 tablets (5 mg x 2) whereas it had been just 1 tablet up to this time.    Patient has not had any other illnesses and no significant injury    We will fax a note regarding the midodrine dose change to her caretaker (Cathleen)     PAST MEDICAL HISTORY:  Past Medical History:   Diagnosis Date     Anemia      Anorexia      Depression      Eating disorder      GERD (gastroesophageal reflux disease)      Hydronephrosis      Hypercholesterolemia      Hypotension      Hypotension, postural      Palpitations      POTS (postural orthostatic tachycardia syndrome)      Syncope      Syncope and collapse      Thyroid disease        CURRENT MEDICATIONS:  Current Outpatient Medications   Medication Sig Dispense Refill     acetaminophen (TYLENOL) 500 MG tablet Take 500-1,000 mg by mouth every 6 hours as needed.       atorvastatin (LIPITOR) 10 MG tablet Take by mouth daily Per pt takes this medicine but doesn't know the amount       Blood Pressure Monitor KIT Patient should be evaluated in the emergency department  if her systolic blood pressure <65 1 kit 0     Calcium Carbonate-Vitamin D (CALCIUM 600+D PO) Take 1 tablet by mouth 2 times daily.       Cholecalciferol (VITAMIN D PO) Take  by mouth. 50, 000 units every 14 days       dexmethylphenidate (FOCALIN) 10 MG tablet Take 1 tablet (10 mg) by mouth daily 30 tablet 0     docusate sodium (COLACE) 100 MG capsule Take 1 capsule by mouth 2 times daily.       eszopiclone (LUNESTA) 1 MG tablet TAKE 1 TABLET BY MOUTH AT BEDTIME 30 tablet 2     fludrocortisone (FLORINEF) 0.1 MG tablet Take 1 tablet (0.1 mg) by mouth daily 90 tablet 3     gabapentin (NEURONTIN) 100 MG capsule Take 3 capsules (300 mg) by mouth At Bedtime 90 capsule 4     gabapentin (NEURONTIN) 100 MG capsule TAKE 3 CAPSULES BY MOUTH EVERY NIGHT AT BEDTIME 90 capsule 4     lamoTRIgine (LAMICTAL) 200 MG tablet Take 2 tablets (400 mg) by mouth daily 60 tablet 4     levothyroxine (SYNTHROID, LEVOTHROID) 175 MCG tablet Take  by mouth daily.       metoclopramide (REGLAN) 10 MG tablet 2 times daily        midodrine (PROAMATINE) 5 MG tablet TAKE 2 TABLETS (10MG) EVERY MORNING TAKE 2 TABLETS (10MG) AT NOON, AND TAKE 1 TABLET (5MG) AT 5PM 450 tablet 3     multivitamin (OCUVITE) TABS Take 1 tablet by mouth 2 times daily.       omeprazole (PRILOSEC) 20 MG capsule Take 2 capsules by mouth 2 times daily.       sertraline (ZOLOFT) 100 MG tablet Take 1.5 tablets (150 mg) by mouth daily 45 tablet 4     amLODIPine (NORVASC) 5 MG tablet TAKE 1/2 TABLET (2.5MG) BY MOUTH AT BEDTIME (Patient not taking: Reported on 10/28/2021) 45 tablet 3     ibuprofen (IBU) 800 MG tablet Take 800 mg by mouth every 8 hours as needed. (Patient not taking: Reported on 10/28/2021)       pyridostigmine (MESTINON) 60 MG tablet Take 1 tablet by mouth daily. (Patient not taking: Reported on 10/28/2021)       sucralfate (CARAFATE) 1 GM/10ML suspension Take 1 g by mouth 2 times daily. Take 10 Ml po daily (Patient not taking: Reported on 10/28/2021)         PAST  SURGICAL HISTORY:  Past Surgical History:   Procedure Laterality Date     CYSTOSCOPY W/ LASER LITHOTRIPSY Left 1/23/2017    Procedure: CYSTOSCOPY, LEFT URETEROSCOPY HOLMIUM LASER LITHOTRIPSY AND LEFT STENT PLACEMENT BILATERAL RETROGRADE PYELOGRAM ;  Surgeon: Jenniffer Romero MD;  [place] [facility]  Service:      GASTROSTOMY, INSERT TUBE, COMBINED       GT placed[  2001     IR MISCELLANEOUS PROCEDURE  6/9/2004     PICC INSERTION - DOUBLE LUMEN  6/18/2021          TONSILLECTOMY         ALLERGIES:   No Known Allergies    FAMILY HISTORY:  No family history on file.  Family history reviewed and no pertinent findings    SOCIAL HISTORY:  Social History     Tobacco Use     Smoking status: Never Smoker     Smokeless tobacco: Never Used   Substance Use Topics     Alcohol use: No     Drug use: No       ROS:   Constitutional: No fever, chills, or sweats. Weight stable.   ENT: No  ear ache, epistaxis, sore throat.   Cardiovascular: As per HPI.   Respiratory: No cough, hemoptysis.    GI: No nausea, vomiting,  : No hematuria.   Integument: Negative.   Psychiatric: Negative.   Hematologic:   no easy bleeding.  Neuro: Negative.   Endocrinology: No significant heat or cold intolerance   Musculoskeletal: No myalgia.    Exam:  There were no vitals taken for this visit.  GENERAL APPEARANCE: healthy, alert and no distress  RESPIRATORY: no rales, rhonchi or wheezes, no use of accessory muscles, no retractions, respirations are unlabored, normal respiratory rate  NEURO: alert and oriented to person/place/time, normal speech, and affect    SKIN: no ecchymoses, no rashes    Labs:  CBC RESULTS:   Lab Results   Component Value Date    WBC 14.6 (H) 06/22/2021    WBC 5.0 07/24/2017    RBC 3.18 (L) 06/22/2021    RBC 3.56 (L) 07/24/2017    HGB 9.9 (L) 06/22/2021    HGB 10.8 (L) 07/24/2017    HCT 30.4 (L) 06/22/2021    HCT 33.7 (L) 07/24/2017    MCV 96 06/22/2021    MCV 95 07/24/2017    MCH 31.1 06/22/2021    MCH 30.3 07/24/2017     MCHC 32.6 06/22/2021    MCHC 32.0 07/24/2017    RDW 13.1 06/22/2021    RDW 13.1 07/24/2017     06/22/2021     07/24/2017       BMP RESULTS:  Lab Results   Component Value Date     06/22/2021     07/24/2017    POTASSIUM 4.0 06/22/2021    POTASSIUM 3.5 07/24/2017    CHLORIDE 108 (H) 06/22/2021    CHLORIDE 106 07/24/2017    CO2 26 06/22/2021    CO2 28 07/24/2017    ANIONGAP 7 06/22/2021    ANIONGAP 8 07/24/2017    GLC 96 06/22/2021     (H) 07/24/2017    BUN 11 06/22/2021    BUN 7 07/24/2017    CR 0.82 06/22/2021    CR 0.83 07/24/2017    GFRESTIMATED >60 06/22/2021    GFRESTIMATED 74 07/24/2017    GFRESTBLACK >60 06/22/2021    GFRESTBLACK 89 07/24/2017    KENNETH 9.1 06/22/2021    KENNETH 8.7 07/24/2017        INR RESULTS:  No results found for: INR    Procedures:  PULMONARY FUNCTION TESTS:   No flowsheet data found.      ECHOCARDIOGRAM:   No results found for this or any previous visit (from the past 8760 hour(s)).      Assessment and Plan:   1.  Autonomic dysfunction with orthostatic hypotension-problems more in the evening and during the day    Plan  1.  Increase 5 PM midodrine to 10 mg  -Send a fax toSyed CARLOS verifying increase in midodrine dose change at [phone]    2.  Follow-up visit in 6 months    Total elapsed time today with chart review, visit and documentation 40 minutes    Patient at home; clinic [facility] heart  Platform Doximity  Telephone on 11: 32; telephone off 11: 45    I very much appreciated the opportunity to  assess Keisha Somers in the clinic today. Please do not hesitate to contact my office if you have any questions or concerns.      Thang Galarza MD  Cardiac Arrhythmia Service  [facility]  [phone]      CC  SELF, REFERRED

## 2021-10-28 NOTE — PATIENT INSTRUCTIONS
You were seen in the Electrophysiology Clinic today by: Dr Galarza    Plan:       Medication Changes:     INCREASE Midodrine to 10mg morning, noon, and 5pm      Follow up visit:    6 months with Dr Galarza        Your Care Team:  [facility] Cardiology   Telephone Number     Nurse Line  Rebeca Schroeder RN  [phone]     For scheduling appts or procedures:    Etelvina Donnelly   [phone]   For the Device Clinic (Pacemakers, ICDs, Loop Recorders)    During business hours: [phone]  After business hours:   [phone]- select option 4 and ask for job code [id number].     On-call cardiologist for after hours or on weekends: [phone], option #4, and ask to speak to the on-call cardiologist.     Cardiovascular Clinic:   [address] [place] [address]. [place] [address]      As always, Thank you for trusting us with your health care needs!

## 2021-12-21 ENCOUNTER — TELEPHONE (OUTPATIENT)
Dept: CARDIOLOGY | Facility: CLINIC | Age: 51
End: 2021-12-21
Payer: MEDICARE

## 2021-12-21 DIAGNOSIS — G90.A POSTURAL ORTHOSTATIC TACHYCARDIA SYNDROME: ICD-10-CM

## 2021-12-21 NOTE — TELEPHONE ENCOUNTER
[facility] Call Center    Phone Message    May a detailed message be left on voicemail: yes     Reason for Call: Order(s): Home Care Orders: Other: midodrine (PROAMATINE) 5 MG tablet needing directions. Please call and discuss. Thank you!    Action Taken: Message routed to:  [facility] ([facility]): Cardio    Travel Screening: Not Applicable

## 2021-12-22 RX ORDER — MIDODRINE HYDROCHLORIDE 5 MG/1
10 TABLET ORAL 3 TIMES DAILY
Qty: 180 TABLET | Refills: 11 | Status: SHIPPED | OUTPATIENT
Start: 2021-12-22 | End: 2022-11-07

## 2021-12-22 NOTE — TELEPHONE ENCOUNTER
Called [facility] pharmacy, they are requesting a new RX be sent to them with the new dosing of 10mg TID.     rx sent     Rebeca Schroeder RN on 12/22/2021 at 10:22 AM

## 2022-01-04 ENCOUNTER — OFFICE VISIT (OUTPATIENT)
Dept: PSYCHIATRY | Facility: CLINIC | Age: 52
End: 2022-01-04
Attending: PSYCHIATRY & NEUROLOGY
Payer: MEDICARE

## 2022-01-04 VITALS — HEART RATE: 123 BPM | SYSTOLIC BLOOD PRESSURE: 107 MMHG | DIASTOLIC BLOOD PRESSURE: 77 MMHG

## 2022-01-04 DIAGNOSIS — F51.01 PRIMARY INSOMNIA: ICD-10-CM

## 2022-01-04 DIAGNOSIS — F33.1 MAJOR DEPRESSIVE DISORDER, RECURRENT EPISODE, MODERATE (H): ICD-10-CM

## 2022-01-04 PROCEDURE — 99214 OFFICE O/P EST MOD 30 MIN: CPT | Performed by: PSYCHIATRY & NEUROLOGY

## 2022-01-04 PROCEDURE — 90833 PSYTX W PT W E/M 30 MIN: CPT | Performed by: PSYCHIATRY & NEUROLOGY

## 2022-01-04 PROCEDURE — G0463 HOSPITAL OUTPT CLINIC VISIT: HCPCS

## 2022-01-04 RX ORDER — DEXMETHYLPHENIDATE HYDROCHLORIDE 10 MG/1
10 TABLET ORAL DAILY
Qty: 30 TABLET | Refills: 0 | Status: SHIPPED | OUTPATIENT
Start: 2022-01-04 | End: 2022-02-15

## 2022-01-04 RX ORDER — DEXMETHYLPHENIDATE HYDROCHLORIDE 10 MG/1
10 TABLET ORAL DAILY
Qty: 30 TABLET | Refills: 0 | Status: SHIPPED | OUTPATIENT
Start: 2022-01-04 | End: 2022-01-04

## 2022-01-04 RX ORDER — GABAPENTIN 100 MG/1
CAPSULE ORAL
Qty: 120 CAPSULE | Refills: 4 | Status: SHIPPED | OUTPATIENT
Start: 2022-01-04 | End: 2022-02-15

## 2022-01-04 ASSESSMENT — PAIN SCALES - GENERAL: PAINLEVEL: NO PAIN (0)

## 2022-01-04 NOTE — PROGRESS NOTES
"            Progress Notes   Romana Tena MD (Physician)     Psychiatry       PSYCHIATRY VISIT      The patient was seen for evaluation and management.The patient was seen for anorexia and depression.   She was last seen 3mo ago    INTERIM HX:At last visit the plan was:  Continue Lunesta  1  mg    She will return again in 3 mo. .  Focalin Rxs sent.   Gabapentin to 300 mg to help with sleep.   Follow-up with cardiology and neurology prn.  Consider sleep consult for nighttime eating.    Patient reports that covid has really affected her place of residence. There are no staff remaining except for supervisors.  Her staff person left. Patient is essentially independent and does her own meds,but is required to do this under supervision.      Mood has been ok; she is very frustrated with covid. She is frustrated with her residence and how it is run. She no longer has a  but does have a [employer] worker.  Patient has never met her..    Energy is ok  Eating is a \"nightmare\" particulary night eating. She gets up in the am and there is food everywhere. She has gained 30#.    No migraines since hospitalization.      Overall, she is doing pretty well. Another change for the patient is the designation of her place as assisted living.     Her physical health is ok, no problems since her hospitalization in June for pneumonia. No new changes in meds since last visit.     Mood has been pretty good;  She has been stressed with changes. Sleep is hit or miss.  She realizes that the stressors affect her sleep.  Energy is pretty good. Concentration is a little lacking. Eating has been going well;  She is probably at the highest weight ever but she feels good physically. She now realizes how her thinking was off at low weight.      Her family has been supportive . It has been 2 years since she went to their cabin.      MEDICAL Update:  As above;   ROS: HA-migraine stable    SH: She has a [employer] worker- never met her. She is not " responsive .     MEDICATIONS:   1. Lamictal 400 mg daily am  2. Zoloft 150 mg daily am   3. Focalin 10 mg daily. Beneficial for energy, mood and no indications of problematic use.   4. Synthroid 150 mcg - she is uncertain of dose.    5. Zofran prn migraines  7. Mitodrine 10/10/10 tid - changed from Dr. Galarza   8. Lunesta 1 mg - Lunesta has made a huge difference in her sleep: on this at least 3 years., helps to stay asleep but  Causing nighttime eating  9. Carafate  10. Pyridostigmine - OFF now  11. Tizanidine   16. Fluorinef-   17. Maxalt for migraines with ibuprofen.  It works very well.    19. Mg-   21. Vit B2  22. Gabapentin 300 QHS - helps with sleep;  Added for hot flashes, works.  23. Amlodipine lowered to 2.5 mg at bedtime- recently- OFF now  24. Atorvastatin  25. Ibuprofen- not allowed   /77   Pulse (!) 123  - televisit    BP Readings from Last 3 Encounters:   01/04/22 107/77   12/17/19 112/75   09/26/19 115/81     Wt Readings from Last 4 Encounters:   09/26/19 64 kg (141 lb 3.2 oz)   07/23/19 62.1 kg (137 lb)   04/16/19 66.5 kg (146 lb 9.6 oz)   03/21/19 68.9 kg (152 lb)     MENTAL STATUS EXAM: The patient is interviewed via phone, oriented x3. Mood is euthymic, Good range of affect. Thought processes logical, associations are clear and goal directed. Thought content is normal. Fund of knowledge good, speech RRR, language intact, memory intact, concentration good, insight and judgment are good, gait unable to assess.    ASSESSMENT:   Anorexia nervosa, weight stable , the highest weight she has been and feels much better emotionally and physically with her current weight but we are concerned about the nighttime eating.      Major depressive disorder,, stable.,;   Chronic sleep problems and abnormal sleep behaviors  Recurrent syncopal episode(s), unpredictable  Migraine HA, treated  with maxalt, not currently problematic  POTS    PLAN:    Taper off  Lunesta by decreasing dose to 1/2 of 1 mg tab for  2 weeks then discontinue with goal to decrease nighttime eating and weight gain.  She will return again in 6 weeks. .   Focalin Rxs sent.   Gabapentin - may take 100-600 mg at bedtime prn for sleep  Follow-up with cardiology and neurology prn.  Consider sleep consult for nighttime eating.    RADHA MAURER MD     Level of Medical Decision Making:   Problems addressed: - At least 2 stable chronic problems and additional unstable problem (sleep)    - Moderate due to: Engaged in prescription drug management during visit (discussed any medication benefits, side effects, alternatives, etc.)    .       PSYCHIATRY CLINIC INDIVIDUAL PSYCHOTHERAPY NOTE                                                     [16]   Start time - 200pm        End time -216pm    Date reviewed - created on 12-19-17   last update: 10/12/21  Date next due -     10-17-22  REVIEWED OVER PHONE 6-23-20 DUE TO TELE VISIT  Subjective: This supportive psychotherapy session addressed issues related to mood and medical problems.  Patient's reaction: Maintenance in the context of mental status appropriate for ambulatory setting.  Progress: good  Plan: RTC 3 mo  Psychotherapy services during this visit included  myself and the patient.   TREATMENT  PLAN          SYMPTOMS; PROBLEMS   MEASURABLE GOALS;    FUNCTIONAL IMPROVEMENT INTERVENTIONS;   GAINS MADE DISCHARGE CRITERIA   Depression: none  Anxiety: mild, situational, parents getting older  Sleep Problems: nightmares   reduce nightmares, make a plan to manage 2-3 anxiety-provoking situations, develop 2 strategies to cope with trauma triggers/intrusive memories and take medications as prescribed on a daily basis building on strengths  communication skills  medications   psychotherapy marked symptom improvement and symptom resolution   Eating Disorder: intense fear of weight gain and distorted body image    POTS- syncopal episodes eating 3 meals/day, improvement in body image,maintain weight       Minimal  episodes stress management  psychotherapy         muliple meds, cardiologist following , management of sxs marked symptom improvement

## 2022-01-04 NOTE — PATIENT INSTRUCTIONS
MEDICATION CHANGES TODAY:  Taper off Lunesta:  Take 1/2 of 1 mg tablet at bedtime for 2 weeks then stop.  Gabapentin:  May take 1-6 of the 100 mg. At bedtime prn.      **For crisis resources, please see the information at the end of this document**   Patient Education    Thank you for coming to the [facility] MENTAL HEALTH & ADDICTION [place] [facility].    Lab Testing:  If you had lab testing today and your results are reassuring or normal they will be mailed to you or sent through [employer] within 7 days. If the lab tests need quick action we will call you with the results. The phone number we will call with results is # [phone] (home) . If this is not the best number please call our clinic and change the number.    Medication Refills:  If you need any refills please call your pharmacy and they will contact us. Our fax number for refills is [phone]. Please allow three business for refill processing. If you need to  your refill at a new pharmacy, please contact the new pharmacy directly. The new pharmacy will help you get your medications transferred.     Scheduling:  If you have any concerns about today's visit or wish to schedule another appointment please call our office during normal business hours [phone] (8-5:00 M-F)    Contact Us:  Please call [phone] during business hours (8-5:00 M-F).  If after clinic hours, or on the weekend, please call  [phone].    Financial Assistance [phone]  [facility] Billing [phone]  Central Billing Office, [facility]: [phone]  [facility] Billing [phone]  Medical Records [phone]  [facility] Patient Bill of Rights [email or web address]       MENTAL HEALTH CRISIS NUMBERS:  For a medical emergency please call  911 or go to the nearest ER.     [facility]:   [facility] -[phone]   Crisis Residence [facility] -[phone]    Walk-In Counseling Center \[facility] [place] [facility]\"" -[phone]   COPE 24/7 [facility] Mobile Team -[phone] (adults)/[phone] (child)  CHILD: [facility] needs assessment team - [phone]      [facility]:   [facility] - [phone]   Walk-in counseling [facility] - [phone]   Walk-in counseling [facility] - [phone]   Crisis Residence [facility] - [phone]  Urgent Care Adult Mental Tdkkeh-[phone] mobile unit/ 24/7 crisis line    National Crisis Numbers:   National Suicide Prevention Lifeline: 8-731-450-TALK (902-602-2927)  Poison Control Center - 1-301.932.5625  CleanMyCRM/resources for a list of additional resources (SOS)  Trans Lifeline a hotline for transgender people 1-204.563.5831  The [employer] Project a hotline for LGBT youth 1-206.400.9856  Crisis Text Line: For any crisis 24/7   To: 905058  see www.crisistextline.org  - IF MAKING A CALL FEELS TOO HARD, send a text!         Again thank you for choosing [facility] MENTAL HEALTH & ADDICTION [facility] and please let us know how we can best partner with you to improve you and your family's health.    You may be receiving a survey regarding this appointment. We would love to have your feedback, both positive and negative. The survey is done by an external company, so your answers are anonymous.

## 2022-01-07 DIAGNOSIS — F51.01 PRIMARY INSOMNIA: ICD-10-CM

## 2022-01-07 RX ORDER — ESZOPICLONE 1 MG/1
TABLET, FILM COATED ORAL
Qty: 30 TABLET | Refills: 2 | Status: CANCELLED | OUTPATIENT
Start: 2022-01-07

## 2022-01-07 NOTE — TELEPHONE ENCOUNTER
Last Seen 1/4/2022  RTC 3 months  Cancel 0  No-Show 0    Next Appt 2/15/2022 (Telephone)    Incoming Refill From [facility] via Fax    Medication Requested   eszopiclone (LUNESTA) 1 MG tablet    Directions   Sig: TAKE 1 TABLET BY MOUTH AT BEDTIME    Qty 30    Last Refill 12/13/2021      Medication Refill Pended Per Refill Protocol

## 2022-01-12 ASSESSMENT — PATIENT HEALTH QUESTIONNAIRE - PHQ9: SUM OF ALL RESPONSES TO PHQ QUESTIONS 1-9: 6

## 2022-01-13 DIAGNOSIS — F51.01 PRIMARY INSOMNIA: ICD-10-CM

## 2022-01-18 NOTE — TELEPHONE ENCOUNTER
Last seen: 1/4  RTC: 3 months   Cancel: none   No-show: none  Next appt: 2/15    Incoming refill from pharmacy via Rx Auth      Disp Refills Start End YUMIKO   eszopiclone (LUNESTA) 1 MG tablet 30 tablet 2 10/12/2021  No   Sig: TAKE 1 TABLET BY MOUTH AT BEDTIME   Sent to pharmacy as: Eszopiclone 1 MG Oral Tablet (LUNESTA)   Class: E-Prescribe   Order: [id number]   E-Prescribing Status: Receipt confirmed by pharmacy (10/12/2021  1:29 PM CDT)   Per  last refilled: 12/13 #30, 11/15 #30, 10/14 #30    Will route to provider for approval

## 2022-01-19 RX ORDER — ESZOPICLONE 1 MG/1
TABLET, FILM COATED ORAL
Qty: 30 TABLET | Refills: 0 | Status: SHIPPED | OUTPATIENT
Start: 2022-01-19 | End: 2022-02-15

## 2022-02-01 ENCOUNTER — TELEPHONE (OUTPATIENT)
Dept: PSYCHIATRY | Facility: CLINIC | Age: 52
End: 2022-02-01
Payer: MEDICARE

## 2022-02-01 NOTE — TELEPHONE ENCOUNTER
Per chart review, pt was seen by Dr. Tena on 1/4/22. Per note:  PLAN:    Taper off  Lunesta by decreasing dose to 1/2 of 1 mg tab for 2 weeks then discontinue with goal to decrease nighttime eating and weight gain.    Per medication tab, medication was refilled on 1/19/22.    Per conversation with Dr. Tena, medication is to be discontinued.     Phone call to  ([employer]): No answer. Will make additional attempt.

## 2022-02-01 NOTE — TELEPHONE ENCOUNTER
[facility] Call Center    Phone Message    May a detailed message be left on voicemail: yes     Reason for Call: Medication Question or concern regarding medication   Prescription Clarification  Name of Medication: Eszopiclone  Prescribing Provider: Darinel   Pharmacy: [id number] - [place], [place] - [address] [place] [address] [place]     What on the order needs clarification? RN from [facility] calling regarding refill request from [facility]. [facility] states pt is still taking this medication, while assisted living has orders dc'd. Looking for clarification.           Action Taken: Message routed to:  Other: nursing pool    Travel Screening: Not Applicable

## 2022-02-01 NOTE — TELEPHONE ENCOUNTER
Attempted to reach  nursing staff for the second time. No answer; left DVM explaining the plan to taper and discontinue Lunesta as per 1/4/22 appointment. Instructed  to return phone call to clinic should they have any additional questions or concerns.

## 2022-02-15 ENCOUNTER — VIRTUAL VISIT (OUTPATIENT)
Dept: PSYCHIATRY | Facility: CLINIC | Age: 52
End: 2022-02-15
Attending: PSYCHIATRY & NEUROLOGY
Payer: MEDICARE

## 2022-02-15 DIAGNOSIS — F33.0 MAJOR DEPRESSIVE DISORDER, RECURRENT EPISODE, MILD (H): ICD-10-CM

## 2022-02-15 DIAGNOSIS — F51.01 PRIMARY INSOMNIA: ICD-10-CM

## 2022-02-15 DIAGNOSIS — F33.1 MAJOR DEPRESSIVE DISORDER, RECURRENT EPISODE, MODERATE (H): ICD-10-CM

## 2022-02-15 PROCEDURE — 99443 PR PHYSICIAN TELEPHONE EVALUATION 21-30 MIN: CPT | Mod: 95 | Performed by: PSYCHIATRY & NEUROLOGY

## 2022-02-15 RX ORDER — GABAPENTIN 300 MG/1
600 CAPSULE ORAL AT BEDTIME
Qty: 60 CAPSULE | Refills: 4 | Status: SHIPPED | OUTPATIENT
Start: 2022-02-15 | End: 2022-04-19

## 2022-02-15 RX ORDER — DEXMETHYLPHENIDATE HYDROCHLORIDE 10 MG/1
10 TABLET ORAL DAILY
Qty: 30 TABLET | Refills: 0 | Status: SHIPPED | OUTPATIENT
Start: 2022-02-15 | End: 2022-02-15

## 2022-02-15 RX ORDER — LAMOTRIGINE 200 MG/1
400 TABLET ORAL DAILY
Qty: 60 TABLET | Refills: 4 | Status: SHIPPED | OUTPATIENT
Start: 2022-02-15 | End: 2022-07-15

## 2022-02-15 RX ORDER — SERTRALINE HYDROCHLORIDE 100 MG/1
150 TABLET, FILM COATED ORAL DAILY
Qty: 45 TABLET | Refills: 4 | Status: SHIPPED | OUTPATIENT
Start: 2022-02-15 | End: 2022-07-19

## 2022-02-15 RX ORDER — DEXMETHYLPHENIDATE HYDROCHLORIDE 10 MG/1
10 TABLET ORAL DAILY
Qty: 30 TABLET | Refills: 0 | Status: SHIPPED | OUTPATIENT
Start: 2022-02-15 | End: 2022-04-19

## 2022-02-15 NOTE — PROGRESS NOTES
TELEPHONE VISIT  Keisha Somers is a 52 year old pt. who is being evaluated via a billable telephone visit.      The patient has been notified of the following:    We have found that certain health care needs can be provided without the need for a physical exam. This service lets us provide the care you need with a short phone conversation. If a prescription is necessary we can send it directly to your pharmacy. If lab work is needed we can place an order for that and you can then stop by our lab to have the test done at a later time. Insurers are generally covering virtual visits as they would in-office visits so billing should not be different than normal.  If for some reason you do get billed incorrectly, you should contact the billing office to correct it and that number is in the AVS .    Patient has given verbal consent for a telephone visit?:  Yes   How would the pt like to obtain the AVS?:  Patient declined  AVS SmartPhrase [PsychAVS] has been placed in 'Patient Instructions':  Yes     Start Time: 430 pm      End Time:  500pm                Progress Notes   Romana Tena MD (Physician)     Psychiatry       PSYCHIATRY VISIT      The patient was seen for evaluation and management.The patient was seen for anorexia and depression.   She was last seen 1 mo ago    INTERIM HX:At last visit the plan was:  Taper off  Lunesta by decreasing dose to 1/2 of 1 mg tab for 2 weeks then discontinue with goal to decrease nighttime eating and weight gain.  She will return again in 6 weeks. .   Focalin Rxs sent.   Gabapentin - may take 100-600 mg at bedtime prn for sleep  Follow-up with cardiology and neurology prn.  Consider sleep consult for nighttime eating.    The Lunesta change was because of the nighttime eating. Patient reports that it is going pretty well. She is maybe waking a bit more but able to go back to sleep and is rested when she wakes.  The nighttime eating has not gotten any better though. She thinks that  "her body thinks that it is am but she is not really awake. She falls asleep at 1030pm and wakes in 45 min.  Waking occurs several x/night.  She is not tired during the day. At last visit, she reported   Eating is a \"nightmare\" particulary night eating. She gets up in the am and there is food everywhere. She has gained 30#.    Patient increased gabapentin to 600 at bedtime.  Sleep is more restful with it.      Mood good, energy good, motivation all good. Eating fine. Usually eats in am then again at night.      At her residence only 2 main staff remaining.  Patient sees the nurse once weekly; this has been ok. CADI worker not involved. She gets out with her parents and family.     No migraines since hospitalization for kidney problem last June 2021.  No migraines since hospitalization.      Overall, she is doing pretty well. Another change for the patient is the designation of her place as assisted living.     Her physical health is ok, no problems since her hospitalization in June for pneumonia. No new changes in meds since last visit.     MEDICAL Update:  As above;   ROS: HA-migraine stable    SH: She has a CADI worker- never met her. She is not responsive .     MEDICATIONS:   1. Lamictal 400 mg daily am  2. Zoloft 150 mg daily am   3. Focalin 10 mg daily. Beneficial for energy, mood and no indications of problematic use.   4. Synthroid 150 mcg - she is uncertain of dose.    5. Zofran prn migraines  7. Mitodrine 10/10/10 tid - changed from Dr. Galarza   8. Lunesta -dc'd  9. Carafate  10. Pyridostigmine - OFF now  11. Tizanidine - 3x/day, last dose 9pm for migraines  16. Fluorinef-   17. Maxalt for migraines with ibuprofen.  It works very well.    19. Mg-   21. Vit B2  22. Gabapentin 600 QHS - helps with sleep;  Added for hot flashes, works.  23. Amlodipine lowered to 2.5 mg at bedtime- recently- OFF now  24. Atorvastatin  25. Ibuprofen- not allowed   There were no vitals taken for this visit. - televisit    BP " Readings from Last 3 Encounters:   01/04/22 107/77   12/17/19 112/75   09/26/19 115/81     Wt Readings from Last 4 Encounters:   09/26/19 64 kg (141 lb 3.2 oz)   07/23/19 62.1 kg (137 lb)   04/16/19 66.5 kg (146 lb 9.6 oz)   03/21/19 68.9 kg (152 lb)     MENTAL STATUS EXAM: The patient is interviewed via phone, oriented x3. Mood is euthymic, Good range of affect. Thought processes logical, associations are clear and goal directed. Thought content is normal. Fund of knowledge good, speech RRR, language intact, memory intact, concentration good, insight and judgment are good, gait unable to assess.    ASSESSMENT:   Anorexia nervosa, weight stable , the highest weight she has been and feels much better emotionally and physically with her current weight but  concerned about the nighttime eating.      Major depressive disorder,, stable.,;   Chronic sleep problems and abnormal sleep behaviors  Recurrent syncopal episode(s), unpredictable  Migraine HA, treated  with maxalt, not currently problematic  POTS    PLAN:  She will return again in 8 weeks. .   Focalin Rxs sent.   Gabapentin -600 mg at bedtime  for sleep  Follow-up with cardiology and neurology prn.  Recommend sleep consult for disordered nighttime eating. She should discuss with PCP to get referral.    RADHA MAURER MD     Level of Medical Decision Making:   Problems addressed: - At least 2 stable chronic problems and additional unstable problem (sleep)    - Moderate due to: Engaged in prescription drug management during visit (discussed any medication benefits, side effects, alternatives, etc.)    .       PSYCHIATRY CLINIC INDIVIDUAL PSYCHOTHERAPY NOTE                                                     [16]   Start time - 430pm        End time -446pm    Date reviewed - created on 12-19-17   last update: 10/12/21  Date next due -     10-17-22  REVIEWED OVER PHONE 6-23-20 DUE TO TELE VISIT  Subjective: This supportive psychotherapy session addressed issues  related to mood and medical problems.  Patient's reaction: Maintenance in the context of mental status appropriate for ambulatory setting.  Progress: good  Plan: RTC 2 mo  Psychotherapy services during this visit included  myself and the patient.   TREATMENT  PLAN          SYMPTOMS; PROBLEMS   MEASURABLE GOALS;    FUNCTIONAL IMPROVEMENT INTERVENTIONS;   GAINS MADE DISCHARGE CRITERIA   Depression: none  Anxiety: mild, situational, parents getting older  Sleep Problems: nightmares   reduce nightmares, make a plan to manage 2-3 anxiety-provoking situations, develop 2 strategies to cope with trauma triggers/intrusive memories and take medications as prescribed on a daily basis building on strengths  communication skills  medications   psychotherapy marked symptom improvement and symptom resolution   Eating Disorder: intense fear of weight gain and distorted body image    POTS- syncopal episodes eating 3 meals/day, improvement in body image,maintain weight       Minimal episodes stress management  psychotherapy         muliple meds, cardiologist following , management of sxs marked symptom improvement

## 2022-04-07 ENCOUNTER — VIRTUAL VISIT (OUTPATIENT)
Dept: CARDIOLOGY | Facility: CLINIC | Age: 52
End: 2022-04-07
Attending: INTERNAL MEDICINE
Payer: MEDICARE

## 2022-04-07 DIAGNOSIS — G90.A POTS (POSTURAL ORTHOSTATIC TACHYCARDIA SYNDROME): ICD-10-CM

## 2022-04-07 DIAGNOSIS — I95.9 HYPOTENSION, UNSPECIFIED HYPOTENSION TYPE: ICD-10-CM

## 2022-04-07 DIAGNOSIS — I95.1 ORTHOSTATIC HYPOTENSION: ICD-10-CM

## 2022-04-07 PROCEDURE — 99214 OFFICE O/P EST MOD 30 MIN: CPT | Mod: 95 | Performed by: INTERNAL MEDICINE

## 2022-04-07 RX ORDER — FLUDROCORTISONE ACETATE 0.1 MG/1
0.2 TABLET ORAL DAILY
Qty: 90 TABLET | Refills: 3 | Status: SHIPPED | OUTPATIENT
Start: 2022-04-07 | End: 2022-09-12

## 2022-04-07 NOTE — PROGRESS NOTES
Keisha is a 52 year old who is being evaluated via a billable telephone visit.      What phone number would you like to be contacted at? [phone]  How would you like to obtain your AVS? Mail a copy or fax to [phone]    Ronald Cortez, Visit Avery/MA.    HPI:     Keisha  is a 51-year-old woman with orthostatic hypotension associated with presumed autonomic dysfunction.     In terms of symptoms she continues to experience orthostatic hypotension and has had some falls but no injury.  These tend to occur primarily at night after she gets up from a seated or supine position and walk 10 to 12 feet.      She wears constrictive undergarments during the day but not at night and this may contribute.  Potentially we could improve the situation by adding on increased dose of fludrocortisone and I will arrange for that.  I have also advocated for her to use a walker at night to reduce falls risk..    Patient is not complaining of any other cardiovascular symptoms at the present time.  She notes that on good days her blood pressure typically is 120 systolic but on certain days her blood pressure will be low in the 80s.  She is unaware of any factors that contribute although it is likely that hydration and electrolyte intake may vary from time to time.  I advised her that when her blood pressure is low to increase her daily salt and electrolyte intake today.  Additional dose of fludrocortisone may also help.      Please voiced understanding of the above and agrees to the increase fludrocortisone.  We will reassess in the clinic in a few months time.    PAST MEDICAL HISTORY:  Past Medical History:   Diagnosis Date     Anemia      Anorexia      Depression      Eating disorder      GERD (gastroesophageal reflux disease)      Hydronephrosis      Hypercholesterolemia      Hypotension      Hypotension, postural      Palpitations      POTS (postural orthostatic tachycardia syndrome)      Syncope      Syncope and collapse       Thyroid disease        CURRENT MEDICATIONS:  Current Outpatient Medications   Medication Sig Dispense Refill     acetaminophen (TYLENOL) 500 MG tablet Take 500-1,000 mg by mouth every 6 hours as needed.       atorvastatin (LIPITOR) 10 MG tablet Take by mouth daily Per pt takes this medicine but doesn't know the amount       Blood Pressure Monitor KIT Patient should be evaluated in the emergency department if her systolic blood pressure <65 1 kit 0     Calcium Carbonate-Vitamin D (CALCIUM 600+D PO) Take 1 tablet by mouth 2 times daily.       Cholecalciferol (VITAMIN D PO) Take  by mouth. 50, 000 units every 14 days       dexmethylphenidate (FOCALIN) 10 MG tablet Take 1 tablet (10 mg) by mouth daily 30 tablet 0     docusate sodium (COLACE) 100 MG capsule Take 1 capsule by mouth 2 times daily.       fludrocortisone (FLORINEF) 0.1 MG tablet Take 1 tablet (0.1 mg) by mouth daily 90 tablet 3     gabapentin (NEURONTIN) 300 MG capsule Take 2 capsules (600 mg) by mouth At Bedtime 60 capsule 4     ibuprofen (IBU) 800 MG tablet Take 800 mg by mouth every 8 hours as needed        lamoTRIgine (LAMICTAL) 200 MG tablet Take 2 tablets (400 mg) by mouth daily 60 tablet 4     levothyroxine (SYNTHROID, LEVOTHROID) 175 MCG tablet Take  by mouth daily.       metoclopramide (REGLAN) 10 MG tablet 2 times daily        midodrine (PROAMATINE) 5 MG tablet Take 2 tablets (10 mg) by mouth 3 times daily 180 tablet 11     multivitamin (OCUVITE) TABS Take 1 tablet by mouth 2 times daily.       sertraline (ZOLOFT) 100 MG tablet Take 1.5 tablets (150 mg) by mouth daily 45 tablet 4     amLODIPine (NORVASC) 5 MG tablet TAKE 1/2 TABLET (2.5MG) BY MOUTH AT BEDTIME (Patient not taking: Reported on 1/4/2022) 45 tablet 3     omeprazole (PRILOSEC) 20 MG capsule Take 2 capsules by mouth 2 times daily. (Patient not taking: Reported on 4/7/2022)       pyridostigmine (MESTINON) 60 MG tablet Take 1 tablet by mouth daily  (Patient not taking: Reported on  4/7/2022)       sucralfate (CARAFATE) 1 GM/10ML suspension Take 1 g by mouth 2 times daily Take 10 Ml po daily (Patient not taking: Reported on 4/7/2022)         PAST SURGICAL HISTORY:  Past Surgical History:   Procedure Laterality Date     CYSTOSCOPY W/ LASER LITHOTRIPSY Left 1/23/2017    Procedure: CYSTOSCOPY, LEFT URETEROSCOPY HOLMIUM LASER LITHOTRIPSY AND LEFT STENT PLACEMENT BILATERAL RETROGRADE PYELOGRAM ;  Surgeon: Jenniffer Romero MD;  Location: [facility] - [place], [place];  Service:      GASTROSTOMY, INSERT TUBE, COMBINED       GT placed[  2001     IR MISCELLANEOUS PROCEDURE  6/9/2004     PICC INSERTION - DOUBLE LUMEN  6/18/2021          TONSILLECTOMY         ALLERGIES:   No Known Allergies    FAMILY HISTORY:  No pertinent family history noted    SOCIAL HISTORY:  Social History     Tobacco Use     Smoking status: Never Smoker     Smokeless tobacco: Never Used   Substance Use Topics     Alcohol use: No     Drug use: No       ROS:   Constitutional: No fever, chills, or sweats. Weight stable.   ENT: No visual disturbance, ear ache, epistaxis, sore throat.   Cardiovascular: As per HPI.   Respiratory: No cough, hemoptysis.    GI: No nausea, vomiting,   : No hematuria.   Integument: Negative.   Psychiatric: Negative.   Hematologic:   no easy bleeding.  Neuro: Negative apart from that noted above in HPI.   Endocrinology: No significant heat or cold intolerance   Musculoskeletal: No myalgia or other rheumatologic complaint present time.    Exam:  There were no vitals taken for this visit.    RESPIRATORY:no rales, rhonchi or wheezes, no use of accessory muscles, no retractions, respirations are unlabored, normal respiratory rate  NEURO: alert and oriented to person/place/time, normal speech,and affect  SKIN: no ecchymoses, no rashes    Labs:  CBC RESULTS:   Lab Results   Component Value Date    WBC 14.6 (H) 06/22/2021    WBC 5.0 07/24/2017    RBC 3.18 (L) 06/22/2021    RBC 3.56 (L) 07/24/2017    HGB 9.9 (L)  06/22/2021    HGB 10.8 (L) 07/24/2017    HCT 30.4 (L) 06/22/2021    HCT 33.7 (L) 07/24/2017    MCV 96 06/22/2021    MCV 95 07/24/2017    MCH 31.1 06/22/2021    MCH 30.3 07/24/2017    MCHC 32.6 06/22/2021    MCHC 32.0 07/24/2017    RDW 13.1 06/22/2021    RDW 13.1 07/24/2017     06/22/2021     07/24/2017       BMP RESULTS:  Lab Results   Component Value Date     06/22/2021     07/24/2017    POTASSIUM 4.0 06/22/2021    POTASSIUM 3.5 07/24/2017    CHLORIDE 108 (H) 06/22/2021    CHLORIDE 106 07/24/2017    CO2 26 06/22/2021    CO2 28 07/24/2017    ANIONGAP 7 06/22/2021    ANIONGAP 8 07/24/2017    GLC 96 06/22/2021     (H) 07/24/2017    BUN 11 06/22/2021    BUN 7 07/24/2017    CR 0.82 06/22/2021    CR 0.83 07/24/2017    GFRESTIMATED >60 06/22/2021    GFRESTIMATED 74 07/24/2017    GFRESTBLACK >60 06/22/2021    GFRESTBLACK 89 07/24/2017    KENNETH 9.1 06/22/2021    KENNETH 8.7 07/24/2017        INR RESULTS:  No results found for: INR    Procedures:  PULMONARY FUNCTION TESTS:   No flowsheet data found.      ECHOCARDIOGRAM:   No results found for this or any previous visit (from the past 8760 hour(s)).      Assessment and Plan:     1.  Orthostatic hypotension-patient indicates that this is predominantly after she has gotten up and walked 7 to 10 feet at night (classic form).  2.  Presumed underlying autonomic dysfunction.    Plan  1.  Increase fludrocortisone from 0.1-0.2 daily.  Send a prescription for increased dosing to [facility] and fax instruction to patient's nurse (rita Connolly) [phone]  2.  Advised patient to use walker in the bedroom at night  3.  Follow-up clinic visit in 6 months    Total elapsed time today with chart review, clinic visit and documentation 30 minutes    Telephone on 11: 33; off 11: 50  Platform [employer]  Patient at home; clinic [facility] heart    I very much appreciated the opportunity to see and assess Keisha Somers in the clinic today. Please do not hesitate to contact my  office if you have any questions or concerns.      Thang Galarza MD  Cardiac Arrhythmia Service  [facility]  [phone]            CC  THANG GALARZA

## 2022-04-07 NOTE — PATIENT INSTRUCTIONS
You were seen in the Electrophysiology Clinic today by: Dr Galarza    Plan:     Medication Changes:     INCREASE- Fludrocortisone to 0.2mg daily      Follow up visit:    Dr Galarza in 6 months      Further Instructions:    Use a walker at nighttime       Your Care Team:  EP Cardiology   Telephone Number     Nurse Line  Rebeca Schroeder RN  [phone]     For scheduling appts or procedures:    Etelvina Donnelly   [phone]   For the Device Clinic (Pacemakers, ICDs, Loop Recorders)    During business hours: [phone]  After business hours:   [phone]- select option 4 and ask for job code [id number].     On-call cardiologist for after hours or on weekends: [phone], option #4, and ask to speak to the on-call cardiologist.     Cardiovascular Clinic:   [address] [place] [address]. [place] [address]      As always, Thank you for trusting us with your health care needs!

## 2022-04-07 NOTE — LETTER
4/7/2022      RE: Keisha OVALLES Bisping  [address]  [address]  [facility] [address]         HPI:     Keisha  is a 51-year-old woman with orthostatic hypotension associated with presumed autonomic dysfunction.     In terms of symptoms she continues to experience orthostatic hypotension and has had some falls but no injury.  These tend to occur primarily at night after she gets up from a seated or supine position and walk 10 to 12 feet.      She wears constrictive undergarments during the day but not at night and this may contribute.  Potentially we could improve the situation by adding on increased dose of fludrocortisone and I will arrange for that.  I have also advocated for her to use a walker at night to reduce falls risk..    Patient is not complaining of any other cardiovascular symptoms at the present time.  She notes that on good days her blood pressure typically is 120 systolic but on certain days her blood pressure will be low in the 80s.  She is unaware of any factors that contribute although it is likely that hydration and electrolyte intake may vary from time to time.  I advised her that when her blood pressure is low to increase her daily salt and electrolyte intake today.  Additional dose of fludrocortisone may also help.      Please voiced understanding of the above and agrees to the increase fludrocortisone.  We will reassess in the clinic in a few months time.    PAST MEDICAL HISTORY:  Past Medical History:   Diagnosis Date     Anemia      Anorexia      Depression      Eating disorder      GERD (gastroesophageal reflux disease)      Hydronephrosis      Hypercholesterolemia      Hypotension      Hypotension, postural      Palpitations      POTS (postural orthostatic tachycardia syndrome)      Syncope      Syncope and collapse      Thyroid disease        CURRENT MEDICATIONS:  Current Outpatient Medications   Medication Sig Dispense Refill     acetaminophen (TYLENOL) 500 MG tablet Take 500-1,000 mg by mouth  every 6 hours as needed.       atorvastatin (LIPITOR) 10 MG tablet Take by mouth daily Per pt takes this medicine but doesn't know the amount       Blood Pressure Monitor KIT Patient should be evaluated in the emergency department if her systolic blood pressure <65 1 kit 0     Calcium Carbonate-Vitamin D (CALCIUM 600+D PO) Take 1 tablet by mouth 2 times daily.       Cholecalciferol (VITAMIN D PO) Take  by mouth. 50, 000 units every 14 days       dexmethylphenidate (FOCALIN) 10 MG tablet Take 1 tablet (10 mg) by mouth daily 30 tablet 0     docusate sodium (COLACE) 100 MG capsule Take 1 capsule by mouth 2 times daily.       fludrocortisone (FLORINEF) 0.1 MG tablet Take 1 tablet (0.1 mg) by mouth daily 90 tablet 3     gabapentin (NEURONTIN) 300 MG capsule Take 2 capsules (600 mg) by mouth At Bedtime 60 capsule 4     ibuprofen (IBU) 800 MG tablet Take 800 mg by mouth every 8 hours as needed        lamoTRIgine (LAMICTAL) 200 MG tablet Take 2 tablets (400 mg) by mouth daily 60 tablet 4     levothyroxine (SYNTHROID, LEVOTHROID) 175 MCG tablet Take  by mouth daily.       metoclopramide (REGLAN) 10 MG tablet 2 times daily        midodrine (PROAMATINE) 5 MG tablet Take 2 tablets (10 mg) by mouth 3 times daily 180 tablet 11     multivitamin (OCUVITE) TABS Take 1 tablet by mouth 2 times daily.       sertraline (ZOLOFT) 100 MG tablet Take 1.5 tablets (150 mg) by mouth daily 45 tablet 4     amLODIPine (NORVASC) 5 MG tablet TAKE 1/2 TABLET (2.5MG) BY MOUTH AT BEDTIME (Patient not taking: Reported on 1/4/2022) 45 tablet 3     omeprazole (PRILOSEC) 20 MG capsule Take 2 capsules by mouth 2 times daily. (Patient not taking: Reported on 4/7/2022)       pyridostigmine (MESTINON) 60 MG tablet Take 1 tablet by mouth daily  (Patient not taking: Reported on 4/7/2022)       sucralfate (CARAFATE) 1 GM/10ML suspension Take 1 g by mouth 2 times daily Take 10 Ml po daily (Patient not taking: Reported on 4/7/2022)         PAST SURGICAL  HISTORY:  Past Surgical History:   Procedure Laterality Date     CYSTOSCOPY W/ LASER LITHOTRIPSY Left 1/23/2017    Procedure: CYSTOSCOPY, LEFT URETEROSCOPY HOLMIUM LASER LITHOTRIPSY AND LEFT STENT PLACEMENT BILATERAL RETROGRADE PYELOGRAM ;  Surgeon: Jenniffer Romero MD;  [facility];  Service:      GASTROSTOMY, INSERT TUBE, COMBINED       GT placed[  2001     IR MISCELLANEOUS PROCEDURE  6/9/2004     PICC INSERTION - DOUBLE LUMEN  6/18/2021          TONSILLECTOMY         ALLERGIES:   No Known Allergies    FAMILY HISTORY:  No pertinent family history noted    SOCIAL HISTORY:  Social History     Tobacco Use     Smoking status: Never Smoker     Smokeless tobacco: Never Used   Substance Use Topics     Alcohol use: No     Drug use: No       ROS:   Constitutional: No fever, chills, or sweats. Weight stable.   ENT: No visual disturbance, ear ache, epistaxis, sore throat.   Cardiovascular: As per HPI.   Respiratory: No cough, hemoptysis.    GI: No nausea, vomiting,   : No hematuria.   Integument: Negative.   Psychiatric: Negative.   Hematologic:   no easy bleeding.  Neuro: Negative apart from that noted above in HPI.   Endocrinology: No significant heat or cold intolerance   Musculoskeletal: No myalgia or other rheumatologic complaint present time.    Exam:  There were no vitals taken for this visit.    RESPIRATORY:no rales, rhonchi or wheezes, no use of accessory muscles, no retractions, respirations are unlabored, normal respiratory rate  NEURO: alert and oriented to person/place/time, normal speech,and affect  SKIN: no ecchymoses, no rashes    Labs:  CBC RESULTS:   Lab Results   Component Value Date    WBC 14.6 (H) 06/22/2021    WBC 5.0 07/24/2017    RBC 3.18 (L) 06/22/2021    RBC 3.56 (L) 07/24/2017    HGB 9.9 (L) 06/22/2021    HGB 10.8 (L) 07/24/2017    HCT 30.4 (L) 06/22/2021    HCT 33.7 (L) 07/24/2017    MCV 96 06/22/2021    MCV 95 07/24/2017    MCH 31.1 06/22/2021    MCH 30.3 07/24/2017     MCHC 32.6 06/22/2021    MCHC 32.0 07/24/2017    RDW 13.1 06/22/2021    RDW 13.1 07/24/2017     06/22/2021     07/24/2017       BMP RESULTS:  Lab Results   Component Value Date     06/22/2021     07/24/2017    POTASSIUM 4.0 06/22/2021    POTASSIUM 3.5 07/24/2017    CHLORIDE 108 (H) 06/22/2021    CHLORIDE 106 07/24/2017    CO2 26 06/22/2021    CO2 28 07/24/2017    ANIONGAP 7 06/22/2021    ANIONGAP 8 07/24/2017    GLC 96 06/22/2021     (H) 07/24/2017    BUN 11 06/22/2021    BUN 7 07/24/2017    CR 0.82 06/22/2021    CR 0.83 07/24/2017    GFRESTIMATED >60 06/22/2021    GFRESTIMATED 74 07/24/2017    GFRESTBLACK >60 06/22/2021    GFRESTBLACK 89 07/24/2017    KENNETH 9.1 06/22/2021    KENNETH 8.7 07/24/2017        INR RESULTS:  No results found for: INR    Procedures:  PULMONARY FUNCTION TESTS:   No flowsheet data found.      ECHOCARDIOGRAM:   No results found for this or any previous visit (from the past 8760 hour(s)).      Assessment and Plan:     1.  Orthostatic hypotension-patient indicates that this is predominantly after she has gotten up and walked 7 to 10 feet at night (classic form).  2.  Presumed underlying autonomic dysfunction.    Plan  1.  Increase fludrocortisone from 0.1-0.2 daily.  Send a prescription for increased dosing to [facility] and fax instruction to patient's nurse (rita Connolly) [phone]  2.  Advised patient to use walker in the bedroom at night  3.  Follow-up clinic visit in 6 months    Total elapsed time today with chart review, clinic visit and documentation 30 minutes    Telephone on 11: 33; off 11: 50  Platform Doximity  Patient at home; clinic [facility] heart    I very much appreciated the opportunity to see and assess Keisha Somers in the clinic today. Please do not hesitate to contact my office if you have any questions or concerns.      Thomas Galarza MD  Cardiac Arrhythmia Service  [facility]  [phone]          CC  THOMAS GALARZA

## 2022-04-07 NOTE — NURSING NOTE
Chief Complaint   Patient presents with     Follow Up     6 month f/u, no updates per pt. Reviewed medications with pt, no changes.      Ronald Cortez, Visit Facilitator/MA.

## 2022-04-07 NOTE — LETTER
4/7/2022      RE: Keisha Somers  [address]  [address]  [facility] [address]       Dear Colleague,    Thank you for the opportunity to participate in the care of your patient, Keisha Somers, at the [facility]. Please see a copy of my visit note below.    Keisha is a 52 year old who is being evaluated via a billable telephone visit.      What phone number would you like to be contacted at? [phone]  How would you like to obtain your AVS? Mail a copy or fax to [phone]    Ronald Cortez, Visit Facilitator/MA.    HPI:     Keisha  is a 51-year-old woman with orthostatic hypotension associated with presumed autonomic dysfunction.     In terms of symptoms she continues to experience orthostatic hypotension and has had some falls but no injury.  These tend to occur primarily at night after she gets up from a seated or supine position and walk 10 to 12 feet.      She wears constrictive undergarments during the day but not at night and this may contribute.  Potentially we could improve the situation by adding on increased dose of fludrocortisone and I will arrange for that.  I have also advocated for her to use a walker at night to reduce falls risk..    Patient is not complaining of any other cardiovascular symptoms at the present time.  She notes that on good days her blood pressure typically is 120 systolic but on certain days her blood pressure will be low in the 80s.  She is unaware of any factors that contribute although it is likely that hydration and electrolyte intake may vary from time to time.  I advised her that when her blood pressure is low to increase her daily salt and electrolyte intake today.  Additional dose of fludrocortisone may also help.      Please voiced understanding of the above and agrees to the increase fludrocortisone.  We will reassess in the clinic in a few months time.    PAST MEDICAL  HISTORY:  Past Medical History:   Diagnosis Date     Anemia      Anorexia      Depression      Eating disorder      GERD (gastroesophageal reflux disease)      Hydronephrosis      Hypercholesterolemia      Hypotension      Hypotension, postural      Palpitations      POTS (postural orthostatic tachycardia syndrome)      Syncope      Syncope and collapse      Thyroid disease        CURRENT MEDICATIONS:  Current Outpatient Medications   Medication Sig Dispense Refill     acetaminophen (TYLENOL) 500 MG tablet Take 500-1,000 mg by mouth every 6 hours as needed.       atorvastatin (LIPITOR) 10 MG tablet Take by mouth daily Per pt takes this medicine but doesn't know the amount       Blood Pressure Monitor KIT Patient should be evaluated in the emergency department if her systolic blood pressure <65 1 kit 0     Calcium Carbonate-Vitamin D (CALCIUM 600+D PO) Take 1 tablet by mouth 2 times daily.       Cholecalciferol (VITAMIN D PO) Take  by mouth. 50, 000 units every 14 days       dexmethylphenidate (FOCALIN) 10 MG tablet Take 1 tablet (10 mg) by mouth daily 30 tablet 0     docusate sodium (COLACE) 100 MG capsule Take 1 capsule by mouth 2 times daily.       fludrocortisone (FLORINEF) 0.1 MG tablet Take 1 tablet (0.1 mg) by mouth daily 90 tablet 3     gabapentin (NEURONTIN) 300 MG capsule Take 2 capsules (600 mg) by mouth At Bedtime 60 capsule 4     ibuprofen (IBU) 800 MG tablet Take 800 mg by mouth every 8 hours as needed        lamoTRIgine (LAMICTAL) 200 MG tablet Take 2 tablets (400 mg) by mouth daily 60 tablet 4     levothyroxine (SYNTHROID, LEVOTHROID) 175 MCG tablet Take  by mouth daily.       metoclopramide (REGLAN) 10 MG tablet 2 times daily        midodrine (PROAMATINE) 5 MG tablet Take 2 tablets (10 mg) by mouth 3 times daily 180 tablet 11     multivitamin (OCUVITE) TABS Take 1 tablet by mouth 2 times daily.       sertraline (ZOLOFT) 100 MG tablet Take 1.5 tablets (150 mg) by mouth daily 45 tablet 4     amLODIPine  (NORVASC) 5 MG tablet TAKE 1/2 TABLET (2.5MG) BY MOUTH AT BEDTIME (Patient not taking: Reported on 1/4/2022) 45 tablet 3     omeprazole (PRILOSEC) 20 MG capsule Take 2 capsules by mouth 2 times daily. (Patient not taking: Reported on 4/7/2022)       pyridostigmine (MESTINON) 60 MG tablet Take 1 tablet by mouth daily  (Patient not taking: Reported on 4/7/2022)       sucralfate (CARAFATE) 1 GM/10ML suspension Take 1 g by mouth 2 times daily Take 10 Ml po daily (Patient not taking: Reported on 4/7/2022)         PAST SURGICAL HISTORY:  Past Surgical History:   Procedure Laterality Date     CYSTOSCOPY W/ LASER LITHOTRIPSY Left 1/23/2017    Procedure: CYSTOSCOPY, LEFT URETEROSCOPY HOLMIUM LASER LITHOTRIPSY AND LEFT STENT PLACEMENT BILATERAL RETROGRADE PYELOGRAM ;  Surgeon: Jenniffer Romero MD;  Location: [facility];  Service:      GASTROSTOMY, INSERT TUBE, COMBINED       GT placed[  2001     IR MISCELLANEOUS PROCEDURE  6/9/2004     PICC INSERTION - DOUBLE LUMEN  6/18/2021          TONSILLECTOMY         ALLERGIES:   No Known Allergies    FAMILY HISTORY:  No pertinent family history noted    SOCIAL HISTORY:  Social History     Tobacco Use     Smoking status: Never Smoker     Smokeless tobacco: Never Used   Substance Use Topics     Alcohol use: No     Drug use: No       ROS:   Constitutional: No fever, chills, or sweats. Weight stable.   ENT: No visual disturbance, ear ache, epistaxis, sore throat.   Cardiovascular: As per HPI.   Respiratory: No cough, hemoptysis.    GI: No nausea, vomiting,   : No hematuria.   Integument: Negative.   Psychiatric: Negative.   Hematologic:   no easy bleeding.  Neuro: Negative apart from that noted above in HPI.   Endocrinology: No significant heat or cold intolerance   Musculoskeletal: No myalgia or other rheumatologic complaint present time.    Exam:  There were no vitals taken for this visit.    RESPIRATORY:no rales, rhonchi or wheezes, no use of accessory muscles, no  retractions, respirations are unlabored, normal respiratory rate  NEURO: alert and oriented to person/place/time, normal speech,and affect  SKIN: no ecchymoses, no rashes    Labs:  CBC RESULTS:   Lab Results   Component Value Date    WBC 14.6 (H) 06/22/2021    WBC 5.0 07/24/2017    RBC 3.18 (L) 06/22/2021    RBC 3.56 (L) 07/24/2017    HGB 9.9 (L) 06/22/2021    HGB 10.8 (L) 07/24/2017    HCT 30.4 (L) 06/22/2021    HCT 33.7 (L) 07/24/2017    MCV 96 06/22/2021    MCV 95 07/24/2017    MCH 31.1 06/22/2021    MCH 30.3 07/24/2017    MCHC 32.6 06/22/2021    MCHC 32.0 07/24/2017    RDW 13.1 06/22/2021    RDW 13.1 07/24/2017     06/22/2021     07/24/2017       BMP RESULTS:  Lab Results   Component Value Date     06/22/2021     07/24/2017    POTASSIUM 4.0 06/22/2021    POTASSIUM 3.5 07/24/2017    CHLORIDE 108 (H) 06/22/2021    CHLORIDE 106 07/24/2017    CO2 26 06/22/2021    CO2 28 07/24/2017    ANIONGAP 7 06/22/2021    ANIONGAP 8 07/24/2017    GLC 96 06/22/2021     (H) 07/24/2017    BUN 11 06/22/2021    BUN 7 07/24/2017    CR 0.82 06/22/2021    CR 0.83 07/24/2017    GFRESTIMATED >60 06/22/2021    GFRESTIMATED 74 07/24/2017    GFRESTBLACK >60 06/22/2021    GFRESTBLACK 89 07/24/2017    KENNETH 9.1 06/22/2021    KENNETH 8.7 07/24/2017        INR RESULTS:  No results found for: INR    Procedures:  PULMONARY FUNCTION TESTS:   No flowsheet data found.      ECHOCARDIOGRAM:   No results found for this or any previous visit (from the past 8760 hour(s)).      Assessment and Plan:     1.  Orthostatic hypotension-patient indicates that this is predominantly after she has gotten up and walked 7 to 10 feet at night (classic form).  2.  Presumed underlying autonomic dysfunction.    Plan  1.  Increase fludrocortisone from 0.1-0.2 daily.  Send a prescription for increased dosing to [facility] and fax instruction to patient's nurse (rita Connolly) [phone]  2.  Advised patient to use walker in the bedroom at night  3.   Follow-up clinic visit in 6 months    Total elapsed time today with chart review, clinic visit and documentation 30 minutes    Telephone on 11: 33; off 11: 50  Platform Doximity  Patient at home; clinic [facility] heart    I very much appreciated the opportunity to see and assess Keisha Somers in the clinic today. Please do not hesitate to contact my office if you have any questions or concerns.      Thang Galarza MD  Cardiac Arrhythmia Service  [facility]  [phone]            CC  THANG GALARZA        Please do not hesitate to contact me if you have any questions/concerns.     Sincerely,     Thang Galarza MD

## 2022-04-19 ENCOUNTER — VIRTUAL VISIT (OUTPATIENT)
Dept: PSYCHIATRY | Facility: CLINIC | Age: 52
End: 2022-04-19
Attending: PSYCHIATRY & NEUROLOGY
Payer: MEDICARE

## 2022-04-19 DIAGNOSIS — F33.0 MAJOR DEPRESSIVE DISORDER, RECURRENT EPISODE, MILD (H): Primary | ICD-10-CM

## 2022-04-19 DIAGNOSIS — F33.1 MAJOR DEPRESSIVE DISORDER, RECURRENT EPISODE, MODERATE (H): ICD-10-CM

## 2022-04-19 DIAGNOSIS — F51.01 PRIMARY INSOMNIA: ICD-10-CM

## 2022-04-19 PROCEDURE — 99442 PR PHYSICIAN TELEPHONE EVALUATION 11-20 MIN: CPT | Performed by: PSYCHIATRY & NEUROLOGY

## 2022-04-19 RX ORDER — DEXMETHYLPHENIDATE HYDROCHLORIDE 10 MG/1
10 TABLET ORAL DAILY
Qty: 30 TABLET | Refills: 0 | Status: SHIPPED | OUTPATIENT
Start: 2022-04-19 | End: 2022-04-19

## 2022-04-19 RX ORDER — DEXMETHYLPHENIDATE HYDROCHLORIDE 10 MG/1
10 TABLET ORAL DAILY
Qty: 30 TABLET | Refills: 0 | Status: SHIPPED | OUTPATIENT
Start: 2022-04-19 | End: 2022-07-19

## 2022-04-19 RX ORDER — GABAPENTIN 300 MG/1
900 CAPSULE ORAL AT BEDTIME
Qty: 90 CAPSULE | Refills: 4 | Status: SHIPPED | OUTPATIENT
Start: 2022-04-19 | End: 2022-07-19

## 2022-04-19 NOTE — PROGRESS NOTES
Increase gabapentin to 600 mg at bedtime (take 2 of 300 mg caps)    Siena Fuller JOSR Somers is a 52 year old who has consented to receive services via billable phone visit.      What phone number would you prefer to be contacted at?: Mobile phone number on file:   Telephone Information:   Mobile [phone]     How would you prefer to obtain AVS?: Mail a copy     TELEPHONE VISIT  Keisha Somers is a 52 year old pt. who is being evaluated via a billable telephone visit.      The patient has been notified of the following:    We have found that certain health care needs can be provided without the need for a physical exam. This service lets us provide the care you need with a short phone conversation. If a prescription is necessary we can send it directly to your pharmacy. If lab work is needed we can place an order for that and you can then stop by our lab to have the test done at a later time. Insurers are generally covering virtual visits as they would in-office visits so billing should not be different than normal.  If for some reason you do get billed incorrectly, you should contact the billing office to correct it and that number is in the AVS .    Patient has given verbal consent for a telephone visit?:  Yes   How would the pt like to obtain the AVS?:  Patient declined  AVS SmartPhrase [PsychAVS] has been placed in 'Patient Instructions':  Yes     Start Time: 240 pm      End Time:  300pm                Progress Notes   Romana Tena MD (Physician)     Psychiatry       PSYCHIATRY VISIT      The patient was seen for evaluation and management.The patient was seen for anorexia and depression.   She was last seen 2 mo ago    INTERIM HX:At last visit the plan was:    She will return again in 8 weeks. .   Focalin Rxs sent.   Gabapentin -600 mg at bedtime  for sleep  Follow-up with cardiology and neurology prn.  Recommend sleep consult for disordered nighttime eating. She should discuss with PCP to get  "referral.    She reports a \"snag\" in the past few months.  Her father was to have an angioplasty but they ended up doing a triple bypass. He went to TCU and coming home on Thursday.  She is very stressed by all of this.  He was hospitalized less than 2 weeks despite having pneumonia also.  He will not be having in home services.  Patient has been with father every day.      Mood overall has been ok , overwhelmed by situation. Sleep is more of a problem lately.  She is having trouble staying asleep.  Within an hour she wakes. Nighttime eating is definitely better being off of Lunesta.    Energy is ok, motivation, interests ok. Patient increased gabapentin to 600 at bedtime.  Sleep is more restful with it.  She is wondering if a higher dose would be helpful.    We discontinued Lunesta because of the nighttime eating and this has helped considerably.      SH: She has a [employer] worker- never met her    MED:  She still has syncopal episodes but not any different - no pattern though.  She might have 2/week then a month and have only one.     ROS: HA-migraine only one ;  A few other HA's.     MEDICATIONS:   1. Lamictal 400 mg daily am  2. Zoloft 150 mg daily am   3. Focalin 10 mg daily. Beneficial for energy, mood and no indications of problematic use.   4. Synthroid 150 mcg - she is uncertain of dose.    5. Zofran prn migraines  7. Mitodrine 10/10/10 tid - changed from Dr. Galarza   8. Lunesta -dc'd  9. Carafate  10. Pyridostigmine - OFF now  11. Tizanidine - 3x/day, last dose 9pm for migraines  16. Fluorinef- increased to 0.2 mg daily   17. Maxalt for migraines with ibuprofen.  It works very well.    19. Mg-   21. Vit B2  22. Gabapentin 600 QHS - helps with sleep;  Added for hot flashes, works.  23. Amlodipine lowered to 2.5 mg at bedtime- recently- OFF now  24. Atorvastatin  25. Ibuprofen- not allowed   There were no vitals taken for this visit. - televisit    BP Readings from Last 3 Encounters:   01/04/22 107/77 "   12/17/19 112/75   09/26/19 115/81     Wt Readings from Last 4 Encounters:   09/26/19 64 kg (141 lb 3.2 oz)   07/23/19 62.1 kg (137 lb)   04/16/19 66.5 kg (146 lb 9.6 oz)   03/21/19 68.9 kg (152 lb)     MENTAL STATUS EXAM: The patient is interviewed via phone, oriented x3. Mood is euthymic, Good range of affect. Thought processes logical, associations are clear and goal directed. Thought content is normal. Fund of knowledge good, speech RRR, language intact, memory intact, concentration good, insight and judgment are good, gait unable to assess.    ASSESSMENT:   Anorexia nervosa, weight stable , the highest weight she has been and feels much better emotionally and physically with her current weight but  concerned about the nighttime eating.      Major depressive disorder,, stable.,;   Chronic sleep problems and abnormal sleep behaviors  Recurrent syncopal episode(s), unpredictable  Migraine HA, treated  with maxalt, not currently problematic  POTS    PLAN:  She will return again in 12 weeks. .   Focalin Rxs sent.   Gabapentin -increase to 900 mg at bedtime  for sleep  Follow-up with cardiology and neurology prn.    RADHA MAURER MD     Level of Medical Decision Making:   Problems addressed: - At least 2 stable chronic problems and additional unstable problem (sleep)    - Moderate due to: Engaged in prescription drug management during visit (discussed any medication benefits, side effects, alternatives, etc.)    .       PSYCHIATRY CLINIC INDIVIDUAL PSYCHOTHERAPY NOTE                                                     [16]   Start time - 240pm        End time -256pm    Date reviewed - created on 12-19-17   last update: 10/12/21  Date next due -     10-17-22  REVIEWED OVER PHONE 6-23-20 DUE TO TELE VISIT  Subjective: This supportive psychotherapy session addressed issues related to mood and medical problems.  Patient's reaction: Maintenance in the context of mental status appropriate for ambulatory setting.   Progress: good  Plan: RTC 3 mo  Psychotherapy services during this visit included  myself and the patient.   TREATMENT  PLAN          SYMPTOMS; PROBLEMS   MEASURABLE GOALS;    FUNCTIONAL IMPROVEMENT INTERVENTIONS;   GAINS MADE DISCHARGE CRITERIA   Depression: none  Anxiety: mild, situational, parents getting older  Sleep Problems: nightmares   reduce nightmares, make a plan to manage 2-3 anxiety-provoking situations, develop 2 strategies to cope with trauma triggers/intrusive memories and take medications as prescribed on a daily basis building on strengths  communication skills  medications   psychotherapy marked symptom improvement and symptom resolution   Eating Disorder: intense fear of weight gain and distorted body image    POTS- syncopal episodes eating 3 meals/day, improvement in body image,maintain weight       Minimal episodes stress management  psychotherapy         muliple meds, cardiologist following , management of sxs marked symptom improvement

## 2022-04-19 NOTE — PATIENT INSTRUCTIONS
**For crisis resources, please see the information at the end of this document**   Patient Education    Thank you for coming to the [facility] [place] [facility].    Lab Testing:  If you had lab testing today and your results are reassuring or normal they will be mailed to you or sent through [employer] within 7 days. If the lab tests need quick action we will call you with the results. The phone number we will call with results is # [phone]. If this is not the best number please call our clinic and change the number.     Medication Refills:  If you need any refills please call your pharmacy and they will contact us. Our fax number for refills is [phone]. Please allow three business days for refill processing.   If you need to change to a different pharmacy, please contact the new pharmacy directly. The new pharmacy will help you get your medications transferred.     Contact Us:  Please call [phone] during business hours (8-5:00 M-F).  If you have medication related questions after clinic hours, or on the weekend, please call [phone].    Financial Assistance [phone]  Medical Records [phone]       MENTAL HEALTH CRISIS RESOURCES:  For a emergency help, please call 911 or go to the nearest Emergency Department.     Emergency Walk-In Options:   EmPATH Unit @ [facility] ([place]): [phone] - Specialized mental health emergency area designed to be calming  [facility] ([place]): [phone]  [facility] ([place]): [phone]  [facility]): [phone]    County Crisis Information:   [place]: [phone]  [place]: [phone]  [place] (ANGELA) - Adult: [phone]     Child: [phone]  [place] - Adult: [phone]     Child: [phone]  [place]: [phone]  List of all [facility] resources:    https://[place].gov/dhs/people-we-serve/adults/health-care/mental-health/resources/crisis-contacts.jsp    National Crisis Information:   Crisis Text Line: Text  [place]  to 874055  National Suicide Prevention Lifeline: 8-709-664-TALK (1-952.391.4650)       For online chat options, visit https://suicidepreventionlifeline.org/chat/  Poison Control Center: 1-843-561-6489  Trans Lifeline: 4-209-203-0397 - Hotline for transgender people of all ages  The [employer]: 0-755-812-3262 - Hotline for LGBT youth     For Non-Emergency Support:   Fast Tracker: Mental Health & Substance Use Disorder Resources -   https://www.[employer].org/

## 2022-07-19 ENCOUNTER — VIRTUAL VISIT (OUTPATIENT)
Dept: PSYCHIATRY | Facility: CLINIC | Age: 52
End: 2022-07-19
Attending: PSYCHIATRY & NEUROLOGY
Payer: MEDICARE

## 2022-07-19 DIAGNOSIS — F51.01 PRIMARY INSOMNIA: ICD-10-CM

## 2022-07-19 DIAGNOSIS — F33.0 MAJOR DEPRESSIVE DISORDER, RECURRENT EPISODE, MILD (H): ICD-10-CM

## 2022-07-19 DIAGNOSIS — F33.1 MAJOR DEPRESSIVE DISORDER, RECURRENT EPISODE, MODERATE (H): ICD-10-CM

## 2022-07-19 PROCEDURE — 99443 PR PHYSICIAN TELEPHONE EVALUATION 21-30 MIN: CPT | Mod: 95 | Performed by: PSYCHIATRY & NEUROLOGY

## 2022-07-19 RX ORDER — DEXMETHYLPHENIDATE HYDROCHLORIDE 10 MG/1
10 TABLET ORAL DAILY
Qty: 30 TABLET | Refills: 0 | Status: SHIPPED | OUTPATIENT
Start: 2022-07-19 | End: 2022-11-01

## 2022-07-19 RX ORDER — SERTRALINE HYDROCHLORIDE 100 MG/1
150 TABLET, FILM COATED ORAL DAILY
Qty: 45 TABLET | Refills: 4 | Status: SHIPPED | OUTPATIENT
Start: 2022-07-19 | End: 2022-11-01

## 2022-07-19 RX ORDER — LAMOTRIGINE 200 MG/1
400 TABLET ORAL DAILY
Qty: 60 TABLET | Refills: 3 | Status: SHIPPED | OUTPATIENT
Start: 2022-07-19 | End: 2022-11-01

## 2022-07-19 RX ORDER — GABAPENTIN 300 MG/1
900 CAPSULE ORAL AT BEDTIME
Qty: 90 CAPSULE | Refills: 4 | Status: SHIPPED | OUTPATIENT
Start: 2022-07-19 | End: 2022-11-01

## 2022-07-19 RX ORDER — DEXMETHYLPHENIDATE HYDROCHLORIDE 10 MG/1
10 TABLET ORAL 2 TIMES DAILY
Qty: 30 TABLET | Refills: 0 | Status: SHIPPED | OUTPATIENT
Start: 2022-07-19 | End: 2022-11-01

## 2022-07-19 ASSESSMENT — PATIENT HEALTH QUESTIONNAIRE - PHQ9
SUM OF ALL RESPONSES TO PHQ QUESTIONS 1-9: 1
SUM OF ALL RESPONSES TO PHQ QUESTIONS 1-9: 1
10. IF YOU CHECKED OFF ANY PROBLEMS, HOW DIFFICULT HAVE THESE PROBLEMS MADE IT FOR YOU TO DO YOUR WORK, TAKE CARE OF THINGS AT HOME, OR GET ALONG WITH OTHER PEOPLE: NOT DIFFICULT AT ALL

## 2022-07-19 NOTE — PATIENT INSTRUCTIONS
No change in medications.  Continue the dose of gabapentin 900 mg at bedtime that was increased at last visit.

## 2022-07-19 NOTE — PROGRESS NOTES
Keisha Somers is a 52 year old who has consented to receive services via billable phone visit.      What phone number would you prefer to be contacted at?: Mobile phone number on file:   Telephone Information:   Mobile [phone]     How would you prefer to obtain AVS?: MyChart     TELEPHONE VISIT  Keisha Somers is a 52 year old pt. who is being evaluated via a billable telephone visit.      The patient has been notified of the following:    We have found that certain health care needs can be provided without the need for a physical exam. This service lets us provide the care you need with a short phone conversation. If a prescription is necessary we can send it directly to your pharmacy. If lab work is needed we can place an order for that and you can then stop by our lab to have the test done at a later time. Insurers are generally covering virtual visits as they would in-office visits so billing should not be different than normal.  If for some reason you do get billed incorrectly, you should contact the billing office to correct it and that number is in the AVS .    Patient has given verbal consent for a telephone visit?:  Yes   How would the pt like to obtain the AVS?:MAIL  AVS SmartPhrase [PsychAVS] has been placed in 'Patient Instructions':  Yes     Start Time: 200 pm      End Time:  225pm                Progress Notes   Romana Tena MD (Physician)     Psychiatry       PSYCHIATRY VISIT      The patient was seen for evaluation and management.The patient was seen for anorexia and depression.   She was last seen 3 mo ago    INTERIM HX:At last visit the plan was:  She will return again in 12 weeks. .   Focalin Rxs sent.   Gabapentin -increase to 900 mg at bedtime  for sleep  Follow-up with cardiology and neurology prn.  Recommend sleep consult for disordered nighttime eating. She should discuss with PCP to get referral.    She reports that she has been doing well.    Her father is doing very well; he is  home post triple bypass.  He can walk better and has less breathing problems. Patient is there 1-2x/week.  Her mom is starting to have memory issues.  Her sister has dementia and her mom is worried she will develop it. Mom is 82 yrs old.     Mood has been good the last few months. Sleep is a little bit better than last visit. Some days are good then 1-2 x/week she will wake early and not be able to get back to sleep.  She generally wakes 4x/night.  Energy is good,   motivation, interests ok. Patient increased gabapentin to 600 at bedtime.  Sleep is more restful with it. With every medicine she gets better for a few weeks then effect tapers off. No SE. We discontinued Lunesta because of the nighttime eating and this has helped considerably.    The nighttime eating is better off Lunesta but not completely resolved. Her neurologist did not think a sleep study would be beneficial.  She has had a sleep study in the past which showed she was sleeping more than she thought. She was having nighttime eating at that time also.  Patient sees it as her days/nights are reversed and this may account for her disordered eating.    SH: She has a [employer] worker- never met her    MED:  She still has syncopal episodes but not any different - no pattern though.  She might have 2/week then a month and have only one. She has had a few more during the day when she has dizzy s pells.   Weight reportedly is going up due to nighttime eating.      ROS: HA-migraine only one ;  More other HA's.especially with humidity.     MEDICATIONS:   1. Lamictal 400 mg daily am  2. Zoloft 150 mg daily am   3. Focalin 10 mg daily. Beneficial for energy, mood and no indications of problematic use.   4. Synthroid 150 mcg - she is uncertain of dose.    5. Zofran prn migraines  7. Mitodrine 10/10/10 tid - changed from Dr. Galarza   8. Lunesta -dc'd  9. Carafate  10. Pyridostigmine - OFF now  11. Tizanidine - 4x/day, last dose 9pm for migraines-one in am , one  noo,one at night.  16. Fluorinef- 0.2 mg daily   17. Maxalt for migraines with ibuprofen.  It works very well.    19. Mg-   21. Vit B2  22. Gabapentin 900 QHS - helps with sleep;  Added for hot flashes, works.  23. Amlodipine lowered to 2.5 mg at bedtime- recently- OFF now  24. Atorvastatin  25. Ibuprofen- not allowed   There were no vitals taken for this visit. - televisit    BP Readings from Last 3 Encounters:   01/04/22 107/77   12/17/19 112/75   09/26/19 115/81     Wt Readings from Last 4 Encounters:   09/26/19 64 kg (141 lb 3.2 oz)   07/23/19 62.1 kg (137 lb)   04/16/19 66.5 kg (146 lb 9.6 oz)   03/21/19 68.9 kg (152 lb)     MENTAL STATUS EXAM: The patient is interviewed via phone, oriented x3. Mood is euthymic, Good range of affect. Thought processes logical, associations are clear and goal directed. Thought content is normal. Fund of knowledge good, speech RRR, language intact, memory intact, concentration good, insight and judgment are good, gait unable to assess.    ASSESSMENT:   Anorexia nervosa, weight stable , the highest weight she has been and feels much better emotionally and physically with her current weight but  concerned about the nighttime eating.      Major depressive disorder,, stable.,;   Chronic sleep problems and abnormal sleep behaviors  Recurrent syncopal episode(s), unpredictable  Migraine HA, treated  with maxalt, not currently problematic  POTS    PLAN:  She will return again in 12 weeks. .   Focalin Rxs sent.   Gabapentin continue at  900 mg at bedtime  for sleep  Follow-up with cardiology and neurology prn.    RADHA MAURER MD     Level of Medical Decision Making:   Problems addressed: - At least 2 stable chronic problems and additional unstable problem (sleep)    - Moderate due to: Engaged in prescription drug management during visit (discussed any medication benefits, side effects, alternatives, etc.)    .       PSYCHIATRY CLINIC INDIVIDUAL PSYCHOTHERAPY NOTE                                                      [16]   Start time - 200pm        End time -216pm    Date reviewed - created on 12-19-17   last update: 10/12/21  Date next due -     10-17-22  REVIEWED OVER PHONE 6-23-20 DUE TO TELE VISIT  Subjective: This supportive psychotherapy session addressed issues related to mood and medical problems.  Patient's reaction: Maintenance in the context of mental status appropriate for ambulatory setting.  Progress: good  Plan: RTC 3 mo  Psychotherapy services during this visit included  myself and the patient.   TREATMENT  PLAN          SYMPTOMS; PROBLEMS   MEASURABLE GOALS;    FUNCTIONAL IMPROVEMENT INTERVENTIONS;   GAINS MADE DISCHARGE CRITERIA   Depression: none  Anxiety: mild, situational, parents getting older  Sleep Problems: nightmares   reduce nightmares, make a plan to manage 2-3 anxiety-provoking situations, develop 2 strategies to cope with trauma triggers/intrusive memories and take medications as prescribed on a daily basis building on strengths  communication skills  medications   psychotherapy marked symptom improvement and symptom resolution   Eating Disorder: intense fear of weight gain and distorted body image    POTS- syncopal episodes eating 3 meals/day, improvement in body image,maintain weight       Minimal episodes stress management  psychotherapy         muliple meds, cardiologist following , management of sxs marked symptom improvement

## 2022-09-07 DIAGNOSIS — F33.1 MAJOR DEPRESSIVE DISORDER, RECURRENT EPISODE, MODERATE (H): ICD-10-CM

## 2022-09-08 ENCOUNTER — TELEPHONE (OUTPATIENT)
Dept: PSYCHIATRY | Facility: CLINIC | Age: 52
End: 2022-09-08

## 2022-09-08 DIAGNOSIS — I95.1 ORTHOSTATIC HYPOTENSION: ICD-10-CM

## 2022-09-08 RX ORDER — DEXMETHYLPHENIDATE HYDROCHLORIDE 10 MG/1
10 TABLET ORAL DAILY
Qty: 30 TABLET | OUTPATIENT
Start: 2022-09-08

## 2022-09-08 NOTE — TELEPHONE ENCOUNTER
[facility] Call Center    Phone Message    May a detailed message be left on voicemail: yes     Reason for Call:   Nidia said that she called earlier and was directed to Dr. Tena's  but was disconnected. She's requesting a call back from RN or Provider about pt's medication: dexmethylphenidate (FOCALIN) 10 MG tablet        Action Taken: Message routed to:  Other: P PSYCHIATRY NURSE-ERASTO MORALES    Travel Screening: Not Applicable           Follow Up:  Writer returned call to Nidia at [employer]. She states that due to a packing error, patient did not receive her Focalin for 2 weeks and per company policy, they wanted to inform Dr. Tena. Per Nidia, patient has been back on this medication for 3 days now and is tolerating it well. Nidia also states that patient thought she had been taking the medication throughout the entire two weeks she was off of it and did not experience any difficulties without this medication.

## 2022-09-12 RX ORDER — FLUDROCORTISONE ACETATE 0.1 MG/1
0.2 TABLET ORAL DAILY
Qty: 60 TABLET | Refills: 0 | Status: SHIPPED | OUTPATIENT
Start: 2022-09-12 | End: 2022-10-11

## 2022-09-12 NOTE — TELEPHONE ENCOUNTER
Last Clinic Visit: 4/7/2022 [facility]  Future Visit: 10/6/2022    *refill sent 30 day supply - appt within next 30  days

## 2022-09-29 NOTE — TELEPHONE ENCOUNTER
Body Location Override (Optional): Right frontal scalp
On 03/07/2017 the patient signed an LASHAWN authorizing the release of letter from [facility] Psychiatry to Dr. Zarate for the purpose of continuing care. On 03/08/2017 I sent this LASHAWN to scanning and kept a copy in psychiatry until scanning completed/confirmed.  Leonela Rodríguez LPN       
Which Doctor Performed Mohs? (Optional): Seferino Sandhoff

## 2022-10-06 ENCOUNTER — VIRTUAL VISIT (OUTPATIENT)
Dept: CARDIOLOGY | Facility: CLINIC | Age: 52
End: 2022-10-06
Attending: INTERNAL MEDICINE
Payer: MEDICARE

## 2022-10-06 DIAGNOSIS — I95.1 ORTHOSTATIC HYPOTENSION: ICD-10-CM

## 2022-10-06 PROCEDURE — 99442 PR PHYSICIAN TELEPHONE EVALUATION 11-20 MIN: CPT | Mod: 95 | Performed by: INTERNAL MEDICINE

## 2022-10-06 NOTE — PATIENT INSTRUCTIONS
You were seen in the Electrophysiology Clinic today by: Dr Galarza    Plan:   Medication Changes:   Take Fludrocortisone in the late afternoon instead of in the morning. Not to be taken within 4 hours of lying down.    Follow up visit:  6 months with Dr Galarza          If you have further questions, please utilize [employer] to contact us.     Your Care Team:    EP Cardiology   Telephone Number     Nurse Line  Rebeca Schroeder, RN   Jeni Portillo RN   [phone]     For scheduling appointments:   [place]ent[place]   [phone]   For procedure scheduling:    Etelvina Donnelly     [phone]   For the Device Clinic (Pacemakers, ICDs, Loop Recorders)    During business hours: [phone]  After business hours:   [phone]- select option 4 and ask for job code [id number].       On-call cardiologist for after hours or on weekends:   [phone], option #4, and ask to speak to the on-call cardiologist.     Cardiovascular Clinic:   [address] [place] [address]. [place] [address]      As always, Thank you for trusting us with your health care needs!

## 2022-10-06 NOTE — PROGRESS NOTES
HPI:     Keisha  is a 51-year-old woman with orthostatic hypotension associated with presumed autonomic dysfunction.     In terms of symptoms she continues to experience orthostatic hypotension and has had some falls but no injury.  These tend to occur primarily at night and she has received some benefit with the increased dose of fludrocortisone.  However, she does note that there is some variability and benefit and that she will have some bad days after which the nighttime problems may be improve for a while.    Keisha has not had any falls with injury and no new cardiovascular complaints.  We will move her fludrocortisone dosing to late afternoon although the long half-life of the drug makes it unlikely that that will have a substantial impact on her nighttime issues.  We will follow-up in the clinic in a few months.    PAST MEDICAL HISTORY:  Past Medical History:   Diagnosis Date     Anemia      Anorexia      Depression      Eating disorder      GERD (gastroesophageal reflux disease)      Hydronephrosis      Hypercholesterolemia      Hypotension      Hypotension, postural      Palpitations      POTS (postural orthostatic tachycardia syndrome)      Syncope      Syncope and collapse      Thyroid disease        CURRENT MEDICATIONS:  Current Outpatient Medications   Medication Sig Dispense Refill     acetaminophen (TYLENOL) 500 MG tablet Take 500-1,000 mg by mouth every 6 hours as needed.       atorvastatin (LIPITOR) 10 MG tablet Take by mouth daily Per pt takes this medicine but doesn't know the amount       Blood Pressure Monitor KIT Patient should be evaluated in the emergency department if her systolic blood pressure <65 1 kit 0     Calcium Carbonate-Vitamin D (CALCIUM 600+D PO) Take 1 tablet by mouth 2 times daily.       Cholecalciferol (VITAMIN D PO) Take  by mouth. 50, 000 units every 14 days       dexmethylphenidate (FOCALIN) 10 MG tablet Take 1 tablet (10 mg) by mouth daily 30 tablet 0     dexmethylphenidate  (FOCALIN) 10 MG tablet Take 1 tablet (10 mg) by mouth daily 30 tablet 0     dexmethylphenidate (FOCALIN) 10 MG tablet Take 1 tablet (10 mg) by mouth 2 times daily 30 tablet 0     docusate sodium (COLACE) 100 MG capsule Take 1 capsule by mouth 2 times daily.       fludrocortisone (FLORINEF) 0.1 MG tablet Take 2 tablets (0.2 mg) by mouth daily 60 tablet 0     gabapentin (NEURONTIN) 300 MG capsule Take 3 capsules (900 mg) by mouth At Bedtime 90 capsule 4     ibuprofen (ADVIL/MOTRIN) 800 MG tablet Take 800 mg by mouth every 8 hours as needed        lamoTRIgine (LAMICTAL) 200 MG tablet Take 2 tablets (400 mg) by mouth daily PLEASE SCHEDULE APPT. FOR REFILLS [phone] 60 tablet 3     levothyroxine (SYNTHROID, LEVOTHROID) 175 MCG tablet Take  by mouth daily.       metoclopramide (REGLAN) 10 MG tablet 2 times daily        midodrine (PROAMATINE) 5 MG tablet Take 2 tablets (10 mg) by mouth 3 times daily 180 tablet 11     multivitamin (OCUVITE) TABS Take 1 tablet by mouth 2 times daily.       sertraline (ZOLOFT) 100 MG tablet Take 1.5 tablets (150 mg) by mouth daily 45 tablet 4       PAST SURGICAL HISTORY:  Past Surgical History:   Procedure Laterality Date     CYSTOSCOPY W/ LASER LITHOTRIPSY Left 1/23/2017    Procedure: CYSTOSCOPY, LEFT URETEROSCOPY HOLMIUM LASER LITHOTRIPSY AND LEFT STENT PLACEMENT BILATERAL RETROGRADE PYELOGRAM ;  Surgeon: Jenniffer Romero MD;  Location: [facility];  Service:      GASTROSTOMY, INSERT TUBE, COMBINED       GT placed[  2001     IR MISCELLANEOUS PROCEDURE  6/9/2004     PICC INSERTION - DOUBLE LUMEN  6/18/2021          TONSILLECTOMY         ALLERGIES:   No Known Allergies    FAMILY HISTORY:  No family history on file.    SOCIAL HISTORY:  Social History     Tobacco Use     Smoking status: Never Smoker     Smokeless tobacco: Never Used   Substance Use Topics     Alcohol use: No     Drug use: No       ROS:   Constitutional: No fever, chills, or sweats. Weight stable.   ENT: No  visual disturbance, ear ache, epistaxis, sore throat.   Cardiovascular: As per HPI.   Respiratory: No cough, hemoptysis.    GI: No nausea, vomiting,  : No hematuria.   Integument: Negative.   Psychiatric: Negative.   Hematologic:  no easy bleeding.  Neuro: Negative.   Endocrinology: No significant heat or cold intolerance   Musculoskeletal: No myalgia or other rheumatologic complaint.    Exam:  There were no vitals taken for this visit.  GENERAL APPEARANCE: healthy, alert and no distress    RESPIRATORY: no rales, rhonchi or wheezes, no use of accessory muscles, no retractions, respirations are unlabored, normal respiratory rate  NEURO: alert and oriented to person/place/time, normal speech, gait and affect.  SKIN: no ecchymoses, no rashes    Labs:  CBC RESULTS:   Lab Results   Component Value Date    WBC 14.6 (H) 06/22/2021    WBC 5.0 07/24/2017    RBC 3.18 (L) 06/22/2021    RBC 3.56 (L) 07/24/2017    HGB 9.9 (L) 06/22/2021    HGB 10.8 (L) 07/24/2017    HCT 30.4 (L) 06/22/2021    HCT 33.7 (L) 07/24/2017    MCV 96 06/22/2021    MCV 95 07/24/2017    MCH 31.1 06/22/2021    MCH 30.3 07/24/2017    MCHC 32.6 06/22/2021    MCHC 32.0 07/24/2017    RDW 13.1 06/22/2021    RDW 13.1 07/24/2017     06/22/2021     07/24/2017       BMP RESULTS:  Lab Results   Component Value Date     06/22/2021     07/24/2017    POTASSIUM 4.0 06/22/2021    POTASSIUM 3.5 07/24/2017    CHLORIDE 108 (H) 06/22/2021    CHLORIDE 106 07/24/2017    CO2 26 06/22/2021    CO2 28 07/24/2017    ANIONGAP 7 06/22/2021    ANIONGAP 8 07/24/2017    GLC 96 06/22/2021     (H) 07/24/2017    BUN 11 06/22/2021    BUN 7 07/24/2017    CR 0.82 06/22/2021    CR 0.83 07/24/2017    GFRESTIMATED >60 06/22/2021    GFRESTIMATED 74 07/24/2017    GFRESTBLACK >60 06/22/2021    GFRESTBLACK 89 07/24/2017    KENNETH 9.1 06/22/2021    KENNETH 8.7 07/24/2017        INR RESULTS:  No results found for: INR    Procedures:  PULMONARY FUNCTION TESTS:   No flowsheet  data found.      ECHOCARDIOGRAM:   No results found for this or any previous visit (from the past 8760 hour(s)).      Assessment and Plan:   1.  Autonomic dysfunction with orthostatic hypotension somewhat improved with increased fludrocortisone dosing    Plan  1.  Continue current medications.  We will fludrocortisone to be taken in the late afternoon  2.  Follow-up clinic visit 6 months or earlier if patient calls    Total elapsed time today with chart review, clinic visit and documentation 30    Telephone initiated 11: 34; terminated 11: 55  Platform [employer]ity  Patient at home; clinic [facility] heart    I very much appreciated the opportunity to see and assess Keisha Somers in the clinic today. Please do not hesitate to contact my office if you have any questions or concerns.      Thang Galarza MD  Cardiac Arrhythmia Service  [facility]  [phone]      CC  THANG GALARZA

## 2022-10-06 NOTE — NURSING NOTE
Chief Complaint   Patient presents with     Video Visit     6 month follow up       Patient confirms medications and allergies are accurate and or denies any changes since last reviewed/verified.     Trisha Virk, Virtual Facilitator/LPN

## 2022-10-06 NOTE — LETTER
10/6/2022      RE: Keisha Somers  [address]  [address]  [facility] [address]       Dear Colleague,    Thank you for the opportunity to participate in the care of your patient, Keisha Somers, at the [facility] HEART [facility]. Please see a copy of my visit note below.    HPI:     Keisha  is a 51-year-old woman with orthostatic hypotension associated with presumed autonomic dysfunction.     In terms of symptoms she continues to experience orthostatic hypotension and has had some falls but no injury.  These tend to occur primarily at night and she has received some benefit with the increased dose of fludrocortisone.  However, she does note that there is some variability and benefit and that she will have some bad days after which the nighttime problems may be improve for a while.    Keisha has not had any falls with injury and no new cardiovascular complaints.  We will move her fludrocortisone dosing to late afternoon although the long half-life of the drug makes it unlikely that that will have a substantial impact on her nighttime issues.  We will follow-up in the clinic in a few months.    PAST MEDICAL HISTORY:  Past Medical History:   Diagnosis Date     Anemia      Anorexia      Depression      Eating disorder      GERD (gastroesophageal reflux disease)      Hydronephrosis      Hypercholesterolemia      Hypotension      Hypotension, postural      Palpitations      POTS (postural orthostatic tachycardia syndrome)      Syncope      Syncope and collapse      Thyroid disease        CURRENT MEDICATIONS:  Current Outpatient Medications   Medication Sig Dispense Refill     acetaminophen (TYLENOL) 500 MG tablet Take 500-1,000 mg by mouth every 6 hours as needed.       atorvastatin (LIPITOR) 10 MG tablet Take by mouth daily Per pt takes this medicine but doesn't know the amount       Blood Pressure Monitor KIT Patient should be evaluated in the  emergency department if her systolic blood pressure <65 1 kit 0     Calcium Carbonate-Vitamin D (CALCIUM 600+D PO) Take 1 tablet by mouth 2 times daily.       Cholecalciferol (VITAMIN D PO) Take  by mouth. 50, 000 units every 14 days       dexmethylphenidate (FOCALIN) 10 MG tablet Take 1 tablet (10 mg) by mouth daily 30 tablet 0     dexmethylphenidate (FOCALIN) 10 MG tablet Take 1 tablet (10 mg) by mouth daily 30 tablet 0     dexmethylphenidate (FOCALIN) 10 MG tablet Take 1 tablet (10 mg) by mouth 2 times daily 30 tablet 0     docusate sodium (COLACE) 100 MG capsule Take 1 capsule by mouth 2 times daily.       fludrocortisone (FLORINEF) 0.1 MG tablet Take 2 tablets (0.2 mg) by mouth daily 60 tablet 0     gabapentin (NEURONTIN) 300 MG capsule Take 3 capsules (900 mg) by mouth At Bedtime 90 capsule 4     ibuprofen (ADVIL/MOTRIN) 800 MG tablet Take 800 mg by mouth every 8 hours as needed        lamoTRIgine (LAMICTAL) 200 MG tablet Take 2 tablets (400 mg) by mouth daily PLEASE SCHEDULE APPT. FOR REFILLS [phone] 60 tablet 3     levothyroxine (SYNTHROID, LEVOTHROID) 175 MCG tablet Take  by mouth daily.       metoclopramide (REGLAN) 10 MG tablet 2 times daily        midodrine (PROAMATINE) 5 MG tablet Take 2 tablets (10 mg) by mouth 3 times daily 180 tablet 11     multivitamin (OCUVITE) TABS Take 1 tablet by mouth 2 times daily.       sertraline (ZOLOFT) 100 MG tablet Take 1.5 tablets (150 mg) by mouth daily 45 tablet 4       PAST SURGICAL HISTORY:  Past Surgical History:   Procedure Laterality Date     CYSTOSCOPY W/ LASER LITHOTRIPSY Left 1/23/2017    Procedure: CYSTOSCOPY, LEFT URETEROSCOPY HOLMIUM LASER LITHOTRIPSY AND LEFT STENT PLACEMENT BILATERAL RETROGRADE PYELOGRAM ;  Surgeon: Jenniffer Romero MD;  Location: [facility] OR;  Service:      GASTROSTOMY, INSERT TUBE, COMBINED       GT placed[  2001     IR MISCELLANEOUS PROCEDURE  6/9/2004     PICC INSERTION - DOUBLE LUMEN  6/18/2021           TONSILLECTOMY         ALLERGIES:   No Known Allergies    FAMILY HISTORY:  No family history on file.    SOCIAL HISTORY:  Social History     Tobacco Use     Smoking status: Never Smoker     Smokeless tobacco: Never Used   Substance Use Topics     Alcohol use: No     Drug use: No       ROS:   Constitutional: No fever, chills, or sweats. Weight stable.   ENT: No visual disturbance, ear ache, epistaxis, sore throat.   Cardiovascular: As per HPI.   Respiratory: No cough, hemoptysis.    GI: No nausea, vomiting,  : No hematuria.   Integument: Negative.   Psychiatric: Negative.   Hematologic:  no easy bleeding.  Neuro: Negative.   Endocrinology: No significant heat or cold intolerance   Musculoskeletal: No myalgia or other rheumatologic complaint.    Exam:  There were no vitals taken for this visit.  GENERAL APPEARANCE: healthy, alert and no distress    RESPIRATORY: no rales, rhonchi or wheezes, no use of accessory muscles, no retractions, respirations are unlabored, normal respiratory rate  NEURO: alert and oriented to person/place/time, normal speech, gait and affect.  SKIN: no ecchymoses, no rashes    Labs:  CBC RESULTS:   Lab Results   Component Value Date    WBC 14.6 (H) 06/22/2021    WBC 5.0 07/24/2017    RBC 3.18 (L) 06/22/2021    RBC 3.56 (L) 07/24/2017    HGB 9.9 (L) 06/22/2021    HGB 10.8 (L) 07/24/2017    HCT 30.4 (L) 06/22/2021    HCT 33.7 (L) 07/24/2017    MCV 96 06/22/2021    MCV 95 07/24/2017    MCH 31.1 06/22/2021    MCH 30.3 07/24/2017    MCHC 32.6 06/22/2021    MCHC 32.0 07/24/2017    RDW 13.1 06/22/2021    RDW 13.1 07/24/2017     06/22/2021     07/24/2017       BMP RESULTS:  Lab Results   Component Value Date     06/22/2021     07/24/2017    POTASSIUM 4.0 06/22/2021    POTASSIUM 3.5 07/24/2017    CHLORIDE 108 (H) 06/22/2021    CHLORIDE 106 07/24/2017    CO2 26 06/22/2021    CO2 28 07/24/2017    ANIONGAP 7 06/22/2021    ANIONGAP 8 07/24/2017    GLC 96 06/22/2021     (H)  07/24/2017    BUN 11 06/22/2021    BUN 7 07/24/2017    CR 0.82 06/22/2021    CR 0.83 07/24/2017    GFRESTIMATED >60 06/22/2021    GFRESTIMATED 74 07/24/2017    GFRESTBLACK >60 06/22/2021    GFRESTBLACK 89 07/24/2017    KENNETH 9.1 06/22/2021    KENNETH 8.7 07/24/2017        INR RESULTS:  No results found for: INR    Procedures:  PULMONARY FUNCTION TESTS:   No flowsheet data found.      ECHOCARDIOGRAM:   No results found for this or any previous visit (from the past 8760 hour(s)).      Assessment and Plan:   1.  Autonomic dysfunction with orthostatic hypotension somewhat improved with increased fludrocortisone dosing    Plan  1.  Continue current medications.  We will fludrocortisone to be taken in the late afternoon  2.  Follow-up clinic visit 6 months or earlier if patient calls    Total elapsed time today with chart review, clinic visit and documentation 30    Telephone initiated 11: 34; terminated 11: 55  Platform Doximity  Patient at home; clinic [facility] heart    I very much appreciated the opportunity to see and assess Keisha Somers in the clinic today. Please do not hesitate to contact my office if you have any questions or concerns.      Thang Galarza MD  Cardiac Arrhythmia Service  [facility]  [phone]      THANG TONG

## 2022-10-07 ENCOUNTER — TELEPHONE (OUTPATIENT)
Dept: CARE COORDINATION | Facility: CLINIC | Age: 52
End: 2022-10-07

## 2022-10-07 NOTE — TELEPHONE ENCOUNTER
Left Voicemail (1st Attempt) for the patient to call back and schedule the following:    Appointment type: return  Provider: Dr. Galarza  Return date: 6 months, around 4/6/2023  Specialty phone number: [phone]  Additional appointment(s) needed: n/a  Additonal Notes: n/a

## 2022-10-10 NOTE — TELEPHONE ENCOUNTER
fludrocortisone (FLORINEF) 0.1 MG  Last Written Prescription Date:  9/12/22  Last Fill Quantity: 60,   # refills: 0  Last Office Visit : 10/6/22  Future Office visit:  4/6/23  Routing refill request to provider for review/approval because:   Drug not on the Rrefill protocol

## 2022-10-11 RX ORDER — FLUDROCORTISONE ACETATE 0.1 MG/1
0.2 TABLET ORAL DAILY
Qty: 180 TABLET | Refills: 1 | Status: SHIPPED | OUTPATIENT
Start: 2022-10-11 | End: 2023-04-05

## 2022-10-31 DIAGNOSIS — G90.A POSTURAL ORTHOSTATIC TACHYCARDIA SYNDROME: ICD-10-CM

## 2022-11-01 ENCOUNTER — VIRTUAL VISIT (OUTPATIENT)
Dept: PSYCHIATRY | Facility: CLINIC | Age: 52
End: 2022-11-01
Attending: PSYCHIATRY & NEUROLOGY
Payer: MEDICARE

## 2022-11-01 DIAGNOSIS — F51.01 PRIMARY INSOMNIA: ICD-10-CM

## 2022-11-01 DIAGNOSIS — F33.1 MAJOR DEPRESSIVE DISORDER, RECURRENT EPISODE, MODERATE (H): ICD-10-CM

## 2022-11-01 DIAGNOSIS — F33.0 MAJOR DEPRESSIVE DISORDER, RECURRENT EPISODE, MILD (H): ICD-10-CM

## 2022-11-01 PROCEDURE — 99443 PR PHYSICIAN TELEPHONE EVALUATION 21-30 MIN: CPT | Mod: 95 | Performed by: PSYCHIATRY & NEUROLOGY

## 2022-11-01 RX ORDER — LAMOTRIGINE 200 MG/1
400 TABLET ORAL DAILY
Qty: 60 TABLET | Refills: 3 | Status: SHIPPED | OUTPATIENT
Start: 2022-11-01 | End: 2023-02-07

## 2022-11-01 RX ORDER — GABAPENTIN 300 MG/1
1200 CAPSULE ORAL
Qty: 90 CAPSULE | Refills: 4 | Status: SHIPPED | OUTPATIENT
Start: 2022-11-01 | End: 2023-02-07

## 2022-11-01 RX ORDER — DEXMETHYLPHENIDATE HYDROCHLORIDE 10 MG/1
10 TABLET ORAL DAILY
Qty: 30 TABLET | Refills: 0 | Status: SHIPPED | OUTPATIENT
Start: 2022-11-01 | End: 2023-02-07

## 2022-11-01 RX ORDER — SERTRALINE HYDROCHLORIDE 100 MG/1
150 TABLET, FILM COATED ORAL DAILY
Qty: 45 TABLET | Refills: 4 | Status: SHIPPED | OUTPATIENT
Start: 2022-11-01 | End: 2023-02-07

## 2022-11-01 RX ORDER — DEXMETHYLPHENIDATE HYDROCHLORIDE 10 MG/1
10 TABLET ORAL 2 TIMES DAILY
Qty: 30 TABLET | Refills: 0 | Status: SHIPPED | OUTPATIENT
Start: 2022-11-01 | End: 2022-11-09

## 2022-11-01 ASSESSMENT — PATIENT HEALTH QUESTIONNAIRE - PHQ9
SUM OF ALL RESPONSES TO PHQ QUESTIONS 1-9: 3
SUM OF ALL RESPONSES TO PHQ QUESTIONS 1-9: 3
10. IF YOU CHECKED OFF ANY PROBLEMS, HOW DIFFICULT HAVE THESE PROBLEMS MADE IT FOR YOU TO DO YOUR WORK, TAKE CARE OF THINGS AT HOME, OR GET ALONG WITH OTHER PEOPLE: NOT DIFFICULT AT ALL

## 2022-11-01 NOTE — PROGRESS NOTES
Keisha Somers is a 52 year old who is being evaluated via a billable telephone visit.      What phone number would you prefer to be contacted at?: Mobile phone number on file:   Telephone Information:   Mobile [phone]       Reason for telehealth visit: Patient has requested telehealth visit    Originating location (patient location): Patient's home    Will anyone else be joining the visit? No  Answers for HPI/ROS submitted by the patient on 11/1/2022  If you checked off any problems, how difficult have these problems made it for you to do your work, take care of things at home, or get along with other people?: Not difficult at all  PHQ9 TOTAL SCORE: 3      How would you prefer to obtain AVS?: MyChart     TELEPHONE VISIT  Keisha Somers is a 52 year old pt. who is being evaluated via a billable telephone visit.      The patient has been notified of the following:    We have found that certain health care needs can be provided without the need for a physical exam. This service lets us provide the care you need with a short phone conversation. If a prescription is necessary we can send it directly to your pharmacy. If lab work is needed we can place an order for that and you can then stop by our lab to have the test done at a later time. Insurers are generally covering virtual visits as they would in-office visits so billing should not be different than normal.  If for some reason you do get billed incorrectly, you should contact the billing office to correct it and that number is in the AVS .    Patient has given verbal consent for a telephone visit?:  Yes   How would the pt like to obtain the AVS?:MAIL  AVS SmartPhrase [PsychAVS] has been placed in 'Patient Instructions':  Yes     Start Time: 330 pm      End Time:  400pm                Progress Notes   Romana Tena MD (Physician)     Psychiatry       PSYCHIATRY VISIT      The patient was seen for evaluation and management.The patient was seen for anorexia  and depression.   She was last seen 6 mo ago    INTERIM HX:At last visit the plan was:  She will return again in 12 weeks. .   Focalin Rxs sent.   Gabapentin -increase to 900 mg at bedtime  for sleep  Follow-up with cardiology and neurology prn.  Recommend sleep consult for disordered nighttime eating. She should discuss with PCP to get referral.    She reports that she has been trying to get over pneumonia.  She was sick for a month before.  She lost her voice, bad cough, fever. She went to the urgency room and was dxed with pneumonia and given antibiotics and cough medicine.  She is improving .      Mood has been pretty good.  She was tired of being at home and not being able to talk.  Sleep now is fair. Before she got sick she was either oversleeping or not at all. Better when sick.  Now is somewhat back to usual.  She doesn't sleep through the night. She is still eating at night, even after LUnesta dced.    Current gabapentin is 900 at bedtime. It does not cause additional dizziness and she doesn't think it causes SE's.    Energy now is pretty good and she is able to get out more. Her mom and her are not always getting along. She is very critical of the patient and is particularly worse when her brother is not present .  Her father is doing very well; he had a post triple bypass.  Her mom is starting to have memory issues.  Her sister has dementia and her mom is worried she will develop it. Mom is 82 yrs old.     SH: She has a [employer] worker- recently met with her.  She told pt that she might qualify for an [employer] worker.     MED:  She still has syncopal episodes but better - once every 2 weeks. Weight reportedly is going up due to nighttime eating.      ROS: HA-migraine     MEDICATIONS:   1. Lamictal 400 mg daily am  2. Zoloft 150 mg daily am   3. Focalin 10 mg daily. Beneficial for energy, mood and no indications of problematic use.   4. Synthroid 150 mcg - she is uncertain of dose.    5. Zofran prn migraines  7.  Mitodrine 10/10/10 tid - changed from Dr. Galarza   8. Lunesta -dc'd  9. Carafate  10. Pyridostigmine - OFF now  11. Tizanidine - 4x/day, last dose 9pm for migraines-one in am , one noo,one at night.  16. Fluorinef- 0.2 mg daily   17. Maxalt for migraines with ibuprofen.  It works very well.    19. Mg-   21. Vit B2  22. Gabapentin 900 QHS - helps with sleep;  Added for hot flashes, works.  23. Amlodipine lowered to 2.5 mg at bedtime- recently- OFF now  24. Atorvastatin  25. Ibuprofen- not allowed   There were no vitals taken for this visit. - televisit    BP Readings from Last 3 Encounters:   01/04/22 107/77   12/17/19 112/75   09/26/19 115/81     Wt Readings from Last 4 Encounters:   09/26/19 64 kg (141 lb 3.2 oz)   07/23/19 62.1 kg (137 lb)   04/16/19 66.5 kg (146 lb 9.6 oz)   03/21/19 68.9 kg (152 lb)     MENTAL STATUS EXAM: The patient is interviewed via phone, oriented x3. Mood is euthymic, Good range of affect. Thought processes logical, associations are clear and goal directed. Thought content is normal. Fund of knowledge good, speech RRR, language intact, memory intact, concentration good, insight and judgment are good, gait unable to assess.    ASSESSMENT:   Anorexia nervosa, weight stable , the highest weight she has been and feels much better emotionally and physically with her current weight but  concerned about the nighttime eating.      Major depressive disorder,, stable.,;   Chronic sleep problems and abnormal sleep behaviors  Recurrent syncopal episode(s), unpredictable  Migraine HA, treated  with maxalt, not currently problematic  POTS    PLAN:  She will return again in 12 weeks. .   Focalin Rxs sent.   Gabapentin increase to  1200 mg prn at bedtime  for sleep  Follow-up with cardiology and neurology prn.  Note to assisted living facility sent per their policy.    RADHA MAURER MD     Level of Medical Decision Making:   Problems addressed: - At least 2 stable chronic problems and additional  unstable problem (sleep)    - Moderate due to: Engaged in prescription drug management during visit (discussed any medication benefits, side effects, alternatives, etc.)    .       PSYCHIATRY CLINIC INDIVIDUAL PSYCHOTHERAPY NOTE                                                     [16]   Start time - 330pm        End time -346pm    Date reviewed - created on 12-19-17   last update: 10/12/21  Date next due -     10-17-22  REVIEWED OVER PHONE 6-23-20 DUE TO TELE VISIT  Subjective: This supportive psychotherapy session addressed issues related to mood and medical problems.  Patient's reaction: Maintenance in the context of mental status appropriate for ambulatory setting.  Progress: good  Plan: RTC 3 mo  Psychotherapy services during this visit included  myself and the patient.   TREATMENT  PLAN          SYMPTOMS; PROBLEMS   MEASURABLE GOALS;    FUNCTIONAL IMPROVEMENT INTERVENTIONS;   GAINS MADE DISCHARGE CRITERIA   Depression: none  Anxiety: mild, situational, parents getting older  Sleep Problems: nightmares   reduce nightmares, make a plan to manage 2-3 anxiety-provoking situations, develop 2 strategies to cope with trauma triggers/intrusive memories and take medications as prescribed on a daily basis building on strengths  communication skills  medications   psychotherapy marked symptom improvement and symptom resolution   Eating Disorder: intense fear of weight gain and distorted body image    POTS- syncopal episodes eating 3 meals/day, improvement in body image,maintain weight       Minimal episodes stress management  psychotherapy         muliple meds, cardiologist following , management of sxs marked symptom improvement

## 2022-11-01 NOTE — PATIENT INSTRUCTIONS
**For crisis resources, please see the information at the end of this document**   Patient Education    Thank you for coming to the [facility] MENTAL HEALTH & ADDICTION [place] CLINIC.     Lab Testing:  If you had lab testing today and your results are reassuring or normal they will be mailed to you or sent through [employer] within 7 days. If the lab tests need quick action we will call you with the results. The phone number we will call with results is # [phone]. If this is not the best number please call our clinic and change the number.     Medication Refills:  If you need any refills please call your pharmacy and they will contact us. Our fax number for refills is [phone].   Three business days of notice are needed for general medication refill requests.   Five business days of notice are needed for controlled substance refill requests.   If you need to change to a different pharmacy, please contact the new pharmacy directly. The new pharmacy will help you get your medications transferred.     Contact Us:  Please call [phone] during business hours (8-5:00 M-F).   If you have medication related questions after clinic hours, or on the weekend, please call [phone].     Financial Assistance [phone]   Medical Records [phone]       MENTAL HEALTH CRISIS RESOURCES:  For a emergency help, please call 911 or go to the nearest Emergency Department.     Emergency Walk-In Options:   EmPATH Unit @ [facility] ([place]): [phone] - Specialized mental health emergency area designed to be calming  [facility] ([place]): [phone]  [facility]iat[facility] ([place]): [phone]  [facility]): [phone]    [facility] Crisis Information:   [place]: [phone]  [place]: [phone]  [place] (ANGELA) - Adult: [phone]     Child: [phone]  [place] - Adult: [phone]     Child: [phone]  [place]:  [phone]  List of all [facility] resources:   https://[place].gov/dhs/people-we-serve/adults/health-care/mental-health/resources/crisis-contacts.jsp    National Crisis Information:   Crisis Text Line: Text  [place]  to 211685  Suicide & Crisis Lifeline: 988  National Suicide Prevention Lifeline: 5-179-682-TALK (1-110.107.1002)       For online chat options, visit https://suicidepreventionlifeline.org/chat/  Poison Control Center: 5-945-993-2717  Trans Lifeline: 8-529-396-5624 - Hotline for transgender people of all ages  The [employer] Project: 9-006-801-1911 - Hotline for LGBT youth     For Non-Emergency Support:   Fast Tracker: Mental Health & Substance Use Disorder Resources -   https://www.[employer].org/

## 2022-11-07 RX ORDER — MIDODRINE HYDROCHLORIDE 5 MG/1
10 TABLET ORAL 3 TIMES DAILY
Qty: 180 TABLET | Refills: 11 | Status: SHIPPED | OUTPATIENT
Start: 2022-11-07 | End: 2023-10-23

## 2022-11-07 NOTE — TELEPHONE ENCOUNTER
midodrine (PROAMATINE) 5 MG tablet    Last Written Prescription Date:  12/22/21  Last Fill Quantity: 180,   # refills: 11  Last Office Visit : 10/6/22  Future Office visit:  4/6/23    Routing refill request to provider for review/approval because:  Drug not on the Cardiology refill protocol   Thank-you, Jessie MOLINA RN Medication Refill Team

## 2022-11-09 DIAGNOSIS — F33.1 MAJOR DEPRESSIVE DISORDER, RECURRENT EPISODE, MODERATE (H): ICD-10-CM

## 2022-11-09 NOTE — TELEPHONE ENCOUNTER
[facility] Call Center    Phone Message    May a detailed message be left on voicemail: yes     Reason for Call: Medication Question or concern regarding medication   Prescription Clarification  Name of Medication: Focalin  Prescribing Provider:    Pharmacy: [facility]   What on the order needs clarification? One of the focalin prescriptions has the wrong directions.           Action Taken: Other: [facility]    Travel Screening: Not Applicable

## 2022-11-09 NOTE — TELEPHONE ENCOUNTER
Plan per last office visit notes:   - Focalin 10 mg daily. Beneficial for energy, mood and no indications of problematic use.     Per med tab:      Disp Refills Start End YUMIKO   dexmethylphenidate (FOCALIN) 10 MG tablet 30 tablet 0 11/1/2022  No   Sig - Route: Take 1 tablet (10 mg) by mouth 2 times daily - Oral   Sent to pharmacy as: Dexmethylphenidate HCl 10 MG Oral Tablet (Focalin)   Class: E-Prescribe   Earliest Fill Date: 11/1/2022   Notes to Pharmacy: Do not fill until Dec 30, 2022   Order: [id number]   E-Prescribing Status: Receipt confirmed by pharmacy (11/1/2022  3:53 PM CDT)

## 2022-11-10 ENCOUNTER — TELEPHONE (OUTPATIENT)
Dept: PSYCHIATRY | Facility: CLINIC | Age: 52
End: 2022-11-10

## 2022-11-10 NOTE — TELEPHONE ENCOUNTER
Received 4 pages of forms via mail from patient requesting provider completion and signature before mailing back to pt's home address. Writer sent message to Dr. Tena and Michelle Handley, RNCC for more information. Forms are being held in nurse triage. Magda Cruz, EMT

## 2022-11-14 RX ORDER — DEXMETHYLPHENIDATE HYDROCHLORIDE 10 MG/1
10 TABLET ORAL DAILY
Qty: 30 TABLET | Refills: 0 | Status: SHIPPED | OUTPATIENT
Start: 2022-11-14 | End: 2023-02-07

## 2022-11-17 NOTE — TELEPHONE ENCOUNTER
----- Message from Romana Tena MD sent at 11/14/2022 11:28 PM CST -----  Regarding: RE: [employer] form  I think we can use the 2017 one as reference.  The medications may have changed and so we should reflect that.  She is not actively engaged in psychotherapy so we could delete reference to that.  ThanksRomana  ----- Message -----  From: Magda Cruz  Sent: 11/10/2022  11:08 AM CST  To: Romana Tena MD, Michelle Handley, RN  Subject: [employer] form                              Hi Dr. Tena and Michelle,    I have a [employer] mobility eligibility form for this patient. There is a previous copy from 2017 in the media tab. I can use that as reference to complete this, but since it's been so long I want to double check to see if any of the answers have changed since then. Let me know and I can complete it and mail it back to her. The media tab entry is dated 11/24/2017.     ThanksMagda

## 2022-11-17 NOTE — TELEPHONE ENCOUNTER
Form completed and is currently being held in nurse triage awaiting provider signature. Magda Cruz, EMT

## 2022-11-18 NOTE — TELEPHONE ENCOUNTER
Forms signed. Envelope addressed to pt's home address was placed in outgoing mail basket. Forms sent to scanning and a hard copy is being held in nurse triage until scanning is confirmed. Magda Cruz, EMT     Negative

## 2022-11-29 NOTE — TELEPHONE ENCOUNTER
Writer printed copy of forms and placed in outgoing mail basket in folder addressed to pt's home.     Patient notified via phone call at mobile. Magda Cruz, EMT

## 2022-11-29 NOTE — TELEPHONE ENCOUNTER
Patient called this morning.  She never received the [employer] mailing.  Confirmed correct address in the system.  Please mail ASAP as forms are time sensitive and cannot be faxed.

## 2023-02-07 ENCOUNTER — VIRTUAL VISIT (OUTPATIENT)
Dept: PSYCHIATRY | Facility: CLINIC | Age: 53
End: 2023-02-07
Attending: PSYCHIATRY & NEUROLOGY
Payer: MEDICARE

## 2023-02-07 DIAGNOSIS — F33.1 MAJOR DEPRESSIVE DISORDER, RECURRENT EPISODE, MODERATE (H): ICD-10-CM

## 2023-02-07 DIAGNOSIS — F51.01 PRIMARY INSOMNIA: ICD-10-CM

## 2023-02-07 DIAGNOSIS — F33.0 MAJOR DEPRESSIVE DISORDER, RECURRENT EPISODE, MILD (H): ICD-10-CM

## 2023-02-07 PROCEDURE — 99443 PR PHYSICIAN TELEPHONE EVALUATION 21-30 MIN: CPT | Mod: 95 | Performed by: PSYCHIATRY & NEUROLOGY

## 2023-02-07 PROCEDURE — 90833 PSYTX W PT W E/M 30 MIN: CPT | Mod: 95 | Performed by: PSYCHIATRY & NEUROLOGY

## 2023-02-07 RX ORDER — DEXMETHYLPHENIDATE HYDROCHLORIDE 10 MG/1
10 TABLET ORAL DAILY
Qty: 30 TABLET | Refills: 0 | Status: SHIPPED | OUTPATIENT
Start: 2023-02-07 | End: 2023-03-31

## 2023-02-07 RX ORDER — SERTRALINE HYDROCHLORIDE 100 MG/1
150 TABLET, FILM COATED ORAL DAILY
Qty: 45 TABLET | Refills: 4 | Status: SHIPPED | OUTPATIENT
Start: 2023-02-07 | End: 2023-05-16

## 2023-02-07 RX ORDER — GABAPENTIN 300 MG/1
1200 CAPSULE ORAL
Qty: 120 CAPSULE | Refills: 3 | Status: SHIPPED | OUTPATIENT
Start: 2023-02-07 | End: 2023-05-16

## 2023-02-07 RX ORDER — DEXMETHYLPHENIDATE HYDROCHLORIDE 10 MG/1
10 TABLET ORAL DAILY
Qty: 30 TABLET | Refills: 0 | Status: SHIPPED | OUTPATIENT
Start: 2023-02-07 | End: 2023-05-16

## 2023-02-07 RX ORDER — LAMOTRIGINE 200 MG/1
400 TABLET ORAL DAILY
Qty: 60 TABLET | Refills: 3 | Status: SHIPPED | OUTPATIENT
Start: 2023-02-07 | End: 2023-05-16

## 2023-02-07 NOTE — NURSING NOTE
Is the patient currently in the state of [place]? YES    Visit mode:TELEPHONE     If the visit is dropped, the patient can be reconnected by: TELEPHONE VISIT: Phone number: [phone]    Will anyone else be joining the visit? NO      How would you like to obtain your AVS? Mail a copy    Are changes needed to the allergy or medication list? Patient stated she is allergic to mushrooms.    Comments or concerns related to today's visit: KAREN Mcdaniel VF

## 2023-02-07 NOTE — PATIENT INSTRUCTIONS
**For crisis resources, please see the information at the end of this document**   Patient Education    Thank you for coming to the [facility] [place] [facility].     Lab Testing:  If you had lab testing today and your results are reassuring or normal they will be mailed to you or sent through [email or web address] within 7 days. If the lab tests need quick action we will call you with the results. The phone number we will call with results is # [phone]. If this is not the best number please call our clinic and change the number.     Medication Refills:  If you need any refills please call your pharmacy and they will contact us. Our fax number for refills is [phone].   Three business days of notice are needed for general medication refill requests.   Five business days of notice are needed for controlled substance refill requests.   If you need to change to a different pharmacy, please contact the new pharmacy directly. The new pharmacy will help you get your medications transferred.     Contact Us:  Please call [phone] during business hours (8-5:00 M-F).   If you have medication related questions after clinic hours, or on the weekend, please call [phone].     Financial Assistance [phone]   Medical Records [phone]       MENTAL HEALTH CRISIS RESOURCES:  For a emergency help, please call 911 or go to the nearest Emergency Department.     Emergency Walk-In Options:   EmPATH Unit @ [facility] ([place]): [phone] - Specialized mental health emergency area designed to be calming  [facility]ly [facility] ([place]): [phone]  [facility] ([place]): [phone]  [facility]): [phone]    [facility] Crisis Information:   [place]: [phone]  [place]: [phone]  [place] (ANGELA) - Adult: [phone]     Child: [phone]  Isaac - Adult: [phone]     Child: [phone]  [place]:  [phone]  List of all [employer] resources:   https://[place].gov/dhs/people-we-serve/adults/health-care/mental-health/resources/crisis-contacts.jsp    National Crisis Information:   Crisis Text Line: Text  [place]  to 721297  Suicide & Crisis Lifeline: 988  National Suicide Prevention Lifeline: 9-288-595-TALK (1-908.213.3473)       For online chat options, visit https://suicidepreventionlifeline.org/chat/  Poison Control Center: 6-814-713-1644  Trans Lifeline: 6-997-827-0083 - Hotline for transgender people of all ages  The [employer] Project: 9-438-149-2079 - Hotline for LGBT youth     For Non-Emergency Support:   Fast Tracker: Mental Health & Substance Use Disorder Resources -   https://www.Infinit[employer]Seeonicn.org/

## 2023-02-07 NOTE — PROGRESS NOTES
Keisha Somers is a 53 year old who is being evaluated via a billable telephone visit.      What phone number would you prefer to be contacted at?: Mobile phone number on file:   Telephone Information:   Mobile [phone]       Reason for telehealth visit: Patient has requested telehealth visit    Originating location (patient location): Patient's home    Will anyone else be joining the visit? No    Akosua Mcdaniel VF      TELEPHONE VISIT  Keisha Somers is a 52 year old pt. who is being evaluated via a billable telephone visit.      The patient has been notified of the following:    We have found that certain health care needs can be provided without the need for a physical exam. This service lets us provide the care you need with a short phone conversation. If a prescription is necessary we can send it directly to your pharmacy. If lab work is needed we can place an order for that and you can then stop by our lab to have the test done at a later time. Insurers are generally covering virtual visits as they would in-office visits so billing should not be different than normal.  If for some reason you do get billed incorrectly, you should contact the billing office to correct it and that number is in the AVS .    Patient has given verbal consent for a telephone visit?:  Yes   How would the pt like to obtain the AVS?:MAIL  AVS SmartPhrase [PsychAVS] has been placed in 'Patient Instructions':  Yes     Start Time: 100 pm      End Time:  130pm                Progress Notes   Romana Tena MD (Physician)     Psychiatry       PSYCHIATRY VISIT      The patient was seen for evaluation and management.The patient was seen for anorexia and depression.   She was last seen  mo ago    INTERIM HX:At last visit the plan was:  She will return again in 12 weeks. .   Focalin Rxs sent.   Gabapentin increase to  1200 mg prn at bedtime  for sleep  Follow-up with cardiology and neurology prn.  Note to assisted living facility sent per  "their policy.     She has been stressed and anxious lately;  There is family stress due to relatives moving into facilities.  She has been visiting them quite a bit.  It has been heartbreaking to see deterioration of her aunt and its effect on the family.  Patient is helping to care for them and is also helping with taxes.    Discussed stress reduction tools.  Patient had a review with her [employer] worker and told her that she doesn't go out except for family.  Recommended was an [employer] type worker who she met with already.  It was awkward.  She is hopeful that she would be helpful for getting out.      Mood has been pretty good otherwise.  She had an interaction at her residence.  Each resident is supposed to have a staff person but it didn't happen.      Sleep:  She doesn't sleep through the night especially the last 2 weeks. It is \"hit or miss.\"  For a week she may not sleep well then the next it is better and she sleeps in. . She is still eating at night, probably worse than ever.    Current gabapentin is 1200 at bedtime. Overall it may have helped but not dramatically.  It does not cause additional dizziness and she doesn't think it causes SE's.    Energy now is pretty good . Interests and enjoyments ok.  SH: She has a CADI worker- recently met with her.   MED:  She still has syncopal episodes but better - once every 2 weeks. Weight reportedly is going up due to nighttime eating.      ROS: HA-migraine 1-2    MEDICATIONS:   1. Lamictal 400 mg daily am  2. Zoloft 150 mg daily am   3. Focalin 10 mg daily. Beneficial for energy, mood and no indications of problematic use.   4. Synthroid 150 mcg - she is uncertain of dose.    5. Zofran prn migraines  7. Mitodrine 10/10/10 tid - changed from Dr. Galarza   9. Carafate  10. Pyridostigmine - OFF now  11. Tizanidine - 4x/day, last dose 9pm for migraines-one in am , one noo,one at night.  16. Fluorinef- 0.2 mg daily - moved to evening  17. Maxalt for migraines with ibuprofen.  " It works very well.    19. Mg-   21. Vit B2  22. Gabapentin 1200 QHS - helps some with sleep;  Added for hot flashes, works.  23. Amlodipine lowered to 2.5 mg at bedtime- recently- OFF now  24. Atorvastatin  25. Ibuprofen- not allowed   There were no vitals taken for this visit. - televisit    BP Readings from Last 3 Encounters:   01/04/22 107/77   12/17/19 112/75   09/26/19 115/81     Wt Readings from Last 4 Encounters:   09/26/19 64 kg (141 lb 3.2 oz)   07/23/19 62.1 kg (137 lb)   04/16/19 66.5 kg (146 lb 9.6 oz)   03/21/19 68.9 kg (152 lb)     MENTAL STATUS EXAM: The patient is interviewed via phone, oriented x3. Mood is euthymic, Good range of affect. Thought processes logical, associations are clear and goal directed. Thought content is normal. Fund of knowledge good, speech RRR, language intact, memory intact, concentration good, insight and judgment are good, gait unable to assess.    ASSESSMENT:   Anorexia nervosa, weight stable , the highest weight she has been and feels much better emotionally and physically with her current weight but  concerned about the nighttime eating.      Major depressive disorder,, stable.,;   Chronic sleep problems and abnormal sleep behaviors  Recurrent syncopal episode(s), unpredictable  Migraine HA, treated  with maxalt, not currently problematic  POTS    PLAN:  She will return again in 12 weeks. .   Focalin Rxs sent.   Gabapentin 1200 mg prn at bedtime  for sleep  Follow-up with cardiology and neurology prn.  Discussed transition of care from me to PCP due to reduction in my hours.  Note to assisted living facility sent per their policy.    RADHA MAURER MD     Level of Medical Decision Making:   Problems addressed: - At least 2 stable chronic problems and additional unstable problem (sleep)    - Moderate due to: Engaged in prescription drug management during visit (discussed any medication benefits, side effects, alternatives, etc.)    .       PSYCHIATRY CLINIC  INDIVIDUAL PSYCHOTHERAPY NOTE                                                     [16]   Start time - 100pm        End time -116pm    Date reviewed - created on 12-19-17   last update: 10/12/21  Date next due -     10-17-22  REVIEWED OVER PHONE 6-23-20 DUE TO TELE VISIT  Subjective: This supportive psychotherapy session addressed issues related to mood and medical problems.  Patient's reaction: Maintenance in the context of mental status appropriate for ambulatory setting.  Progress: good  Plan: RTC 3 mo  Psychotherapy services during this visit included  myself and the patient.   TREATMENT  PLAN          SYMPTOMS; PROBLEMS   MEASURABLE GOALS;    FUNCTIONAL IMPROVEMENT INTERVENTIONS;   GAINS MADE DISCHARGE CRITERIA   Depression: none  Anxiety: mild, situational, parents getting older  Sleep Problems: nightmares   reduce nightmares, make a plan to manage 2-3 anxiety-provoking situations, develop 2 strategies to cope with trauma triggers/intrusive memories and take medications as prescribed on a daily basis building on strengths  communication skills  medications   psychotherapy marked symptom improvement and symptom resolution   Eating Disorder: intense fear of weight gain and distorted body image    POTS- syncopal episodes eating 3 meals/day, improvement in body image,maintain weight       Minimal episodes stress management  psychotherapy         muliple meds, cardiologist following , management of sxs marked symptom improvement

## 2023-03-29 DIAGNOSIS — I95.1 ORTHOSTATIC HYPOTENSION: ICD-10-CM

## 2023-03-31 DIAGNOSIS — F33.1 MAJOR DEPRESSIVE DISORDER, RECURRENT EPISODE, MODERATE (H): ICD-10-CM

## 2023-03-31 RX ORDER — DEXMETHYLPHENIDATE HYDROCHLORIDE 10 MG/1
TABLET ORAL
Qty: 30 TABLET | Refills: 0 | Status: SHIPPED | OUTPATIENT
Start: 2023-03-31 | End: 2023-05-16

## 2023-03-31 NOTE — TELEPHONE ENCOUNTER
Medication requested: Dexmethylphenidate HCl 10MG TABS   Last refilled: 2/7/23  Qty: 30/0      Last seen: 2/7/23  RTC: 12 weeks  Cancel: 0  No-show: 0  Next appt: 5/16/23    Refill decision:   Controlled med

## 2023-04-02 NOTE — TELEPHONE ENCOUNTER
fludrocortisone (FLORINEF) 0.1 MG tablet  Last Written Prescription Date:  10/11/22  Last Fill Quantity: 180   # refills: 1  Last Office Visit : 10/6/22  Future Office visit:  5/9/23      Routing refill request to provider for review/approval because: not on protocol

## 2023-04-05 RX ORDER — FLUDROCORTISONE ACETATE 0.1 MG/1
TABLET ORAL
Qty: 180 TABLET | Refills: 3 | Status: SHIPPED | OUTPATIENT
Start: 2023-04-05 | End: 2024-03-25

## 2023-05-09 ENCOUNTER — VIRTUAL VISIT (OUTPATIENT)
Dept: CARDIOLOGY | Facility: CLINIC | Age: 53
End: 2023-05-09
Attending: INTERNAL MEDICINE
Payer: MEDICARE

## 2023-05-09 DIAGNOSIS — I95.1 ORTHOSTATIC HYPOTENSION: ICD-10-CM

## 2023-05-09 PROCEDURE — 99214 OFFICE O/P EST MOD 30 MIN: CPT | Mod: 95 | Performed by: INTERNAL MEDICINE

## 2023-05-09 NOTE — LETTER
5/9/2023      RE: Keisha Somers  [address]  [address]  [facility] [address]       Dear Colleague,    Thank you for the opportunity to participate in the care of your patient, Keisha Somers, at the [facility] [place] [facility]. Please see a copy of my visit note below.    Virtual Visit Details    Type of service:  Telephone Visit     HPI:   Keisha is a 53-year-old woman with relatively severe orthostatic hypotension likely related to autonomic dysfunction.  She has been treated with midodrine and fludrocortisone to some benefit.  She is still however does have symptoms especially immediate orthostatic hypotension.  We did discuss that today.  Keisha does use constrictive undergarments.  I suggested that she try muscle straining before she stands up and let us know whether that helps to diminish the immediate symptoms.    Please had her leg crossing could be helpful.  I pointed out that has been described in literature but she needs to be careful about instability when standing with legs crossed.  She should be sure that she has something to hold onto in order to diminish the chance of instability.  Patient voiced understanding and agreement    We will maintain her current treatment regimen and follow-up in about 6 months time.    PAST MEDICAL HISTORY:  Past Medical History:   Diagnosis Date    Anemia     Anorexia     Depression     Eating disorder     GERD (gastroesophageal reflux disease)     Hydronephrosis     Hypercholesterolemia     Hypotension     Hypotension, postural     Palpitations     POTS (postural orthostatic tachycardia syndrome)     Syncope     Syncope and collapse     Thyroid disease        CURRENT MEDICATIONS:  Current Outpatient Medications   Medication Sig Dispense Refill    acetaminophen (TYLENOL) 500 MG tablet Take 500-1,000 mg by mouth every 6 hours as needed.      atorvastatin (LIPITOR) 10 MG tablet Take by mouth daily Per pt  takes this medicine but doesn't know the amount      Blood Pressure Monitor KIT Patient should be evaluated in the emergency department if her systolic blood pressure <65 1 kit 0    Calcium Carbonate-Vitamin D (CALCIUM 600+D PO) Take 1 tablet by mouth 2 times daily.      Cholecalciferol (VITAMIN D PO) Take  by mouth. 50, 000 units every 14 days      dexmethylphenidate (FOCALIN) 10 MG tablet Take 1 tablet (10 mg) by mouth daily 30 tablet 0    docusate sodium (COLACE) 100 MG capsule Take 1 capsule by mouth 2 times daily.      ferrous sulfate 140 (45 Fe) MG TBCR CR tablet Take 1 tablet by mouth 2 times daily      fludrocortisone (FLORINEF) 0.1 MG tablet TAKE 2 TABLETS (0.2MG) BY MOUTH DAILY 180 tablet 3    gabapentin (NEURONTIN) 300 MG capsule Take 4 capsules (1,200 mg) by mouth nightly as needed (sleep) 120 capsule 3    ibuprofen (ADVIL/MOTRIN) 800 MG tablet Take 800 mg by mouth every 8 hours as needed       lamoTRIgine (LAMICTAL) 200 MG tablet Take 2 tablets (400 mg) by mouth daily PLEASE SCHEDULE APPT. FOR REFILLS [phone] 60 tablet 3    levothyroxine (SYNTHROID, LEVOTHROID) 175 MCG tablet Take  by mouth daily.      metoclopramide (REGLAN) 10 MG tablet 2 times daily       midodrine (PROAMATINE) 5 MG tablet Take 2 tablets (10 mg) by mouth 3 times daily 180 tablet 11    multivitamin (OCUVITE) TABS Take 1 tablet by mouth 2 times daily.      sertraline (ZOLOFT) 100 MG tablet Take 1.5 tablets (150 mg) by mouth daily 45 tablet 4    dexmethylphenidate (FOCALIN) 10 MG tablet TAKE 1 TABLET BY MOUTH DAILY 30 tablet 0    dexmethylphenidate (FOCALIN) 10 MG tablet Take 1 tablet (10 mg) by mouth daily (Patient not taking: Reported on 5/9/2023) 30 tablet 0       PAST SURGICAL HISTORY:  Past Surgical History:   Procedure Laterality Date    CYSTOSCOPY W/ LASER LITHOTRIPSY Left 1/23/2017    Procedure: CYSTOSCOPY, LEFT URETEROSCOPY HOLMIUM LASER LITHOTRIPSY AND LEFT STENT PLACEMENT BILATERAL RETROGRADE PYELOGRAM ;  Surgeon: Jenniffer  Juan Jose Romero MD;  Location: [facility] OR;  Service:     GASTROSTOMY, INSERT TUBE, COMBINED      GT placed[  2001    IR MISCELLANEOUS PROCEDURE  6/9/2004    PICC INSERTION - DOUBLE LUMEN  6/18/2021         TONSILLECTOMY         ALLERGIES:   No Known Allergies    FAMILY HISTORY:  No family history on file.      SOCIAL HISTORY:  Social History     Tobacco Use    Smoking status: Never    Smokeless tobacco: Never   Substance Use Topics    Alcohol use: No    Drug use: No       ROS:   Constitutional: No fever, chills, or sweats. Weight stable.   ENT: No visual disturbance, ear ache, epistaxis, sore throat.   Cardiovascular: As per HPI.   Respiratory: No cough, hemoptysis.    GI: No nausea, vomiting,  : No hematuria.   Integument: Negative.   Psychiatric: Negative.   Hematologic: no easy bleeding.  Neuro: Negative.   Endocrinology: No significant heat or cold intolerance   Musculoskeletal: No myalgia or other rheumatologic complaints reported at this time.    Exam:  There were no vitals taken for this visit.  GENERAL APPEARANCE: healthy, alert and no distress  HEENT: no icterus,no central cyanosis  NECK:  JVP not elevated  RESPIRATORY: - no rales, rhonchi or wheezes, no use of accessory muscles, no retractions, respirations are unlabored, normal respiratory rate  NEURO: alert and oriented to person/place/time, normal speech, gait and affect  lly.   SKIN: no ecchymoses, no rashes    Labs:  CBC RESULTS:   Lab Results   Component Value Date    WBC 14.6 (H) 06/22/2021    WBC 5.0 07/24/2017    RBC 3.18 (L) 06/22/2021    RBC 3.56 (L) 07/24/2017    HGB 9.9 (L) 06/22/2021    HGB 10.8 (L) 07/24/2017    HCT 30.4 (L) 06/22/2021    HCT 33.7 (L) 07/24/2017    MCV 96 06/22/2021    MCV 95 07/24/2017    MCH 31.1 06/22/2021    MCH 30.3 07/24/2017    MCHC 32.6 06/22/2021    MCHC 32.0 07/24/2017    RDW 13.1 06/22/2021    RDW 13.1 07/24/2017     06/22/2021     07/24/2017       BMP RESULTS:  Lab Results   Component  Value Date     06/22/2021     07/24/2017    POTASSIUM 4.0 06/22/2021    POTASSIUM 3.5 07/24/2017    CHLORIDE 108 (H) 06/22/2021    CHLORIDE 106 07/24/2017    CO2 26 06/22/2021    CO2 28 07/24/2017    ANIONGAP 7 06/22/2021    ANIONGAP 8 07/24/2017    GLC 96 06/22/2021     (H) 07/24/2017    BUN 11 06/22/2021    BUN 7 07/24/2017    CR 0.82 06/22/2021    CR 0.83 07/24/2017    GFRESTIMATED >60 06/22/2021    GFRESTIMATED 74 07/24/2017    GFRESTBLACK >60 06/22/2021    GFRESTBLACK 89 07/24/2017    KENNETH 9.1 06/22/2021    KENNETH 8.7 07/24/2017        INR RESULTS:  No results found for: INR    Procedures:  PULMONARY FUNCTION TESTS:        View : No data to display.                  ECHOCARDIOGRAM:   No results found for this or any previous visit (from the past 8760 hour(s)).      Assessment and Plan:   1.  Orthostatic hypotension with falls likely related to underlying dysautonomia state  2.  Some improvement with midodrine and fludrocortisone  3.  Patient does use constrictive undergarments    Plan  1.  Continue current treatment strategy  2.  Initiate muscle straining prior to getting up from chairs myself to minimize immediate orthostatic  3.  Clinical follow-up 6 to 8 months    Total elapsed time today with chart review, clinic visit and documentation 30 minutes    Telephone on 2: 00; off 2: 11  [facility]  Patient at home; clinic [facility] heart    I very much appreciated the opportunity to see and assess Keisha Somers in the clinic today. Please do not hesitate to contact my office if you have any questions or concerns.      Thang Galarza MD  Cardiac Arrhythmia Service  [facility]  [phone]      CC  THANG GALARZA

## 2023-05-09 NOTE — PROGRESS NOTES
Virtual Visit Details    Type of service:  Telephone Visit     HPI:   Keisha is a 53-year-old woman with relatively severe orthostatic hypotension likely related to autonomic dysfunction.  She has been treated with midodrine and fludrocortisone to some benefit.  She is still however does have symptoms especially immediate orthostatic hypotension.  We did discuss that today.  Keisha does use constrictive undergarments.  I suggested that she try muscle straining before she stands up and let us know whether that helps to diminish the immediate symptoms.    Please had her leg crossing could be helpful.  I pointed out that has been described in literature but she needs to be careful about instability when standing with legs crossed.  She should be sure that she has something to hold onto in order to diminish the chance of instability.  Patient voiced understanding and agreement    We will maintain her current treatment regimen and follow-up in about 6 months time.    PAST MEDICAL HISTORY:  Past Medical History:   Diagnosis Date     Anemia      Anorexia      Depression      Eating disorder      GERD (gastroesophageal reflux disease)      Hydronephrosis      Hypercholesterolemia      Hypotension      Hypotension, postural      Palpitations      POTS (postural orthostatic tachycardia syndrome)      Syncope      Syncope and collapse      Thyroid disease        CURRENT MEDICATIONS:  Current Outpatient Medications   Medication Sig Dispense Refill     acetaminophen (TYLENOL) 500 MG tablet Take 500-1,000 mg by mouth every 6 hours as needed.       atorvastatin (LIPITOR) 10 MG tablet Take by mouth daily Per pt takes this medicine but doesn't know the amount       Blood Pressure Monitor KIT Patient should be evaluated in the emergency department if her systolic blood pressure <65 1 kit 0     Calcium Carbonate-Vitamin D (CALCIUM 600+D PO) Take 1 tablet by mouth 2 times daily.       Cholecalciferol (VITAMIN D PO) Take  by mouth. 50, 000  units every 14 days       dexmethylphenidate (FOCALIN) 10 MG tablet Take 1 tablet (10 mg) by mouth daily 30 tablet 0     docusate sodium (COLACE) 100 MG capsule Take 1 capsule by mouth 2 times daily.       ferrous sulfate 140 (45 Fe) MG TBCR CR tablet Take 1 tablet by mouth 2 times daily       fludrocortisone (FLORINEF) 0.1 MG tablet TAKE 2 TABLETS (0.2MG) BY MOUTH DAILY 180 tablet 3     gabapentin (NEURONTIN) 300 MG capsule Take 4 capsules (1,200 mg) by mouth nightly as needed (sleep) 120 capsule 3     ibuprofen (ADVIL/MOTRIN) 800 MG tablet Take 800 mg by mouth every 8 hours as needed        lamoTRIgine (LAMICTAL) 200 MG tablet Take 2 tablets (400 mg) by mouth daily PLEASE SCHEDULE APPT. FOR REFILLS [phone] 60 tablet 3     levothyroxine (SYNTHROID, LEVOTHROID) 175 MCG tablet Take  by mouth daily.       metoclopramide (REGLAN) 10 MG tablet 2 times daily        midodrine (PROAMATINE) 5 MG tablet Take 2 tablets (10 mg) by mouth 3 times daily 180 tablet 11     multivitamin (OCUVITE) TABS Take 1 tablet by mouth 2 times daily.       sertraline (ZOLOFT) 100 MG tablet Take 1.5 tablets (150 mg) by mouth daily 45 tablet 4     dexmethylphenidate (FOCALIN) 10 MG tablet TAKE 1 TABLET BY MOUTH DAILY 30 tablet 0     dexmethylphenidate (FOCALIN) 10 MG tablet Take 1 tablet (10 mg) by mouth daily (Patient not taking: Reported on 5/9/2023) 30 tablet 0       PAST SURGICAL HISTORY:  Past Surgical History:   Procedure Laterality Date     CYSTOSCOPY W/ LASER LITHOTRIPSY Left 1/23/2017    Procedure: CYSTOSCOPY, LEFT URETEROSCOPY HOLMIUM LASER LITHOTRIPSY AND LEFT STENT PLACEMENT BILATERAL RETROGRADE PYELOGRAM ;  Surgeon: Jenniffer Romero MD;  Location: [facility] OR;  Service:      GASTROSTOMY, INSERT TUBE, COMBINED       GT placed[  2001     IR MISCELLANEOUS PROCEDURE  6/9/2004     PICC INSERTION - DOUBLE LUMEN  6/18/2021          TONSILLECTOMY         ALLERGIES:   No Known Allergies    FAMILY HISTORY:  No family history  on file.      SOCIAL HISTORY:  Social History     Tobacco Use     Smoking status: Never     Smokeless tobacco: Never   Substance Use Topics     Alcohol use: No     Drug use: No       ROS:   Constitutional: No fever, chills, or sweats. Weight stable.   ENT: No visual disturbance, ear ache, epistaxis, sore throat.   Cardiovascular: As per HPI.   Respiratory: No cough, hemoptysis.    GI: No nausea, vomiting,  : No hematuria.   Integument: Negative.   Psychiatric: Negative.   Hematologic: no easy bleeding.  Neuro: Negative.   Endocrinology: No significant heat or cold intolerance   Musculoskeletal: No myalgia or other rheumatologic complaints reported at this time.    Exam:  There were no vitals taken for this visit.  GENERAL APPEARANCE: healthy, alert and no distress  HEENT: no icterus,no central cyanosis  NECK:  JVP not elevated  RESPIRATORY: - no rales, rhonchi or wheezes, no use of accessory muscles, no retractions, respirations are unlabored, normal respiratory rate  NEURO: alert and oriented to person/place/time, normal speech, gait and affect  lly.   SKIN: no ecchymoses, no rashes    Labs:  CBC RESULTS:   Lab Results   Component Value Date    WBC 14.6 (H) 06/22/2021    WBC 5.0 07/24/2017    RBC 3.18 (L) 06/22/2021    RBC 3.56 (L) 07/24/2017    HGB 9.9 (L) 06/22/2021    HGB 10.8 (L) 07/24/2017    HCT 30.4 (L) 06/22/2021    HCT 33.7 (L) 07/24/2017    MCV 96 06/22/2021    MCV 95 07/24/2017    MCH 31.1 06/22/2021    MCH 30.3 07/24/2017    MCHC 32.6 06/22/2021    MCHC 32.0 07/24/2017    RDW 13.1 06/22/2021    RDW 13.1 07/24/2017     06/22/2021     07/24/2017       BMP RESULTS:  Lab Results   Component Value Date     06/22/2021     07/24/2017    POTASSIUM 4.0 06/22/2021    POTASSIUM 3.5 07/24/2017    CHLORIDE 108 (H) 06/22/2021    CHLORIDE 106 07/24/2017    CO2 26 06/22/2021    CO2 28 07/24/2017    ANIONGAP 7 06/22/2021    ANIONGAP 8 07/24/2017    GLC 96 06/22/2021     (H) 07/24/2017     BUN 11 06/22/2021    BUN 7 07/24/2017    CR 0.82 06/22/2021    CR 0.83 07/24/2017    GFRESTIMATED >60 06/22/2021    GFRESTIMATED 74 07/24/2017    GFRESTBLACK >60 06/22/2021    GFRESTBLACK 89 07/24/2017    KENNETH 9.1 06/22/2021    KENNETH 8.7 07/24/2017        INR RESULTS:  No results found for: INR    Procedures:  PULMONARY FUNCTION TESTS:        View : No data to display.                  ECHOCARDIOGRAM:   No results found for this or any previous visit (from the past 8760 hour(s)).      Assessment and Plan:   1.  Orthostatic hypotension with falls likely related to underlying dysautonomia state  2.  Some improvement with midodrine and fludrocortisone  3.  Patient does use constrictive undergarments    Plan  1.  Continue current treatment strategy  2.  Initiate muscle straining prior to getting up from chairs myself to minimize immediate orthostatic  3.  Clinical follow-up 6 to 8 months    Total elapsed time today with chart review, clinic visit and documentation 30 minutes    Telephone on 2: 00; off 2: 11  [facility]  Patient at home; clinic [facility] heart    I very much appreciated the opportunity to see and assess Keisha Somers in the clinic today. Please do not hesitate to contact my office if you have any questions or concerns.      Thang Galarza MD  Cardiac Arrhythmia Service  [facility]  [phone]      THANG TONG

## 2023-05-09 NOTE — NURSING NOTE
Is the patient currently in the state of [place]? YES    Visit mode:TELEPHONE    If the visit is dropped, the patient can be reconnected by: TELEPHONE VISIT: Phone number: [phone]     Will anyone else be joining the visit? NO      How would you like to obtain your AVS? Mail a copy    Are changes needed to the allergy or medication list? NO    Reason for visit: Follow Up    JADEN Valdez/ANILA

## 2023-05-09 NOTE — PATIENT INSTRUCTIONS
You were seen in the Electrophysiology Clinic today by: Dr Galarza    Plan:     Follow up Visit:  6-8 months with Dr Galarza      Further Instructions:  Initiate muscle straining prior to getting up from chairs myself to minimize immediate orthostatic      If you have further questions, please utilize [facility] to contact us.     Your Care Team:    EP Cardiology   Telephone Number     Nurse Line  Rebeca Schroeder, RN   Jeni Portillo, RN   [phone]     For scheduling appointments:   Valentín   [phone]   For procedure scheduling:    Etelvina Donnelly     [phone]   For the Device Clinic (Pacemakers, ICDs, Loop Recorders)    During business hours: [phone]  After business hours:   [phone]- select option 4 and ask for job code [id number].       On-call cardiologist for after hours or on weekends:   [phone], option #4, and ask to speak to the on-call cardiologist.     Cardiovascular Clinic:   [address] [place] [address]. [place] [address]      As always, Thank you for trusting us with your health care needs!

## 2023-05-16 ENCOUNTER — VIRTUAL VISIT (OUTPATIENT)
Dept: PSYCHIATRY | Facility: CLINIC | Age: 53
End: 2023-05-16
Attending: PSYCHIATRY & NEUROLOGY
Payer: MEDICARE

## 2023-05-16 DIAGNOSIS — F33.1 MAJOR DEPRESSIVE DISORDER, RECURRENT EPISODE, MODERATE (H): ICD-10-CM

## 2023-05-16 DIAGNOSIS — F51.01 PRIMARY INSOMNIA: ICD-10-CM

## 2023-05-16 DIAGNOSIS — F33.0 MAJOR DEPRESSIVE DISORDER, RECURRENT EPISODE, MILD (H): ICD-10-CM

## 2023-05-16 PROCEDURE — 90833 PSYTX W PT W E/M 30 MIN: CPT | Mod: 95 | Performed by: PSYCHIATRY & NEUROLOGY

## 2023-05-16 PROCEDURE — 99442 PR PHYSICIAN TELEPHONE EVALUATION 11-20 MIN: CPT | Mod: 95 | Performed by: PSYCHIATRY & NEUROLOGY

## 2023-05-16 RX ORDER — DEXMETHYLPHENIDATE HYDROCHLORIDE 10 MG/1
10 TABLET ORAL DAILY
Qty: 30 TABLET | Refills: 0 | Status: SHIPPED | OUTPATIENT
Start: 2023-05-16 | End: 2023-08-21

## 2023-05-16 RX ORDER — GABAPENTIN 300 MG/1
1500 CAPSULE ORAL
Qty: 150 CAPSULE | Refills: 3 | Status: SHIPPED | OUTPATIENT
Start: 2023-05-16 | End: 2023-08-21

## 2023-05-16 RX ORDER — SERTRALINE HYDROCHLORIDE 100 MG/1
150 TABLET, FILM COATED ORAL DAILY
Qty: 45 TABLET | Refills: 4 | Status: SHIPPED | OUTPATIENT
Start: 2023-05-16 | End: 2023-08-21

## 2023-05-16 RX ORDER — DEXMETHYLPHENIDATE HYDROCHLORIDE 10 MG/1
10 TABLET ORAL DAILY
Qty: 30 TABLET | Refills: 0 | Status: SHIPPED | OUTPATIENT
Start: 2023-05-16 | End: 2023-11-21

## 2023-05-16 RX ORDER — LAMOTRIGINE 200 MG/1
400 TABLET ORAL DAILY
Qty: 60 TABLET | Refills: 3 | Status: SHIPPED | OUTPATIENT
Start: 2023-05-16 | End: 2023-08-21

## 2023-05-16 ASSESSMENT — PATIENT HEALTH QUESTIONNAIRE - PHQ9
10. IF YOU CHECKED OFF ANY PROBLEMS, HOW DIFFICULT HAVE THESE PROBLEMS MADE IT FOR YOU TO DO YOUR WORK, TAKE CARE OF THINGS AT HOME, OR GET ALONG WITH OTHER PEOPLE: NOT DIFFICULT AT ALL
SUM OF ALL RESPONSES TO PHQ QUESTIONS 1-9: 3
SUM OF ALL RESPONSES TO PHQ QUESTIONS 1-9: 3

## 2023-05-16 NOTE — NURSING NOTE
Is the patient currently in the state of [place]? YES    Visit mode:TELEPHONE    If the visit is dropped, the patient can be reconnected by: TELEPHONE VISIT: Phone number: [phone]    Will anyone else be joining the visit? NO      How would you like to obtain your AVS? Mail a copy    Are changes needed to the allergy or medication list? NO    Reason for visit: Follow Up

## 2023-05-16 NOTE — PROGRESS NOTES
Virtual Visit Details    Type of service:  Telephone Visit   Phone call duration: 30 minutes                 Psychiatry Progress Note   Romana Tena MD (Physician)     Psychiatry       PSYCHIATRY VISIT      The patient was seen for evaluation and management.The patient was seen for anorexia and depression.   She was last seen 3 mo ago    INTERIM HX:At last visit the plan was:  She will return again in 12 weeks. .   Focalin Rxs sent.   Gabapentin 1200 mg prn at bedtime  for sleep  Follow-up with cardiology and neurology prn.  Discussed transition of care from me to PCP due to reduction in my hours.  Note to assisted living facility sent per their policy.    She reports that she is doing pretty well. There are a few things going on.  Her brother is retiring early and [place].  She is close to him and sad that she will not see him much.       Discussed a follow-up plan after closure of my clinic. She does not think her PCP would be comfortable with her array of medications and she only sees her yearly.     Mood has been pretty stable.  Sleep is quite disrupted and she is overeating at night.  She wakes in the middle of the night and is amnestic for eating.  Staying asleep is the problem.  Every night she falls asleep easily but only for an hour.  She will eat whatever she finds. This is understandably very distressing given her h/o severe anorexia nervosa.    Energy is good.  Motivation, interests are good.       Past Psych Hx: remarkable for significant depression and low energy.  She has been stable on current medications for years with only minor changes.  The Focalin is primarily for depression and energy.  She is also on meds to maintain BP.    Health updates:  No new problems    PMH: notable for severe anorexia which resulted in a few years of g-tube for nutrition to maintain even a low weight.  She reports she finally was sick of it and this led to stabilization and removal of need for nutritional  support.     Her aunt and uncle are not doing well; they are in facilities. There is family stress due to relatives moving into facilities.  She has been visiting them quite a bit.  It has been heartbreaking to see deterioration of her aunt and its effect on the family.  Patient is helping to care for them and is also helping with taxes.    Patient reports her program is changing and so there will not be a nurse anymore.  She will be setting up her own meds. They will only have staff during the day. Even now there is little contact with staff there.      Meds will still come from her present pharmacy.      Current gabapentin is 1200 at bedtime. Overall it may have helped but not dramatically.  It does not cause additional dizziness and she doesn't think it causes SE's.    SH: She has a CADI worker- recently met with her.   MED:  She still has syncopal episodes but better - once every 2 weeks. Weight reportedly is going up due to nighttime eating.      ROS: HA-migraine 1-2. BP is lower at night into 80's  MEDICATIONS:   1. Lamictal 400 mg daily am  2. Zoloft 150 mg daily am   3. Focalin 10 mg daily. Beneficial for energy, mood and no indications of problematic use.   4. Synthroid 150 mcg - she is uncertain of dose.    5. Zofran prn migraines  7. Mitodrine 10/10/10 tid - changed from Dr. Galarza   9. Carafate  10. Pyridostigmine - OFF now  11. Tizanidine - 4x/day, last dose 9pm for migraines-one in am , one noo,one at night.  16. Fluorinef- 0.2 mg daily - moved to evening  17. Maxalt for migraines with ibuprofen.  It works very well.    19. Mg-   21. Vit B2  22. Gabapentin 1200 QHS - helps some with sleep;  Added for hot flashes, works.  23. Amlodipine lowered to 2.5 mg at bedtime- recently- OFF now  24. Atorvastatin  25. Ibuprofen- not allowed   There were no vitals taken for this visit. - televisit    BP Readings from Last 3 Encounters:   01/04/22 107/77   12/17/19 112/75   09/26/19 115/81     Wt Readings from Last  4 Encounters:   09/26/19 64 kg (141 lb 3.2 oz)   07/23/19 62.1 kg (137 lb)   04/16/19 66.5 kg (146 lb 9.6 oz)   03/21/19 68.9 kg (152 lb)     MENTAL STATUS EXAM: The patient is interviewed via phone, oriented x3. Mood is euthymic, Good range of affect. Thought processes logical, associations are clear and goal directed. Thought content is normal. Fund of knowledge good, speech RRR, language intact, memory intact, concentration good, insight and judgment are good, gait unable to assess.    ASSESSMENT:   Anorexia nervosa, weight stable , the highest weight she has been and feels much better emotionally and physically with her current weight but  concerned about the nighttime eating.      Major depressive disorder,, stable.,;   Chronic sleep problems and abnormal sleep behaviors  Recurrent syncopal episode(s), unpredictable. Cardiology follows.  Migraine HA, treated  with maxalt, not currently problematic  POTS    PLAN:  She will return again in 12 weeks to new provider in resident clinic.   Focalin Rxs sent.   Increase Gabapentin to 1500 mg prn at bedtime  for sleep  Follow-up with cardiology and neurology prn.  Patient will transition to the psychiatry general resident clinic.    Note to assisted living facility on current medication change sent per their policy.    RADHA MAURER MD     Level of Medical Decision Making:   Problems addressed: - At least 2 stable chronic problems and additional unstable problem (sleep)    - Moderate due to: Engaged in prescription drug management during visit (discussed any medication benefits, side effects, alternatives, etc.)    .       PSYCHIATRY CLINIC INDIVIDUAL PSYCHOTHERAPY NOTE                                                     [16]   Start time - 130pm        End time -146pm    Date reviewed - created on 12-19-17   last update: 10/12/21   REVIEWED OVER PHONE  5-16-23  DUE TO TELE VISIT  Subjective: This supportive psychotherapy session addressed issues related to mood  and medical problems.  Patient's reaction: Maintenance in the context of mental status appropriate for ambulatory setting.  Progress: good  Plan: RTC 3 mo  Psychotherapy services during this visit included  myself and the patient.   TREATMENT  PLAN          SYMPTOMS; PROBLEMS   MEASURABLE GOALS;    FUNCTIONAL IMPROVEMENT INTERVENTIONS;   GAINS MADE DISCHARGE CRITERIA   Depression: none  Anxiety: mild, situational, parents getting older  Sleep Problems: nightmares   reduce nightmares, make a plan to manage 2-3 anxiety-provoking situations, develop 2 strategies to cope with trauma triggers/intrusive memories and take medications as prescribed on a daily basis building on strengths  communication skills  medications   psychotherapy marked symptom improvement and symptom resolution   Eating Disorder: intense fear of weight gain and distorted body image    POTS- syncopal episodes eating 3 meals/day, improvement in body image,maintain weight       Minimal episodes stress management  psychotherapy         muliple meds, cardiologist following , management of sxs marked symptom improvement                              Answers for HPI/ROS submitted by the patient on 5/16/2023  If you checked off any problems, how difficult have these problems made it for you to do your work, take care of things at home, or get along with other people?: Not difficult at all  PHQ9 TOTAL SCORE: 3

## 2023-05-17 NOTE — PATIENT INSTRUCTIONS
Continue taking current medications but increase Gabapentin to 1500 mg at bedtime to better help sleep.  No other changes.   Next visit in 3 months -  patient will be called by schedulers and there will be new psychiatrist - a psychiatry resident who will be assuming care (due to my change in schedule)    MD Alina

## 2023-08-18 DIAGNOSIS — F51.01 PRIMARY INSOMNIA: ICD-10-CM

## 2023-08-18 RX ORDER — GABAPENTIN 300 MG/1
CAPSULE ORAL
Qty: 150 CAPSULE | Refills: 3 | OUTPATIENT
Start: 2023-08-18

## 2023-08-21 ENCOUNTER — OFFICE VISIT (OUTPATIENT)
Dept: PSYCHIATRY | Facility: CLINIC | Age: 53
End: 2023-08-21
Attending: PSYCHIATRY & NEUROLOGY
Payer: MEDICARE

## 2023-08-21 VITALS — DIASTOLIC BLOOD PRESSURE: 87 MMHG | SYSTOLIC BLOOD PRESSURE: 136 MMHG | HEART RATE: 84 BPM

## 2023-08-21 DIAGNOSIS — F50.00 ANOREXIA NERVOSA IN REMISSION (H): ICD-10-CM

## 2023-08-21 DIAGNOSIS — F51.01 PRIMARY INSOMNIA: ICD-10-CM

## 2023-08-21 DIAGNOSIS — F33.0 MAJOR DEPRESSIVE DISORDER, RECURRENT EPISODE, MILD (H): Primary | ICD-10-CM

## 2023-08-21 PROCEDURE — 90792 PSYCH DIAG EVAL W/MED SRVCS: CPT | Mod: GC

## 2023-08-21 RX ORDER — LAMOTRIGINE 200 MG/1
400 TABLET ORAL DAILY
Qty: 60 TABLET | Refills: 3 | Status: SHIPPED | OUTPATIENT
Start: 2023-08-21 | End: 2023-12-21

## 2023-08-21 RX ORDER — GABAPENTIN 300 MG/1
1200 CAPSULE ORAL
Qty: 150 CAPSULE | Refills: 3 | Status: SHIPPED | OUTPATIENT
Start: 2023-08-21 | End: 2023-10-09

## 2023-08-21 RX ORDER — SERTRALINE HYDROCHLORIDE 100 MG/1
150 TABLET, FILM COATED ORAL DAILY
Qty: 45 TABLET | Refills: 3 | Status: SHIPPED | OUTPATIENT
Start: 2023-09-13 | End: 2023-12-21

## 2023-08-21 ASSESSMENT — PATIENT HEALTH QUESTIONNAIRE - PHQ9
SUM OF ALL RESPONSES TO PHQ QUESTIONS 1-9: 4
10. IF YOU CHECKED OFF ANY PROBLEMS, HOW DIFFICULT HAVE THESE PROBLEMS MADE IT FOR YOU TO DO YOUR WORK, TAKE CARE OF THINGS AT HOME, OR GET ALONG WITH OTHER PEOPLE: NOT DIFFICULT AT ALL
SUM OF ALL RESPONSES TO PHQ QUESTIONS 1-9: 4

## 2023-08-21 ASSESSMENT — PAIN SCALES - GENERAL: PAINLEVEL: NO PAIN (0)

## 2023-08-21 NOTE — PATIENT INSTRUCTIONS
**For crisis resources, please see the information at the end of this document**   Patient Education    Thank you for coming to the [facility] [place] [facility].     Lab Testing:  If you had lab testing today and your results are reassuring or normal they will be mailed to you or sent through "[employer]," within 7 days. If the lab tests need quick action we will call you with the results. The phone number we will call with results is # [phone]. If this is not the best number please call our clinic and change the number.     Medication Refills:  If you need any refills please call your pharmacy and they will contact us. Our fax number for refills is [phone].   Three business days of notice are needed for general medication refill requests.   Five business days of notice are needed for controlled substance refill requests.   If you need to change to a different pharmacy, please contact the new pharmacy directly. The new pharmacy will help you get your medications transferred.     Contact Us:  Please call [phone] during business hours (8-5:00 M-F).   If you have medication related questions after clinic hours, or on the weekend, please call [phone].     Financial Assistance [phone]   Medical Records [phone]       MENTAL HEALTH CRISIS RESOURCES:  For a emergency help, please call 911 or go to the nearest Emergency Department.     Emergency Walk-In Options:   EmPATH Unit @ [facility] ([place]): [phone] - Specialized mental health emergency area designed to be calming  [facility] ([place]): [phone]  [facility] ([place]): [phone]  [facility]): [phone]    [facility] Crisis Information:   [place]: [phone]  Juan: [phone]  Jarett (ANGELA) - Adult: [phone]     Child: [phone]  Isaac - Adult: [phone]     Child: [phone]  [place]:  [phone]  List of all [facility] resources:   https://[place].gov/dhs/people-we-serve/adults/health-care/mental-health/resources/crisis-contacts.jsp    National Crisis Information:   Crisis Text Line: Text  [place]  to 558110  Suicide & Crisis Lifeline: 988  National Suicide Prevention Lifeline: 5-549-903-TALK (1-827.223.9005)       For online chat options, visit https://suicidepreventionlifeline.org/chat/  Poison Control Center: 3-322-106-3250  Trans Lifeline: 2-074-975-7205 - Hotline for transgender people of all ages  The [employer] Project: 9-206-138-7694 - Hotline for LGBT youth     For Non-Emergency Support:   Fast Tracker: Mental Health & Substance Use Disorder Resources -   https://www.MarijuanaStocksIndex.comckMbiten.org/

## 2023-08-21 NOTE — PROGRESS NOTES
"   [facility]  TRANSFER of CARE DIAGNOSTIC ASSESSMENT     CARE TEAM:    PCP- Lavonne Zarate  Therapist- None  Cardiology- Dr. Galarza, Endo- Dr. Handley.     Keisha is a 53 year old who uses the pronouns she, her, hers.      Chief Concern     \"I've been dealing with sleep issues since my teens.\"     Diagnoses     Major depressive disorder, recurrent, mild   Primary insomnia   Anorexia nervosa, in remission  PTSD, per history     Assessment     Keisha Somers is a 54 yo F with past psychiatric diagnosis of anorexia nervosa at some point needing a feeding tube, major depressive disorder, sleep disorder and significant medical diagnosis that includes POTS (postural orthostatic tachycardia syndrome), migraines. She has been working with Dr. Tena in this clinic since 2007, here today for a transfer of care DA.   Overall, Keisha reports her depressive symptoms, post-trauma symptoms have been pretty stable over the past months and her primary goal for continuing care in this clinic is to continue being stable. Regarding her eating disorder, it appears that it has been maintained the most stable over the years. She denies any binge eating or purging/restricting behaviors, except for some night time undesired eating that seems not to be problematic at this time. We discussed referrals to weigh management clinic and she politely declined this offer at this time and is ope to revisit this conversation in the future. We discussed medication indications, side effects, risks vs benefits and alternatives, including use of stimulant  which she has been on for over >25 years and seems that has provided benefit with energy, mood and no problematic use, per Dr. Tena's notes. She is aware of risks and prefers not to make changes at this time, which is reasonable. We will continue reassessing medications with goal of simplification of medication regimen. She has " been tolerating medication regimen without side effects. She is been followed by cardiology and neurology.     Psychotropic Drug Interactions:    ADDITIVE SEROTONERGIC: Zolonoft, Focalin, Maxalt, ondansetron  ADDITIVE QTc: Zoloft, ondansetron   ADDITIVE CNS/RESPIRATORY DEPRESSION: Gabapentin, Lamictal, tizanidine  Management: routine monitoring, limited use of zofran patient aware of risks.     MNPMP was not checked today: will be checked next visit    Risk Statements:   Treatment Risk- Risks, benefits, alternatives and potential adverse effects have been discussed and are understood.   Safety Risk-Keisha did not appear to be an imminent safety risk to self or others.     Plan     1) Medications:   -Continue Zoloft 150 mg PO qDay Helps as well as Lamictal with mood. Last depressive episode >5 years.   -Continue Lamictal 400 mg daily   -Continue Gabapentin 1200 QHS PRN  (helps some with sleep;  Added for hot flashes, works)  -Focalin 10 mg daily (Beneficial for energy, mood and no indications of problematic use). Helps with concentration. Started around >25 years in context of eating disorder.      Anjana other prescribers PCP, cardiology, GI):  1. Synthroid 150 mcg - she is uncertain of dose.    2. Zofran prn migraines  3. Mitodrine 10/10/10 tid - changed from Dr. Galarza   4. Carafate  5. Pyridostigmine - OFF now  6. Tizanidine - 4x/day, last dose 9pm for migraines-one in am , one noo,one at night.  7. Fluorinef- 0.2 mg daily - moved to evening  8. Maxalt for migraines with ibuprofen.  It works very well.    9. Mg-   10. Vit B2  11. Atorvastatin    2) Psychotherapy:   None currently     3) Next due:  Labs- Last obtained 09/2022, will defer further lipid/CBC monitoring to PCP. Routine monitoring is not indicated for current psychotropic medication regimen   EKG- Last obtained 06/2021,  md. Follows-up with cardiology, will defer further monitoring to them.   Rating scales-  PHQ9    4) Referrals: none    5) Other:  "  -Yearly PCP check-up. Will obtain labs.   -F/up with cardiology   -Sleep medicine follow-up/referral offered, declined at this time  -Weight management clinic referral, declined at this time     6) Follow-up: Return to clinic in 3 months     Pertinent Background                                                   [most recent eval 08/21/23]     Keisha first experienced MH symptoms since HS, she started experiencing eating disorder that worsened through her 20, severe, at one point needing a feeding tube, leading to multiple hospitalizations. Also experienced trauma x2 as well as emotional trauma growing up. She reports experiencing depression and post-traumatic symptoms as well since around the same age.     Pertinent items include: trauma hx, eating disorder , mutiple psychotropic trials , psych hosp , and major medical problems     Subjective     Goals for continuing care in this clinic: \"Continue the same stable path.\"  -\"I've been dealing with sleep issues since my teens.\"   -Falls asleep without issues.  -Difficulty with maintaining sleep, wakes up intermittently. May take up to 1 hour to fall back asleep/   -Reports she does not feel tired in the morning and attributes this to adapting to sleep pattern over the years.  -Lives by herself.    -Has follow up appointment with Neurology.   -Sleep study completed 3-4 years ago.  -Mood \"good\"  -Appetite: No changes  -Stable weight. Doing well, except some concerns for night time eating when unable to sleep   -Weight changes: Gained ~ 30 lbs on last 5 years.   -Jovany has had more migraine episodes most recently.   -Takes Zofran 1-2x a year.   -Jovany will see neurologist next month  -Denies side effects from current medications   -Nightmares: Denies  -Jovany does not like the word \"relaxation\" (related to trauma experience)  -Seen a therapist for years, last seen ~year ago.   -Completed DBT in the past (last time 4 years ago.)  -Anxiety: Lives in an apartment, has new " "neighbors, they may have domestic issues. Feels safe and identifies resources in case needs help  -Denies SI, HI, SIB    Current Social History:  Living situation/income: (partner, children, pets, etc): Apartment building \"customized living. [place] Has lived there for 13 years.   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 SSD  Feels safe at home: Yes    Pertinent Substance Use:   Alcohol: No   Cannabis: No  Tobacco: No  Caffeine:  No   Opioids: No   Narcan Kit current: N/A  Other substances:  Denies     Medical Review of Systems:   Lightheadedness/orthostasis: Yes, POTS dx  Headaches: Yes, migraines dx  GI: None  Sexual health concerns: None    A comprehensive review of systems was performed and is negative other than noted above.     Mental Status Exam     Alertness: alert  and oriented  Appearance: well groomed  Behavior/Demeanor: cooperative, pleasant, and calm, with good  eye contact   Speech: normal and regular rate and rhythm  Language: intact and no problems  Psychomotor: normal or unremarkable  Mood: \"good\"  Affect: full range; congruent to: mood- yes, content- yes  Thought Process/Associations: unremarkable  Thought Content:  Reports none;  Denies suicidal & violent ideation and delusions, obsessions , phobia , magical thinking, over-valued ideas, and paranoid ideation  Perception:  Reports none;  Denies auditory hallucinations, visual hallucinations, visual distortion seen as shadows , depersonalization, and derealization  Insight: good  Judgment: good  Cognition: does  appear grossly intact; formal cognitive testing was not done  Gait and Station: unremarkable     Social History                                 pt reported     Family Mental Health History   -Maternal grandfather: MDD, attempted suicide  -Maternal aunt: Dementia  -No other contributory   Financial/employment: SSDI. 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 Last job held was in the early 2000's   Living situation: Alone in apartment. Apartment building \"customized living.\" Has lived there for 13 years.   Relationships: Good relationship with parents. They live in the cities. 2 brothers.   Children: None   Social/spiritual support:   Education: Some college. MA, graduated.   Early history: Diagnosed with POTS 10 years ago. Had episodes in her mid teens. Thought to have more psychosomatic symptoms. Deines learning issues  Legal: Denies      Past Psychiatric History       Self injurious behavior [method, most recent]: No  Suicide attempt [#, most recent, method]: No  Suicidal ideation hx [passive, active]: No    Violence/Aggression Hx:No   Psychosis Hx: No   Eating Disorder Hx: Yes: Eating dsorder started early highschool. Diagnosed in her 20s.   Trauma hx: Yes: Prefers not to discuss today    Psych Hosp [#, most recent]: Multiple hospitalization related to eating disorder: ~4-5 times. Once for depression no SI.   Commitment: No   TMS/ECT: No   Outpatient Programs [Day treatment, DBT, eating disorder tx, etc]: Yes. Eating disorder treatments.     SUBSTANCE USE HISTORY   Past Use: Denies   Treatment [#, most recent]: No   Medical Consequences: No   Legal Consequences: No      Past Psych Med Trials        Medication Max Dose (mg) Dates / Duration Helpful? DC Reason / Adverse Effects?   Lamictal        Zoloft        Focalin        Lunesta       Ritalin SR 20 mg BID ~2007     Remeron  7.5 mg       Seroquel   mg qhs      Ambien 10 mg  ~2007     Prozac 80 mg  ~2007          Vitals   There were no vitals taken for this visit.  Pulse Readings from Last 3 Encounters:   08/21/23 84   01/04/22 (!) 123   12/17/19 68     Wt Readings from Last 3 Encounters:   09/26/19 64 kg (141 lb 3.2 oz)   07/23/19 62.1 kg (137 lb)   04/16/19 66.5 kg (146 lb 9.6 oz)     BP Readings from Last 3 Encounters:   08/21/23 136/87   01/04/22 107/77   12/17/19 112/75        Medical History "     ALLERGIES: Patient has no known allergies.    Patient Active Problem List   Diagnosis    Major depressive disorder, recurrent episode, moderate (H)    Anorexia nervosa    Iron deficiency anemia    Skin lesion    Syncope and collapse    Postural orthostatic tachycardia syndrome    Syncope        Medications     Current Outpatient Medications   Medication Sig Dispense Refill    acetaminophen (TYLENOL) 500 MG tablet Take 500-1,000 mg by mouth every 6 hours as needed.      atorvastatin (LIPITOR) 10 MG tablet Take by mouth daily Per pt takes this medicine but doesn't know the amount      Blood Pressure Monitor KIT Patient should be evaluated in the emergency department if her systolic blood pressure <65 1 kit 0    Calcium Carbonate-Vitamin D (CALCIUM 600+D PO) Take 1 tablet by mouth 2 times daily.      Cholecalciferol (VITAMIN D PO) Take  by mouth. 50, 000 units every 14 days      dexmethylphenidate (FOCALIN) 10 MG tablet Take 1 tablet (10 mg) by mouth daily 30 tablet 0    dexmethylphenidate (FOCALIN) 10 MG tablet Take 1 tablet (10 mg) by mouth daily 30 tablet 0    dexmethylphenidate (FOCALIN) 10 MG tablet Take 1 tablet (10 mg) by mouth daily 30 tablet 0    docusate sodium (COLACE) 100 MG capsule Take 1 capsule by mouth 2 times daily.      ferrous sulfate 140 (45 Fe) MG TBCR CR tablet Take 1 tablet by mouth 2 times daily      fludrocortisone (FLORINEF) 0.1 MG tablet TAKE 2 TABLETS (0.2MG) BY MOUTH DAILY 180 tablet 3    gabapentin (NEURONTIN) 300 MG capsule Take 4 capsules (1,200 mg) by mouth nightly as needed (sleep) 150 capsule 3    ibuprofen (ADVIL/MOTRIN) 800 MG tablet Take 800 mg by mouth every 8 hours as needed       lamoTRIgine (LAMICTAL) 200 MG tablet Take 2 tablets (400 mg) by mouth daily PLEASE SCHEDULE APPT. FOR REFILLS [phone] 60 tablet 3    levothyroxine (SYNTHROID, LEVOTHROID) 175 MCG tablet Take  by mouth daily.      metoclopramide (REGLAN) 10 MG tablet 2 times daily       midodrine (PROAMATINE) 5  MG tablet Take 2 tablets (10 mg) by mouth 3 times daily 180 tablet 11    multivitamin (OCUVITE) TABS Take 1 tablet by mouth 2 times daily.      [START ON 9/13/2023] sertraline (ZOLOFT) 100 MG tablet Take 1.5 tablets (150 mg) by mouth daily 45 tablet 3        Labs and Data         11/1/2022     3:25 PM 2/7/2023    12:30 PM 5/16/2023     1:25 PM   PROMIS-10 Total Score w/o Sub Scores   PROMIS TOTAL - SUBSCORES 30 28 31          No data to display                  11/1/2022     3:22 PM 2/7/2023    12:32 PM 5/16/2023     1:23 PM   PHQ-9 SCORE   PHQ-9 Total Score MyChart 3 (Minimal depression) 3 (Minimal depression) 3 (Minimal depression)   PHQ-9 Total Score 3 3 3          No data to display                 Per care everywhere September/2022    Order: [id number]   Ref Range & Units 11 mo ago Resulting Agency   Sodium 136 - 145 mmol/L 141 [facility]   Potassium 3.5 - 5.1 mmol/L 4.3 [facility]   Chloride 98 - 109 mmol/L 106 [facility]   CO2 20 - 29 mmol/L 26 [facility]   Anion Gap 7 - 16 mmol/L 9 [facility]   Calcium 8.4 - 10.4 mg/dL 9.9 [facility]   BUN 7 - 26 mg/dL 13 [facility]   Creatinine 0.55 - 1.02 mg/dL 0.90 [facility]   GFR, Estimated >60 mL/min/1.73m2 >60 [facility]   Glucose 70 - 100 mg/dL 105 [facility]       Complete Blood Count-No Diff  Order: [id number]   Ref Range & Units 11 mo ago   WBC 3.5 - 10.5 x10(9)/L 7.2   RBC 3.90 - 5.03 x10(12)/L 3.84 [facility]    Hemoglobin 12.0 - 15.5 g/dL 12.1   HCT 34.9 - 44.5 % 35.9   MCV 80.0 - 100.0 fL 93.5   MCH 27.6 - 33.3 pg 31.5   MCHC 31.5 - 35.2 g/dL 33.7   RDW 11.9 - 15.5 % 13.0   Platelets 150 - 450 x10(9)/L 360   Resulting Agency  [facility]     Iron Profile (Iron,TIBC,%Sat.(Calc))  Order: [id number]   Ref Range & Units 11 mo ago   Iron 50 - 170 mcg/dL 78   Transferrin 180 - 382 mg/dL 270   TIBC, Calculated 240 - 450 mcg/dL 338   %  Saturation, Calculated 10 - 50 % 23     Hgb A1C  Order: [id number]   Ref Range & Units 11 mo ago   Hemoglobin A1C <=5.6 % 5.6     s abnormal data Lipid Panel (Expected: 365 days)  Order: [id number]   Ref Range & Units 12 mo ago Resulting Agency   Cholesterol 0 - 199 mg/dL 221 [facility]   Triglyceride <=149 mg/dL 100 [facility]   HDL Cholesterol >=40 mg/dL 64 [facility]   LDL, Calculated <130 mg/dL 137 [facility]   Non HDL Chol, Calculated <=159 mg/dL 157 [facility]   Cholesterol/HDL Ratio  3.5 [facility]   Hours Fasting  13      TSH  Order: [id number]   Ref Range & Units 12 mo ago   TSH, Sensitive 0.30 - 4.50 uIU/mL 1.17     Vitamin D 25-Hydroxy, Total  Order: [id number]   Ref Range & Units 12 mo ago   Vitamin D, 25-OH, Total 30 - 80 ng/mL 61           PROVIDER: Navid Cano MD    Level of Medical Decision Making:   - At least 1 chronic problem that is not stable  - Engaged in prescription drug management during visit (discussed any medication benefits, side effects, alternatives, etc.)      Psychiatry Individual Psychotherapy Note   Not provided.    Patient staffed in clinic with Dr. Maxewll who will sign the note.  Supervisor is Dr. Hughes.

## 2023-08-22 RX ORDER — DEXMETHYLPHENIDATE HYDROCHLORIDE 10 MG/1
10 TABLET ORAL DAILY
Qty: 30 TABLET | Refills: 0 | Status: SHIPPED | OUTPATIENT
Start: 2023-08-22 | End: 2023-10-17

## 2023-08-28 RX ORDER — ATORVASTATIN CALCIUM 40 MG/1
TABLET, FILM COATED ORAL
COMMUNITY
Start: 2023-07-26

## 2023-08-29 ENCOUNTER — TELEPHONE (OUTPATIENT)
Dept: PSYCHIATRY | Facility: CLINIC | Age: 53
End: 2023-08-29
Payer: MEDICARE

## 2023-08-29 NOTE — TELEPHONE ENCOUNTER
Called and spoke to pharmacy and this was sent in error. They have the script on file and are sending out a refill soon. Updated patient.

## 2023-08-29 NOTE — TELEPHONE ENCOUNTER
Health Call Center    Phone Message    May a detailed message be left on voicemail: yes     Reason for Call: Medication Question or concern regarding medication   Prescription Clarification  Name of Medication: Sertraline 100mg  Prescribing Provider: Dr. Cano   Pharmacy: [facility] [place] [id number] - [place], [place] - [address]    What on the order needs clarification? The patient received a notice in the mail from their pharmacy stating that the prescription for this medication has been discontinued, and for it not to be filled. The patient was hoping to get clarification on this, as they should still be continuing this prescription per their provider. The patient stated they still had a decent amount left of their current prescription.      Action Taken: Message routed to:  Other: [facility] psychiatry nurse pool    Travel Screening: Not Applicable

## 2023-10-06 DIAGNOSIS — F51.01 PRIMARY INSOMNIA: ICD-10-CM

## 2023-10-06 NOTE — TELEPHONE ENCOUNTER
[facility] Call Center    Phone Message    May a detailed message be left on voicemail: yes     Reason for Call: Other: the pt called she needs some clarification on the medication gabapentin 300 mg. Pt saw Dr. Cano and lowered the dosage of the medication and she went and saw her neurologists that wanted to change her dosage to increase it and she has stated that she is confused of what dosage she should take and would like the nurse to call the pharmacy so they can clarify the correct amount of what she should be taking and she would also like the nurse to give her a call on her cell phone asap      Action Taken: Other: nurse pool    Travel Screening: Not Applicable

## 2023-10-06 NOTE — TELEPHONE ENCOUNTER
Per last visit note:    -Continue Gabapentin 1200 QHS PRN  (helps some with sleep;  Added for hot flashes, works)     Writer attempted to call patient to inquire about what dose she has been taking as prescription sent to pharmacy on 8/21 matches visit note. LVM requesting a call back at the main clinic number.

## 2023-10-09 RX ORDER — GABAPENTIN 300 MG/1
1500 CAPSULE ORAL
Qty: 150 CAPSULE | Refills: 3 | Status: SHIPPED | OUTPATIENT
Start: 2023-10-09 | End: 2024-01-17

## 2023-10-09 NOTE — TELEPHONE ENCOUNTER
Navid Cano MD   to Me       10/9/23  9:55 AM  Also, she needs to reach out to Neurologist and express her dose of Gabapentin is 1500 mg at bed time which seems takes consistently every night. Refill approved. She needs to clarify with her Neuro team if they want an extra 600 mg at bedtime for migraines but I will defer this to her Neuro team.  Humberto Cano      10/9/23  9:50 AM   Navid Cano MD signed an order  Navid Cano MD   to Me       10/9/23  9:48 AM  Hi Brenda,  My impression was that she was taking 1200, now if she reported had been taking 1500 as planned increased with Dr. Tena in May I will continue with her 1500 dose then, which is reasonable. Kidney function WNL per labs obtained on 10/3/23 through PCP. Please encourage her to follow up with her PCP re: Mild hypokalemia of 3.4.    Navid Paul      Follow Up:  Writer called patient to update on the above. Patient verbalized understanding and states she already spoke to her PCP about the hypokalemia and has to go in for another lab draw in a week or two. No further questions or concerns at this time.

## 2023-10-09 NOTE — TELEPHONE ENCOUNTER
"Patient returned writer's call. Patient states that she went to the Neurologist on Thursday for her migraines and they told her to increase her gabapentin to 600 mg. Patient states that she was confused by this as she has been taking 1500 mg at HS PRN. She states this is what her bottle says that was dispensed in July and ordered by Dr. Tena. Per Dr. Tena's note on 5/16/2023: \"Increase Gabapentin to 1500 mg prn at bedtime  for sleep.\"    Patient also was unsure whether neurologist changed prescription at the pharmacy. Writer called patient's pharmacy to inquire what prescription they had on file and they stated they only had gabapentin 600 mg at bedtime prescribed by Dr. Oxana Hanks with [facility].     Office visit with Oxana Hanks on 10/5/2023 reads: Increase gabapentin to 600 nightly.   Current regimen:   Gabapentin 500mg nightly, last increased about 1 year ago. Some grogginess but not bothersome. Originally for hot flashes and sleep     Patient requests a call back at her cell number [phone].  "

## 2023-10-17 DIAGNOSIS — F90.9 ATTENTION DEFICIT HYPERACTIVITY DISORDER (ADHD), UNSPECIFIED ADHD TYPE: Primary | ICD-10-CM

## 2023-10-17 NOTE — TELEPHONE ENCOUNTER
Last Seen 8/21  RTC 3 months  Cancel None  No-Show None    Next Appt 11/21    Incoming Refill From [facility] via fax    Medication Requested   dexmethylphenidate (FOCALIN) 10 MG tablet     Directions   Take 1 tablet (10 mg) by mouth daily     Qty 30    Last Refill 9/19 #30, 8/12 #30, 7/18 #30      Medication pended and routed to provider for approval.

## 2023-10-18 RX ORDER — DEXMETHYLPHENIDATE HYDROCHLORIDE 10 MG/1
10 TABLET ORAL DAILY
Qty: 30 TABLET | Refills: 0 | Status: SHIPPED | OUTPATIENT
Start: 2023-10-18 | End: 2023-10-20

## 2023-10-20 DIAGNOSIS — F90.9 ATTENTION DEFICIT HYPERACTIVITY DISORDER (ADHD), UNSPECIFIED ADHD TYPE: ICD-10-CM

## 2023-10-20 RX ORDER — DEXMETHYLPHENIDATE HYDROCHLORIDE 5 MG/1
10 TABLET ORAL DAILY
Qty: 60 TABLET | Refills: 0 | Status: SHIPPED | OUTPATIENT
Start: 2023-10-20 | End: 2023-11-21

## 2023-10-20 NOTE — TELEPHONE ENCOUNTER
Writer called [facility] and they stated that they do not have 10 mg tablets of generic Focalin in stock currently and they only have 5 mg tablets. They are asking that a new prescription with 5 mg tablets be sent, though they are unsure whether patient's insurance will cover 2 tablets per day.    Writer inquired about last fill, and [facility] stated that her last prescription was delivered to her home on 9/22, but that their machine that does the scanning was not working properly and scanning was delayed (which is why  shows last sold on 9/27).

## 2023-10-20 NOTE — TELEPHONE ENCOUNTER
Writer called patient's pharmacy and they confirmed that updated prescription went through patient's insurance.

## 2023-10-20 NOTE — TELEPHONE ENCOUNTER
[facility] Call Center    Phone Message    May a detailed message be left on voicemail: yes     Reason for Call: Other: Pharmacy calling stating they have a back order of the dexmethylphenidate and wondering if there is an alterative option in medication to get the same dose the pt needs.      Action Taken: Other: [email or web address] psych pool    Travel Screening: Not Applicable

## 2023-10-23 DIAGNOSIS — G90.A POSTURAL ORTHOSTATIC TACHYCARDIA SYNDROME: ICD-10-CM

## 2023-10-24 RX ORDER — MIDODRINE HYDROCHLORIDE 5 MG/1
10 TABLET ORAL 3 TIMES DAILY
Qty: 180 TABLET | Refills: 1 | Status: SHIPPED | OUTPATIENT
Start: 2023-10-24 | End: 2024-01-22

## 2023-10-24 NOTE — TELEPHONE ENCOUNTER
midodrine (PROAMATINE) 5 MG tablet   Last Written Prescription Date:  11/7/2022  Last Fill Quantity: 180,   # refills: 11  Last Office Visit : 5/9/2023  [facility]  Future Office visit:  11/16/2023    Routing refill request to provider for review/approval because:  Medication not on the Cardiology refill protocol.

## 2023-11-16 ENCOUNTER — VIRTUAL VISIT (OUTPATIENT)
Dept: CARDIOLOGY | Facility: CLINIC | Age: 53
End: 2023-11-16
Attending: INTERNAL MEDICINE
Payer: MEDICARE

## 2023-11-16 VITALS
HEART RATE: 79 BPM | BODY MASS INDEX: 23.54 KG/M2 | DIASTOLIC BLOOD PRESSURE: 61 MMHG | WEIGHT: 150 LBS | SYSTOLIC BLOOD PRESSURE: 108 MMHG | HEIGHT: 67 IN

## 2023-11-16 DIAGNOSIS — I95.1 ORTHOSTATIC HYPOTENSION: ICD-10-CM

## 2023-11-16 PROCEDURE — 99443 PR PHYSICIAN TELEPHONE EVALUATION 21-30 MIN: CPT | Mod: 95 | Performed by: INTERNAL MEDICINE

## 2023-11-16 ASSESSMENT — PAIN SCALES - GENERAL: PAINLEVEL: NO PAIN (0)

## 2023-11-16 NOTE — LETTER
11/16/2023      RE: Keisha Somers  [address]  [address]  [place] [address]       Dear Colleague,    Thank you for the opportunity to participate in the care of your patient, Keisha Somers, at the [facility] HEART [facility]. Please see a copy of my visit note below.    Virtual Visit Details    Type of service:  Telephone Visit       HPI: This is a 53-year-old woman with severe orthostatic hypotension which has only been moderately improved with the use of fludrocortisone and midodrine.  She has her principal symptoms later in the day and they do seem to be orthostatic in nature such as moving from a chair to upright posture.    Patient does not report any injury associated with falls or collapse.  Her principal symptoms seem to occur more in the evening.  Her last midodrine doses are taken at around 5 PM.    At today's visit I recommended several changes in her treatment plan:  1.  Resume mild lower body isometric exercises such as standing training and use of exercise bands  2.  Move her on a daily afternoon midodrine dose from 5 PM to 7 PM to try to improve evening symptom status.  I did point out that this may adversely affect sleeping and if that happens then we would have to forego this change  3.  Maintain current fludrocortisone and midodrine dosing    Follow-up in about 6 months time or earlier if needed    PAST MEDICAL HISTORY:  Past Medical History:   Diagnosis Date    Anemia     Anorexia     Depression     Eating disorder     GERD (gastroesophageal reflux disease)     Hydronephrosis     Hypercholesterolemia     Hypotension     Hypotension, postural     Palpitations     POTS (postural orthostatic tachycardia syndrome)     Syncope     Syncope and collapse     Thyroid disease        CURRENT MEDICATIONS:  Current Outpatient Medications   Medication Sig Dispense Refill    acetaminophen (TYLENOL) 500 MG tablet Take 500-1,000 mg by  mouth every 6 hours as needed.      atorvastatin (LIPITOR) 10 MG tablet Take by mouth daily Per pt takes this medicine but doesn't know the amount      atorvastatin (LIPITOR) 40 MG tablet       Blood Pressure Monitor KIT Patient should be evaluated in the emergency department if her systolic blood pressure <65 1 kit 0    Calcium Carbonate-Vitamin D (CALCIUM 600+D PO) Take 1 tablet by mouth 2 times daily.      Cholecalciferol (VITAMIN D PO) Take  by mouth. 50, 000 units every 14 days      dexmethylphenidate (FOCALIN) 10 MG tablet Take 1 tablet (10 mg) by mouth daily 30 tablet 0    dexmethylphenidate (FOCALIN) 10 MG tablet Take 1 tablet (10 mg) by mouth daily 30 tablet 0    dexmethylphenidate (FOCALIN) 5 MG tablet Take 2 tablets (10 mg) by mouth daily 60 tablet 0    docusate sodium (COLACE) 100 MG capsule Take 1 capsule by mouth 2 times daily.      ferrous sulfate 140 (45 Fe) MG TBCR CR tablet Take 1 tablet by mouth 2 times daily      fludrocortisone (FLORINEF) 0.1 MG tablet TAKE 2 TABLETS (0.2MG) BY MOUTH DAILY 180 tablet 3    gabapentin (NEURONTIN) 300 MG capsule Take 5 capsules (1,500 mg) by mouth nightly as needed (sleep) 150 capsule 3    ibuprofen (ADVIL/MOTRIN) 800 MG tablet Take 800 mg by mouth every 8 hours as needed       lamoTRIgine (LAMICTAL) 200 MG tablet Take 2 tablets (400 mg) by mouth daily PLEASE SCHEDULE APPT. FOR REFILLS [phone] 60 tablet 3    levothyroxine (SYNTHROID, LEVOTHROID) 175 MCG tablet Take  by mouth daily.      metoclopramide (REGLAN) 10 MG tablet 2 times daily       midodrine (PROAMATINE) 5 MG tablet Take 2 tablets (10 mg) by mouth 3 times daily 180 tablet 1    multivitamin (OCUVITE) TABS Take 1 tablet by mouth 2 times daily.      sertraline (ZOLOFT) 100 MG tablet Take 1.5 tablets (150 mg) by mouth daily 45 tablet 3       PAST SURGICAL HISTORY:  Past Surgical History:   Procedure Laterality Date    CYSTOSCOPY W/ LASER LITHOTRIPSY Left 1/23/2017    Procedure: CYSTOSCOPY, LEFT  "URETEROSCOPY HOLMIUM LASER LITHOTRIPSY AND LEFT STENT PLACEMENT BILATERAL RETROGRADE PYELOGRAM ;  Surgeon: Jenniffer Romero MD;  Location: [facility] OR;  Service:     GASTROSTOMY, INSERT TUBE, COMBINED      GT placed[  2001    IR MISCELLANEOUS PROCEDURE  6/9/2004    PICC INSERTION - DOUBLE LUMEN  6/18/2021         TONSILLECTOMY         ALLERGIES:   No Known Allergies    FAMILY HISTORY:  No family history on file.      SOCIAL HISTORY:  Social History     Tobacco Use    Smoking status: Never    Smokeless tobacco: Never   Substance Use Topics    Alcohol use: No    Drug use: No       ROS:   Constitutional: No fever, chills, or sweats. Weight stable.   ENT: No visual disturbance, ear ache,   Cardiovascular: As per HPI.   Respiratory: No cough, hemoptysis.    GI: No nausea, vomiting, : No hematuria.   Integument: Negative.   Psychiatric: Negative.   Hematologic:   no easy bleeding.  Neuro: Negative.   Endocrinology: No significant heat or cold intolerance   Musculoskeletal: No myalgia.  Falls due to OH but no reported injury    Exam:  /61   Pulse 79   Ht 1.702 m (5' 7\")   Wt 68 kg (150 lb)   BMI 23.49 kg/m    GENERAL APPEARANCE: alert and no distress    RESPIRATORY: no rales, rhonchi or wheezes, no use of accessory muscles, no retractions, respirations are unlabored, normal respiratory rate    EXTREMITIES: no edema reported  NEURO: alert and oriented to person/place/time, normal speech, gait and affect  SKIN: no ecchymoses, no rashes    Labs:  CBC RESULTS:   Lab Results   Component Value Date    WBC 14.6 (H) 06/22/2021    WBC 5.0 07/24/2017    RBC 3.18 (L) 06/22/2021    RBC 3.56 (L) 07/24/2017    HGB 9.9 (L) 06/22/2021    HGB 10.8 (L) 07/24/2017    HCT 30.4 (L) 06/22/2021    HCT 33.7 (L) 07/24/2017    MCV 96 06/22/2021    MCV 95 07/24/2017    MCH 31.1 06/22/2021    MCH 30.3 07/24/2017    MCHC 32.6 06/22/2021    MCHC 32.0 07/24/2017    RDW 13.1 06/22/2021    RDW 13.1 07/24/2017     06/22/2021 " "    07/24/2017       BMP RESULTS:  Lab Results   Component Value Date     06/22/2021     07/24/2017    POTASSIUM 4.0 06/22/2021    POTASSIUM 3.5 07/24/2017    CHLORIDE 108 (H) 06/22/2021    CHLORIDE 106 07/24/2017    CO2 26 06/22/2021    CO2 28 07/24/2017    ANIONGAP 7 06/22/2021    ANIONGAP 8 07/24/2017    GLC 96 06/22/2021     (H) 07/24/2017    BUN 11 06/22/2021    BUN 7 07/24/2017    CR 0.82 06/22/2021    CR 0.83 07/24/2017    GFRESTIMATED >60 06/22/2021    GFRESTIMATED 74 07/24/2017    GFRESTBLACK >60 06/22/2021    GFRESTBLACK 89 07/24/2017    KENNETH 9.1 06/22/2021    KENNETH 8.7 07/24/2017        INR RESULTS:  No results found for: \"INR\"    Procedures:  PULMONARY FUNCTION TESTS:        No data to display                  ECHOCARDIOGRAM:   No results found for this or any previous visit (from the past 8760 hour(s)).      Assessment and Plan:   1.  Orthostatic hypotension still inadequately controlled    Plan  1.  Resume lower body isometric exercises as described in HPI  2.  Adjust midodrine dosing time as in HPI  3.  Follow-up 6 months time    Total elapsed time today with chart review, clinic visit and documentation 30 minutes  Telephone on 11: 45; off 11: 58  [employer]  Patient at home; clinic [facility] heart    I very much appreciated the opportunity to see and assess Keisha Somers in the clinic today. Please do not hesitate to contact my office if you have any questions or concerns.      Thang Galarza MD  Cardiac Arrhythmia Service  [facility]  [phone]     "

## 2023-11-16 NOTE — PATIENT INSTRUCTIONS
You were seen in the Electrophysiology Clinic today by: Dr Galarza    Plan:   Medication Changes:   Adjust timing of 3rd Midodrine dose to later, around 7pm    Follow up Visit:   6 months      If you have further questions, please utilize [employer] to contact us.     Your Care Team:    EP Cardiology   Telephone Number     Nurse Line  Rebeca Schroeder, RN   eJni Portillo, RN  Oracio John RN   [phone]     For scheduling appointments:   [place]ent[facility]   [phone]   For procedure scheduling:    Etelvina Donnelly     [phone]   For the Device Clinic (Pacemakers, ICDs, Loop Recorders)    During business hours: [phone]  After business hours:   [phone]- select option 4 and ask for job code [id number].       On-call cardiologist for after hours or on weekends:   [phone], option #4, and ask to speak to the on-call cardiologist.     Cardiovascular Clinic:   [address] [place] [address]. [place] [address]      As always, Thank you for trusting us with your health care needs!

## 2023-11-16 NOTE — NURSING NOTE
Chief Complaint   Patient presents with    Follow Up     6 month follow up, no updates per pt. Medications/allergies reviewed with pt, no changes per pt.        Is the patient currently in the state of [place]? NO    Visit mode:TELEPHONE    If the visit is dropped, the patient can be reconnected by: TELEPHONE VISIT: Phone number: [phone]    Will anyone else be joining the visit? NO  (If patient encounters technical issues they should call [phone]    How would you like to obtain your AVS? Mail a copy    Are changes needed to the allergy or medication list? No, Pt stated no changes to allergies, and Pt stated no med changes    Patient denies any changes since echeck-in regarding medication and allergies and states all information entered during echeck-in remains accurate.    Ronald Cortez, Visit Facilitator/MA.

## 2023-11-16 NOTE — PROGRESS NOTES
Virtual Visit Details    Type of service:  Telephone Visit       HPI: This is a 53-year-old woman with severe orthostatic hypotension which has only been moderately improved with the use of fludrocortisone and midodrine.  She has her principal symptoms later in the day and they do seem to be orthostatic in nature such as moving from a chair to upright posture.    Patient does not report any injury associated with falls or collapse.  Her principal symptoms seem to occur more in the evening.  Her last midodrine doses are taken at around 5 PM.    At today's visit I recommended several changes in her treatment plan:  1.  Resume mild lower body isometric exercises such as standing training and use of exercise bands  2.  Move her on a daily afternoon midodrine dose from 5 PM to 7 PM to try to improve evening symptom status.  I did point out that this may adversely affect sleeping and if that happens then we would have to forego this change  3.  Maintain current fludrocortisone and midodrine dosing    Follow-up in about 6 months time or earlier if needed    PAST MEDICAL HISTORY:  Past Medical History:   Diagnosis Date    Anemia     Anorexia     Depression     Eating disorder     GERD (gastroesophageal reflux disease)     Hydronephrosis     Hypercholesterolemia     Hypotension     Hypotension, postural     Palpitations     POTS (postural orthostatic tachycardia syndrome)     Syncope     Syncope and collapse     Thyroid disease        CURRENT MEDICATIONS:  Current Outpatient Medications   Medication Sig Dispense Refill    acetaminophen (TYLENOL) 500 MG tablet Take 500-1,000 mg by mouth every 6 hours as needed.      atorvastatin (LIPITOR) 10 MG tablet Take by mouth daily Per pt takes this medicine but doesn't know the amount      atorvastatin (LIPITOR) 40 MG tablet       Blood Pressure Monitor KIT Patient should be evaluated in the emergency department if her systolic blood pressure <65 1 kit 0    Calcium Carbonate-Vitamin D  (CALCIUM 600+D PO) Take 1 tablet by mouth 2 times daily.      Cholecalciferol (VITAMIN D PO) Take  by mouth. 50, 000 units every 14 days      dexmethylphenidate (FOCALIN) 10 MG tablet Take 1 tablet (10 mg) by mouth daily 30 tablet 0    dexmethylphenidate (FOCALIN) 10 MG tablet Take 1 tablet (10 mg) by mouth daily 30 tablet 0    dexmethylphenidate (FOCALIN) 5 MG tablet Take 2 tablets (10 mg) by mouth daily 60 tablet 0    docusate sodium (COLACE) 100 MG capsule Take 1 capsule by mouth 2 times daily.      ferrous sulfate 140 (45 Fe) MG TBCR CR tablet Take 1 tablet by mouth 2 times daily      fludrocortisone (FLORINEF) 0.1 MG tablet TAKE 2 TABLETS (0.2MG) BY MOUTH DAILY 180 tablet 3    gabapentin (NEURONTIN) 300 MG capsule Take 5 capsules (1,500 mg) by mouth nightly as needed (sleep) 150 capsule 3    ibuprofen (ADVIL/MOTRIN) 800 MG tablet Take 800 mg by mouth every 8 hours as needed       lamoTRIgine (LAMICTAL) 200 MG tablet Take 2 tablets (400 mg) by mouth daily PLEASE SCHEDULE APPT. FOR REFILLS [phone] 60 tablet 3    levothyroxine (SYNTHROID, LEVOTHROID) 175 MCG tablet Take  by mouth daily.      metoclopramide (REGLAN) 10 MG tablet 2 times daily       midodrine (PROAMATINE) 5 MG tablet Take 2 tablets (10 mg) by mouth 3 times daily 180 tablet 1    multivitamin (OCUVITE) TABS Take 1 tablet by mouth 2 times daily.      sertraline (ZOLOFT) 100 MG tablet Take 1.5 tablets (150 mg) by mouth daily 45 tablet 3       PAST SURGICAL HISTORY:  Past Surgical History:   Procedure Laterality Date    CYSTOSCOPY W/ LASER LITHOTRIPSY Left 1/23/2017    Procedure: CYSTOSCOPY, LEFT URETEROSCOPY HOLMIUM LASER LITHOTRIPSY AND LEFT STENT PLACEMENT BILATERAL RETROGRADE PYELOGRAM ;  Surgeon: Jenniffer Romero MD;  Location: [facility];  Service:     GASTROSTOMY, INSERT TUBE, COMBINED      GT placed[  2001    IR MISCELLANEOUS PROCEDURE  6/9/2004    PICC INSERTION - DOUBLE LUMEN  6/18/2021         TONSILLECTOMY    "      ALLERGIES:   No Known Allergies    FAMILY HISTORY:  No family history on file.      SOCIAL HISTORY:  Social History     Tobacco Use    Smoking status: Never    Smokeless tobacco: Never   Substance Use Topics    Alcohol use: No    Drug use: No       ROS:   Constitutional: No fever, chills, or sweats. Weight stable.   ENT: No visual disturbance, ear ache,   Cardiovascular: As per HPI.   Respiratory: No cough, hemoptysis.    GI: No nausea, vomiting, : No hematuria.   Integument: Negative.   Psychiatric: Negative.   Hematologic:   no easy bleeding.  Neuro: Negative.   Endocrinology: No significant heat or cold intolerance   Musculoskeletal: No myalgia.  Falls due to OH but no reported injury    Exam:  /61   Pulse 79   Ht 1.702 m (5' 7\")   Wt 68 kg (150 lb)   BMI 23.49 kg/m    GENERAL APPEARANCE: alert and no distress    RESPIRATORY: no rales, rhonchi or wheezes, no use of accessory muscles, no retractions, respirations are unlabored, normal respiratory rate    EXTREMITIES: no edema reported  NEURO: alert and oriented to person/place/time, normal speech, gait and affect  SKIN: no ecchymoses, no rashes    Labs:  CBC RESULTS:   Lab Results   Component Value Date    WBC 14.6 (H) 06/22/2021    WBC 5.0 07/24/2017    RBC 3.18 (L) 06/22/2021    RBC 3.56 (L) 07/24/2017    HGB 9.9 (L) 06/22/2021    HGB 10.8 (L) 07/24/2017    HCT 30.4 (L) 06/22/2021    HCT 33.7 (L) 07/24/2017    MCV 96 06/22/2021    MCV 95 07/24/2017    MCH 31.1 06/22/2021    MCH 30.3 07/24/2017    MCHC 32.6 06/22/2021    MCHC 32.0 07/24/2017    RDW 13.1 06/22/2021    RDW 13.1 07/24/2017     06/22/2021     07/24/2017       BMP RESULTS:  Lab Results   Component Value Date     06/22/2021     07/24/2017    POTASSIUM 4.0 06/22/2021    POTASSIUM 3.5 07/24/2017    CHLORIDE 108 (H) 06/22/2021    CHLORIDE 106 07/24/2017    CO2 26 06/22/2021    CO2 28 07/24/2017    ANIONGAP 7 06/22/2021    ANIONGAP 8 07/24/2017    GLC 96 " "06/22/2021     (H) 07/24/2017    BUN 11 06/22/2021    BUN 7 07/24/2017    CR 0.82 06/22/2021    CR 0.83 07/24/2017    GFRESTIMATED >60 06/22/2021    GFRESTIMATED 74 07/24/2017    GFRESTBLACK >60 06/22/2021    GFRESTBLACK 89 07/24/2017    KENNETH 9.1 06/22/2021    KENNETH 8.7 07/24/2017        INR RESULTS:  No results found for: \"INR\"    Procedures:  PULMONARY FUNCTION TESTS:        No data to display                  ECHOCARDIOGRAM:   No results found for this or any previous visit (from the past 8760 hour(s)).      Assessment and Plan:   1.  Orthostatic hypotension still inadequately controlled    Plan  1.  Resume lower body isometric exercises as described in HPI  2.  Adjust midodrine dosing time as in HPI  3.  Follow-up 6 months time    Total elapsed time today with chart review, clinic visit and documentation 30 minutes  Telephone on 11: 45; off 11: 58  [facility]  Patient at [facility]; clinic [facility] heart    I very much appreciated the opportunity to see and assess Keisha Somers in the clinic today. Please do not hesitate to contact my office if you have any questions or concerns.      Thang Galarza MD  Cardiac Arrhythmia Service  [facility]  [phone]       CC  SELF, REFERRED    "

## 2023-11-21 ENCOUNTER — OFFICE VISIT (OUTPATIENT)
Dept: PSYCHIATRY | Facility: CLINIC | Age: 53
End: 2023-11-21
Attending: PSYCHIATRY & NEUROLOGY
Payer: MEDICARE

## 2023-11-21 VITALS — HEART RATE: 72 BPM | DIASTOLIC BLOOD PRESSURE: 85 MMHG | SYSTOLIC BLOOD PRESSURE: 137 MMHG

## 2023-11-21 DIAGNOSIS — F33.0 MAJOR DEPRESSIVE DISORDER, RECURRENT EPISODE, MILD (H): ICD-10-CM

## 2023-11-21 DIAGNOSIS — F50.00 ANOREXIA NERVOSA IN REMISSION (H): Primary | ICD-10-CM

## 2023-11-21 DIAGNOSIS — F43.10 PTSD (POST-TRAUMATIC STRESS DISORDER): ICD-10-CM

## 2023-11-21 PROBLEM — Z98.890 S/P ORIF (OPEN REDUCTION INTERNAL FIXATION) FRACTURE: Status: ACTIVE | Noted: 2018-09-06

## 2023-11-21 PROBLEM — M85.80 OSTEOPENIA: Status: ACTIVE | Noted: 2017-10-24

## 2023-11-21 PROBLEM — N12 PYELONEPHRITIS: Status: ACTIVE | Noted: 2021-06-17

## 2023-11-21 PROBLEM — N18.31 STAGE 3A CHRONIC KIDNEY DISEASE (H): Status: ACTIVE | Noted: 2021-06-18

## 2023-11-21 PROBLEM — Z87.81 S/P ORIF (OPEN REDUCTION INTERNAL FIXATION) FRACTURE: Status: ACTIVE | Noted: 2018-09-06

## 2023-11-21 PROBLEM — N25.81 SECONDARY HYPERPARATHYROIDISM (H): Status: ACTIVE | Noted: 2017-10-24

## 2023-11-21 PROCEDURE — G0463 HOSPITAL OUTPT CLINIC VISIT: HCPCS

## 2023-11-21 PROCEDURE — 99214 OFFICE O/P EST MOD 30 MIN: CPT | Mod: GC

## 2023-11-21 RX ORDER — RIBOFLAVIN (VITAMIN B2) 100 MG
4 TABLET ORAL DAILY
COMMUNITY
Start: 2023-10-03

## 2023-11-21 RX ORDER — ONDANSETRON 4 MG/1
TABLET, FILM COATED ORAL
COMMUNITY
Start: 2023-10-03

## 2023-11-21 RX ORDER — POTASSIUM CHLORIDE 750 MG/1
TABLET, EXTENDED RELEASE ORAL
COMMUNITY
Start: 2023-10-26

## 2023-11-21 RX ORDER — RIZATRIPTAN BENZOATE 10 MG/1
TABLET, ORALLY DISINTEGRATING ORAL
COMMUNITY
Start: 2022-06-20

## 2023-11-21 RX ORDER — TIZANIDINE 2 MG/1
TABLET ORAL
COMMUNITY
Start: 2023-10-23

## 2023-11-21 RX ORDER — MAGNESIUM 200 MG
1 TABLET ORAL DAILY
COMMUNITY
Start: 2023-09-19

## 2023-11-21 NOTE — PROGRESS NOTES
[facility]  MEDICAL PROGRESS NOTE     CARE TEAM:    PCP- Lavonne Zartae  Therapist- None  Cardiology- Dr. Galarza, Endo- Dr. Handley.     Keisha is a 53 year old who uses the pronouns she, her, hers.      Diagnoses     Major depressive disorder, recurrent, mild   Primary insomnia   Anorexia nervosa, in remission  PTSD, per history     Assessment     Keisha Somers is a 52 yo F with past psychiatric diagnosis of anorexia nervosa at some point needing a feeding tube, major depressive disorder, sleep disorder and significant medical diagnosis that includes POTS (postural orthostatic tachycardia syndrome), migraines, here today for a follow-up visit.     Overall Keisha reports symptoms have been largely unchanged since our last visit, appears that her mood and anxiety and trauma symptoms have been stable, stats her primary goal for continuing care in this clinic is to continue being stable. Regarding her eating disorder, it appears that it has been maintained the most stable over the years. She denies any recent binge eating or purging/restricting behaviors, except for some night time undesired eating that seems not to be problematic at this time. We again discussed referrals to weigh management clinic and she politely declined this offer at this time and is open to revisit this idea in the future.     We discussed medication indications, side effects, risks vs benefits and alternatives, including use of stimulant  which she has been on for over >25 years and seems that has provided benefit with energy, mood and no problematic use, per Dr. Tena's notes. Given that she has been observing some elevated BP values at home (has BP cuff) as well values today from BP obtained in clinic, we recommended decreasing stimulant dose with goal of tapering off due to not clear indication and risks outweighing potential benefits, likely contributing to some  anxiety as well as potential for worsening trauma-like symptoms. Keisha is open to this recommendation. We also generally discussed some of the future considerations detailed below and we will continue this conversation in future visits.   No questions or concerns.        Future considerations  -Consider optimizing Zoloft better target anxiety and continue helping with mood (last moderate-severe depressive episode >5 years ago per Dr. Tena's notes).  -Consider decreasing/tapering gabapentin if/once anxiety is more stable   -Consider further decreasing/taper off Focalin due to recent high BP as well as likely contributing to some anxiety, concerns for eating disorder that appears has been in remission for a while (restricting?).    -Could cautiously (ideally would need to consult with cards before due to POTS) add an alpha blocker to help with PTSD-sleep disturbances as well with BP,       Psychotropic Drug Interactions:    ADDITIVE SEROTONERGIC: Zolonoft, Focalin, Maxalt, ondansetron  ADDITIVE QTc: Zoloft, ondansetron   ADDITIVE CNS/RESPIRATORY DEPRESSION: Gabapentin, Lamictal, tizanidine  Management: routine monitoring, limited use of zofran patient aware of risks.     [place]NPMP was not checked today: will be checked next visit    Risk Statements:   Treatment Risk- Risks, benefits, alternatives and potential adverse effects have been discussed and are understood.   Safety Risk-Keisha did not appear to be an imminent safety risk to self or others.     Plan     1) Medications:   -Continue Zoloft 150 mg PO qDay Helps as well as Lamictal with mood.   -Continue Lamictal 400 mg daily   -Continue Gabapentin 1200 QHS PRN  (helps some with sleep;  Added for hot flashes, works)  -Decrease Focalin to 5 mg daily. Goal is to taper off.     Anjana other prescribers PCP, cardiology, GI):  1. Synthroid 150 mcg - she is uncertain of dose.    2. Zofran prn migraines  3. Midodrine 11/16/23  - changed from Dr. Galarza   4. Carafate  5.  "Pyridostigmine - OFF now  6. Tizanidine - 4x/day, last dose 9pm for migraines-one in am , one noo,one at night.  7. Fluorinef- 0.2 mg daily - moved to evening  8. Maxalt for migraines with ibuprofen.  It works very well.    9. Mg-   10. Vit B2  11. Atorvastatin    2) Psychotherapy:   None currently, continue offering.     3) Next due:  Labs- Last obtained 09/2023, will defer further lipid/CBC monitoring to PCP. Routine monitoring is not indicated for current psychotropic medication regimen   EKG- Last obtained 06/2021,  ms. Follows-up with cardiology, will defer further monitoring to them.   Rating scales-  PHQ9    4) Referrals: none    5) Other:   -F/up with cardiology   -Sleep medicine follow-up/referral offered, declined at this time  -Weight management clinic referral, declined at this time     6) Follow-up: Return to clinic in 8 weeks approximately      Pertinent Background                                                   [most recent eval 08/21/23]     Keisha first experienced MH symptoms since HS, she started experiencing eating disorder that worsened through her 20, severe, at one point needing a feeding tube, leading to multiple hospitalizations. Also experienced trauma x2 as well as emotional trauma growing up. She reports experiencing depression and post-traumatic symptoms as well since around the same age.   Last depressive episode >5 years.   Has been on Focalin for several years, per Dr. Tena notes \"Beneficial for energy, mood and no indications of problematic use. Helps with concentration. Started around >25 years in context of eating disorder.\"     Pertinent items include: trauma hx, eating disorder , mutiple psychotropic trials , psych hosp , and major medical problems     Subjective     Since our last visit:  -Updates/pressing issues:   -BP has been high lately: Cards just changed meds   -Mood: \"good\"  -Anxiety: Overall \"baseline\" some worries out of the ordinary at times, shares overall " "has not been an issue most recently   -Appetite: Unchanged  -Sleep: Unchanged. Average gets. Feels well rested   -Denies nightmares  -Denies other trauma symptoms   -BM/constipation: 3x/week  -Tremor/abnormal movements: Denies   -Side effects? Chest pain, palpitations, sleep disturbances, Other side effects:   -Has follow up appointment with endocrinology.   -Saw PCP ~1 mo ago. Replaced potassium. Normal now.   -Saw neuro for migraine.   -Safety: Denies SI/HI/SIB  -Perceptual disturbances: Denies     Recent Psych Symptoms:   Depression:  insomnia  Elevated:  none  Psychosis:  none  Anxiety:   Overall \"baseline\" some worries out of the ordinary at times, shares overall has not been an issue most recently   Trauma Related:  Denies recent nightmares, flashbacks, dissociations   Insomnia:  Issues with sleep maintenance, wakes up 1-3 times through the night, falls back asleep relatively easily, feels well rested in the AM   Other:  No      Current Social History:  Living situation/income: (partner, children, pets, etc): Apartment building \"customized living. [place] Has lived there for 13 years.   Social/spiritual support: Helps her dad (retired) with some accounts in his [employer] business. SSD  Feels safe at home: Yes    Pertinent Substance Use:   Alcohol: No   Cannabis: No  Tobacco: No  Caffeine:  No   Opioids: No   Narcan Kit current: N/A  Other substances:  Denies     Medical Review of Systems:   Lightheadedness/orthostasis: Yes, POTS dx  Headaches: Yes, migraines dx  GI: None  Sexual health concerns: None    A comprehensive review of systems was performed and is negative other than noted above.     Mental Status Exam     Alertness: alert  and oriented  Appearance: well groomed  Behavior/Demeanor: cooperative, pleasant, and calm, with good  eye contact   Speech: normal and regular rate and rhythm  Language: intact and no problems  Psychomotor: normal or unremarkable  Mood: \"good\"  Affect: full range; congruent to: mood- yes, " content- yes  Thought Process/Associations: unremarkable  Thought Content:  Reports none;  Denies suicidal & violent ideation and delusions, obsessions , phobia , magical thinking, over-valued ideas, and paranoid ideation  Perception:  Reports none;  Denies auditory hallucinations, visual hallucinations, visual distortion seen as shadows , depersonalization, and derealization  Insight: good  Judgment: good  Cognition: does  appear grossly intact; formal cognitive testing was not done  Gait and Station: unremarkable     Past Psych Med Trials        Medication Max Dose (mg) Dates / Duration Helpful? DC Reason / Adverse Effects?   Lamictal        Zoloft        Focalin        Lunesta       Ritalin SR 20 mg BID ~2007     Remeron  7.5 mg       Seroquel   mg qhs      Ambien 10 mg  ~2007     Prozac 80 mg  ~2007          Treatment Course and Anne Events since  JULY 2023 8/21/23: Transfer of care DA.   11/21/23.Decrease Focalin to 5 mg daily. Goal is to taper off due to not clear indication concerns for some elevated BP and overall  risks outweighing potential benefits     Vitals   There were no vitals taken for this visit.  Pulse Readings from Last 3 Encounters:   11/21/23 72   11/21/23 80   11/16/23 79   08/21/23 84     Wt Readings from Last 3 Encounters:   11/16/23 68 kg (150 lb)   09/26/19 64 kg (141 lb 3.2 oz)   07/23/19 62.1 kg (137 lb)     BP Readings from Last 3 Encounters:   11/21/23 137/85   11/21/23 153/85   11/16/23 108/61   08/21/23 136/87        Medical History     ALLERGIES: Patient has no known allergies.    Patient Active Problem List   Diagnosis    Major depressive disorder, recurrent episode, moderate (H)    Anorexia nervosa (H28)    Iron deficiency anemia    Skin lesion    Syncope and collapse    Postural orthostatic tachycardia syndrome    Syncope        Medications     Current Outpatient Medications   Medication Sig Dispense Refill    acetaminophen (TYLENOL) 500 MG tablet Take 500-1,000 mg by  mouth every 6 hours as needed.      atorvastatin (LIPITOR) 10 MG tablet Take by mouth daily Per pt takes this medicine but doesn't know the amount      atorvastatin (LIPITOR) 40 MG tablet       Blood Pressure Monitor KIT Patient should be evaluated in the emergency department if her systolic blood pressure <65 1 kit 0    Calcium Carbonate-Vitamin D (CALCIUM 600+D PO) Take 1 tablet by mouth 2 times daily.      Cholecalciferol (VITAMIN D PO) Take  by mouth. 50, 000 units every 14 days      dexmethylphenidate (FOCALIN) 5 MG tablet Take 2 tablets (10 mg) by mouth daily 60 tablet 0    docusate sodium (COLACE) 100 MG capsule Take 1 capsule by mouth 2 times daily.      ferrous sulfate 140 (45 Fe) MG TBCR CR tablet Take 1 tablet by mouth 2 times daily      fludrocortisone (FLORINEF) 0.1 MG tablet TAKE 2 TABLETS (0.2MG) BY MOUTH DAILY 180 tablet 3    gabapentin (NEURONTIN) 300 MG capsule Take 5 capsules (1,500 mg) by mouth nightly as needed (sleep) 150 capsule 3    ibuprofen (ADVIL/MOTRIN) 800 MG tablet Take 800 mg by mouth every 8 hours as needed       lamoTRIgine (LAMICTAL) 200 MG tablet Take 2 tablets (400 mg) by mouth daily PLEASE SCHEDULE APPT. FOR REFILLS [phone] 60 tablet 3    levothyroxine (SYNTHROID, LEVOTHROID) 175 MCG tablet Take  by mouth daily.      magnesium 200 MG TABS Take 1 tablet by mouth daily      metoclopramide (REGLAN) 10 MG tablet 2 times daily       midodrine (PROAMATINE) 5 MG tablet Take 2 tablets (10 mg) by mouth 3 times daily 180 tablet 1    multivitamin (OCUVITE) TABS Take 1 tablet by mouth 2 times daily.      ondansetron (ZOFRAN) 4 MG tablet Take 1 pill for nausea at onset of headache, repeat if needed every 8 hours      potassium chloride ER (K-TAB/KLOR-CON) 10 MEQ CR tablet       rizatriptan (MAXALT-MLT) 10 MG ODT DISSOLVE 1 TABLET BY MOUTH AT ONSET OF MIGRAINE (MAY REPEAT IN 2 HOURS IF NEEDED      sertraline (ZOLOFT) 100 MG tablet Take 1.5 tablets (150 mg) by mouth daily 45 tablet 3     tiZANidine (ZANAFLEX) 2 MG tablet TAKE 1 TABLET BY MOUTH EVERY MORNING, TAKE 1 TABLET EVERY EVENING, AND TAKE 2 TABLETS BY MOUTH EVERY NIGHT AT BEDTIME      vitamin B-2 (RIBOFLAVIN) 100 MG TABS tablet Take 4 tablets by mouth daily          Labs and Data         2/7/2023    12:30 PM 5/16/2023     1:25 PM 8/21/2023     1:08 PM   PROMIS-10 Total Score w/o Sub Scores   PROMIS TOTAL - SUBSCORES 28 31 30          No data to display                  2/7/2023    12:32 PM 5/16/2023     1:23 PM 8/21/2023     1:06 PM   PHQ-9 SCORE   PHQ-9 Total Score MyChart 3 (Minimal depression) 3 (Minimal depression) 4 (Minimal depression)   PHQ-9 Total Score 3 3 4          No data to display                 Per care everywhere October-November 2023:     Vitamin D 25-Hydroxy, Total  Order: [id number]  Component  Ref Range & Units 1 mo ago   Vitamin D, 25-OH, Total  30 - 80 ng/mL 50   [facility]          Contains abnormal data TSH  Order: [id number]  Component  Ref Range & Units 3 wk ago   TSH, Sensitive  0.30 - 4.50 uIU/mL 4.74 High    [facility]         Lipid Panel (Expected: 90 days)  Order: [id number]  Component  Ref Range & Units 1 mo ago Resulting Agency   Cholesterol  0 - 199 mg/dL 202 High  [facility]   Triglyceride  <=149 mg/dL 68 [facility]   HDL Cholesterol  >=40 mg/dL 66 [facility]   LDL, Calculated  <130 mg/dL 122 [facility]   Non HDL Chol, Calculated  <=159 mg/dL 136 [facility]   Cholesterol/HDL Ratio 3.1 [facility]   Hours Fasting  8 - 12 Hours 12.0 [facility]     Basic Metabolic Panel  Order: [id number]  Component  Ref Range & Units 1 mo ago Resulting Agency   Sodium  136 - 145 mmol/L 144 [facility]   Potassium  3.5 - 5.1 mmol/L 3.4 Low  [facility]   Chloride  98 - 109 mmol/L 111 High  [facility]   CO2  20 - 29 mmol/L 23  [facility]   Anion Gap  7 - 16 mmol/L 10 [facility]   Calcium  8.4 - 10.4 mg/dL 9.5 [facility]   BUN  7 - 26 mg/dL 14 [facility]   Creatinine  0.55 - 1.02 mg/dL 0.80 [facility]   Glucose  70 - 100 mg/dL 107 High  [facility]   Comment: The given reference range is for the fasting state. Non-fasting reference range for glucose is 70 - 180 mg/dL.   GFR, Estimated  >60 mL/min/1.73m2 >60 [facility]   Hours Fasting  8 - 12 Hours 12.0 [facility]                 PROVIDER: Navid Cano MD     Level of Medical Decision Making:   - At least 1 chronic problem that is not stable  - Engaged in prescription drug management during visit (discussed any medication benefits, side effects, alternatives, etc.)          Psychiatry Individual Psychotherapy Note   Psychotherapy start time - 940 AM  Psychotherapy end time - 956 AM  Date treatment plan last reviewed with patient - 11/21/23  Subjective: This supportive psychotherapy session addressed issues related to goals of therapy and current psychosocial stressors. Patient's reaction: Pre-contemplation, Contemplation, and Maintenance in the context of mental status appropriate for ambulatory setting.    Interactive complexity indicated? No  Plan: RTC in timeframe noted above  Psychotherapy services during this visit included myself and the patient.   Treatment Plan         SYMPTOMS; PROBLEMS    MEASURABLE GOALS;    FUNCTIONAL IMPROVEMENT / GAINS INTERVENTIONS DISCHARGE CRITERIA   Depression: depressed mood and insomnia  Anxiety: excessive worry and feeling fearful  Trauma Related: fear, nightmares, trauma trigger psychological / physiological response, and dysregulation  Eating Disorder: Hx of anorexia nervosa, some nigh time overeating currently   Psychosocial: health issues    reduce depressive episodes, learn best practices for sleep, reduce panic attacks/ excessive worry, make  a plan to manage 2-3 anxiety-provoking situations, develop 2 strategies to cope with trauma triggers/intrusive memories, reduce nightmares, take steps to improve support network, and improve nutrition Supportive / psychodynamic marked symptom improvement, symptom resolution, reduced visit frequency, can transfer back to primary care, and transition to supportive/CBT psychotherapy        Patient staffed in clinic with Dr. Maxwell who will sign the note.  Supervisor is Dr. Hughes.

## 2023-11-21 NOTE — NURSING NOTE
Chief Complaint   Patient presents with    Recheck Medication     Major depressive disorder, recurrent episode, mild     Pt declined weight at this time.    - Russell Greene, Visit Facilitator

## 2023-11-22 RX ORDER — DEXMETHYLPHENIDATE HYDROCHLORIDE 5 MG/1
5 TABLET ORAL DAILY
Qty: 30 TABLET | Refills: 0 | Status: SHIPPED | OUTPATIENT
Start: 2023-11-22 | End: 2024-01-09

## 2023-11-22 NOTE — PATIENT INSTRUCTIONS
**For crisis resources, please see the information at the end of this document**   Patient Education    Thank you for coming to the [facility] [place] [facility].     Lab Testing:  If you had lab testing today and your results are reassuring or normal they will be mailed to you or sent through Sincerely within 7 days. If the lab tests need quick action we will call you with the results. The phone number we will call with results is # [phone]. If this is not the best number please call our clinic and change the number.     Medication Refills:  If you need any refills please call your pharmacy and they will contact us. Our fax number for refills is [phone].   Three business days of notice are needed for general medication refill requests.   Five business days of notice are needed for controlled substance refill requests.   If you need to change to a different pharmacy, please contact the new pharmacy directly. The new pharmacy will help you get your medications transferred.     Contact Us:  Please call [phone] during business hours (8-5:00 M-F).   If you have medication related questions after clinic hours, or on the weekend, please call [phone].     Financial Assistance [phone]   Medical Records [phone]       MENTAL HEALTH CRISIS RESOURCES:  For a emergency help, please call 911 or go to the nearest Emergency Department.     Emergency Walk-In Options:   Em[facility] Unit @ [facility] ([place]): [phone] - Specialized mental health emergency area designed to be calming  [facility] ([place]): [phone]  [facility] ([place]): [phone]  [facility]): [phone]    [facility] Crisis Information:   [place]: [phone]  [place]: [phone]  [place] (ANGELA) - Adult: [phone]     Child: [phone]  [place]ac - Adult: [phone]     Child: [phone]  [place]:  [phone]  List of all [facility] resources:   https://[place].gov/dhs/people-we-serve/adults/health-care/mental-health/resources/crisis-contacts.jsp    National Crisis Information:   Crisis Text Line: Text  [place]  to 989187  Suicide & Crisis Lifeline: 988  National Suicide Prevention Lifeline: 0-148-746-TALK (1-913.724.9191)       For online chat options, visit https://suicidepreventionlifeline.org/chat/  Poison Control Center: 9-422-289-6822  Trans Lifeline: 2-118-427-2514 - Hotline for transgender people of all ages  The [employer] Project: 8-098-852-7497 - Hotline for LGBT youth     For Non-Emergency Support:   Fast Tracker: Mental Health & Substance Use Disorder Resources -   https://www.[employer]wick[facility]entorMobn.org/

## 2023-12-21 DIAGNOSIS — F33.0 MAJOR DEPRESSIVE DISORDER, RECURRENT EPISODE, MILD (H): ICD-10-CM

## 2023-12-21 DIAGNOSIS — F50.00 ANOREXIA NERVOSA IN REMISSION (H): ICD-10-CM

## 2023-12-21 RX ORDER — LAMOTRIGINE 200 MG/1
400 TABLET ORAL DAILY
Qty: 60 TABLET | Refills: 1 | Status: SHIPPED | OUTPATIENT
Start: 2023-12-21 | End: 2024-01-25

## 2023-12-21 RX ORDER — DEXMETHYLPHENIDATE HYDROCHLORIDE 5 MG/1
5 TABLET ORAL DAILY
Qty: 30 TABLET | OUTPATIENT
Start: 2023-12-21

## 2023-12-21 RX ORDER — SERTRALINE HYDROCHLORIDE 100 MG/1
150 TABLET, FILM COATED ORAL DAILY
Qty: 45 TABLET | Refills: 1 | Status: SHIPPED | OUTPATIENT
Start: 2023-12-21 | End: 2024-01-25

## 2023-12-21 NOTE — TELEPHONE ENCOUNTER
sertraline (ZOLOFT) 100 MG tablet 45 tablet 3 9/13/2023     lamoTRIgine (LAMICTAL) 200 MG tablet 60 tablet 3 8/21/2023       Last seen: 11/21/23  RTC: 8 weeks  Cancel: 0  No-show: 0  Next appt: 1/25/24    Refill decision:   Refilled for 30 days per protocol.    Warnings Override History for sertraline (ZOLOFT) 100 MG tablet [[id number]]    Overridden by Navid Cano MD on Aug 21, 2023 9:10 PM   Drug-Drug   1. IBUPROFEN / SELECTIVE SEROTONIN REUPTAKE INHIBITORS [Level: Major] [Reason: Tolerated medication/side effects in past]   Other Orders: ibuprofen (ADVIL/MOTRIN) 800 MG tablet

## 2024-01-09 DIAGNOSIS — F50.00 ANOREXIA NERVOSA IN REMISSION (H): ICD-10-CM

## 2024-01-09 RX ORDER — DEXMETHYLPHENIDATE HYDROCHLORIDE 5 MG/1
5 TABLET ORAL DAILY
Qty: 30 TABLET | Refills: 0 | Status: SHIPPED | OUTPATIENT
Start: 2024-01-09 | End: 2024-01-25

## 2024-01-09 NOTE — TELEPHONE ENCOUNTER
Last seen: 11/21/2023  RTC: 8 weeks  Cancel: 0  No-show: 0  Next appt: 01/25/2024     Incoming refill from Patient via phone    Medication requested:   Pending Prescriptions:                       Disp   Refills    dexmethylphenidate (FOCALIN) 5 MG tablet  30 tab*0            Sig: Take 1 tablet (5 mg) by mouth daily        Last refill per       From chart note:   -Decrease Focalin to 5 mg daily. Goal is to taper off.       Medication sent to provider for review.

## 2024-01-09 NOTE — TELEPHONE ENCOUNTER
[facility] Call Center    Phone Message    May a detailed message be left on voicemail: yes     Reason for Call: Medication Refill Request    Has the patient contacted the pharmacy for the refill? Yes   Name of medication being requested: Focalin  Provider who prescribed the medication:   Pharmacy: [facility]  Date medication is needed: ASAP. Pt is out.       Action Taken: Other: [facility] [place] [place] [facility] pool    Travel Screening: Not Applicable

## 2024-01-12 ENCOUNTER — TELEPHONE (OUTPATIENT)
Dept: CARDIOLOGY | Facility: CLINIC | Age: 54
End: 2024-01-12
Payer: MEDICARE

## 2024-01-17 DIAGNOSIS — G90.A POSTURAL ORTHOSTATIC TACHYCARDIA SYNDROME: ICD-10-CM

## 2024-01-17 DIAGNOSIS — F51.01 PRIMARY INSOMNIA: ICD-10-CM

## 2024-01-17 RX ORDER — GABAPENTIN 300 MG/1
1500 CAPSULE ORAL
Qty: 150 CAPSULE | Refills: 0 | Status: SHIPPED | OUTPATIENT
Start: 2024-01-17 | End: 2024-01-25

## 2024-01-17 NOTE — TELEPHONE ENCOUNTER
Last Seen 11/21  RTC 8 weeks  Cancel None  No-Show None    Next Appt 1/25    Incoming Refill From [facility] via fax    Medication Requested   gabapentin (NEURONTIN) 300 MG capsule     Directions   Take 5 capsules (1,500 mg) by mouth nightly as needed (sleep)     Qty 150    Last Refill Per [place]  1/8 #150, 11/29 #150, 11/1 #150      Medication pended and routed to provider for approval.

## 2024-01-22 RX ORDER — MIDODRINE HYDROCHLORIDE 5 MG/1
10 TABLET ORAL 3 TIMES DAILY
Qty: 180 TABLET | Refills: 3 | Status: SHIPPED | OUTPATIENT
Start: 2024-01-22 | End: 2024-05-23

## 2024-01-22 NOTE — TELEPHONE ENCOUNTER
midodrine (PROAMATINE) 5 MG tablet 180 tablet 1 10/24/2023    Last Office Visit : 11/16/2023  [facility] Thang Mcmanus MD     Future Office visit:  5/9/2024     Routing refill request to provider for review/approval because:  MEDICATION NOT ON CARDS REFILL PROTOCOL.

## 2024-01-25 ENCOUNTER — OFFICE VISIT (OUTPATIENT)
Dept: PSYCHIATRY | Facility: CLINIC | Age: 54
End: 2024-01-25
Attending: PSYCHIATRY & NEUROLOGY
Payer: MEDICARE

## 2024-01-25 VITALS — HEART RATE: 109 BPM | DIASTOLIC BLOOD PRESSURE: 85 MMHG | SYSTOLIC BLOOD PRESSURE: 125 MMHG

## 2024-01-25 DIAGNOSIS — F51.01 PRIMARY INSOMNIA: ICD-10-CM

## 2024-01-25 DIAGNOSIS — F33.0 MAJOR DEPRESSIVE DISORDER, RECURRENT EPISODE, MILD (H): Primary | ICD-10-CM

## 2024-01-25 DIAGNOSIS — F43.10 PTSD (POST-TRAUMATIC STRESS DISORDER): ICD-10-CM

## 2024-01-25 PROCEDURE — G0463 HOSPITAL OUTPT CLINIC VISIT: HCPCS

## 2024-01-25 PROCEDURE — 99214 OFFICE O/P EST MOD 30 MIN: CPT | Mod: GC

## 2024-01-25 RX ORDER — GABAPENTIN 300 MG/1
1500 CAPSULE ORAL
Qty: 150 CAPSULE | Refills: 2 | Status: SHIPPED | OUTPATIENT
Start: 2024-02-16 | End: 2024-04-26

## 2024-01-25 RX ORDER — LAMOTRIGINE 200 MG/1
400 TABLET ORAL DAILY
Qty: 60 TABLET | Refills: 3 | Status: SHIPPED | OUTPATIENT
Start: 2024-01-25 | End: 2024-04-26

## 2024-01-25 RX ORDER — SERTRALINE HYDROCHLORIDE 100 MG/1
150 TABLET, FILM COATED ORAL DAILY
Qty: 45 TABLET | Refills: 2 | Status: SHIPPED | OUTPATIENT
Start: 2024-01-25 | End: 2024-04-26

## 2024-01-25 ASSESSMENT — PATIENT HEALTH QUESTIONNAIRE - PHQ9
SUM OF ALL RESPONSES TO PHQ QUESTIONS 1-9: 2
10. IF YOU CHECKED OFF ANY PROBLEMS, HOW DIFFICULT HAVE THESE PROBLEMS MADE IT FOR YOU TO DO YOUR WORK, TAKE CARE OF THINGS AT HOME, OR GET ALONG WITH OTHER PEOPLE: NOT DIFFICULT AT ALL
SUM OF ALL RESPONSES TO PHQ QUESTIONS 1-9: 2

## 2024-01-25 ASSESSMENT — PAIN SCALES - GENERAL: PAINLEVEL: NO PAIN (0)

## 2024-01-25 NOTE — PROGRESS NOTES
[facility]  MEDICAL PROGRESS NOTE     CARE TEAM:    PCP- Lavonne Zarate  Therapist- None currently.   Cardiology- Dr. Galarza, Endo- Dr. Handley.     Keisha is a 54 year old who uses the pronouns she, her, hers.      Diagnoses     Major depressive disorder, recurrent, mild   Primary insomnia   Anorexia nervosa, in remission  PTSD, per history     Assessment     Keisha Somers is a 54 yo F with past psychiatric diagnosis of anorexia nervosa at some point needing a feeding tube, major depressive disorder, sleep disorder and significant medical diagnosis that includes POTS (postural orthostatic tachycardia syndrome), migraines, here today for a follow-up visit.     Overall, since our last visit, Keisha has continued reporting that her mood, anxiety and trauma symptoms have been stable/largely unchanged and attributes this to her current medication regimen providing benefits and is tolerating these well without noticing new side effects.     In our last visit, we discussed use of stimulant, which she has reported had been on for over >25 years and seems that it was initially started to help with binge eating/eating disorder. Regarding her eating disorder, it appears that it has been maintained the most stable over the years now. She denies any recent binge eating or purging/restricting behaviors, except for some night time undesired eating that seems not to be problematic at this time.      We discussed medication indications, side effects, risks vs benefits and alternatives, including use of stimulant  Given that she has been observing some elevated BP values at home (has BP cuff) as well values today from BP obtained in clinic, we recommended discontinuing stimulant today due to not clear current indication and risks outweighing potential benefits, likely contributing to some anxiety as well as potential for worsening trauma-like symptoms. Keisha is  in agreement with plan of discontinuing stimulant today as she reports has noticed anxiety has been stable/improved after decrease of stimulant dose in our last visit, has not noticed other changes or differences in mood, appetite or overall functioning.     We again discussed referrals to weigh management clinic and she politely declined this offer at this time as she thinks is not needed at the moment and is open to revisit this idea in the future.    We also generally discussed some of the future considerations detailed below and we will continue this conversation in future visits.   No questions or concerns.     Future considerations  -Consider optimizing Zoloft better target anxiety and continue helping with mood (last moderate-severe depressive episode >5 years ago per Dr. Tena's notes).  -Consider decreasing/tapering gabapentin if/once anxiety is more stable   -Consider further decreasing/taper off Focalin due to recent high BP as well as likely contributing to some anxiety, concerns for eating disorder that appears has been in remission for a while (restricting?).    -Could cautiously (ideally would need to consult with cards before due to POTS) add an alpha blocker to help with PTSD-sleep disturbances as well with BP,       Psychotropic Drug Interactions:    ADDITIVE SEROTONERGIC: Zolonoft, Focalin, Maxalt, ondansetron  ADDITIVE QTc: Zoloft, ondansetron   ADDITIVE CNS/RESPIRATORY DEPRESSION: Gabapentin, Lamictal, tizanidine  Management: routine monitoring, ongoing medication simplification, limited use of zofran and patient aware of risks.     [place] today: indicates that controlled prescriptions have been filled as prescribed    Risk Statements:   Treatment Risk- Risks, benefits, alternatives and potential adverse effects have been discussed and are understood.   Safety Risk-Keisha did not appear to be an imminent safety risk to self or others.     Plan     1) Medications:   -Continue Zoloft  "150 mg PO qDay Helps as well as Lamictal with mood.   -Continue Lamictal 400 mg daily   -Continue Gabapentin 1500 QHS PRN  (helps some with sleep;  Added for hot flashes, works)  - Discontinue Focalin 5 mg daily    Anjana other prescribers PCP, cardiology, GI):  1. Synthroid 150 mcg - she is uncertain of dose.    2. Zofran prn migraines  3. Midodrine 11/16/23  - changed from Dr. Galarza   4. Carafate  5. Pyridostigmine - OFF now  6. Tizanidine - 4x/day, last dose 9pm for migraines-one in am , one noo,one at night.  7. Fluorinef- 0.2 mg daily - moved to evening  8. Maxalt for migraines with ibuprofen.  It works very well.    9. Mg-   10. Vit B2  11. Atorvastatin    2) Psychotherapy:   None currently, continue offering.     3) Next due:  Labs- Last obtained 09/2023, will defer further lipid/CBC monitoring to PCP. Routine monitoring is not indicated for current psychotropic medication regimen   EKG- Last obtained 06/2021,  ms. Follows-up with cardiology (POTS), will defer further monitoring to them.   Rating scales-  PHQ9, GAD7    4) Referrals: none    5) Other:   None     6) Follow-up: Return to clinic in  6-8 weeks approximately, or before if needed     Pertinent Background                                                   [most recent eval 08/21/23]     Keisha first experienced MH symptoms since HS, she started experiencing eating disorder that worsened through her 20, severe, at one point needing a feeding tube, leading to multiple hospitalizations. Also experienced trauma x2 as well as emotional trauma growing up. She reports experiencing depression and post-traumatic symptoms as well since around the same age.   Last depressive episode >5 years.   Has been on Focalin for several years, per Dr. Tena notes \"Beneficial for energy, mood and no indications of problematic use. Helps with concentration. Started around >25 years in context of eating disorder.\"     Pertinent items include: trauma hx, eating disorder " ", mutiple psychotropic trials , psych hosp , and major medical problems     Subjective     Since our last visit:  -Updates/pressing issues:   -Follow up appointment with endocrinology/cards: No recent changes   -Mood: \"Good\"   -Anxiety: Denies issues or worries out of the ordinary, notices anxiety had been stable/improved since our last visit  -Appetite: Denies changes. Denies increased appetite after stimulant dose decrease  -Denies binge eating/restricting/purging or other compensating behaviors   -Sleep: \"Could be better, still getting a good 7 hrs, always broken.\" Wakes up x2 times overnight, falls back asleep easily.   -Nightmares? Denies   -Other trauma symptoms Denies   -BM/constipation: Denies changes/constipation.   -Tremor/abnormal movements: Denies   -Side effects? Denies chest pain, palpitations, sleep disturbances or other side effects/overstimulating-type side effects.  -Other symptoms: Migraine headaches, 1 episode every 2 months on average, taking Maxalt (once every 2 months on average) helps.    -Safety:  Denies SI/HI/SIB   -Perceptual disturbances: Denies     Recent Psych Symptoms:   Depression:  low energy  Elevated:  none  Psychosis:  none  Anxiety:  As above   Trauma Related:  Denies current/recent trauma-type symptoms   Other:  No    Current Social History:  Living situation/income: (partner, children, pets, etc): Apartment building \"customized living. [place] Has lived there for over 13 years.   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 SSD  Feels safe at home: Yes    Pertinent Substance Use:   Alcohol: No   Cannabis: No  Tobacco: No  Caffeine:  No   Opioids: No   Narcan Kit current: N/A  Other substances:  Denies     Medical Review of Systems:   Lightheadedness/orthostasis: Yes, POTS dx  Headaches: Yes, migraines dx. On average 1 episode ~q/2 months.   GI: None  Sexual health concerns: None    A comprehensive review of systems was performed and is " "negative other than noted above.     Mental Status Exam     Alertness: alert  and oriented  Appearance: well groomed  Behavior/Demeanor: cooperative, pleasant, and calm, with good  eye contact   Speech: normal and regular rate and rhythm  Language: intact and no problems  Psychomotor: normal or unremarkable  Mood: \"good\"  Affect: full range; congruent to: mood- yes, content- yes  Thought Process/Associations: unremarkable  Thought Content:  Reports none;  Denies suicidal & violent ideation and delusions, obsessions , phobia , magical thinking, over-valued ideas, and paranoid ideation  Perception:  Reports none;  Denies auditory hallucinations, visual hallucinations, visual distortion seen as shadows , depersonalization, and derealization  Insight: good  Judgment: good  Cognition: does  appear grossly intact; formal cognitive testing was not done  Gait and Station: unremarkable     Past Psych Med Trials        Medication Max Dose (mg) Dates / Duration Helpful? DC Reason / Adverse Effects?   Lamictal        Zoloft        Focalin   ~25 years -stopped Jamuary 2024  Started ~25 years ago possible for binge-eating. Currently not clear indication concerns for some elevated BP and overall  risks outweighing potential benefits   Lunesta       Ritalin SR 20 mg BID ~2007     Remeron  7.5 mg       Seroquel   mg qhs      Ambien 10 mg  ~2007     Prozac 80 mg  ~2007          Treatment Course and Anne Events since  JULY 2023 8/21/23: Transfer of care DA.   11/21/23.Decrease Focalin to 5 mg daily. Goal is to taper off due to not clear indication concerns for some elevated BP and overall  risks outweighing potential benefits  1/25/2024: Discontinued Focalin.     Vitals     Pulse Readings from Last 3 Encounters:   01/25/24 109   11/21/23 72   11/16/23 79     Wt Readings from Last 3 Encounters:   11/16/23 68 kg (150 lb)   09/26/19 64 kg (141 lb 3.2 oz)   07/23/19 62.1 kg (137 lb)     BP Readings from Last 3 Encounters: "   01/25/24 125/85   11/21/23 137/85   11/16/23 108/61         Medical History     ALLERGIES: Patient has no known allergies.    Patient Active Problem List   Diagnosis    Major depressive disorder, recurrent episode, moderate (H)    Anorexia nervosa (H28)    Iron deficiency anemia    Skin lesion    Syncope and collapse    Postural orthostatic tachycardia syndrome    Hypothyroidism    Macular degeneration    Migraine headache    Mixed hearing loss, unilateral    Osteopenia    Osteoporosis    PTSD (post-traumatic stress disorder)    Pyelonephritis    S/P ORIF (open reduction internal fixation) fracture    Secondary hyperparathyroidism (H24)    Stage 3a chronic kidney disease (H)      Medications   Focalin discontinued today  Current Outpatient Medications   Medication Sig Dispense Refill    acetaminophen (TYLENOL) 500 MG tablet Take 500-1,000 mg by mouth every 6 hours as needed.      atorvastatin (LIPITOR) 10 MG tablet Take by mouth daily Per pt takes this medicine but doesn't know the amount      atorvastatin (LIPITOR) 40 MG tablet       Blood Pressure Monitor KIT Patient should be evaluated in the emergency department if her systolic blood pressure <65 1 kit 0    Calcium Carbonate-Vitamin D (CALCIUM 600+D PO) Take 1 tablet by mouth 2 times daily.      Cholecalciferol (VITAMIN D PO) Take  by mouth. 50, 000 units every 14 days      docusate sodium (COLACE) 100 MG capsule Take 1 capsule by mouth 2 times daily.      ferrous sulfate 140 (45 Fe) MG TBCR CR tablet Take 1 tablet by mouth 2 times daily      fludrocortisone (FLORINEF) 0.1 MG tablet TAKE 2 TABLETS (0.2MG) BY MOUTH DAILY 180 tablet 3    [START ON 2/16/2024] gabapentin (NEURONTIN) 300 MG capsule Take 5 capsules (1,500 mg) by mouth nightly as needed (sleep) 150 capsule 2    ibuprofen (ADVIL/MOTRIN) 800 MG tablet Take 800 mg by mouth every 8 hours as needed       lamoTRIgine (LAMICTAL) 200 MG tablet Take 2 tablets (400 mg) by mouth daily 60 tablet 3     levothyroxine (SYNTHROID, LEVOTHROID) 175 MCG tablet Take  by mouth daily.      magnesium 200 MG TABS Take 1 tablet by mouth daily      metoclopramide (REGLAN) 10 MG tablet 2 times daily       midodrine (PROAMATINE) 5 MG tablet Take 2 tablets (10 mg) by mouth 3 times daily 180 tablet 3    multivitamin (OCUVITE) TABS Take 1 tablet by mouth 2 times daily.      ondansetron (ZOFRAN) 4 MG tablet Take 1 pill for nausea at onset of headache, repeat if needed every 8 hours      potassium chloride ER (K-TAB/KLOR-CON) 10 MEQ CR tablet       rizatriptan (MAXALT-MLT) 10 MG ODT DISSOLVE 1 TABLET BY MOUTH AT ONSET OF MIGRAINE (MAY REPEAT IN 2 HOURS IF NEEDED      sertraline (ZOLOFT) 100 MG tablet Take 1.5 tablets (150 mg) by mouth daily 45 tablet 2    tiZANidine (ZANAFLEX) 2 MG tablet TAKE 1 TABLET BY MOUTH EVERY MORNING, TAKE 1 TABLET EVERY EVENING, AND TAKE 2 TABLETS BY MOUTH EVERY NIGHT AT BEDTIME      vitamin B-2 (RIBOFLAVIN) 100 MG TABS tablet Take 4 tablets by mouth daily          Labs and Data         5/16/2023     1:25 PM 8/21/2023     1:08 PM 11/21/2023     9:13 AM   PROMIS-10 Total Score w/o Sub Scores   PROMIS TOTAL - SUBSCORES 31 30 32          No data to display                  5/16/2023     1:23 PM 8/21/2023     1:06 PM 11/21/2023     9:03 AM   PHQ-9 SCORE   PHQ-9 Total Score MyChart 3 (Minimal depression) 4 (Minimal depression) 3 (Minimal depression)   PHQ-9 Total Score 3 4 3          No data to display                 Per care everywhere October-November 2023:     Vitamin D 25-Hydroxy, Total  Order: [id number]  Component  Ref Range & Units 1 mo ago   Vitamin D, 25-OH, Total  30 - 80 ng/mL 50   [facility]          Contains abnormal data TSH  Order: [id number]  Component  Ref Range & Units 3 wk ago   TSH, Sensitive  0.30 - 4.50 uIU/mL 4.74 High    Resulting [facility]         Lipid Panel (Expected: 90 days)  Order: [id number]  Component  Ref Range & Units 1 mo  ago Resulting Agency   Cholesterol  0 - 199 mg/dL 202 High  [facility]   Triglyceride  <=149 mg/dL 68 [facility]   HDL Cholesterol  >=40 mg/dL 66 [facility]   LDL, Calculated  <130 mg/dL 122 [facility]   Non HDL Chol, Calculated  <=159 mg/dL 136 [facility]   Cholesterol/HDL Ratio 3.[facility]   Hours Fasting  8 - 12 Hours 12.0 [facility]     Basic Metabolic Panel  Order: [id number]  Component  Ref Range & Units 1 mo ago Resulting Agency   Sodium  136 - 145 mmol/L 144 [facility]   Potassium  3.5 - 5.1 mmol/L 3.4 Low  [facility]   Chloride  98 - 109 mmol/L 111 High  [facility]   CO2  20 - 29 mmol/L 23 [facility]   Anion Gap  7 - 16 mmol/L 10 [facility]   Calcium  8.4 - 10.4 mg/dL 9.5 [facility]   BUN  7 - 26 mg/dL 14 [facility]   Creatinine  0.55 - 1.02 mg/dL 0.80 [facility]   Glucose  70 - 100 mg/dL 107 High  [facility]   Comment: The given reference range is for the fasting state. Non-fasting reference range for glucose is 70 - 180 mg/dL.   GFR, Estimated  >60 mL/min/1.73m2 >60 [facility]   Hours Fasting  8 - 12 Hours 12.0 [facility]             PROVIDER: Navid Cano MD    Level of Medical Decision Making:   - At least 1 chronic problem that is not stable  - Engaged in prescription drug management during visit (discussed any medication benefits, side effects, alternatives, etc.)      Psychiatry Individual Psychotherapy Note   N/A    Patient staffed in clinic with Dr. Hughes who will sign the note.  Supervisor is Dr. Hughes.

## 2024-01-25 NOTE — NURSING NOTE
Chief Complaint   Patient presents with    Recheck Medication     Anorexia nervosa in remission   Patient declined wt.    Sonam Phillips on 1/25/2024 at 10:45 AM

## 2024-01-25 NOTE — PATIENT INSTRUCTIONS
**For crisis resources, please see the information at the end of this document**   Patient Education    Thank you for coming to the [facility] [place] [facility].     Lab Testing:  If you had lab testing today and your results are reassuring or normal they will be mailed to you or sent through [employer] within 7 days. If the lab tests need quick action we will call you with the results. The phone number we will call with results is # [phone]. If this is not the best number please call our clinic and change the number.     Medication Refills:  If you need any refills please call your pharmacy and they will contact us. Our fax number for refills is [phone].   Three business days of notice are needed for general medication refill requests.   Five business days of notice are needed for controlled substance refill requests.   If you need to change to a different pharmacy, please contact the new pharmacy directly. The new pharmacy will help you get your medications transferred.     Contact Us:  Please call [phone] during business hours (8-5:00 M-F).   If you have medication related questions after clinic hours, or on the weekend, please call [phone].     Financial Assistance [phone]   Medical Records [phone]       MENTAL HEALTH CRISIS RESOURCES:  For a emergency help, please call 911 or go to the nearest Emergency Department.     Emergency Walk-In Options:   EmPATH Unit @ [facility] ([place]): [phone] - Specialized mental health emergency area designed to be calming  [facility] ([place]): [phone]  [facility] ([place]): [phone]  [facility]): [phone]    [facility] Crisis Information:   [place]: [phone]  [place]: [phone]  [place] (ANGELA) - Adult: [phone]     Child: [phone]  [place] - Adult: [phone]     Child: [phone]  [place]:  [phone]  List of all [facility] resources:   https://[place].gov/dhs/people-we-serve/adults/health-care/mental-health/resources/crisis-contacts.jsp    National Crisis Information:   Crisis Text Line: Text  [place]  to 511107  Suicide & Crisis Lifeline: 988  National Suicide Prevention Lifeline: 0-235-622-TALK (1-744.221.2244)       For online chat options, visit https://suicidepreventionlifeline.org/chat/  Poison Control Center: 4-761-486-9283  Trans Lifeline: 6-462-609-4920 - Hotline for transgender people of all ages  The [employer] Project: 0-500-071-4210 - Hotline for LGBT youth     For Non-Emergency Support:   Fast Tracker: Mental Health & Substance Use Disorder Resources -   https://www.5i SciencesckGrouPAYn.org/

## 2024-03-18 DIAGNOSIS — I95.1 ORTHOSTATIC HYPOTENSION: ICD-10-CM

## 2024-03-25 RX ORDER — FLUDROCORTISONE ACETATE 0.1 MG/1
TABLET ORAL
Qty: 180 TABLET | Refills: 2 | Status: SHIPPED | OUTPATIENT
Start: 2024-03-25

## 2024-03-25 NOTE — TELEPHONE ENCOUNTER
Fludrocortisone Acetate 0.1MG TABS       Last Written Prescription Date:  4-5-23  Last Fill Quantity: 180,   # refills: 3  Last Office Visit : 11-16-23  Future Office visit:  5-9-24    Routing refill request to provider for review/approval because:  Med not on cards protocol

## 2024-04-26 ENCOUNTER — VIRTUAL VISIT (OUTPATIENT)
Dept: PSYCHIATRY | Facility: CLINIC | Age: 54
End: 2024-04-26
Attending: PSYCHIATRY & NEUROLOGY
Payer: MEDICARE

## 2024-04-26 DIAGNOSIS — F33.0 MAJOR DEPRESSIVE DISORDER, RECURRENT EPISODE, MILD (H): Primary | ICD-10-CM

## 2024-04-26 DIAGNOSIS — F51.01 PRIMARY INSOMNIA: ICD-10-CM

## 2024-04-26 DIAGNOSIS — F43.10 PTSD (POST-TRAUMATIC STRESS DISORDER): ICD-10-CM

## 2024-04-26 PROCEDURE — 99214 OFFICE O/P EST MOD 30 MIN: CPT | Mod: 95

## 2024-04-26 RX ORDER — LAMOTRIGINE 200 MG/1
400 TABLET ORAL DAILY
Qty: 60 TABLET | Refills: 3 | Status: SHIPPED | OUTPATIENT
Start: 2024-04-26 | End: 2024-07-02

## 2024-04-26 RX ORDER — SERTRALINE HYDROCHLORIDE 100 MG/1
150 TABLET, FILM COATED ORAL DAILY
Qty: 45 TABLET | Refills: 3 | Status: SHIPPED | OUTPATIENT
Start: 2024-04-26 | End: 2024-07-02

## 2024-04-26 RX ORDER — GABAPENTIN 300 MG/1
1500 CAPSULE ORAL
Qty: 150 CAPSULE | Refills: 3 | Status: SHIPPED | OUTPATIENT
Start: 2024-04-26 | End: 2024-07-02

## 2024-04-26 ASSESSMENT — PAIN SCALES - GENERAL: PAINLEVEL: NO PAIN (0)

## 2024-04-26 NOTE — PROGRESS NOTES
[facility]  MEDICAL PROGRESS NOTE     CARE TEAM:    PCP- Lavonne Zarate  Therapist- None currently.   Cardiology- Dr. Galarza, Endo- Dr. Handley.     Keisha is a 54 year old who uses the pronouns she, her, hers.      Diagnoses     Major depressive disorder, recurrent, mild-in partial remission   Primary insomnia   Anorexia nervosa, in remission  PTSD     Assessment     Keisha Somers is a 54 yo F with past psychiatric diagnosis of anorexia nervosa at some point needing a feeding tube, major depressive disorder, sleep disorder and significant medical diagnosis that includes POTS (postural orthostatic tachycardia syndrome), migraines, here today for a follow-up visit.     Anticipating end of academic year and starting in July transferring pt's care to incoming  resident, we discussed continuing care in this clinic vs exploring other options and pt expressed understanding of teaching clinic, being very familiar with yearly transfer of cares/setting and also expressed interest in continuing MH care in the resident clinic. Keisha expressed Interested in continuing care with writer for med management through next academic year.      Overall, since our last visit, Keisha has continued reporting that her mood, anxiety and trauma symptoms have been stable/largely unchanged and attributes this to her current medication regimen providing benefits and is tolerating these well without noticing new side effects.      In our last visit, we discussed use of stimulant, which she has reported had been on for over >25 years and seems that it was initially started to help with binge eating/eating disorder. Given that her reported eating disorder had not been a concern over past years we discussed discontinuing Focalin and  today as she reports has noticed anxiety has been stable/improved after discontinuing of stimulant (~3 months ago) in our last visit, has not  noticed other changes or differences in mood, appetite or overall functioning.      We discussed medication indications, side effects, risks vs benefits and alternatives, and given is tolerating well and perceiving benefit form these, it is reasonable not to make changes today, which is also Keisha's preference.     We again discussed referrals to weigh management clinic and she politely declined this offer at this time as she thinks is not needed at the moment and is open to revisit this idea in the future.  We also generally discussed some of the future considerations detailed below and we will continue this conversation in future visits.   No questions or concerns.       Future considerations  -Consider optimizing Zoloft better target anxiety and continue helping with mood (last moderate-severe depressive episode >5 years ago per Dr. Tena's notes).  -Consider decreasing/tapering gabapentin if/once anxiety is more stable .    -Could cautiously (ideally would need to consult with cards before due to POTS) add an alpha blocker to help with PTSD-sleep disturbances as well with BP  -Consider repeating Sleep study. Consider continue offering CBT-I.       Psychotropic Drug Interactions:    ADDITIVE SEROTONERGIC: Zolonoft, Focalin, Maxalt, ondansetron  ADDITIVE QTc: Zoloft, ondansetron   ADDITIVE CNS/RESPIRATORY DEPRESSION: Gabapentin, Lamictal, tizanidine  Management: routine monitoring, ongoing medication simplification, limited use of zofran and patient aware of risks.     [place]: indicates that controlled prescriptions have been filled as prescribed    Risk Statements:   Treatment Risk- Risks, benefits, alternatives and potential adverse effects have been discussed and are understood.   Safety Risk-Keisha did not appear to be an imminent safety risk to self or others.     Plan     1) Medications:   -Continue Zoloft 150 mg PO qDay Helps as well as Lamictal with mood.   -Continue Lamictal 400 mg daily  "  -Continue Gabapentin 1500 QHS PRN  (helps some with sleep;  Added for hot flashes, works). Most recent Creatinine/GFR WNL.      Anjana other prescribers PCP, cardiology, GI):  1. Synthroid 150 mcg - she is uncertain of dose.    2. Zofran prn migraines  3. Midodrine 11/16/23  - changed from Dr. Galarza   4. Carafate  5. Tizanidine - 4x/day, last dose 9pm for migraines-one in am , one noo,one at night.  6. Fluorinef- 0.2 mg daily - moved to evening  7. Maxalt for migraines with ibuprofen.  It works very well.    8. Mg-   9. Vit B2  10. Atorvastatin    2) Psychotherapy:   None currently, continue offering.     3) Next due:  Labs- Last obtained 09/2023, will defer further lipid/CBC monitoring to PCP. Routine monitoring is not indicated for current psychotropic medication regimen. Creatinine/GFR WNL  EKG- Last obtained 06/2021,  ms. Follows-up with cardiology (POTS), will defer further monitoring to them. Upcoming appointment in about 3 weeks.   Rating scales-  PHQ9, GAD7    4) Referrals: none    5) Other:   None     6) Follow-up: Return to clinic in  12  weeks approximately, with writer or before if needed     Pertinent Background                                                   [most recent eval 08/21/23]     Keisha first experienced MH symptoms since HS, she started experiencing eating disorder that worsened through her 20, severe, at one point needing a feeding tube, leading to multiple hospitalizations. Also experienced trauma x2 as well as emotional trauma growing up. She reports experiencing depression and post-traumatic symptoms as well since around the same age.   Last depressive episode >5 years.   Has been on Focalin for several years, per Dr. Tena notes \"Beneficial for energy, mood and no indications of problematic use. Helps with concentration. Started around >25 years in context of eating disorder.\"     Pertinent items include: trauma hx, eating disorder , mutiple psychotropic trials , psych hosp , " "and major medical problems     Subjective     Since our last visit:  -Updates/pressing issues: No pressing issues.   - Updates re: discontinued Focalin 5 mg daily: \"Have not noticed anything.\" Denies appetite changes, denies day-time somnolence, denies attention issues. Denies binge eating or other concerns.  -Mood: \"Been good\"  -Anxiety: Denies worries out of the ordinary.   -Weight changes: Denies noticing recent changes   -Sleep: Unchanged, still getting on average ~7 hrs/night. Wakes up x2 times overnight, falls back asleep easily.   -Shares has never tried other intervention approaches for sleep such as CBT-I   -Completed sleep study 3-4 yrs ago   -BM/constipation: Denies   -Tremor/abnormal movements: Denies   -Denies Chest pain, palpitations. Denies   -Perceptual disturbances: Denies   -Safety: Denies SI/plan/intent. Denies HI     Recent Psych Symptoms:   Depression:  low energy  Elevated:  none  Psychosis:  none  Anxiety:  Denies worries out of the ordinary  Trauma Related:  none. Reports baseline sleep disturbances, denies recent/current trauma-like symptoms   Insomnia:  Sleep issues as above   Other:  No    Current Social History:  Living situation/income: (partner, children, pets, etc): Apartment building \"customized living. [place] Has lived there for over 14 years now, likes it.   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 SSD  Feels safe at home: Yes    Pertinent Substance Use:   Alcohol: No   Cannabis: No  Tobacco: No  Caffeine:  No   Opioids: No   Narcan Kit current: N/A  Other substances:  Denies     Medical Review of Systems:   Lightheadedness/orthostasis: Yes, POTS dx, follows with cards.   Headaches: Yes, migraines dx. On average 1 episode ~q/2-3 months.   GI: None  Sexual health concerns: None    A comprehensive review of systems was performed and is negative other than noted above.     Mental Status Exam     Alertness: alert  and oriented  Appearance: " "well groomed  Behavior/Demeanor: cooperative, pleasant, and calm, with good  eye contact   Speech: normal and regular rate and rhythm  Language: intact and no problems  Psychomotor: normal or unremarkable  Mood: \"Been good\"  Affect: full range; congruent to: mood- yes, content- yes  Thought Process/Associations: unremarkable  Thought Content:  Reports none;  Denies suicidal & violent ideation and delusions, obsessions , phobia , magical thinking, over-valued ideas, and paranoid ideation  Perception:  Reports none;  Denies auditory hallucinations, visual hallucinations, visual distortion seen as shadows , depersonalization, and derealization  Insight: good  Judgment: good  Cognition: does  appear grossly intact; formal cognitive testing was not done  Gait and Station: N/A (telehealth)      Past Psych Med Trials        Medication Max Dose (mg) Dates / Duration Helpful? DC Reason / Adverse Effects?   Lamictal        Zoloft        Focalin   ~25 years -stopped Jamuary 2024  Started ~25 years ago possible for binge-eating. Currently not clear indication concerns for some elevated BP and overall  risks outweighing potential benefits   Lunesta       Ritalin SR 20 mg BID ~2007     Remeron  7.5 mg       Seroquel   mg qhs      Ambien 10 mg  ~2007     Prozac 80 mg  ~2007          Treatment Course and Anne Events since  JULY 2023 8/21/23: Transfer of care  from Dr. Tena's patient's panel..   11/21/23.Decrease Focalin from 10 mg  (reported being beneficial for energy, mood and no indications of problematic use)Started around >25 years in context of eating disorder.  to 5 mg daily. Goal is to taper off due to not clear indication concerns for some elevated BP and overall  risks outweighing potential benefits  1/25/2024: Discontinued Focalin.  4/26/2024: Continue offering CBT-I, sleep study. No disordered eating concerns or other concerns with discontinuation of Focalin.      Vitals     Pulse Readings from Last 3 " Encounters:   01/25/24 109   11/21/23 72   11/16/23 79     Wt Readings from Last 3 Encounters:   11/16/23 68 kg (150 lb)   09/26/19 64 kg (141 lb 3.2 oz)   07/23/19 62.1 kg (137 lb)     BP Readings from Last 3 Encounters:   01/25/24 125/85   11/21/23 137/85   11/16/23 108/61         Medical History     ALLERGIES: Patient has no known allergies.    Patient Active Problem List   Diagnosis    Major depressive disorder, recurrent episode, moderate (H)    Anorexia nervosa (H28)    Iron deficiency anemia    Skin lesion    Syncope and collapse    Postural orthostatic tachycardia syndrome    Hypothyroidism    Macular degeneration    Migraine headache    Mixed hearing loss, unilateral    Osteopenia    Osteoporosis    PTSD (post-traumatic stress disorder)    Pyelonephritis    S/P ORIF (open reduction internal fixation) fracture    Secondary hyperparathyroidism (H24)    Stage 3a chronic kidney disease (H)      Medications   Focalin discontinued today  Current Outpatient Medications   Medication Sig Dispense Refill    acetaminophen (TYLENOL) 500 MG tablet Take 500-1,000 mg by mouth every 6 hours as needed.      atorvastatin (LIPITOR) 10 MG tablet Take by mouth daily Per pt takes this medicine but doesn't know the amount      atorvastatin (LIPITOR) 40 MG tablet       Blood Pressure Monitor KIT Patient should be evaluated in the emergency department if her systolic blood pressure <65 1 kit 0    Calcium Carbonate-Vitamin D (CALCIUM 600+D PO) Take 1 tablet by mouth 2 times daily.      Cholecalciferol (VITAMIN D PO) Take  by mouth. 50, 000 units every 14 days      docusate sodium (COLACE) 100 MG capsule Take 1 capsule by mouth 2 times daily.      ferrous sulfate 140 (45 Fe) MG TBCR CR tablet Take 1 tablet by mouth 2 times daily      fludrocortisone (FLORINEF) 0.1 MG tablet TAKE 2 TABLETS (0.2MG) BY MOUTH DAILY 180 tablet 2    gabapentin (NEURONTIN) 300 MG capsule Take 5 capsules (1,500 mg) by mouth nightly as needed (sleep) 150  capsule 3    ibuprofen (ADVIL/MOTRIN) 800 MG tablet Take 800 mg by mouth every 8 hours as needed       lamoTRIgine (LAMICTAL) 200 MG tablet Take 2 tablets (400 mg) by mouth daily 60 tablet 3    levothyroxine (SYNTHROID, LEVOTHROID) 175 MCG tablet Take  by mouth daily.      magnesium 200 MG TABS Take 1 tablet by mouth daily      metoclopramide (REGLAN) 10 MG tablet 2 times daily       midodrine (PROAMATINE) 5 MG tablet Take 2 tablets (10 mg) by mouth 3 times daily 180 tablet 3    multivitamin (OCUVITE) TABS Take 1 tablet by mouth 2 times daily.      ondansetron (ZOFRAN) 4 MG tablet Take 1 pill for nausea at onset of headache, repeat if needed every 8 hours      potassium chloride ER (K-TAB/KLOR-CON) 10 MEQ CR tablet       rizatriptan (MAXALT-MLT) 10 MG ODT DISSOLVE 1 TABLET BY MOUTH AT ONSET OF MIGRAINE (MAY REPEAT IN 2 HOURS IF NEEDED      sertraline (ZOLOFT) 100 MG tablet Take 1.5 tablets (150 mg) by mouth daily 45 tablet 3    tiZANidine (ZANAFLEX) 2 MG tablet TAKE 1 TABLET BY MOUTH EVERY MORNING, TAKE 1 TABLET EVERY EVENING, AND TAKE 2 TABLETS BY MOUTH EVERY NIGHT AT BEDTIME      vitamin B-2 (RIBOFLAVIN) 100 MG TABS tablet Take 4 tablets by mouth daily          Labs and Data         5/16/2023     1:25 PM 8/21/2023     1:08 PM 11/21/2023     9:13 AM   PROMIS-10 Total Score w/o Sub Scores   PROMIS TOTAL - SUBSCORES 31 30 32          No data to display                  8/21/2023     1:06 PM 11/21/2023     9:03 AM 1/25/2024    10:38 AM   PHQ-9 SCORE   PHQ-9 Total Score MyChart 4 (Minimal depression) 3 (Minimal depression) 2 (Minimal depression)   PHQ-9 Total Score 4 3 2          No data to display                 Per care everywhere October-November 2023:     Vitamin D 25-Hydroxy, Total  Order: [id number]  Component  Ref Range & Units 1 mo ago   Vitamin D, 25-OH, Total  30 - 80 ng/mL 50   [facility]          Contains abnormal data TSH  Order: [id number]  Component  Ref Range & Units 3 wk  ago   TSH, Sensitive  0.30 - 4.50 uIU/mL 4.74 High    Resulting Agency [facility]         Lipid Panel (Expected: 90 days)  Order: [id number]  Component  Ref Range & Units 1 mo ago Resulting Agency   Cholesterol  0 - 199 mg/dL 202 High  [facility]   Triglyceride  <=149 mg/dL 68 [facility]   HDL Cholesterol  >=40 mg/dL 66 [facility]   LDL, Calculated  <130 mg/dL 122 [facility]   Non HDL Chol, Calculated  <=159 mg/dL 136 [facility]   Cholesterol/HDL Ratio 3.1 [facility]   Hours Fasting  8 - 12 Hours 12.0 [facility]     Basic Metabolic Panel  Order: [id number]  Component  Ref Range & Units 1 mo ago Resulting Agency   Sodium  136 - 145 mmol/L 144 [facility]   Potassium  3.5 - 5.1 mmol/L 3.4 Low  [facility]   Chloride  98 - 109 mmol/L 111 High  [facility]   CO2  20 - 29 mmol/L 23 [facility]   Anion Gap  7 - 16 mmol/L 10 [facility]   Calcium  8.4 - 10.4 mg/dL 9.5 [facility]   BUN  7 - 26 mg/dL 14 [facility]   Creatinine  0.55 - 1.02 mg/dL 0.80 [facility]   Glucose  70 - 100 mg/dL 107 High  [facility]   Comment: The given reference range is for the fasting state. Non-fasting reference range for glucose is 70 - 180 mg/dL.   GFR, Estimated  >60 mL/min/1.73m2 >60 [facility]   Hours Fasting  8 - 12 Hours 12.0 [facility]             ======================  PROVIDER: Navid Cano MD    Level of Medical Decision Making:   - At least 1 chronic problem that is not stable  - Engaged in prescription drug management during visit (discussed any medication benefits, side effects, alternatives, etc.)     Psychiatry Individual Psychotherapy Note   N/a    Patient staffed in clinic with Dr. Carmichael, who will sign the note.  Supervisor is Dr. Hughes.

## 2024-04-26 NOTE — PROGRESS NOTES
Virtual Visit Details    Type of service:  Video Visit   Video Start Time:  1430  Video End Time: 1500    Originating Location (pt. Location): Home  Distant Location (provider location):  On-site  Platform used for Video Visit: Everlane

## 2024-04-26 NOTE — NURSING NOTE
Is the patient currently in the state of [place]? YES    Visit mode:VIDEO    If the visit is dropped, the patient can be reconnected by: VIDEO VISIT: Text to cell phone:   Telephone Information:   Mobile [phone]       Will anyone else be joining the visit? NO  (If patient encounters technical issues they should call [phone]    How would you like to obtain your AVS? MyChart    Are changes needed to the allergy or medication list? No    Are refills needed on medications prescribed by this physician? NO    Reason for visit: RECHECK    Jena CHAMPION

## 2024-04-26 NOTE — PATIENT INSTRUCTIONS
**For crisis resources, please see the information at the end of this document**   Patient Education    Thank you for coming to the [facility] [place] [facility].     Lab Testing:  If you had lab testing today and your results are reassuring or normal they will be mailed to you or sent through [employer] within 7 days. If the lab tests need quick action we will call you with the results. The phone number we will call with results is # [phone]. If this is not the best number please call our clinic and change the number.     Medication Refills:  If you need any refills please call your pharmacy and they will contact us. Our fax number for refills is [phone].   Three business days of notice are needed for general medication refill requests.   Five business days of notice are needed for controlled substance refill requests.   If you need to change to a different pharmacy, please contact the new pharmacy directly. The new pharmacy will help you get your medications transferred.     Contact Us:  Please call [phone] during business hours (8-5:00 M-F).   If you have medication related questions after clinic hours, or on the weekend, please call [phone].     Financial Assistance [phone]   Medical Records [phone]       MENTAL HEALTH CRISIS RESOURCES:  For a emergency help, please call 911 or go to the nearest Emergency Department.     Emergency Walk-In Options:   Em[facility] Unit @ [facility] ([place]): [phone] - Specialized mental health emergency area designed to be calming  [facility] ([place]): [phone]  [facility] ([place]): [phone]  [facility]): [phone]    [facility] Crisis Information:   [place]: [phone]  [place]: [phone]  [place] (ANGELA) - Adult: [phone]     Child: [phone]  [place] - Adult: [phone]     Child: [phone]  [place]:  [phone]  List of all [facility] resources:   https://[place].gov/dhs/people-we-serve/adults/health-care/mental-health/resources/crisis-contacts.jsp    National Crisis Information:   Crisis Text Line: Text  [place]  to 276506  Suicide & Crisis Lifeline: 988  National Suicide Prevention Lifeline: 6-394-499-TALK (1-524.908.1416)       For online chat options, visit https://suicidepreventionlifeline.org/chat/  Poison Control Center: 2-370-799-3455  Trans Lifeline: 5-533-298-2136 - Hotline for transgender people of all ages  The [employer]: 8-802-322-3787 - Hotline for LGBT youth     For Non-Emergency Support:   Fast Tracker: Mental Health & Substance Use Disorder Resources -   https://www.[employer]anLogicsn.org/

## 2024-05-09 ENCOUNTER — VIRTUAL VISIT (OUTPATIENT)
Dept: CARDIOLOGY | Facility: CLINIC | Age: 54
End: 2024-05-09
Attending: INTERNAL MEDICINE
Payer: MEDICARE

## 2024-05-09 VITALS — SYSTOLIC BLOOD PRESSURE: 129 MMHG | BODY MASS INDEX: 22.81 KG/M2 | HEIGHT: 68 IN | DIASTOLIC BLOOD PRESSURE: 67 MMHG

## 2024-05-09 DIAGNOSIS — I95.1 ORTHOSTATIC HYPOTENSION: ICD-10-CM

## 2024-05-09 PROCEDURE — 99442 PR PHYSICIAN TELEPHONE EVALUATION 11-20 MIN: CPT | Mod: 93 | Performed by: INTERNAL MEDICINE

## 2024-05-09 ASSESSMENT — PAIN SCALES - GENERAL: PAINLEVEL: NO PAIN (0)

## 2024-05-09 NOTE — NURSING NOTE
Is the patient currently in the state of [place]? YES    Visit mode:TELEPHONE    If the visit is dropped, the patient can be reconnected by: TELEPHONE VISIT: Phone number: [phone]    Will anyone else be joining the visit? NO  (If patient encounters technical issues they should call [phone])    How would you like to obtain your AVS? Mail a copy    Are changes needed to the allergy or medication list? Pt stated no changes to allergies and Pt stated no med changes    Are refills needed on medications prescribed by this physician? NO    Reason for visit: RECHECK    Luz CHAMPION

## 2024-05-09 NOTE — LETTER
5/9/2024      RE: Keisha Somers  [address]  [address]  [facility] [address]       Dear Colleague,    Thank you for the opportunity to participate in the care of your patient, Keisha Somers, at the [facility] HEART [facility] at [facility]. Please see a copy of my visit note below.    Virtual Visit Details    Type of service:  Telephone Visit   HPI:     Keisha is a 54-year-old woman with persistent autonomic dysfunction requiring ongoing treatment with midodrine and fludrocortisone.  After our last visit in the fall 2023 we made an adjustment to the timing of her midodrine and this seems to have been helpful.  She continues to have postural dizziness but does not seem to be overly concerned and feels that it may be somewhat better with the change in dose timing.  She has not had any falls or injury.     Keisha does measure her blood pressures at home and notes that in the morning they are usually in the range of 120 systolic but in the evening systolics in the 90s are not uncommon.  She does maintain a walker in the bedroom for nights if she has to get up.  The lower pressures at night are likely due to midodrine dosing being focused earlier in the day.    At this clinic visit I reviewed the interim history and we did discuss whether any changes in her treatment were warranted.  I particularly focused on the importance of maintaining hydration especially with the upcoming summer. Keisha voiced understanding and agreement.    No medication refills required.  Patient is aware that she could contact us anytime should symptoms become more problematic.    PAST MEDICAL HISTORY:  Past Medical History:   Diagnosis Date     Anemia      Anorexia      Depression      Eating disorder      GERD (gastroesophageal reflux disease)      Hydronephrosis      Hypercholesterolemia      Hypotension      Hypotension, postural      Palpitations      POTS (postural orthostatic  tachycardia syndrome)      Syncope      Syncope and collapse      Thyroid disease        CURRENT MEDICATIONS:  Current Outpatient Medications   Medication Sig Dispense Refill     acetaminophen (TYLENOL) 500 MG tablet Take 500-1,000 mg by mouth every 6 hours as needed.       atorvastatin (LIPITOR) 10 MG tablet Take by mouth daily Per pt takes this medicine but doesn't know the amount       atorvastatin (LIPITOR) 40 MG tablet        Blood Pressure Monitor KIT Patient should be evaluated in the emergency department if her systolic blood pressure <65 1 kit 0     Calcium Carbonate-Vitamin D (CALCIUM 600+D PO) Take 1 tablet by mouth 2 times daily.       Cholecalciferol (VITAMIN D PO) Take  by mouth. 50, 000 units every 14 days       docusate sodium (COLACE) 100 MG capsule Take 1 capsule by mouth 2 times daily.       ferrous sulfate 140 (45 Fe) MG TBCR CR tablet Take 1 tablet by mouth 2 times daily       fludrocortisone (FLORINEF) 0.1 MG tablet TAKE 2 TABLETS (0.2MG) BY MOUTH DAILY 180 tablet 2     gabapentin (NEURONTIN) 300 MG capsule Take 5 capsules (1,500 mg) by mouth nightly as needed (sleep) 150 capsule 3     ibuprofen (ADVIL/MOTRIN) 800 MG tablet Take 800 mg by mouth every 8 hours as needed        lamoTRIgine (LAMICTAL) 200 MG tablet Take 2 tablets (400 mg) by mouth daily 60 tablet 3     levothyroxine (SYNTHROID, LEVOTHROID) 175 MCG tablet Take  by mouth daily.       magnesium 200 MG TABS Take 1 tablet by mouth daily       metoclopramide (REGLAN) 10 MG tablet 2 times daily        midodrine (PROAMATINE) 5 MG tablet Take 2 tablets (10 mg) by mouth 3 times daily 180 tablet 3     multivitamin (OCUVITE) TABS Take 1 tablet by mouth 2 times daily.       ondansetron (ZOFRAN) 4 MG tablet Take 1 pill for nausea at onset of headache, repeat if needed every 8 hours       potassium chloride ER (K-TAB/KLOR-CON) 10 MEQ CR tablet        rizatriptan (MAXALT-MLT) 10 MG ODT DISSOLVE 1 TABLET BY MOUTH AT ONSET OF MIGRAINE (MAY REPEAT IN  "2 HOURS IF NEEDED       sertraline (ZOLOFT) 100 MG tablet Take 1.5 tablets (150 mg) by mouth daily 45 tablet 3     tiZANidine (ZANAFLEX) 2 MG tablet TAKE 1 TABLET BY MOUTH EVERY MORNING, TAKE 1 TABLET EVERY EVENING, AND TAKE 2 TABLETS BY MOUTH EVERY NIGHT AT BEDTIME       vitamin B-2 (RIBOFLAVIN) 100 MG TABS tablet Take 4 tablets by mouth daily         PAST SURGICAL HISTORY:  Past Surgical History:   Procedure Laterality Date     CYSTOSCOPY W/ LASER LITHOTRIPSY Left 1/23/2017    Procedure: CYSTOSCOPY, LEFT URETEROSCOPY HOLMIUM LASER LITHOTRIPSY AND LEFT STENT PLACEMENT BILATERAL RETROGRADE PYELOGRAM ;  Surgeon: Jenniffer Romero MD;  Location: [facility];  Service:      GASTROSTOMY, INSERT TUBE, COMBINED       GT placed[  2001     IR MISCELLANEOUS PROCEDURE  6/9/2004     PICC INSERTION - DOUBLE LUMEN  6/18/2021          TONSILLECTOMY         ALLERGIES:   No Known Allergies    FAMILY HISTORY:  No pertinent family history    SOCIAL HISTORY:  Social History     Tobacco Use     Smoking status: Never     Smokeless tobacco: Never   Vaping Use     Vaping status: Never Used   Substance Use Topics     Alcohol use: No     Drug use: No       ROS:   Constitutional: No fever, chills, or sweats. Weight stable.   ENT: No visual disturbance, ear ache, epistaxis, sore throat.   Cardiovascular: As per HPI.   Respiratory: No cough, hemoptysis.    GI: No nausea, vomiting,   : No hematuria.   Integument: Negative.   Psychiatric: Negative.   Hematologic:   no easy bleeding.  Neuro: Negative.   Endocrinology: No significant heat or cold intolerance   Musculoskeletal: No myalgia or rheumatologic symptoms noted.    Exam:  /67   Ht 1.727 m (5' 8\")   BMI 22.81 kg/m    GENERAL APPEARANCE: healthy, alert and no distress    RESPIRATORY:  no rales, rhonchi or wheezes,  respirations are unlabored, normal respiratory rate  NEURO: alert and oriented to person/place/time, normal speech,     SKIN: no ecchymoses, no rashes " "reported    Labs:  CBC RESULTS:   Lab Results   Component Value Date    WBC 14.6 (H) 06/22/2021    WBC 5.0 07/24/2017    RBC 3.18 (L) 06/22/2021    RBC 3.56 (L) 07/24/2017    HGB 9.9 (L) 06/22/2021    HGB 10.8 (L) 07/24/2017    HCT 30.4 (L) 06/22/2021    HCT 33.7 (L) 07/24/2017    MCV 96 06/22/2021    MCV 95 07/24/2017    MCH 31.1 06/22/2021    MCH 30.3 07/24/2017    MCHC 32.6 06/22/2021    MCHC 32.0 07/24/2017    RDW 13.1 06/22/2021    RDW 13.1 07/24/2017     06/22/2021     07/24/2017       BMP RESULTS:  Lab Results   Component Value Date     06/22/2021     07/24/2017    POTASSIUM 4.0 06/22/2021    POTASSIUM 3.5 07/24/2017    CHLORIDE 108 (H) 06/22/2021    CHLORIDE 106 07/24/2017    CO2 26 06/22/2021    CO2 28 07/24/2017    ANIONGAP 7 06/22/2021    ANIONGAP 8 07/24/2017    GLC 96 06/22/2021     (H) 07/24/2017    BUN 11 06/22/2021    BUN 7 07/24/2017    CR 0.82 06/22/2021    CR 0.83 07/24/2017    GFRESTIMATED >60 06/22/2021    GFRESTIMATED 74 07/24/2017    GFRESTBLACK >60 06/22/2021    GFRESTBLACK 89 07/24/2017    KENNETH 9.1 06/22/2021    KENNETH 8.7 07/24/2017        INR RESULTS:  No results found for: \"INR\"    Procedures:  PULMONARY FUNCTION TESTS:        No data to display                  ECHOCARDIOGRAM:   6/2021        Assessment and Plan:       THOMAS TONG      Virtual Visit Details    Type of service:  Telephone Visit   HPI:     Keisha is a 54-year-old woman with persistent autonomic dysfunction requiring ongoing treatment with midodrine and fludrocortisone.  After our last visit in the fall 2023 we made an adjustment to the timing of her midodrine and this seems to have been helpful.  She continues to have postural dizziness but does not seem to be overly concerned and feels that it may be somewhat better with the change in dose timing.  She has not had any falls or injury.     Keisha does measure her blood pressures at home and notes that in the morning they are usually in " the range of 120 systolic but in the evening systolics in the 90s are not uncommon.  She does maintain a walker in the bedroom for nights if she has to get up.  The lower pressures at night are likely due to midodrine dosing being focused earlier in the day.    At this clinic visit I reviewed the interim history and we did discuss whether any changes in her treatment were warranted.  I particularly focused on the importance of maintaining hydration especially with the upcoming summer. Keisha voiced understanding and agreement.    No medication refills required.  Patient is aware that she could contact us anytime should symptoms become more problematic.    PAST MEDICAL HISTORY:  Past Medical History:   Diagnosis Date     Anemia      Anorexia      Depression      Eating disorder      GERD (gastroesophageal reflux disease)      Hydronephrosis      Hypercholesterolemia      Hypotension      Hypotension, postural      Palpitations      POTS (postural orthostatic tachycardia syndrome)      Syncope      Syncope and collapse      Thyroid disease        CURRENT MEDICATIONS:  Current Outpatient Medications   Medication Sig Dispense Refill     acetaminophen (TYLENOL) 500 MG tablet Take 500-1,000 mg by mouth every 6 hours as needed.       atorvastatin (LIPITOR) 10 MG tablet Take by mouth daily Per pt takes this medicine but doesn't know the amount       atorvastatin (LIPITOR) 40 MG tablet        Blood Pressure Monitor KIT Patient should be evaluated in the emergency department if her systolic blood pressure <65 1 kit 0     Calcium Carbonate-Vitamin D (CALCIUM 600+D PO) Take 1 tablet by mouth 2 times daily.       Cholecalciferol (VITAMIN D PO) Take  by mouth. 50, 000 units every 14 days       docusate sodium (COLACE) 100 MG capsule Take 1 capsule by mouth 2 times daily.       ferrous sulfate 140 (45 Fe) MG TBCR CR tablet Take 1 tablet by mouth 2 times daily       fludrocortisone (FLORINEF) 0.1 MG tablet TAKE 2 TABLETS (0.2MG) BY  MOUTH DAILY 180 tablet 2     gabapentin (NEURONTIN) 300 MG capsule Take 5 capsules (1,500 mg) by mouth nightly as needed (sleep) 150 capsule 3     ibuprofen (ADVIL/MOTRIN) 800 MG tablet Take 800 mg by mouth every 8 hours as needed        lamoTRIgine (LAMICTAL) 200 MG tablet Take 2 tablets (400 mg) by mouth daily 60 tablet 3     levothyroxine (SYNTHROID, LEVOTHROID) 175 MCG tablet Take  by mouth daily.       magnesium 200 MG TABS Take 1 tablet by mouth daily       metoclopramide (REGLAN) 10 MG tablet 2 times daily        midodrine (PROAMATINE) 5 MG tablet Take 2 tablets (10 mg) by mouth 3 times daily 180 tablet 3     multivitamin (OCUVITE) TABS Take 1 tablet by mouth 2 times daily.       ondansetron (ZOFRAN) 4 MG tablet Take 1 pill for nausea at onset of headache, repeat if needed every 8 hours       potassium chloride ER (K-TAB/KLOR-CON) 10 MEQ CR tablet        rizatriptan (MAXALT-MLT) 10 MG ODT DISSOLVE 1 TABLET BY MOUTH AT ONSET OF MIGRAINE (MAY REPEAT IN 2 HOURS IF NEEDED       sertraline (ZOLOFT) 100 MG tablet Take 1.5 tablets (150 mg) by mouth daily 45 tablet 3     tiZANidine (ZANAFLEX) 2 MG tablet TAKE 1 TABLET BY MOUTH EVERY MORNING, TAKE 1 TABLET EVERY EVENING, AND TAKE 2 TABLETS BY MOUTH EVERY NIGHT AT BEDTIME       vitamin B-2 (RIBOFLAVIN) 100 MG TABS tablet Take 4 tablets by mouth daily         PAST SURGICAL HISTORY:  Past Surgical History:   Procedure Laterality Date     CYSTOSCOPY W/ LASER LITHOTRIPSY Left 1/23/2017    Procedure: CYSTOSCOPY, LEFT URETEROSCOPY HOLMIUM LASER LITHOTRIPSY AND LEFT STENT PLACEMENT BILATERAL RETROGRADE PYELOGRAM ;  Surgeon: Jenniffer Romero MD;  Location: [facility] OR;  Service:      GASTROSTOMY, INSERT TUBE, COMBINED       GT placed[  2001     IR MISCELLANEOUS PROCEDURE  6/9/2004     PICC INSERTION - DOUBLE LUMEN  6/18/2021          TONSILLECTOMY         ALLERGIES:   No Known Allergies    FAMILY HISTORY:  No pertinent family history    SOCIAL HISTORY:  Social  "History     Tobacco Use     Smoking status: Never     Smokeless tobacco: Never   Vaping Use     Vaping status: Never Used   Substance Use Topics     Alcohol use: No     Drug use: No       ROS:   Constitutional: No fever, chills, or sweats. Weight stable.   ENT: No visual disturbance, ear ache, epistaxis, sore throat.   Cardiovascular: As per HPI.   Respiratory: No cough, hemoptysis.    GI: No nausea, vomiting,   : No hematuria.   Integument: Negative.   Psychiatric: Negative.   Hematologic:   no easy bleeding.  Neuro: Negative.   Endocrinology: No significant heat or cold intolerance   Musculoskeletal: No myalgia or rheumatologic symptoms noted.    Exam:  /67   Ht 1.727 m (5' 8\")   BMI 22.81 kg/m    GENERAL APPEARANCE: healthy, alert and no distress    RESPIRATORY:  no rales, rhonchi or wheezes,  respirations are unlabored, normal respiratory rate  NEURO: alert and oriented to person/place/time, normal speech,     SKIN: no ecchymoses, no rashes reported    Labs:  CBC RESULTS:   Lab Results   Component Value Date    WBC 14.6 (H) 06/22/2021    WBC 5.0 07/24/2017    RBC 3.18 (L) 06/22/2021    RBC 3.56 (L) 07/24/2017    HGB 9.9 (L) 06/22/2021    HGB 10.8 (L) 07/24/2017    HCT 30.4 (L) 06/22/2021    HCT 33.7 (L) 07/24/2017    MCV 96 06/22/2021    MCV 95 07/24/2017    MCH 31.1 06/22/2021    MCH 30.3 07/24/2017    MCHC 32.6 06/22/2021    MCHC 32.0 07/24/2017    RDW 13.1 06/22/2021    RDW 13.1 07/24/2017     06/22/2021     07/24/2017       BMP RESULTS:  Lab Results   Component Value Date     06/22/2021     07/24/2017    POTASSIUM 4.0 06/22/2021    POTASSIUM 3.5 07/24/2017    CHLORIDE 108 (H) 06/22/2021    CHLORIDE 106 07/24/2017    CO2 26 06/22/2021    CO2 28 07/24/2017    ANIONGAP 7 06/22/2021    ANIONGAP 8 07/24/2017    GLC 96 06/22/2021     (H) 07/24/2017    BUN 11 06/22/2021    BUN 7 07/24/2017    CR 0.82 06/22/2021    CR 0.83 07/24/2017    GFRESTIMATED >60 06/22/2021    " "GFRESTIMATED 74 07/24/2017    GFRESTBLACK >60 06/22/2021    GFRESTBLACK 89 07/24/2017    KENNETH 9.1 06/22/2021    KENNETH 8.7 07/24/2017        INR RESULTS:  No results found for: \"INR\"    Procedures:  PULMONARY FUNCTION TESTS:        No data to display                  ECHOCARDIOGRAM:   6/2021  1. Normal left ventricular size and systolic performance with a visually estimated ejection fraction of 65%.   2. No significant valvular heart disease is identified on this study.   3. Normal right ventricular size and systolic performance.     CAROTID US  2023  INDICATION: Syncope.   COMPARISON: None.   TECHNIQUE: Duplex exam performed utilizing 2D gray-scale imaging, Doppler interrogation with color-flow and spectral waveform analysis.     FINDINGS:   RIGHT: There is minimal atheromatous plaque in the right carotid bulb.. Normal waveforms with no significant stenosis.     LEFT: There is minimal atheromatous plaque in the left carotid bulb. Normal waveforms with no significant stenosis.     Both vertebral arteries and subclavian artery waveforms are normal.       Assessment and Plan:   1.  Autonomic dysfunction under reasonable control with midodrine and fludrocortisone  2.  No new cardiovascular symptoms    Plan  1.  Continue current treatment strategy  2.  Follow-up 6 months    Total elapsed time today with chart review, clinic visit and documentation 30 minutes    Video on 2:35 PM; off 2: 50 p.m.  Platform [employer]  Patient at home; clinic [facility] heart    I very much appreciated the opportunity to see and assess Keisha Somers in the clinic today. Please do not hesitate to contact my office if you have any questions or concerns.      Thang Galarza MD  Cardiac Arrhythmia Service  [facility]  [phone]         CC  THANG GALARZA      "

## 2024-05-09 NOTE — PROGRESS NOTES
Virtual Visit Details    Type of service:  Telephone Visit   HPI:     Keisha is a 54-year-old woman with persistent autonomic dysfunction requiring ongoing treatment with midodrine and fludrocortisone.  After our last visit in the fall 2023 we made an adjustment to the timing of her midodrine and this seems to have been helpful.  She continues to have postural dizziness but does not seem to be overly concerned and feels that it may be somewhat better with the change in dose timing.  She has not had any falls or injury.     Keisha does measure her blood pressures at home and notes that in the morning they are usually in the range of 120 systolic but in the evening systolics in the 90s are not uncommon.  She does maintain a walker in the bedroom for nights if she has to get up.  The lower pressures at night are likely due to midodrine dosing being focused earlier in the day.    At this clinic visit I reviewed the interim history and we did discuss whether any changes in her treatment were warranted.  I particularly focused on the importance of maintaining hydration especially with the upcoming summer. Keisha voiced understanding and agreement.    No medication refills required.  Patient is aware that she could contact us anytime should symptoms become more problematic.    PAST MEDICAL HISTORY:  Past Medical History:   Diagnosis Date    Anemia     Anorexia     Depression     Eating disorder     GERD (gastroesophageal reflux disease)     Hydronephrosis     Hypercholesterolemia     Hypotension     Hypotension, postural     Palpitations     POTS (postural orthostatic tachycardia syndrome)     Syncope     Syncope and collapse     Thyroid disease        CURRENT MEDICATIONS:  Current Outpatient Medications   Medication Sig Dispense Refill    acetaminophen (TYLENOL) 500 MG tablet Take 500-1,000 mg by mouth every 6 hours as needed.      atorvastatin (LIPITOR) 10 MG tablet Take by mouth daily Per pt takes this medicine but doesn't  know the amount      atorvastatin (LIPITOR) 40 MG tablet       Blood Pressure Monitor KIT Patient should be evaluated in the emergency department if her systolic blood pressure <65 1 kit 0    Calcium Carbonate-Vitamin D (CALCIUM 600+D PO) Take 1 tablet by mouth 2 times daily.      Cholecalciferol (VITAMIN D PO) Take  by mouth. 50, 000 units every 14 days      docusate sodium (COLACE) 100 MG capsule Take 1 capsule by mouth 2 times daily.      ferrous sulfate 140 (45 Fe) MG TBCR CR tablet Take 1 tablet by mouth 2 times daily      fludrocortisone (FLORINEF) 0.1 MG tablet TAKE 2 TABLETS (0.2MG) BY MOUTH DAILY 180 tablet 2    gabapentin (NEURONTIN) 300 MG capsule Take 5 capsules (1,500 mg) by mouth nightly as needed (sleep) 150 capsule 3    ibuprofen (ADVIL/MOTRIN) 800 MG tablet Take 800 mg by mouth every 8 hours as needed       lamoTRIgine (LAMICTAL) 200 MG tablet Take 2 tablets (400 mg) by mouth daily 60 tablet 3    levothyroxine (SYNTHROID, LEVOTHROID) 175 MCG tablet Take  by mouth daily.      magnesium 200 MG TABS Take 1 tablet by mouth daily      metoclopramide (REGLAN) 10 MG tablet 2 times daily       midodrine (PROAMATINE) 5 MG tablet Take 2 tablets (10 mg) by mouth 3 times daily 180 tablet 3    multivitamin (OCUVITE) TABS Take 1 tablet by mouth 2 times daily.      ondansetron (ZOFRAN) 4 MG tablet Take 1 pill for nausea at onset of headache, repeat if needed every 8 hours      potassium chloride ER (K-TAB/KLOR-CON) 10 MEQ CR tablet       rizatriptan (MAXALT-MLT) 10 MG ODT DISSOLVE 1 TABLET BY MOUTH AT ONSET OF MIGRAINE (MAY REPEAT IN 2 HOURS IF NEEDED      sertraline (ZOLOFT) 100 MG tablet Take 1.5 tablets (150 mg) by mouth daily 45 tablet 3    tiZANidine (ZANAFLEX) 2 MG tablet TAKE 1 TABLET BY MOUTH EVERY MORNING, TAKE 1 TABLET EVERY EVENING, AND TAKE 2 TABLETS BY MOUTH EVERY NIGHT AT BEDTIME      vitamin B-2 (RIBOFLAVIN) 100 MG TABS tablet Take 4 tablets by mouth daily         PAST SURGICAL HISTORY:  Past  "Surgical History:   Procedure Laterality Date    CYSTOSCOPY W/ LASER LITHOTRIPSY Left 1/23/2017    Procedure: CYSTOSCOPY, LEFT URETEROSCOPY HOLMIUM LASER LITHOTRIPSY AND LEFT STENT PLACEMENT BILATERAL RETROGRADE PYELOGRAM ;  Surgeon: Jenniffer Romero MD;  Location: [facility] OR;  Service:     GASTROSTOMY, INSERT TUBE, COMBINED      GT placed[  2001    IR MISCELLANEOUS PROCEDURE  6/9/2004    PICC INSERTION - DOUBLE LUMEN  6/18/2021         TONSILLECTOMY         ALLERGIES:   No Known Allergies    FAMILY HISTORY:  No pertinent family history    SOCIAL HISTORY:  Social History     Tobacco Use    Smoking status: Never    Smokeless tobacco: Never   Vaping Use    Vaping status: Never Used   Substance Use Topics    Alcohol use: No    Drug use: No       ROS:   Constitutional: No fever, chills, or sweats. Weight stable.   ENT: No visual disturbance, ear ache, epistaxis, sore throat.   Cardiovascular: As per HPI.   Respiratory: No cough, hemoptysis.    GI: No nausea, vomiting,   : No hematuria.   Integument: Negative.   Psychiatric: Negative.   Hematologic:   no easy bleeding.  Neuro: Negative.   Endocrinology: No significant heat or cold intolerance   Musculoskeletal: No myalgia or rheumatologic symptoms noted.    Exam:  /67   Ht 1.727 m (5' 8\")   BMI 22.81 kg/m    GENERAL APPEARANCE: healthy, alert and no distress    RESPIRATORY:  no rales, rhonchi or wheezes,  respirations are unlabored, normal respiratory rate  NEURO: alert and oriented to person/place/time, normal speech,     SKIN: no ecchymoses, no rashes reported    Labs:  CBC RESULTS:   Lab Results   Component Value Date    WBC 14.6 (H) 06/22/2021    WBC 5.0 07/24/2017    RBC 3.18 (L) 06/22/2021    RBC 3.56 (L) 07/24/2017    HGB 9.9 (L) 06/22/2021    HGB 10.8 (L) 07/24/2017    HCT 30.4 (L) 06/22/2021    HCT 33.7 (L) 07/24/2017    MCV 96 06/22/2021    MCV 95 07/24/2017    MCH 31.1 06/22/2021    MCH 30.3 07/24/2017    MCHC 32.6 06/22/2021    MCHC " "32.0 07/24/2017    RDW 13.1 06/22/2021    RDW 13.1 07/24/2017     06/22/2021     07/24/2017       BMP RESULTS:  Lab Results   Component Value Date     06/22/2021     07/24/2017    POTASSIUM 4.0 06/22/2021    POTASSIUM 3.5 07/24/2017    CHLORIDE 108 (H) 06/22/2021    CHLORIDE 106 07/24/2017    CO2 26 06/22/2021    CO2 28 07/24/2017    ANIONGAP 7 06/22/2021    ANIONGAP 8 07/24/2017    GLC 96 06/22/2021     (H) 07/24/2017    BUN 11 06/22/2021    BUN 7 07/24/2017    CR 0.82 06/22/2021    CR 0.83 07/24/2017    GFRESTIMATED >60 06/22/2021    GFRESTIMATED 74 07/24/2017    GFRESTBLACK >60 06/22/2021    GFRESTBLACK 89 07/24/2017    KENNETH 9.1 06/22/2021    KENNETH 8.7 07/24/2017        INR RESULTS:  No results found for: \"INR\"    Procedures:  PULMONARY FUNCTION TESTS:        No data to display                  ECHOCARDIOGRAM:   6/2021        Assessment and Plan:       THOMAS TONG    "

## 2024-05-09 NOTE — PROGRESS NOTES
Virtual Visit Details    Type of service:  Telephone Visit   HPI:     Keisha is a 54-year-old woman with persistent autonomic dysfunction requiring ongoing treatment with midodrine and fludrocortisone.  After our last visit in the fall 2023 we made an adjustment to the timing of her midodrine and this seems to have been helpful.  She continues to have postural dizziness but does not seem to be overly concerned and feels that it may be somewhat better with the change in dose timing.  She has not had any falls or injury.     Keisha does measure her blood pressures at home and notes that in the morning they are usually in the range of 120 systolic but in the evening systolics in the 90s are not uncommon.  She does maintain a walker in the bedroom for nights if she has to get up.  The lower pressures at night are likely due to midodrine dosing being focused earlier in the day.    At this clinic visit I reviewed the interim history and we did discuss whether any changes in her treatment were warranted.  I particularly focused on the importance of maintaining hydration especially with the upcoming summer. Keisha voiced understanding and agreement.    No medication refills required.  Patient is aware that she could contact us anytime should symptoms become more problematic.    PAST MEDICAL HISTORY:  Past Medical History:   Diagnosis Date    Anemia     Anorexia     Depression     Eating disorder     GERD (gastroesophageal reflux disease)     Hydronephrosis     Hypercholesterolemia     Hypotension     Hypotension, postural     Palpitations     POTS (postural orthostatic tachycardia syndrome)     Syncope     Syncope and collapse     Thyroid disease        CURRENT MEDICATIONS:  Current Outpatient Medications   Medication Sig Dispense Refill    acetaminophen (TYLENOL) 500 MG tablet Take 500-1,000 mg by mouth every 6 hours as needed.      atorvastatin (LIPITOR) 10 MG tablet Take by mouth daily Per pt takes this medicine but doesn't  know the amount      atorvastatin (LIPITOR) 40 MG tablet       Blood Pressure Monitor KIT Patient should be evaluated in the emergency department if her systolic blood pressure <65 1 kit 0    Calcium Carbonate-Vitamin D (CALCIUM 600+D PO) Take 1 tablet by mouth 2 times daily.      Cholecalciferol (VITAMIN D PO) Take  by mouth. 50, 000 units every 14 days      docusate sodium (COLACE) 100 MG capsule Take 1 capsule by mouth 2 times daily.      ferrous sulfate 140 (45 Fe) MG TBCR CR tablet Take 1 tablet by mouth 2 times daily      fludrocortisone (FLORINEF) 0.1 MG tablet TAKE 2 TABLETS (0.2MG) BY MOUTH DAILY 180 tablet 2    gabapentin (NEURONTIN) 300 MG capsule Take 5 capsules (1,500 mg) by mouth nightly as needed (sleep) 150 capsule 3    ibuprofen (ADVIL/MOTRIN) 800 MG tablet Take 800 mg by mouth every 8 hours as needed       lamoTRIgine (LAMICTAL) 200 MG tablet Take 2 tablets (400 mg) by mouth daily 60 tablet 3    levothyroxine (SYNTHROID, LEVOTHROID) 175 MCG tablet Take  by mouth daily.      magnesium 200 MG TABS Take 1 tablet by mouth daily      metoclopramide (REGLAN) 10 MG tablet 2 times daily       midodrine (PROAMATINE) 5 MG tablet Take 2 tablets (10 mg) by mouth 3 times daily 180 tablet 3    multivitamin (OCUVITE) TABS Take 1 tablet by mouth 2 times daily.      ondansetron (ZOFRAN) 4 MG tablet Take 1 pill for nausea at onset of headache, repeat if needed every 8 hours      potassium chloride ER (K-TAB/KLOR-CON) 10 MEQ CR tablet       rizatriptan (MAXALT-MLT) 10 MG ODT DISSOLVE 1 TABLET BY MOUTH AT ONSET OF MIGRAINE (MAY REPEAT IN 2 HOURS IF NEEDED      sertraline (ZOLOFT) 100 MG tablet Take 1.5 tablets (150 mg) by mouth daily 45 tablet 3    tiZANidine (ZANAFLEX) 2 MG tablet TAKE 1 TABLET BY MOUTH EVERY MORNING, TAKE 1 TABLET EVERY EVENING, AND TAKE 2 TABLETS BY MOUTH EVERY NIGHT AT BEDTIME      vitamin B-2 (RIBOFLAVIN) 100 MG TABS tablet Take 4 tablets by mouth daily         PAST SURGICAL HISTORY:  Past  "Surgical History:   Procedure Laterality Date    CYSTOSCOPY W/ LASER LITHOTRIPSY Left 1/23/2017    Procedure: CYSTOSCOPY, LEFT URETEROSCOPY HOLMIUM LASER LITHOTRIPSY AND LEFT STENT PLACEMENT BILATERAL RETROGRADE PYELOGRAM ;  Surgeon: Jenniffer Romero MD;  Location: [facility] OR;  Service:     GASTROSTOMY, INSERT TUBE, COMBINED      GT placed[  2001    IR MISCELLANEOUS PROCEDURE  6/9/2004    PICC INSERTION - DOUBLE LUMEN  6/18/2021         TONSILLECTOMY         ALLERGIES:   No Known Allergies    FAMILY HISTORY:  No pertinent family history    SOCIAL HISTORY:  Social History     Tobacco Use    Smoking status: Never    Smokeless tobacco: Never   Vaping Use    Vaping status: Never Used   Substance Use Topics    Alcohol use: No    Drug use: No       ROS:   Constitutional: No fever, chills, or sweats. Weight stable.   ENT: No visual disturbance, ear ache, epistaxis, sore throat.   Cardiovascular: As per HPI.   Respiratory: No cough, hemoptysis.    GI: No nausea, vomiting,   : No hematuria.   Integument: Negative.   Psychiatric: Negative.   Hematologic:   no easy bleeding.  Neuro: Negative.   Endocrinology: No significant heat or cold intolerance   Musculoskeletal: No myalgia or rheumatologic symptoms noted.    Exam:  /67   Ht 1.727 m (5' 8\")   BMI 22.81 kg/m    GENERAL APPEARANCE: healthy, alert and no distress    RESPIRATORY:  no rales, rhonchi or wheezes,  respirations are unlabored, normal respiratory rate  NEURO: alert and oriented to person/place/time, normal speech,     SKIN: no ecchymoses, no rashes reported    Labs:  CBC RESULTS:   Lab Results   Component Value Date    WBC 14.6 (H) 06/22/2021    WBC 5.0 07/24/2017    RBC 3.18 (L) 06/22/2021    RBC 3.56 (L) 07/24/2017    HGB 9.9 (L) 06/22/2021    HGB 10.8 (L) 07/24/2017    HCT 30.4 (L) 06/22/2021    HCT 33.7 (L) 07/24/2017    MCV 96 06/22/2021    MCV 95 07/24/2017    MCH 31.1 06/22/2021    MCH 30.3 07/24/2017    MCHC 32.6 06/22/2021    MCHC " "32.0 07/24/2017    RDW 13.1 06/22/2021    RDW 13.1 07/24/2017     06/22/2021     07/24/2017       BMP RESULTS:  Lab Results   Component Value Date     06/22/2021     07/24/2017    POTASSIUM 4.0 06/22/2021    POTASSIUM 3.5 07/24/2017    CHLORIDE 108 (H) 06/22/2021    CHLORIDE 106 07/24/2017    CO2 26 06/22/2021    CO2 28 07/24/2017    ANIONGAP 7 06/22/2021    ANIONGAP 8 07/24/2017    GLC 96 06/22/2021     (H) 07/24/2017    BUN 11 06/22/2021    BUN 7 07/24/2017    CR 0.82 06/22/2021    CR 0.83 07/24/2017    GFRESTIMATED >60 06/22/2021    GFRESTIMATED 74 07/24/2017    GFRESTBLACK >60 06/22/2021    GFRESTBLACK 89 07/24/2017    KENNETH 9.1 06/22/2021    KENNETH 8.7 07/24/2017        INR RESULTS:  No results found for: \"INR\"    Procedures:  PULMONARY FUNCTION TESTS:        No data to display                  ECHOCARDIOGRAM:   6/2021  1. Normal left ventricular size and systolic performance with a visually estimated ejection fraction of 65%.   2. No significant valvular heart disease is identified on this study.   3. Normal right ventricular size and systolic performance.     CAROTID US  2023  INDICATION: Syncope.   COMPARISON: None.   TECHNIQUE: Duplex exam performed utilizing 2D gray-scale imaging, Doppler interrogation with color-flow and spectral waveform analysis.     FINDINGS:   RIGHT: There is minimal atheromatous plaque in the right carotid bulb.. Normal waveforms with no significant stenosis.     LEFT: There is minimal atheromatous plaque in the left carotid bulb. Normal waveforms with no significant stenosis.     Both vertebral arteries and subclavian artery waveforms are normal.       Assessment and Plan:   1.  Autonomic dysfunction under reasonable control with midodrine and fludrocortisone  2.  No new cardiovascular symptoms    Plan  1.  Continue current treatment strategy  2.  Follow-up 6 months    Total elapsed time today with chart review, clinic visit and documentation 30 " minutes    Video on 2:35 PM; off 2: 50 p.m.  Platform Doximity  Patient at home; clinic [facility] heart    I very much appreciated the opportunity to see and assess Keisha Somers in the clinic today. Please do not hesitate to contact my office if you have any questions or concerns.      Thang Galarza MD  Cardiac Arrhythmia Service  [facility]  [phone]         CC  THANG GALARZA

## 2024-05-13 DIAGNOSIS — G90.A POSTURAL ORTHOSTATIC TACHYCARDIA SYNDROME: ICD-10-CM

## 2024-05-22 NOTE — TELEPHONE ENCOUNTER
midodrine (PROAMATINE) 5 MG tablet   Last Written Prescription Date:  1/22/2024  Last Fill Quantity: 180 (30 day),   # refills: 3  Last Office Visit : 5/9/2024  Future Office visit:  12/3/2024  Routing midodrine (PROAMATINE) 5 MG tablet  refill request to provider for review/approval because: Medication not on the Cardiology refill protocol.

## 2024-05-23 RX ORDER — MIDODRINE HYDROCHLORIDE 5 MG/1
10 TABLET ORAL 3 TIMES DAILY
Qty: 180 TABLET | Refills: 11 | Status: SHIPPED | OUTPATIENT
Start: 2024-05-23

## 2024-06-25 ENCOUNTER — NURSE TRIAGE (OUTPATIENT)
Dept: NURSING | Facility: CLINIC | Age: 54
End: 2024-06-25
Payer: MEDICARE

## 2024-06-26 NOTE — TELEPHONE ENCOUNTER
Question. Tried to get a hold of after hours. Patient of Dr Meier. Question about Lamictal. Took one extra pill. I connected her with Poison Control.  Sheila Maharaj RN  [facility]    Reason for Disposition   [1] DOUBLE DOSE (an extra dose or lesser amount) of prescription drug AND [2] any symptoms (e.g., dizziness, nausea, pain, sleepiness)    Additional Information   Negative: [1] Intentional drug overdose AND [2] suicidal thoughts or ideas   Negative: Drug overdose and triager unable to answer question   Negative: Caller requesting a renewal or refill of a medicine patient is currently taking   Negative: Caller requesting information unrelated to medicine   Negative: Caller requesting information about COVID-19 Vaccine   Negative: Caller requesting information about Emergency Contraception   Negative: Caller requesting information about Combined Birth Control Pills   Negative: Caller requesting information about Progestin Birth Control Pills   Negative: Caller requesting information about Post-Op pain or medicines   Negative: Caller requesting a prescription antibiotic (such as Penicillin) for Strep throat and has a positive culture result   Negative: Caller requesting a prescription anti-viral med (such as Tamiflu) and has influenza (flu) symptoms   Negative: Immunization reaction suspected   Negative: Rash while taking a medicine or within 3 days of stopping it   Negative: [1] Asthma and [2] having symptoms of asthma (cough, wheezing, etc.)   Negative: [1] Symptom of illness (e.g., headache, abdominal pain, earache, vomiting) AND [2] more than mild   Negative: Breastfeeding questions about mother's medicines and diet   Negative: MORE THAN A DOUBLE DOSE of a prescription or over-the-counter (OTC) drug    Protocols used: Medication Question Call-A-

## 2024-07-01 NOTE — PROGRESS NOTES
[facility]  MEDICAL PROGRESS NOTE     CARE TEAM:    PCP- Lavonne Zarate  Therapist- None currently.   Cardiology- Dr. Galarza, Endo- Dr. Handley.     Keisha is a 54 year old who uses the pronouns she, her, hers.      Diagnoses     Major depressive disorder, recurrent, mild-in partial remission   Primary insomnia   Anorexia nervosa, in remission  PTSD     Assessment     Keisha Somers is a 54 yo F with past psychiatric diagnosis of anorexia nervosa at some point needing a feeding tube, major depressive disorder, sleep disorder and significant medical diagnosis that includes POTS (postural orthostatic tachycardia syndrome), migraines, here today for a follow-up visit.     Overall appears like Keisha's mood, anxiety and trauma symptoms have remained stable/largely unchanged since our last visit and she continues attributing this to her current medication regimen providing benefits, which she continues tolerating well without noticing new side effects.     As documented before, on 1/25/24 we discussed use of stimulant, which she reported had been on for over >25 years, started to help with binge eating/eating disorder. Given that her reported eating disorder had not been a concern over past years we discussed recommendation for discontinuing Focalin and since she has reported noticing anxiety being stable/improved and not noticing other changes or differences in mood, appetite/eating pattern or overall functioning.     We discussed medication indications, side effects, risks vs benefits and alternatives, and given is tolerating well and perceiving benefit form these, it is reasonable not to make changes today, which is also Keisha's preference.   No other questions or concerns     Future considerations  -Consider optimizing Zoloft better target anxiety and continue helping with mood (last moderate-severe depressive episode >5 years ago per   Darinel's notes).  -Consider decreasing/tapering gabapentin if/once anxiety is more stable .    -Could cautiously (ideally would need to consult with cards before due to POTS) add an alpha blocker to help with PTSD-sleep disturbances as well with BP  -Consider repeating Sleep study. Consider continue offering CBT-I.       Psychotropic Drug Interactions:    ADDITIVE SEROTONERGIC: Zolonoft, Maxalt, ondansetron  ADDITIVE QTc: Zoloft, ondansetron   ADDITIVE CNS/RESPIRATORY DEPRESSION: Gabapentin, Lamictal, tizanidine  Management: routine monitoring, ongoing medication simplification, limited use of zofran and patient aware of risks.     [place] today: indicates that controlled prescriptions have been filled as prescribed (gabapentin).     Risk Statements:   Treatment Risk- Risks, benefits, alternatives and potential adverse effects have been discussed and are understood.   Safety Risk-Keisha did not appear to be an imminent safety risk to self or others.     Plan     1) Medications:   -Continue Zoloft 150 mg PO qDay Helps as well as Lamictal with mood.   -Continue Lamictal 400 mg daily   -Continue Gabapentin 1500 QHS PRN  (helps some with sleep;  Added for hot flashes, works). Most recent Creatinine/GFR WNL.      Anjana other prescribers PCP, cardiology, GI):  1. Synthroid 150 mcg - she is uncertain of dose.    2. Zofran prn migraines  3. Midodrine 11/16/23  - changed from Dr. Galarza   4. Carafate  5. Tizanidine - 4x/day, last dose 9pm for migraines-one in am , one noo,one at night.  6. Fluorinef- 0.2 mg daily - moved to evening  7. Maxalt for migraines with ibuprofen.  It works very well.    8. Mg-   9. Vit B2  10. Atorvastatin    2) Psychotherapy:   None currently, continue offering.     3) Next due:  Labs- Last obtained 09/2023, will defer further lipid/CBC monitoring to PCP. Routine monitoring is not indicated for current psychotropic medication regimen. Creatinine/GFR WNL  EKG- Last obtained 06/2021,   "ms. Follows-up with cardiology (POTS), will defer further monitoring to them. Upcoming appointment in about 3 weeks.   Rating scales-  PHQ9, GAD7    4) Referrals: none    5) Other:   None     6) Follow-up: Return to clinic in 12  weeks approximately, with writer or before if needed.      Pertinent Background                                                   [most recent eval 08/21/23]     Keisha first experienced MH symptoms since HS, she started experiencing eating disorder that worsened through her 20, severe, at one point needing a feeding tube, leading to multiple hospitalizations. Also experienced trauma x2 as well as emotional trauma growing up. She reports experiencing depression and post-traumatic symptoms as well since around the same age.   Last depressive episode >5 years.   Has been on Focalin for several years, per Dr. Tena notes \"Beneficial for energy, mood and no indications of problematic use. Helps with concentration. Started around >25 years in context of eating disorder.\"    On 1/25/24 we discussed use of stimulant, which she reported had been on for over >25 years, started to help with binge eating/eating disorder. Given that her reported eating disorder had not been a concern over past years we discussed recommendation for discontinuing Focalin and since she has reported noticing anxiety being stable/improved and not noticing other changes or differences in mood, appetite/eating pattern or overall functioning.      Pertinent items include: trauma hx, eating disorder , mutiple psychotropic trials , psych hosp , and major medical problems     Subjective     Since our last visit on 4/26/24:  -Updates/pressing issues: Reports no pressing issues.   -Reports had been having laryngitis for about 2 weeks ago, secondary to a recent cold.   -Helping her parents a lot. Age 82 (mom) and 87 (dad). Live nearby.  -Shares saw cards in May. No further updates.  -Mood: \"good\"   -Anxiety: Denies worries out of " "the ordinary.   -Appetite: Unchanged.   -Weight changes: Denies any changes  -Sleep:  Unchanged, still getting on average ~6 hrs/night.   -BM/constipation: Denies constipation  -Tremor/abnormal movements: Denies   -Denies Chest pain, palpitations. Other side effects: Denies   -Perceptual disturbances: Denies   -Safety: Denies SI, HI.    Recent Psych Symptoms:   Depression:  low energy  Elevated:  none  Psychosis:  none  Anxiety:  Denies worries out of the ordinary  Trauma Related:  none. Reports baseline sleep disturbances, denies recent/current trauma-like symptoms   Insomnia:  Sleep issues as above   Other:  No    Current Social History:  Living situation/income: (partner, children, pets, etc): Apartment building \"customized living. [place] Has lived there for over 14 years now, likes it.   Social/spiritual support: Helps her dad (retired) with some accounts in his [employer] business. SSD  Feels safe at home: Yes    Pertinent Substance Use:   Alcohol: No   Cannabis: No  Tobacco: No  Caffeine:  No   Opioids: No   Narcan Kit current: N/A  Other substances:  Denies     Medical Review of Systems:   Lightheadedness/orthostasis: Yes, POTS dx, follows with cards.   Headaches: Yes, migraines dx. On average 1 episode ~q/2-3 months.   GI: None  Sexual health concerns: None    A comprehensive review of systems was performed and is negative other than noted above.     Mental Status Exam     Alertness: alert  and oriented  Appearance: well groomed  Behavior/Demeanor: cooperative, pleasant, and calm, with good  eye contact   Speech: normal and regular rate and rhythm  Language: intact and no problems  Psychomotor: normal or unremarkable  Mood: \"good\"   Affect: full range; congruent to: mood- yes, content- yes  Thought Process/Associations: unremarkable  Thought Content:  Reports none;  Denies suicidal & violent ideation and delusions, obsessions , phobia , magical thinking, over-valued ideas, and paranoid ideation  Perception:  Reports " none;  Denies auditory hallucinations, visual hallucinations, visual distortion seen as shadows , depersonalization, and derealization  Insight: good  Judgment: good  Cognition: does  appear grossly intact; formal cognitive testing was not done  Gait and Station: N/A (telehealth)      Past Psych Med Trials        Medication Max Dose (mg) Dates / Duration Helpful? DC Reason / Adverse Effects?   Lamictal        Zoloft        Focalin   ~25 years -stopped Jamuary 2024  Started ~25 years ago possible for binge-eating. Currently not clear indication concerns for some elevated BP and overall  risks outweighing potential benefits   Lunesta       Ritalin SR 20 mg BID ~2007     Remeron  7.5 mg       Seroquel   mg qhs      Ambien 10 mg  ~2007     Prozac 80 mg  ~2007          Treatment Course and Anne Events since  JULY 2023 8/21/23: Transfer of care  from Dr. Tena's patient's panel..   11/21/23.Decrease Focalin from 10 mg  (reported being beneficial for energy, mood and no indications of problematic use)Started around >25 years in context of eating disorder.  to 5 mg daily. Goal is to taper off due to not clear indication concerns for some elevated BP and overall  risks outweighing potential benefits  1/25/2024: Discontinued Focalin.  4/26/2024: Continue offering CBT-I, sleep study. No disordered eating concerns or other concerns with discontinuation of Focalin.   7/2/2024: No changes.        Vitals     Pulse Readings from Last 3 Encounters:   01/25/24 109   11/21/23 72   11/16/23 79     Wt Readings from Last 3 Encounters:   11/16/23 68 kg (150 lb)   09/26/19 64 kg (141 lb 3.2 oz)   07/23/19 62.1 kg (137 lb)     BP Readings from Last 3 Encounters:   05/09/24 129/67   01/25/24 125/85   11/21/23 137/85         Medical History     ALLERGIES: Patient has no known allergies.    Patient Active Problem List   Diagnosis    Major depressive disorder, recurrent episode, moderate (H)    Anorexia nervosa (H28)    Iron  deficiency anemia    Skin lesion    Syncope and collapse    Postural orthostatic tachycardia syndrome    Hypothyroidism    Macular degeneration    Migraine headache    Mixed hearing loss, unilateral    Osteopenia    Osteoporosis    PTSD (post-traumatic stress disorder)    Pyelonephritis    S/P ORIF (open reduction internal fixation) fracture    Secondary hyperparathyroidism (H24)    Stage 3a chronic kidney disease (H)      Medications   Focalin discontinued today  Current Outpatient Medications   Medication Sig Dispense Refill    gabapentin (NEURONTIN) 300 MG capsule Take 5 capsules (1,500 mg) by mouth nightly as needed (sleep) 150 capsule 3    lamoTRIgine (LAMICTAL) 200 MG tablet Take 2 tablets (400 mg) by mouth daily 60 tablet 3    sertraline (ZOLOFT) 100 MG tablet Take 1.5 tablets (150 mg) by mouth daily 45 tablet 3    acetaminophen (TYLENOL) 500 MG tablet Take 500-1,000 mg by mouth every 6 hours as needed.      atorvastatin (LIPITOR) 10 MG tablet Take by mouth daily Per pt takes this medicine but doesn't know the amount      atorvastatin (LIPITOR) 40 MG tablet       Blood Pressure Monitor KIT Patient should be evaluated in the emergency department if her systolic blood pressure <65 1 kit 0    Calcium Carbonate-Vitamin D (CALCIUM 600+D PO) Take 1 tablet by mouth 2 times daily.      Cholecalciferol (VITAMIN D PO) Take  by mouth. 50, 000 units every 14 days      docusate sodium (COLACE) 100 MG capsule Take 1 capsule by mouth 2 times daily.      ferrous sulfate 140 (45 Fe) MG TBCR CR tablet Take 1 tablet by mouth 2 times daily      fludrocortisone (FLORINEF) 0.1 MG tablet TAKE 2 TABLETS (0.2MG) BY MOUTH DAILY 180 tablet 2    ibuprofen (ADVIL/MOTRIN) 800 MG tablet Take 800 mg by mouth every 8 hours as needed       levothyroxine (SYNTHROID, LEVOTHROID) 175 MCG tablet Take  by mouth daily.      magnesium 200 MG TABS Take 1 tablet by mouth daily      metoclopramide (REGLAN) 10 MG tablet 2 times daily       midodrine  (PROAMATINE) 5 MG tablet Take 2 tablets (10 mg) by mouth 3 times daily 180 tablet 11    multivitamin (OCUVITE) TABS Take 1 tablet by mouth 2 times daily.      ondansetron (ZOFRAN) 4 MG tablet Take 1 pill for nausea at onset of headache, repeat if needed every 8 hours      potassium chloride ER (K-TAB/KLOR-CON) 10 MEQ CR tablet       rizatriptan (MAXALT-MLT) 10 MG ODT DISSOLVE 1 TABLET BY MOUTH AT ONSET OF MIGRAINE (MAY REPEAT IN 2 HOURS IF NEEDED      tiZANidine (ZANAFLEX) 2 MG tablet TAKE 1 TABLET BY MOUTH EVERY MORNING, TAKE 1 TABLET EVERY EVENING, AND TAKE 2 TABLETS BY MOUTH EVERY NIGHT AT BEDTIME      vitamin B-2 (RIBOFLAVIN) 100 MG TABS tablet Take 4 tablets by mouth daily          Labs and Data         8/21/2023     1:08 PM 11/21/2023     9:13 AM 7/2/2024     9:41 AM   PROMIS-10 Total Score w/o Sub Scores   PROMIS TOTAL - SUBSCORES 30 32 31          No data to display                  11/21/2023     9:03 AM 1/25/2024    10:38 AM 7/2/2024     9:39 AM   PHQ-9 SCORE   PHQ-9 Total Score MyChart 3 (Minimal depression) 2 (Minimal depression) 2 (Minimal depression)   PHQ-9 Total Score 3 2 2          No data to display                 Per care everywhere October-November 2023:     Vitamin D 25-Hydroxy, Total  Order: [id number]  Component  Ref Range & Units 1 mo ago   Vitamin D, 25-OH, Total  30 - 80 ng/mL 50   Resulting [facility]          Contains abnormal data TSH  Order: [id number]  Component  Ref Range & Units 3 wk ago   TSH, Sensitive  0.30 - 4.50 uIU/mL 4.74 High    Resulting [facility]         Lipid Panel (Expected: 90 days)  Order: [id number]  Component  Ref Range & Units 1 mo ago Resulting [place]   Cholesterol  0 - 199 mg/dL 202 [facility]   Triglyceride  <=149 mg/dL 68 [facility]   HDL Cholesterol  >=40 mg/dL 66 [facility]   LDL, Calculated  <130 mg/dL 122 [facility]   Non HDL Chol,  Calculated  <=159 mg/dL 136 [facility]   Cholesterol/HDL Ratio 3.1 [facility]   Hours Fasting  8 - 12 Hours 12.0 [facility]     Basic Metabolic Panel  Order: [id number]  Component  Ref Range & Units 1 mo ago Resulting Agency   Sodium  136 - 145 mmol/L 144 [facility]   Potassium  3.5 - 5.1 mmol/L 3.4 Low  [facility]   Chloride  98 - 109 mmol/L 111 High  [facility]   CO2  20 - 29 mmol/L 23 [facility]   Anion Gap  7 - 16 mmol/L 10 [facility]   Calcium  8.4 - 10.4 mg/dL 9.5 [facility]   BUN  7 - 26 mg/dL 14 [facility]   Creatinine  0.55 - 1.02 mg/dL 0.80 [facility]   Glucose  70 - 100 mg/dL 107 High  [facility]   Comment: The given reference range is for the fasting state. Non-fasting reference range for glucose is 70 - 180 mg/dL.   GFR, Estimated  >60 mL/min/1.73m2 >60 [facility]   Hours Fasting  8 - 12 Hours 12.0 [facility]               ======================  PROVIDER: Navid Cano MD     Level of Medical Decision Making:   - At least 1 chronic problem that is not stable  - Engaged in prescription drug management during visit (discussed any medication benefits, side effects, alternatives, etc.)        Psychiatry Individual Psychotherapy Note   NA       Patient staffed in clinic with Dr. Maxwell who will sign the note.  Supervisor is Dr. Maxwell.

## 2024-07-02 ENCOUNTER — VIRTUAL VISIT (OUTPATIENT)
Dept: PSYCHIATRY | Facility: CLINIC | Age: 54
End: 2024-07-02
Attending: PSYCHIATRY & NEUROLOGY
Payer: MEDICARE

## 2024-07-02 DIAGNOSIS — F50.00 ANOREXIA NERVOSA IN REMISSION (H): ICD-10-CM

## 2024-07-02 DIAGNOSIS — F51.01 PRIMARY INSOMNIA: ICD-10-CM

## 2024-07-02 DIAGNOSIS — F43.10 PTSD (POST-TRAUMATIC STRESS DISORDER): ICD-10-CM

## 2024-07-02 DIAGNOSIS — F33.0 MAJOR DEPRESSIVE DISORDER, RECURRENT EPISODE, MILD (H): Primary | ICD-10-CM

## 2024-07-02 PROCEDURE — 99214 OFFICE O/P EST MOD 30 MIN: CPT | Mod: 95

## 2024-07-02 RX ORDER — GABAPENTIN 300 MG/1
1500 CAPSULE ORAL
Qty: 150 CAPSULE | Refills: 3 | Status: SHIPPED | OUTPATIENT
Start: 2024-07-02

## 2024-07-02 RX ORDER — LAMOTRIGINE 200 MG/1
400 TABLET ORAL DAILY
Qty: 60 TABLET | Refills: 3 | Status: SHIPPED | OUTPATIENT
Start: 2024-07-02

## 2024-07-02 RX ORDER — SERTRALINE HYDROCHLORIDE 100 MG/1
150 TABLET, FILM COATED ORAL DAILY
Qty: 45 TABLET | Refills: 3 | Status: SHIPPED | OUTPATIENT
Start: 2024-07-02

## 2024-07-02 ASSESSMENT — PATIENT HEALTH QUESTIONNAIRE - PHQ9
SUM OF ALL RESPONSES TO PHQ QUESTIONS 1-9: 2
SUM OF ALL RESPONSES TO PHQ QUESTIONS 1-9: 2
10. IF YOU CHECKED OFF ANY PROBLEMS, HOW DIFFICULT HAVE THESE PROBLEMS MADE IT FOR YOU TO DO YOUR WORK, TAKE CARE OF THINGS AT HOME, OR GET ALONG WITH OTHER PEOPLE: NOT DIFFICULT AT ALL

## 2024-07-02 ASSESSMENT — PAIN SCALES - GENERAL: PAINLEVEL: NO PAIN (0)

## 2024-07-02 NOTE — PROGRESS NOTES
Virtual Visit Details    Type of service:  Video Visit   Video Start Time:  1000  Video End Time: 1030    Originating Location (pt. Location): Home  Distant Location (provider location):  On-site  Platform used for Video Visit: [employer]

## 2024-07-02 NOTE — NURSING NOTE
Current patient location: [address]    [place] [address]    Is the patient currently in the state of [place]? YES    Visit mode:VIDEO    If the visit is dropped, the patient can be reconnected by: VIDEO VISIT: Send to e-mail at: [email or web address]    Will anyone else be joining the visit? NO  (If patient encounters technical issues they should call [phone]    How would you like to obtain your AVS? MyChart    Are changes needed to the allergy or medication list? No    Are refills needed on medications prescribed by this physician? NO    Reason for visit: ISAIAS CHAMPION

## 2024-07-02 NOTE — PATIENT INSTRUCTIONS
**For crisis resources, please see the information at the end of this document**   Patient Education    Thank you for coming to the [facility] [place] [facility].     Lab Testing:  If you had lab testing today and your results are reassuring or normal they will be mailed to you or sent through [email or web address] within 7 days. If the lab tests need quick action we will call you with the results. The phone number we will call with results is # [phone]. If this is not the best number please call our clinic and change the number.     Medication Refills:  If you need any refills please call your pharmacy and they will contact us. Our fax number for refills is [phone].   Three business days of notice are needed for general medication refill requests.   Five business days of notice are needed for controlled substance refill requests.   If you need to change to a different pharmacy, please contact the new pharmacy directly. The new pharmacy will help you get your medications transferred.     Contact Us:  Please call [phone] during business hours (8-5:00 M-F).   If you have medication related questions after clinic hours, or on the weekend, please call [phone].     Financial Assistance [phone]   Medical Records [phone]       MENTAL HEALTH CRISIS RESOURCES:  For a emergency help, please call 911 or go to the nearest Emergency Department.     Emergency Walk-In Options:   EmPATH Unit @ [facility] ([place]): [phone] - Specialized mental health emergency area designed to be calming  [facility] ([place]): [phone]  [facility] ([place]): [phone]  [facility]): [phone]    [facility] Crisis Information:   [place]: [phone]  [place]: [phone]  [place] (ANGELA) - Adult: [phone]     Child: [phone]  [place] - Adult: [phone]     Child: [phone]  [place]:  [phone]  List of all [facility] resources:   https://[place].gov/dhs/people-we-serve/adults/health-care/mental-health/resources/crisis-contacts.jsp    National Crisis Information:   Crisis Text Line: Text  [place]  to 136309  Suicide & Crisis Lifeline: 988  National Suicide Prevention Lifeline: 8-651-304-TALK (1-765.670.4352)       For online chat options, visit https://suicidepreventionlifeline.org/chat/  Poison Control Center: 9-672-096-3104  Trans Lifeline: 3-184-734-8699 - Hotline for transgender people of all ages  The Darius Project: 7-655-677-2488 - Hotline for LGBT youth     For Non-Emergency Support:   Fast Tracker: Mental Health & Substance Use Disorder Resources -   https://www.[employer].org/

## 2024-08-10 ENCOUNTER — HEALTH MAINTENANCE LETTER (OUTPATIENT)
Age: 54
End: 2024-08-10

## 2024-10-08 ENCOUNTER — VIRTUAL VISIT (OUTPATIENT)
Dept: PSYCHIATRY | Facility: CLINIC | Age: 54
End: 2024-10-08
Attending: STUDENT IN AN ORGANIZED HEALTH CARE EDUCATION/TRAINING PROGRAM
Payer: MEDICARE

## 2024-10-08 VITALS — WEIGHT: 154.32 LBS | BODY MASS INDEX: 23.46 KG/M2

## 2024-10-08 DIAGNOSIS — F51.01 PRIMARY INSOMNIA: ICD-10-CM

## 2024-10-08 DIAGNOSIS — F33.0 MAJOR DEPRESSIVE DISORDER, RECURRENT EPISODE, MILD (H): ICD-10-CM

## 2024-10-08 DIAGNOSIS — F50.00 ANOREXIA NERVOSA IN REMISSION (H): ICD-10-CM

## 2024-10-08 DIAGNOSIS — F41.1 GAD (GENERALIZED ANXIETY DISORDER): ICD-10-CM

## 2024-10-08 DIAGNOSIS — F43.10 PTSD (POST-TRAUMATIC STRESS DISORDER): Primary | ICD-10-CM

## 2024-10-08 PROCEDURE — 99214 OFFICE O/P EST MOD 30 MIN: CPT | Mod: 95

## 2024-10-08 RX ORDER — SERTRALINE HYDROCHLORIDE 100 MG/1
150 TABLET, FILM COATED ORAL DAILY
Qty: 45 TABLET | Refills: 3 | Status: SHIPPED | OUTPATIENT
Start: 2024-10-08

## 2024-10-08 RX ORDER — GABAPENTIN 300 MG/1
1500 CAPSULE ORAL
Qty: 150 CAPSULE | Refills: 3 | Status: SHIPPED | OUTPATIENT
Start: 2024-10-08

## 2024-10-08 RX ORDER — LAMOTRIGINE 200 MG/1
400 TABLET ORAL DAILY
Qty: 60 TABLET | Refills: 3 | Status: SHIPPED | OUTPATIENT
Start: 2024-10-08

## 2024-10-08 ASSESSMENT — PAIN SCALES - GENERAL: PAINLEVEL: NO PAIN (0)

## 2024-10-08 NOTE — NURSING NOTE
Current patient location: Patient declined to provide     Is the patient currently in the state of [place]? YES    Visit mode:VIDEO    If the visit is dropped, the patient can be reconnected by: VIDEO VISIT: Text to cell phone:   Telephone Information:   Mobile [phone]       Will anyone else be joining the visit? NO  (If patient encounters technical issues they should call [phone])    Are changes needed to the allergy or medication list? No    Are refills needed on medications prescribed by this physician? NO    Rooming Documentation:  Questionnaire(s) completed    Reason for visit: Video Visit (Recheck)    Gabriela CHAMPION

## 2024-10-08 NOTE — PATIENT INSTRUCTIONS
**For crisis resources, please see the information at the end of this document**   Patient Education    Thank you for coming to the [facility] [place] [facility].     Lab Testing:  If you had lab testing today and your results are reassuring or normal they will be mailed to you or sent through Billdesk within 7 days. If the lab tests need quick action we will call you with the results. The phone number we will call with results is # [phone]. If this is not the best number please call our clinic and change the number.     Medication Refills:  If you need any refills please call your pharmacy and they will contact us. Our fax number for refills is [phone].   Three business days of notice are needed for general medication refill requests.   Five business days of notice are needed for controlled substance refill requests.   If you need to change to a different pharmacy, please contact the new pharmacy directly. The new pharmacy will help you get your medications transferred.     Contact Us:  Please call [phone] during business hours (8-5:00 M-F).   If you have medication related questions after clinic hours, or on the weekend, please call [phone].     Financial Assistance [phone]   Medical Records [phone]       MENTAL HEALTH CRISIS RESOURCES:  For a emergency help, please call 911 or go to the nearest Emergency Department.     Emergency Walk-In Options:   EmPATH Unit @ [facility] ([place]): [phone] - Specialized mental health emergency area designed to be calming  [facility] ([place]): [phone]  [facility] ([place]): [phone]  [facility]): [phone]    [facility] Crisis Information:   [place]: [phone]  [place]: [phone]  [place] (ANGELA) - Adult: [phone]     Child: [phone]  [place] - Adult: [phone]     Child: [phone]  [place]:  [phone]  List of all [facility] resources:   https://[place].gov/dhs/people-we-serve/adults/health-care/mental-health/resources/crisis-contacts.jsp    National Crisis Information:   Crisis Text Line: Text  [place]  to 355884  Suicide & Crisis Lifeline: 988  National Suicide Prevention Lifeline: 4-300-056-TALK (1-125.609.1680)       For online chat options, visit https://suicidepreventionlifeline.org/chat/  Poison Control Center: 7-944-931-2736  Trans Lifeline: 1-986-470-4399 - Hotline for transgender people of all ages  The Darius Project: 6-310-291-4335 - Hotline for LGBT youth     For Non-Emergency Support:   Fast Tracker: Mental Health & Substance Use Disorder Resources -   https://www.[employer].org/

## 2024-10-08 NOTE — PROGRESS NOTES
[facility]  MEDICAL PROGRESS NOTE     CARE TEAM:    PCP- Lavonne Zarate  Therapist- None currently.   Cardiology- Dr. Galarza, Endo- Dr. Handley.     Keisha is a 54 year old who uses the pronouns she, her, hers.      Diagnoses     Major depressive disorder, recurrent, mild-in partial remission   Primary insomnia   Anorexia nervosa, in remission  PTSD     Assessment     Keisha Somers is a 52 yo F with past psychiatric diagnosis of anorexia nervosa at some point needing a feeding tube, major depressive disorder, sleep disorder and significant medical diagnosis that includes POTS (postural orthostatic tachycardia syndrome), migraines, here today for a follow-up visit.       Overall, Keisha's mood, trauma-like symptoms have remained largely unchanged and stable since our last visit, attributing this to her current medications helping, despite recent challenges with medical issues, which she had continued tolerating well without noticing side effects.     We discussed medication indications, side effects, risks vs benefits and alternatives, and given the above information, we discussed not making changes today, which is a reasonable option as well as Keisha's preference. Recommended to reach out to clinic if she has any questions or concerns, otherwise we plan on meeting in about 12 weeks from now.     No acute safety concerns or other questions or concerns today.     Future considerations  -Consider optimizing Zoloft better target anxiety and continue helping with mood (last moderate-severe depressive episode >5 years ago per Dr. Tena's notes).  -Consider decreasing/tapering gabapentin if/once anxiety is more stable .    -Could cautiously (ideally would need to consult with cards before due to POTS) add an alpha blocker to help with PTSD-sleep disturbances as well with BP  -Consider repeating Sleep study. Consider continue offering CBT-I.        Psychotropic Drug Interactions:    ADDITIVE SEROTONERGIC: Zolonoft, Maxalt, ondansetron  ADDITIVE QTc: Zoloft, ondansetron   ADDITIVE CNS/RESPIRATORY DEPRESSION: Gabapentin, Lamictal, tizanidine  Management: routine monitoring, ongoing medication simplification, limited use of zofran and patient aware of risks.     [place]: indicates that controlled prescriptions have been filled as prescribed (gabapentin).     Risk Statements:   Treatment Risk- Risks, benefits, alternatives and potential adverse effects have been discussed and are understood.   Safety Risk-Keisha did not appear to be an imminent safety risk to self or others.     Plan     1) Medications:   -Continue Zoloft 150 mg PO qDay Helps as well as Lamictal with mood.   -Continue Lamictal 400 mg daily   -Continue Gabapentin 1500 QHS PRN  (helps some with sleep;  Added for hot flashes, works). Most recent Creatinine/GFR WNL.Takes       Per other prescribers PCP, cardiology, GI):  1. Synthroid 150 mcg - she is uncertain of dose.    2. Zofran prn migraines  3. Midodrine 11/16/23  - changed from Dr. Galarza   4. Carafate  5. Tizanidine - 4x/day, last dose 9pm for migraines-one in am , one noo,one at night.  6. Fluorinef- 0.2 mg daily - moved to evening  7. Maxalt for migraines with ibuprofen.  It works very well.    8. Mg-   9. Vit B2  10. Atorvastatin    2) Psychotherapy:   None currently, continue offering.     3) Next due:  Labs- Last obtained 09/2023, will defer further lipid/CBC monitoring to PCP. Routine monitoring is not indicated for current psychotropic medication regimen. Creatinine/GFR WNL  EKG- Last obtained 06/2021,  ms. Follows-up with cardiology (POTS), will defer further monitoring to them.   Rating scales-  PHQ9, GAD7    4) Referrals: none    5) Other:   None     6) Follow-up: Return to clinic in 12  weeks approximately, with writer or before if needed.      Pertinent Background                                            "        [most recent eval 08/21/23]     Keisha first experienced MH symptoms since HS, she started experiencing eating disorder that worsened through her 20, severe, at one point needing a feeding tube, leading to multiple hospitalizations. Also experienced trauma x2 as well as emotional trauma growing up. She reports experiencing depression and post-traumatic symptoms as well since around the same age.   Last depressive episode >5 years.   Has been on Focalin for several years, per Dr. Tena notes \"Beneficial for energy, mood and no indications of problematic use. Helps with concentration. Started around >25 years in context of eating disorder.\"    On 1/25/24 we discussed use of stimulant, which she reported had been on for over >25 years, started to help with binge eating/eating disorder. Given that her reported eating disorder had not been a concern over past years we discussed recommendation for discontinuing Focalin and since she has reported noticing anxiety being stable/improved and not noticing other changes or differences in mood, appetite/eating pattern or overall functioning.      Pertinent items include: trauma hx, eating disorder , mutiple psychotropic trials , psych hosp , and major medical problems     Subjective     Since our last visit on 7/2/24:  Updates:   -Shares had an \"eventful week.\" Has hx of kidney stones and last week had been having pain. Went to the urgency room 1-2 weeks ago. Feeling better now.   -Took antibiotics for a few days for UTI.   -No other updates from her care team. Has upcoming cardio apt in December.   -Denies any new medications.   -Denies noticing any new side effects.     -Mood: \"I'm ok\"   -Anxiety: Denies worries out of the ordinary.   -Appetite:Eating ok.   -Weight changes: Denies noticing any weight changes  -Sleep: Sleeping \"ok\"  Average gets 6 hrs on average.   -POTS dx can make her tired during the day. Reports has had a few symptoms related to orthostatic changes " "(lightheadedness) last time was 3 weeks ago. No falls/head trauma.   -BM/constipation: Denies constipation   -Tremor/abnormal movements: Denies any abnormal movements.   -Denies  Chest pain, palpitations. Other side effects: Denies   -Perceptual disturbances: Denies AH/VH   -Safety: Denies SI/HI.     Recent Psych Symptoms:   Depression:  low energy  Elevated:  none  Psychosis:  none  Anxiety:  Denies worries out of the ordinary  Trauma Related:  none. Reports baseline sleep disturbances, denies recent/current trauma-like symptoms   Insomnia:  Sleep issues as above   Other:  No    Current Social History:  Living situation/income: (partner, children, pets, etc): Apartment building \"customized living. [place] Has lived there for over 14 years now, likes it.   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 SSD  Feels safe at home: Yes    Pertinent Substance Use:   Alcohol: No   Cannabis: No  Tobacco: No  Caffeine:  No   Opioids: No   Narcan Kit current: N/A  Other substances:  Denies     Medical Review of Systems:   Lightheadedness/orthostasis: Yes, POTS dx, follows with cards.   Headaches: Yes, migraines dx. On average 1 episode ~q/2-3 months.   GI: None  Sexual health concerns: None    A comprehensive review of systems was performed and is negative other than noted above.     Mental Status Exam     Mental Status Exam:  Appearance: awake, alert and adequately groomed  Attitude:  cooperative and friendly  Eye Contact:  good  Mood:  \"good\"   Affect:  appropriate and in normal range and mood congruent  Speech:  clear, coherent and normal prosody  Language: fluent and intact in English  Psychomotor, Gait, Musculoskeletal:  no evidence of tardive dyskinesia, dystonia, or tics  Throught Process:  logical, linear, and goal oriented  Associations:  no loose associations  Thought Content:  no evidence of suicidal ideation or homicidal ideation. No evidence of perceptual disturbances. "   Insight:  good  Judgement:  intact  Oriented to:  time, person, and place  Attention Span and Concentration:  intact  Recent and Remote Memory:  intact  Fund of Knowledge:  appropriate     Past Psych Med Trials        Medication Max Dose (mg) Dates / Duration Helpful? DC Reason / Adverse Effects?   Lamictal        Zoloft        Focalin   ~25 years -stopped Jamuary 2024  Started ~25 years ago possible for binge-eating. Currently not clear indication concerns for some elevated BP and overall  risks outweighing potential benefits   Lunesta       Ritalin SR 20 mg BID ~2007     Remeron  7.5 mg       Seroquel   mg qhs      Ambien 10 mg  ~2007     Prozac 80 mg  ~2007          Treatment Course and Anne Events since  JULY 2023 8/21/23: Transfer of care  from Dr. Tena's patient's panel..   11/21/23.Decrease Focalin from 10 mg  (reported being beneficial for energy, mood and no indications of problematic use)Started around >25 years in context of eating disorder.  to 5 mg daily. Goal is to taper off due to not clear indication concerns for some elevated BP and overall  risks outweighing potential benefits  1/25/2024: Discontinued Focalin.  4/26/2024: Continue offering CBT-I, sleep study. No disordered eating concerns or other concerns with discontinuation of Focalin.   7/2/2024: No changes.   10/8/24: No changes.         Vitals     Pulse Readings from Last 3 Encounters:   01/25/24 109   11/21/23 72   11/16/23 79     Wt Readings from Last 3 Encounters:   11/16/23 68 kg (150 lb)   09/26/19 64 kg (141 lb 3.2 oz)   07/23/19 62.1 kg (137 lb)     BP Readings from Last 3 Encounters:   05/09/24 129/67   01/25/24 125/85   11/21/23 137/85         Medical History     ALLERGIES: Patient has no known allergies.    Patient Active Problem List   Diagnosis    Major depressive disorder, recurrent episode, moderate (H)    Anorexia nervosa (H)    Iron deficiency anemia    Skin lesion    Syncope and collapse    Postural orthostatic  tachycardia syndrome    Hypothyroidism    Macular degeneration    Migraine headache    Mixed hearing loss, unilateral    Osteopenia    Osteoporosis    PTSD (post-traumatic stress disorder)    Pyelonephritis    S/P ORIF (open reduction internal fixation) fracture    Secondary hyperparathyroidism (H)    Stage 3a chronic kidney disease (H)      Medications   Focalin discontinued today  Current Outpatient Medications   Medication Sig Dispense Refill    acetaminophen (TYLENOL) 500 MG tablet Take 500-1,000 mg by mouth every 6 hours as needed.      atorvastatin (LIPITOR) 10 MG tablet Take by mouth daily Per pt takes this medicine but doesn't know the amount      atorvastatin (LIPITOR) 40 MG tablet       Blood Pressure Monitor KIT Patient should be evaluated in the emergency department if her systolic blood pressure <65 1 kit 0    Calcium Carbonate-Vitamin D (CALCIUM 600+D PO) Take 1 tablet by mouth 2 times daily.      Cholecalciferol (VITAMIN D PO) Take  by mouth. 50, 000 units every 14 days      docusate sodium (COLACE) 100 MG capsule Take 1 capsule by mouth 2 times daily.      ferrous sulfate 140 (45 Fe) MG TBCR CR tablet Take 1 tablet by mouth 2 times daily      fludrocortisone (FLORINEF) 0.1 MG tablet TAKE 2 TABLETS (0.2MG) BY MOUTH DAILY 180 tablet 2    gabapentin (NEURONTIN) 300 MG capsule Take 5 capsules (1,500 mg) by mouth nightly as needed (sleep) 150 capsule 3    ibuprofen (ADVIL/MOTRIN) 800 MG tablet Take 800 mg by mouth every 8 hours as needed       lamoTRIgine (LAMICTAL) 200 MG tablet Take 2 tablets (400 mg) by mouth daily 60 tablet 3    levothyroxine (SYNTHROID, LEVOTHROID) 175 MCG tablet Take  by mouth daily.      magnesium 200 MG TABS Take 1 tablet by mouth daily      metoclopramide (REGLAN) 10 MG tablet 2 times daily       midodrine (PROAMATINE) 5 MG tablet Take 2 tablets (10 mg) by mouth 3 times daily 180 tablet 11    multivitamin (OCUVITE) TABS Take 1 tablet by mouth 2 times daily.      ondansetron  (ZOFRAN) 4 MG tablet Take 1 pill for nausea at onset of headache, repeat if needed every 8 hours      potassium chloride ER (K-TAB/KLOR-CON) 10 MEQ CR tablet       rizatriptan (MAXALT-MLT) 10 MG ODT DISSOLVE 1 TABLET BY MOUTH AT ONSET OF MIGRAINE (MAY REPEAT IN 2 HOURS IF NEEDED      sertraline (ZOLOFT) 100 MG tablet Take 1.5 tablets (150 mg) by mouth daily 45 tablet 3    tiZANidine (ZANAFLEX) 2 MG tablet TAKE 1 TABLET BY MOUTH EVERY MORNING, TAKE 1 TABLET EVERY EVENING, AND TAKE 2 TABLETS BY MOUTH EVERY NIGHT AT BEDTIME      vitamin B-2 (RIBOFLAVIN) 100 MG TABS tablet Take 4 tablets by mouth daily          Labs and Data         11/21/2023     9:13 AM 7/2/2024     9:41 AM 10/3/2024    10:10 AM   PROMIS-10 Total Score w/o Sub Scores   PROMIS TOTAL - SUBSCORES 32 31 31          No data to display                  11/21/2023     9:03 AM 1/25/2024    10:38 AM 7/2/2024     9:39 AM   PHQ-9 SCORE   PHQ-9 Total Score MyChart 3 (Minimal depression) 2 (Minimal depression) 2 (Minimal depression)   PHQ-9 Total Score 3 2 2          No data to display                 Per care everywhere October-November 2023:     Vitamin D 25-Hydroxy, Total  Order: [id number]  Component  Ref Range & Units 1 mo ago   Vitamin D, 25-OH, Total  30 - 80 ng/mL 50   Resulting [facility]          Contains abnormal data TSH  Order: [id number]  Component  Ref Range & Units 3 wk ago   TSH, Sensitive  0.30 - 4.50 uIU/mL 4.74 High    Resulting [facility]         Lipid Panel (Expected: 90 days)  Order: [id number]  Component  Ref Range & Units 1 mo ago Resulting Agency   Cholesterol  0 - 199 mg/dL 202 High  [facility]   Triglyceride  <=149 mg/dL 68 [facility]   HDL Cholesterol  >=40 mg/dL 66 [facility]   LDL, Calculated  <130 mg/dL 122 [facility]   Non HDL Chol, Calculated  <=159 mg/dL 136 [facility]   Cholesterol/HDL Ratio 3.1  [facility]   Hours Fasting  8 - 12 Hours 12.0 [facility]     Basic Metabolic Panel  Order: [id number]  Component  Ref Range & Units 1 mo ago Resulting Agency   Sodium  136 - 145 mmol/L 144 [facility]   Potassium  3.5 - 5.1 mmol/L 3.4 Low  [facility]   Chloride  98 - 109 mmol/L 111 High  [facility]   CO2  20 - 29 mmol/L 23 [facility]   Anion Gap  7 - 16 mmol/L 10 [facility]   Calcium  8.4 - 10.4 mg/dL 9.5 [facility]   BUN  7 - 26 mg/dL 14 [facility]   Creatinine  0.55 - 1.02 mg/dL 0.80 [facility]   Glucose  70 - 100 mg/dL 107 High  [facility]   Comment: The given reference range is for the fasting state. Non-fasting reference range for glucose is 70 - 180 mg/dL.   GFR, Estimated  >60 mL/min/1.73m2 >60 [facility]   Hours Fasting  8 - 12 Hours 12.0 [facility]          ======================  PROVIDER: Navid Cano MD    Level of Medical Decision Making:   - At least 1 chronic problem that is not stable  - Engaged in prescription drug management during visit (discussed any medication benefits, side effects, alternatives, etc.)      Psychiatry Individual Psychotherapy Note   None    Patient staffed in clinic with Dr. Carmichael who will sign the note.  Supervisor is Dr. Molina.

## 2024-10-08 NOTE — PROGRESS NOTES
Virtual Visit Details    Type of service:  Video Visit   Video Start Time:  1000  Video End Time: 1030    Originating Location (pt. Location): Home  Distant Location (provider location):  On-site  Platform used for Video Visit: Snaapiq    Answers submitted by the patient for this visit:  Patient Health Questionnaire (Submitted on 10/8/2024)  If you checked off any problems, how difficult have these problems made it for you to do your work, take care of things at home, or get along with other people?: Not difficult at all  PHQ9 TOTAL SCORE: 2

## 2024-11-20 DIAGNOSIS — I95.1 ORTHOSTATIC HYPOTENSION: ICD-10-CM

## 2024-11-22 NOTE — TELEPHONE ENCOUNTER
fludrocortisone (FLORINEF) 0.1 MG   Last Written Prescription Date:  3/25/24  Last Fill Quantity: 180,   # refills: 2  Last Office Visit : 5/9/24  Future Office visit:  12/3/24  Routing refill request to provider for review/approval because:  Drug not on the refill protocol

## 2024-11-25 RX ORDER — FLUDROCORTISONE ACETATE 0.1 MG/1
TABLET ORAL
Qty: 60 TABLET | Refills: 0 | Status: SHIPPED | OUTPATIENT
Start: 2024-11-25

## 2024-12-03 ENCOUNTER — VIRTUAL VISIT (OUTPATIENT)
Dept: CARDIOLOGY | Facility: CLINIC | Age: 54
End: 2024-12-03
Attending: INTERNAL MEDICINE
Payer: MEDICARE

## 2024-12-03 VITALS — WEIGHT: 148 LBS | HEIGHT: 68 IN | BODY MASS INDEX: 22.43 KG/M2

## 2024-12-03 DIAGNOSIS — I95.1 ORTHOSTATIC HYPOTENSION: ICD-10-CM

## 2024-12-03 ASSESSMENT — PAIN SCALES - GENERAL: PAINLEVEL_OUTOF10: NO PAIN (1)

## 2024-12-03 NOTE — PATIENT INSTRUCTIONS
You were seen in the Electrophysiology Clinic today by: Dr. Galarza    Plan:   Medication Changes: None.     Follow up Visit: video visit with Dr. Galarza in 6 - 8 months.       If you have further questions, please utilize [employer] to contact us.     Your Care Team:    [facility]   Telephone Number     Nurse Line  Oracio John RN    [phone]     For scheduling appointments:   [facility]   [phone]   For procedure scheduling:    Etelvina Donnelly     [phone]   For the Device Clinic (Pacemakers, ICDs, Loop Recorders)    During business hours: [phone]  After business hours:   [phone]- select option 4 and ask for job code [id number].       On-call cardiologist for after hours or on weekends:   [phone], option #4, and ask to speak to the on-call cardiologist.     Cardiovascular Clinic:   [address] [place] [address]. [place] [address]      As always, Thank you for trusting us with your health care needs!

## 2024-12-03 NOTE — NURSING NOTE
Patient declined medication review due to to changes.         Current patient location: [address]    [facility] [address]    Is the patient currently in the state of [place]? YES    Visit mode:TELEPHONE    If the visit is dropped, the patient can be reconnected by:TELEPHONE VISIT: Phone number:   Telephone Information:   Mobile [phone]       Will anyone else be joining the visit? NO  (If patient encounters technical issues they should call [phone]    Are changes needed to the allergy or medication list? Pt declined med review and Pt stated no med changes    Are refills needed on medications prescribed by this physician? NO    Rooming Documentation:  Questionnaire(s) completed    Reason for visit: ISAIAS SMITHF

## 2024-12-03 NOTE — LETTER
12/3/2024      RE: Keisha Somers  [address]  [address]  [facility] [address]       Dear Colleague,    Thank you for the opportunity to participate in the care of your patient, Keisha Somers, at the [facility] HEART [facility] at [facility]. Please see a copy of my visit note below.    Virtual Visit Details    Type of service:  Telephone Visit   HPI:   Keisha is a 54-year-old woman with persistent autonomic dysfunction requiring being treated with midodrine and fludrocortisone. A after prior visit we made adjustment to the timing of her midodrine, pushing it later in the day.  This seems to be helpful.    Since her last visit Keisha has had no new cardiovascular complaints.  She does indicate that she had a fall last night which sounds to be postural in nature and not atypical for her past symptoms.    Keisha does not need any new prescriptions at this time.  I indicated that she should call us anytime if there is a change in her symptoms or if she needs more medications.  She voiced understanding and agreement.    PAST MEDICAL HISTORY:  Past Medical History:   Diagnosis Date     Anemia      Anorexia      Depression      Eating disorder      GERD (gastroesophageal reflux disease)      Hydronephrosis      Hypercholesterolemia      Hypotension      Hypotension, postural      Palpitations      POTS (postural orthostatic tachycardia syndrome)      Syncope      Syncope and collapse      Thyroid disease        CURRENT MEDICATIONS:  Current Outpatient Medications   Medication Sig Dispense Refill     acetaminophen (TYLENOL) 500 MG tablet Take 500-1,000 mg by mouth every 6 hours as needed.       atorvastatin (LIPITOR) 10 MG tablet Take by mouth daily Per pt takes this medicine but doesn't know the amount       atorvastatin (LIPITOR) 40 MG tablet        Blood Pressure Monitor KIT Patient should be evaluated in the emergency department if her systolic blood pressure <65 1  kit 0     Calcium Carbonate-Vitamin D (CALCIUM 600+D PO) Take 1 tablet by mouth 2 times daily.       Cholecalciferol (VITAMIN D PO) Take  by mouth. 50, 000 units every 14 days       docusate sodium (COLACE) 100 MG capsule Take 1 capsule by mouth 2 times daily.       ferrous sulfate 140 (45 Fe) MG TBCR CR tablet Take 1 tablet by mouth 2 times daily       fludrocortisone (FLORINEF) 0.1 MG tablet TAKE 2 TABLETS (0.2MG) BY MOUTH DAILY 60 tablet 0     gabapentin (NEURONTIN) 300 MG capsule Take 5 capsules (1,500 mg) by mouth nightly as needed (sleep). 150 capsule 3     ibuprofen (ADVIL/MOTRIN) 800 MG tablet Take 800 mg by mouth every 8 hours as needed        lamoTRIgine (LAMICTAL) 200 MG tablet Take 2 tablets (400 mg) by mouth daily. 60 tablet 3     levothyroxine (SYNTHROID, LEVOTHROID) 175 MCG tablet Take  by mouth daily.       magnesium 200 MG TABS Take 1 tablet by mouth daily       metoclopramide (REGLAN) 10 MG tablet 2 times daily        midodrine (PROAMATINE) 5 MG tablet Take 2 tablets (10 mg) by mouth 3 times daily 180 tablet 11     multivitamin (OCUVITE) TABS Take 1 tablet by mouth 2 times daily.       ondansetron (ZOFRAN) 4 MG tablet Take 1 pill for nausea at onset of headache, repeat if needed every 8 hours       potassium chloride ER (K-TAB/KLOR-CON) 10 MEQ CR tablet        rizatriptan (MAXALT-MLT) 10 MG ODT DISSOLVE 1 TABLET BY MOUTH AT ONSET OF MIGRAINE (MAY REPEAT IN 2 HOURS IF NEEDED       sertraline (ZOLOFT) 100 MG tablet Take 1.5 tablets (150 mg) by mouth daily. 45 tablet 3     tiZANidine (ZANAFLEX) 2 MG tablet TAKE 1 TABLET BY MOUTH EVERY MORNING, TAKE 1 TABLET EVERY EVENING, AND TAKE 2 TABLETS BY MOUTH EVERY NIGHT AT BEDTIME       vitamin B-2 (RIBOFLAVIN) 100 MG TABS tablet Take 4 tablets by mouth daily         PAST SURGICAL HISTORY:  Past Surgical History:   Procedure Laterality Date     CYSTOSCOPY W/ LASER LITHOTRIPSY Left 1/23/2017    Procedure: CYSTOSCOPY, LEFT URETEROSCOPY HOLMIUM LASER LITHOTRIPSY  "AND LEFT STENT PLACEMENT BILATERAL RETROGRADE PYELOGRAM ;  Surgeon: Jenniffer Romero MD;  Location: [facility] OR;  Service:      GASTROSTOMY, INSERT TUBE, COMBINED       GT placed[  2001     IR MISCELLANEOUS PROCEDURE  6/9/2004     PICC INSERTION - DOUBLE LUMEN  6/18/2021          TONSILLECTOMY         ALLERGIES:   No Known Allergies    FAMILY HISTORY:  None pertinent    SOCIAL HISTORY:  Social History     Tobacco Use     Smoking status: Never     Smokeless tobacco: Never   Vaping Use     Vaping status: Never Used   Substance Use Topics     Alcohol use: No     Drug use: No       ROS:   Constitutional: No fever, chills, or sweats. Weight stable.   ENT: No visual disturbance, ear ache, epistaxis, sore throat.   Cardiovascular: As per HPI.   Respiratory: No cough, hemoptysis.    GI: No nausea, vomiting, .   : No hematuria.   Integument: Negative.   Psychiatric: Negative.   Hematologic:  no easy bleeding.  Neuro: Negative.   Endocrinology: No significant heat or cold intolerance   Musculoskeletal: No myalgia.    Exam:  Ht 1.727 m (5' 8\")   Wt 67.1 kg (148 lb)   BMI 22.50 kg/m    GENERAL alert and no distress    RESPIRATORY: no rales, rhonchi or wheezes, no use of accessory muscles, no retractions, normal respiratory rate  CARDIOVASCULAR: regular rhythm, normal S1 with physiologic split S2, no S3 or S4 and no murmur, click or rub, precordium quiet with normal PMI.  ABDOMEN: soft, non tender by report   EXTREMITIES: no edema by report  NEURO: alert and oriented to person/place/time, normal speech, gait and affect  SKIN: no ecchymoses, no rashes by report    Labs:  CBC RESULTS:   Lab Results   Component Value Date    WBC 14.6 (H) 06/22/2021    WBC 5.0 07/24/2017    RBC 3.18 (L) 06/22/2021    RBC 3.56 (L) 07/24/2017    HGB 9.9 (L) 06/22/2021    HGB 10.8 (L) 07/24/2017    HCT 30.4 (L) 06/22/2021    HCT 33.7 (L) 07/24/2017    MCV 96 06/22/2021    MCV 95 07/24/2017    MCH 31.1 06/22/2021    MCH 30.3 07/24/2017 " "   MCHC 32.6 06/22/2021    MCHC 32.0 07/24/2017    RDW 13.1 06/22/2021    RDW 13.1 07/24/2017     06/22/2021     07/24/2017       BMP RESULTS:  Lab Results   Component Value Date     06/22/2021     07/24/2017    POTASSIUM 4.0 06/22/2021    POTASSIUM 3.5 07/24/2017    CHLORIDE 108 (H) 06/22/2021    CHLORIDE 106 07/24/2017    CO2 26 06/22/2021    CO2 28 07/24/2017    ANIONGAP 7 06/22/2021    ANIONGAP 8 07/24/2017    GLC 96 06/22/2021     (H) 07/24/2017    BUN 11 06/22/2021    BUN 7 07/24/2017    CR 0.82 06/22/2021    CR 0.83 07/24/2017    GFRESTIMATED >60 06/22/2021    GFRESTIMATED 74 07/24/2017    GFRESTBLACK >60 06/22/2021    GFRESTBLACK 89 07/24/2017    KENNETH 9.1 06/22/2021    KENNETH 8.7 07/24/2017        INR RESULTS:  No results found for: \"INR\"    Procedures:  PULMONARY FUNCTION TESTS:        No data to display                  ECHOCARDIOGRAM:   No results found for this or any previous visit (from the past 8760 hours).      Assessment and Plan:   1.  Autonomic dysfunction clinically stable--1 fall since last visit    Plan  1.  Continue current medications  2.  Video follow-up 6 to 8 months unless patient calls earlier    I very much appreciated the opportunity to see and assess Keisha Somers in the clinic today t. Please do not hesitate to contact my office if you have any questions or concerns.      Thang Galarza MD  Cardiac Arrhythmia Service  [facility]  [phone]   CC  THANG GALARZA      Please do not hesitate to contact me if you have any questions/concerns.     Sincerely,     Thang Galarza MD  "

## 2024-12-03 NOTE — PROGRESS NOTES
Virtual Visit Details    Type of service:  Telephone Visit   HPI:   Keisha is a 54-year-old woman with persistent autonomic dysfunction requiring being treated with midodrine and fludrocortisone. A after prior visit we made adjustment to the timing of her midodrine, pushing it later in the day.  This seems to be helpful.    Since her last visit Keisha has had no new cardiovascular complaints.  She does indicate that she had a fall last night which sounds to be postural in nature and not atypical for her past symptoms.    Keisha does not need any new prescriptions at this time.  I indicated that she should call us anytime if there is a change in her symptoms or if she needs more medications.  She voiced understanding and agreement.    PAST MEDICAL HISTORY:  Past Medical History:   Diagnosis Date    Anemia     Anorexia     Depression     Eating disorder     GERD (gastroesophageal reflux disease)     Hydronephrosis     Hypercholesterolemia     Hypotension     Hypotension, postural     Palpitations     POTS (postural orthostatic tachycardia syndrome)     Syncope     Syncope and collapse     Thyroid disease        CURRENT MEDICATIONS:  Current Outpatient Medications   Medication Sig Dispense Refill    acetaminophen (TYLENOL) 500 MG tablet Take 500-1,000 mg by mouth every 6 hours as needed.      atorvastatin (LIPITOR) 10 MG tablet Take by mouth daily Per pt takes this medicine but doesn't know the amount      atorvastatin (LIPITOR) 40 MG tablet       Blood Pressure Monitor KIT Patient should be evaluated in the emergency department if her systolic blood pressure <65 1 kit 0    Calcium Carbonate-Vitamin D (CALCIUM 600+D PO) Take 1 tablet by mouth 2 times daily.      Cholecalciferol (VITAMIN D PO) Take  by mouth. 50, 000 units every 14 days      docusate sodium (COLACE) 100 MG capsule Take 1 capsule by mouth 2 times daily.      ferrous sulfate 140 (45 Fe) MG TBCR CR tablet Take 1 tablet by mouth 2 times daily      fludrocortisone  (FLORINEF) 0.1 MG tablet TAKE 2 TABLETS (0.2MG) BY MOUTH DAILY 60 tablet 0    gabapentin (NEURONTIN) 300 MG capsule Take 5 capsules (1,500 mg) by mouth nightly as needed (sleep). 150 capsule 3    ibuprofen (ADVIL/MOTRIN) 800 MG tablet Take 800 mg by mouth every 8 hours as needed       lamoTRIgine (LAMICTAL) 200 MG tablet Take 2 tablets (400 mg) by mouth daily. 60 tablet 3    levothyroxine (SYNTHROID, LEVOTHROID) 175 MCG tablet Take  by mouth daily.      magnesium 200 MG TABS Take 1 tablet by mouth daily      metoclopramide (REGLAN) 10 MG tablet 2 times daily       midodrine (PROAMATINE) 5 MG tablet Take 2 tablets (10 mg) by mouth 3 times daily 180 tablet 11    multivitamin (OCUVITE) TABS Take 1 tablet by mouth 2 times daily.      ondansetron (ZOFRAN) 4 MG tablet Take 1 pill for nausea at onset of headache, repeat if needed every 8 hours      potassium chloride ER (K-TAB/KLOR-CON) 10 MEQ CR tablet       rizatriptan (MAXALT-MLT) 10 MG ODT DISSOLVE 1 TABLET BY MOUTH AT ONSET OF MIGRAINE (MAY REPEAT IN 2 HOURS IF NEEDED      sertraline (ZOLOFT) 100 MG tablet Take 1.5 tablets (150 mg) by mouth daily. 45 tablet 3    tiZANidine (ZANAFLEX) 2 MG tablet TAKE 1 TABLET BY MOUTH EVERY MORNING, TAKE 1 TABLET EVERY EVENING, AND TAKE 2 TABLETS BY MOUTH EVERY NIGHT AT BEDTIME      vitamin B-2 (RIBOFLAVIN) 100 MG TABS tablet Take 4 tablets by mouth daily         PAST SURGICAL HISTORY:  Past Surgical History:   Procedure Laterality Date    CYSTOSCOPY W/ LASER LITHOTRIPSY Left 1/23/2017    Procedure: CYSTOSCOPY, LEFT URETEROSCOPY HOLMIUM LASER LITHOTRIPSY AND LEFT STENT PLACEMENT BILATERAL RETROGRADE PYELOGRAM ;  Surgeon: Jenniffer Romero MD;  Location: [facility];  Service:     GASTROSTOMY, INSERT TUBE, COMBINED      GT placed[  2001    IR MISCELLANEOUS PROCEDURE  6/9/2004    PICC INSERTION - DOUBLE LUMEN  6/18/2021         TONSILLECTOMY         ALLERGIES:   No Known Allergies    FAMILY HISTORY:  None pertinent    SOCIAL  "HISTORY:  Social History     Tobacco Use    Smoking status: Never    Smokeless tobacco: Never   Vaping Use    Vaping status: Never Used   Substance Use Topics    Alcohol use: No    Drug use: No       ROS:   Constitutional: No fever, chills, or sweats. Weight stable.   ENT: No visual disturbance, ear ache, epistaxis, sore throat.   Cardiovascular: As per HPI.   Respiratory: No cough, hemoptysis.    GI: No nausea, vomiting, .   : No hematuria.   Integument: Negative.   Psychiatric: Negative.   Hematologic:  no easy bleeding.  Neuro: Negative.   Endocrinology: No significant heat or cold intolerance   Musculoskeletal: No myalgia.    Exam:  Ht 1.727 m (5' 8\")   Wt 67.1 kg (148 lb)   BMI 22.50 kg/m    GENERAL alert and no distress    RESPIRATORY: no rales, rhonchi or wheezes, no use of accessory muscles, no retractions, normal respiratory rate  CARDIOVASCULAR: regular rhythm, normal S1 with physiologic split S2, no S3 or S4 and no murmur, click or rub, precordium quiet with normal PMI.  ABDOMEN: soft, non tender by report   EXTREMITIES: no edema by report  NEURO: alert and oriented to person/place/time, normal speech, gait and affect  SKIN: no ecchymoses, no rashes by report    Labs:  CBC RESULTS:   Lab Results   Component Value Date    WBC 14.6 (H) 06/22/2021    WBC 5.0 07/24/2017    RBC 3.18 (L) 06/22/2021    RBC 3.56 (L) 07/24/2017    HGB 9.9 (L) 06/22/2021    HGB 10.8 (L) 07/24/2017    HCT 30.4 (L) 06/22/2021    HCT 33.7 (L) 07/24/2017    MCV 96 06/22/2021    MCV 95 07/24/2017    MCH 31.1 06/22/2021    MCH 30.3 07/24/2017    MCHC 32.6 06/22/2021    MCHC 32.0 07/24/2017    RDW 13.1 06/22/2021    RDW 13.1 07/24/2017     06/22/2021     07/24/2017       BMP RESULTS:  Lab Results   Component Value Date     06/22/2021     07/24/2017    POTASSIUM 4.0 06/22/2021    POTASSIUM 3.5 07/24/2017    CHLORIDE 108 (H) 06/22/2021    CHLORIDE 106 07/24/2017    CO2 26 06/22/2021    CO2 28 07/24/2017    " "ANIONGAP 7 06/22/2021    ANIONGAP 8 07/24/2017    GLC 96 06/22/2021     (H) 07/24/2017    BUN 11 06/22/2021    BUN 7 07/24/2017    CR 0.82 06/22/2021    CR 0.83 07/24/2017    GFRESTIMATED >60 06/22/2021    GFRESTIMATED 74 07/24/2017    GFRESTBLACK >60 06/22/2021    GFRESTBLACK 89 07/24/2017    KENNETH 9.1 06/22/2021    KENNETH 8.7 07/24/2017        INR RESULTS:  No results found for: \"INR\"    Procedures:  PULMONARY FUNCTION TESTS:        No data to display                  ECHOCARDIOGRAM:   No results found for this or any previous visit (from the past 8760 hours).      Assessment and Plan:   1.  Autonomic dysfunction clinically stable--1 fall since last visit    Plan  1.  Continue current medications  2.  Video follow-up 6 to 8 months unless patient calls earlier    I very much appreciated the opportunity to see and assess Keisha Somers in the clinic today t. Please do not hesitate to contact my office if you have any questions or concerns.      Thang Galarza MD  Cardiac Arrhythmia Service  [facility]  [phone]   CC  THANG GALARZA    "

## 2024-12-26 DIAGNOSIS — I95.1 ORTHOSTATIC HYPOTENSION: ICD-10-CM

## 2024-12-31 RX ORDER — FLUDROCORTISONE ACETATE 0.1 MG/1
TABLET ORAL
Qty: 180 TABLET | Refills: 3 | Status: SHIPPED | OUTPATIENT
Start: 2024-12-31

## 2024-12-31 NOTE — TELEPHONE ENCOUNTER
: fludrocortisone (FLORINEF) 0.1 MG   Last Written Prescription Date:  11/25/24  Last Fill Quantity: 60,   # refills: 0  Last Office Visit : 12/3/24  Future Office visit:  7/24/25  Routing refill request to provider for review/approval because:  Drug not on the refill protocol

## 2025-01-06 NOTE — PROGRESS NOTES
[facility]  MEDICAL PROGRESS NOTE     CARE TEAM:    PCP- Lavonne Zarate  Therapist- None currently.   Cardiology- Dr. Galarza, Endo- Dr. Handley.     Keisha is a 54 year old who uses the pronouns she, her, hers.      Diagnoses     Major depressive disorder, recurrent, mild-in partial remission   Primary insomnia   Anorexia nervosa, in remission  PTSD     Assessment     Keisha Somers is a 54 yo F with past psychiatric diagnosis of anorexia nervosa at some point needing a feeding tube, major depressive disorder, sleep disorder and significant medical diagnosis that includes POTS (postural orthostatic tachycardia syndrome), migraines, here today for a follow-up visit.     Overall appears like Keisha's MH has continues being stable since our last visit and she attributes this to her current medications helping without noticing any new side effects, therefore, we will defer making medication changes today.     We discussed circadian rhythm-like disturbances and sleep hygiene interventions and she expressed motivation around incorporating recommendations to continue improved (baseline) sleep issues.      Anticipating writer finishing clinic by the end of current academic year, we started discussion about transferring care to a new provider, specifically option of starting in July transferring pt's care to incoming G3 resident in this clinic vs exploring other options for a more longitudinal care. Keisha expressed understanding of teaching clinic setting and given she has a good relationship with PCP, she expressed interest in transferring care to PCP (Dr. Zarate at [facility]) for medication management. Writer will coordinate care with Dr. Zarate. For now, plan is to continue working with this clinic until June.      No other questions or concerns today.     Future considerations  -Consider optimizing Zoloft better target anxiety and continue  helping with mood (last moderate-severe depressive episode >5 years ago per Dr. Tena's notes).  -Consider decreasing/tapering gabapentin if/once anxiety is more stable .    -Could cautiously (ideally would need to consult with cards before due to POTS) add an alpha blocker to help with PTSD-sleep disturbances as well with BP  -Consider repeating Sleep study. Consider continue offering CBT-I.       Psychotropic Drug Interactions:    ADDITIVE SEROTONERGIC: Zolonoft, Maxalt, ondansetron  ADDITIVE QTc: Zoloft, ondansetron   ADDITIVE CNS/RESPIRATORY DEPRESSION: Gabapentin, Lamictal, tizanidine  Management: routine monitoring, ongoing medication simplification, limited use of zofran and patient aware of risks.     [place] today: indicates that controlled prescriptions have been filled as prescribed (gabapentin).     Risk Statements:   Treatment Risk- Risks, benefits, alternatives and potential adverse effects have been discussed and are understood.   Safety Risk-Keisha did not appear to be an imminent safety risk to self or others.     Plan     1) Medications:   -Continue Zoloft 150 mg PO qDay  -Continue Lamictal 400 mg daily   -Continue Gabapentin 1500 QHS PRN  (helps some with sleep;  Added for hot flashes, works). Most recent Creatinine/GFR WNL.       Per other prescribers PCP, cardiology, GI):  1. Synthroid 150 mcg - she is uncertain of dose.    2. Zofran prn migraines  3. Midodrine 11/16/23  - changed from Dr. Galarza   4. Carafate  5. Tizanidine - 4x/day, last dose 9pm for migraines-one in am , one noo,one at night.  6. Fluorinef- 0.2 mg daily - moved to evening  7. Maxalt for migraines with ibuprofen.  It works very well.    8. Mg-   9. Vit B2  10. Atorvastatin    2) Psychotherapy:   None currently, continue offering.     3) Next due:  Labs- Last obtained 09/2023, will defer further lipid/CBC monitoring to PCP. Routine monitoring is not indicated for current psychotropic medication regimen.  "Creatinine/GFR WNL  EKG- Last obtained 06/2021,  ms. Follows-up with cardiology (POTS), will defer further monitoring to them.   Rating scales-  PHQ9, GAD7    4) Referrals: none  Writer started coordinating transfer of care to PCP by end of current academic year. Secure Epic message sent to Dr. Zarate. Clinic discharge specialist coordinator (Adelina Del [facility]) also alerted.    5) Other:   None     6) Follow-up: Return to clinic in 3 months approximately, with writer or before if needed.      Pertinent Background                                                   [most recent eval 08/21/23]     Keisha first experienced MH symptoms since HS, she started experiencing eating disorder that worsened through her 20, severe, at one point needing a feeding tube, leading to multiple hospitalizations. Also experienced trauma x2 as well as emotional trauma growing up. She reports experiencing depression and post-traumatic symptoms as well since around the same age.   Last depressive episode >5 years.   Has been on Focalin for several years, per Dr. Tena notes \"Beneficial for energy, mood and no indications of problematic use. Helps with concentration. Started around >25 years in context of eating disorder.\"    On 1/25/24 we discussed use of stimulant, which she reported had been on for over >25 years, started to help with binge eating/eating disorder. Given that her reported eating disorder had not been a concern over past years we discussed recommendation for discontinuing Focalin and since she has reported noticing anxiety being stable/improved and not noticing other changes or differences in mood, appetite/eating pattern or overall functioning.      Pertinent items include: trauma hx, eating disorder , mutiple psychotropic trials , psych hosp , and major medical problems     Subjective     Since our last visit (10/08/2024):    Pressing issues: Denies any pressing issues   Updates: Reports had a good holidays season. " "Spent time with family.   Medication updates: Reports ok.   Denies any new medications/med changes or medical issues.   POTS--Adequately managed, no changes from cards.   -Mood: \"ok, been doing well\"   -Anxiety: Denies worries out of the ordinary.   -Appetite: Eating well, no changes.   -Sleep: Average gets 7 hrs per night, broken, considers it baseline. Some delayed phase circadian rhythm.    -BM/constipation: Denies any constipation   -Tremor/abnormal movements: Denies   -Side effects: Denies chest pain, palpitations, sleep disturbances. Other side effects: Denies any skin rashes or skin changes.   -Safety: Denies SI/HI.   -Perceptual disturbances: Denies any AVH or other perceptual disturbances.     Recent Psych Symptoms:    Depression:  Denies any  Elevated:  none  Psychosis:  none  Anxiety:  Denies worries out of the ordinary  Trauma Related:  none. Reports baseline sleep disturbances, denies recent/current trauma-like symptoms   Insomnia:  Sleep issues as above.    Other:  No    Current Social History:  Living situation/income: (partner, children, pets, etc): Apartment building \"customized living. F[place] Has lived there for over 14 years now, likes it.   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 SSD  Feels safe at home: Yes    Pertinent Substance Use:   Alcohol: No   Cannabis: No  Tobacco: No  Caffeine:  No   Opioids: No   Narcan Kit current: N/A  Other substances:  Denies     Medical Review of Systems:   Lightheadedness/orthostasis: Yes, POTS dx, follows with cards.   Headaches: Yes, migraines dx. On average 1 episode ~q/2-3 months.   GI: None  Sexual health concerns: None    A comprehensive review of systems was performed and is negative other than noted above.     Mental Status Exam     Mental Status Exam:  Appearance: awake, alert and adequately groomed  Attitude:  cooperative and friendly  Eye Contact:  good  Mood:  \"ok, been doing well\"   Affect:  appropriate " and in normal range and mood congruent  Speech:  clear, coherent and normal prosody  Language: fluent and intact in English  Psychomotor, Gait, Musculoskeletal:  no evidence of tardive dyskinesia, dystonia, or tics  Throught Process:  logical, linear, and goal oriented  Associations:  no loose associations  Thought Content:  no evidence of suicidal ideation or homicidal ideation. No evidence of perceptual disturbances.   Insight:  good  Judgement:  intact  Oriented to:  time, person, and place  Attention Span and Concentration:  intact  Recent and Remote Memory:  intact  Fund of Knowledge:  appropriate     Past Psych Med Trials        Medication Max Dose (mg) Dates / Duration Helpful? DC Reason / Adverse Effects?   Lamictal        Zoloft        Focalin   ~25 years -stopped Jamuary 2024  Started ~25 years ago possible for binge-eating. Currently not clear indication concerns for some elevated BP and overall  risks outweighing potential benefits   Lunesta       Ritalin SR 20 mg BID ~2007     Remeron  7.5 mg       Seroquel   mg qhs      Ambien 10 mg  ~2007     Prozac 80 mg  ~2007          Treatment Course and Anne Events since  JULY 2023 8/21/23: Transfer of care  from Dr. Tena's patient's panel..   11/21/23.Decrease Focalin from 10 mg  (reported being beneficial for energy, mood and no indications of problematic use)Started around >25 years in context of eating disorder.  to 5 mg daily. Goal is to taper off due to not clear indication concerns for some elevated BP and overall  risks outweighing potential benefits  1/25/2024: Discontinued Focalin.  4/26/2024: Continue offering CBT-I, sleep study. No disordered eating concerns or other concerns with discontinuation of Focalin.   7/2/2024: No changes.   10/8/24: No changes.    01/07/2025: Started discussion around transfer of care by end of current academic year. Writer coordinating with PCP Dr Zarate.        Vitals     Pulse Readings from Last 3 Encounters:    01/25/24 109   11/21/23 72   11/16/23 79     Wt Readings from Last 3 Encounters:   12/03/24 67.1 kg (148 lb)   10/08/24 70 kg (154 lb 5.2 oz)   11/16/23 68 kg (150 lb)     BP Readings from Last 3 Encounters:   05/09/24 129/67   01/25/24 125/85   11/21/23 137/85         Medical History     ALLERGIES: Patient has no known allergies.    Patient Active Problem List   Diagnosis    Major depressive disorder, recurrent episode, moderate (H)    Anorexia nervosa (H)    Iron deficiency anemia    Skin lesion    Syncope and collapse    Postural orthostatic tachycardia syndrome    Hypothyroidism    Macular degeneration    Migraine headache    Mixed hearing loss, unilateral    Osteopenia    Osteoporosis    PTSD (post-traumatic stress disorder)    Pyelonephritis    S/P ORIF (open reduction internal fixation) fracture    Secondary hyperparathyroidism (H)    Stage 3a chronic kidney disease (H)      Medications   Focalin discontinued today  Current Outpatient Medications   Medication Sig Dispense Refill    Calcium Citrate-Vitamin D (CITRUS CALCIUM/VITAMIN D) 200-6.25 MG-MCG TABS Take 1 tablet by mouth daily.      Cholecalciferol (D3 HIGH POTENCY) 25 MCG (1000 UT) CAPS Take 25 mcg by mouth daily.      gabapentin (NEURONTIN) 300 MG capsule Take 5 capsules (1,500 mg) by mouth nightly as needed (sleep). 150 capsule 3    lamoTRIgine (LAMICTAL) 200 MG tablet Take 2 tablets (400 mg) by mouth daily. 60 tablet 3    levothyroxine (SYNTHROID/LEVOTHROID) 150 MCG tablet Take 150 mcg by mouth daily.      metoclopramide (REGLAN) 5 MG tablet Take 2.5 mg by mouth 2 times daily.      Multiple Vitamin (DAILY-ENRIQUETA) TABS Take 1 tablet by mouth daily.      Multiple Vitamins-Minerals (PRESERVISION AREDS) CAPS Take 1 capsule by mouth 2 times daily.      sertraline (ZOLOFT) 100 MG tablet Take 1.5 tablets (150 mg) by mouth daily. 45 tablet 3    vitamin D2 (ERGOCALCIFEROL) 61725 units (1250 mcg) capsule Take 50,000 Units by mouth every 14 days.       acetaminophen (TYLENOL) 500 MG tablet Take 500-1,000 mg by mouth every 6 hours as needed.      atorvastatin (LIPITOR) 10 MG tablet Take by mouth daily Per pt takes this medicine but doesn't know the amount      atorvastatin (LIPITOR) 40 MG tablet       Blood Pressure Monitor KIT Patient should be evaluated in the emergency department if her systolic blood pressure <65 1 kit 0    Cholecalciferol (VITAMIN D PO) Take  by mouth. 50, 000 units every 14 days      docusate sodium (COLACE) 100 MG capsule Take 1 capsule by mouth 2 times daily.      ferrous sulfate 140 (45 Fe) MG TBCR CR tablet Take 1 tablet by mouth 2 times daily      fludrocortisone (FLORINEF) 0.1 MG tablet TAKE 2 TABLETS (0.2MG) BY MOUTH DAILY 180 tablet 3    ibuprofen (ADVIL/MOTRIN) 800 MG tablet Take 800 mg by mouth every 8 hours as needed       magnesium 200 MG TABS Take 1 tablet by mouth daily      midodrine (PROAMATINE) 5 MG tablet Take 2 tablets (10 mg) by mouth 3 times daily 180 tablet 11    ondansetron (ZOFRAN) 4 MG tablet Take 1 pill for nausea at onset of headache, repeat if needed every 8 hours      potassium chloride ER (K-TAB/KLOR-CON) 10 MEQ CR tablet       rizatriptan (MAXALT-MLT) 10 MG ODT DISSOLVE 1 TABLET BY MOUTH AT ONSET OF MIGRAINE (MAY REPEAT IN 2 HOURS IF NEEDED      tiZANidine (ZANAFLEX) 2 MG tablet TAKE 1 TABLET BY MOUTH EVERY MORNING, TAKE 1 TABLET EVERY EVENING, AND TAKE 2 TABLETS BY MOUTH EVERY NIGHT AT BEDTIME      vitamin B-2 (RIBOFLAVIN) 100 MG TABS tablet Take 4 tablets by mouth daily          Labs and Data         11/21/2023     9:13 AM 7/2/2024     9:41 AM 10/3/2024    10:10 AM   PROMIS-10 Total Score w/o Sub Scores   PROMIS TOTAL - SUBSCORES 32 31 31          No data to display                  1/25/2024    10:38 AM 7/2/2024     9:39 AM 10/8/2024     9:35 AM   PHQ-9 SCORE   PHQ-9 Total Score MyChart 2 (Minimal depression) 2 (Minimal depression) 2 (Minimal depression)   PHQ-9 Total Score 2 2 2          No data to display                  Per care everywhere October-November 2023:     Vitamin D 25-Hydroxy, Total  Order: [id number]  Component  Ref Range & Units 1 mo ago   Vitamin D, 25-OH, Total  30 - 80 ng/mL 50   Resulting [facility]          Contains abnormal data TSH  Order: [id number]  Component  Ref Range & Units 3 wk ago   TSH, Sensitive  0.30 - 4.50 uIU/mL 4.74 High    Resulting [facility]         Lipid Panel (Expected: 90 days)  Order: [id number]  Component  Ref Range & Units 1 mo ago Resulting Agency   Cholesterol  0 - 199 mg/dL 202 High  [facility]   Triglyceride  <=149 mg/dL 68 [facility]   HDL Cholesterol  >=40 mg/dL 66 [facility]   LDL, Calculated  <130 mg/dL 122 [facility]   Non HDL Chol, Calculated  <=159 mg/dL 136 [facility]   Cholesterol/HDL Ratio 3.1 [facility]   Hours Fasting  8 - 12 Hours 12.0 [facility]     Basic Metabolic Panel  Order: [id number]  Component  Ref Range & Units 1 mo ago Resulting Agency   Sodium  136 - 145 mmol/L 144 [facility]   Potassium  3.5 - 5.1 mmol/L 3.4 Low  [facility]   Chloride  98 - 109 mmol/L 111 High  [facility]   CO2  20 - 29 mmol/L 23 [facility]   Anion Gap  7 - 16 mmol/L 10 [facility]   Calcium  8.4 - 10.4 mg/dL 9.5 [facility]   BUN  7 - 26 mg/dL 14 [facility]   Creatinine  0.55 - 1.02 mg/dL 0.80 [facility]   Glucose  70 - 100 mg/dL 107 High  [facility]   Comment: The given reference range is for the fasting state. Non-fasting reference range for glucose is 70 - 180 mg/dL.   GFR, Estimated  >60 mL/min/1.73m2 >60 [facility]   Hours Fasting  8 - 12 Hours 12.0 [facility]            ======================  PROVIDER: Navid Cano MD    Level of Medical Decision Making:   - At least 1 chronic problem  that is not stable  - Engaged in prescription drug management during visit (discussed any medication benefits, side effects, alternatives, etc.)      Psychiatry Individual Psychotherapy Note   N/a    Patient staffed in clinic with Dr. Guerin who will sign the note.  Supervisor is Dr. Molina.

## 2025-01-07 ENCOUNTER — VIRTUAL VISIT (OUTPATIENT)
Dept: PSYCHIATRY | Facility: CLINIC | Age: 55
End: 2025-01-07
Attending: PSYCHIATRY & NEUROLOGY
Payer: MEDICARE

## 2025-01-07 DIAGNOSIS — F33.0 MAJOR DEPRESSIVE DISORDER, RECURRENT EPISODE, MILD: Primary | ICD-10-CM

## 2025-01-07 DIAGNOSIS — F51.01 PRIMARY INSOMNIA: ICD-10-CM

## 2025-01-07 PROCEDURE — G2211 COMPLEX E/M VISIT ADD ON: HCPCS | Mod: 95

## 2025-01-07 PROCEDURE — 98006 SYNCH AUDIO-VIDEO EST MOD 30: CPT | Mod: GC

## 2025-01-07 RX ORDER — ERGOCALCIFEROL 1.25 MG/1
50000 CAPSULE, LIQUID FILLED ORAL
COMMUNITY
Start: 2024-06-07

## 2025-01-07 RX ORDER — LEVOTHYROXINE SODIUM 150 UG/1
150 TABLET ORAL DAILY
COMMUNITY
Start: 2024-01-17

## 2025-01-07 RX ORDER — LAMOTRIGINE 200 MG/1
400 TABLET ORAL DAILY
Qty: 60 TABLET | Refills: 3 | Status: SHIPPED | OUTPATIENT
Start: 2025-01-07

## 2025-01-07 RX ORDER — METOCLOPRAMIDE 5 MG/1
2.5 TABLET ORAL 2 TIMES DAILY
COMMUNITY
Start: 2024-12-31

## 2025-01-07 RX ORDER — VIT A/VIT C/VIT E/ZINC/COPPER 4296-226
1 CAPSULE ORAL 2 TIMES DAILY
COMMUNITY
Start: 2025-01-05

## 2025-01-07 RX ORDER — CALCIUM CITRATE/VITAMIN D3 200MG-6.25
1 TABLET ORAL DAILY
COMMUNITY
Start: 2024-06-07

## 2025-01-07 RX ORDER — CHOLECALCIFEROL (VITAMIN D3) 25 MCG
25 CAPSULE ORAL DAILY
COMMUNITY
Start: 2025-01-05

## 2025-01-07 RX ORDER — MULTIVITAMIN WITH FOLIC ACID 400 MCG
1 TABLET ORAL DAILY
COMMUNITY
Start: 2024-12-12

## 2025-01-07 RX ORDER — SERTRALINE HYDROCHLORIDE 100 MG/1
150 TABLET, FILM COATED ORAL DAILY
Qty: 45 TABLET | Refills: 3 | Status: SHIPPED | OUTPATIENT
Start: 2025-01-07

## 2025-01-07 RX ORDER — GABAPENTIN 300 MG/1
1500 CAPSULE ORAL
Qty: 150 CAPSULE | Refills: 3 | Status: SHIPPED | OUTPATIENT
Start: 2025-01-07

## 2025-01-07 ASSESSMENT — PATIENT HEALTH QUESTIONNAIRE - PHQ9
10. IF YOU CHECKED OFF ANY PROBLEMS, HOW DIFFICULT HAVE THESE PROBLEMS MADE IT FOR YOU TO DO YOUR WORK, TAKE CARE OF THINGS AT HOME, OR GET ALONG WITH OTHER PEOPLE: NOT DIFFICULT AT ALL
SUM OF ALL RESPONSES TO PHQ QUESTIONS 1-9: 2
SUM OF ALL RESPONSES TO PHQ QUESTIONS 1-9: 2

## 2025-01-07 ASSESSMENT — PAIN SCALES - GENERAL: PAINLEVEL_OUTOF10: NO PAIN (0)

## 2025-01-07 NOTE — PROGRESS NOTES
Virtual Visit Details    Type of service:  Video Visit   Video Start Time:  0930  Video End Time: 1000    Originating Location (pt. Location): Home  Distant Location (provider location):  On-site  Platform used for Video Visit: [employer]

## 2025-01-07 NOTE — Clinical Note
Molly Shoemaker, including Adelina in here since pt was agreeable to moving towards discharge.  Let me know if you have any trouble with using the Outside Message to contact the PCP.

## 2025-01-07 NOTE — NURSING NOTE
Current patient location: [address]    [facility] [address]    Is the patient currently in the state of [place]? YES    Visit mode:VIDEO    If the visit is dropped, the patient can be reconnected by:VIDEO VISIT: Text to cell phone:   Telephone Information:   Mobile [phone]       Will anyone else be joining the visit? NO  (If patient encounters technical issues they should call [phone]    Are changes needed to the allergy or medication list? No    Are refills needed on medications prescribed by this physician? NO    Rooming Documentation:  Questionnaire(s) completed    Reason for visit: RECHECK    Sylvia SMITHF

## 2025-01-07 NOTE — PATIENT INSTRUCTIONS
**For crisis resources, please see the information at the end of this document**   Patient Education    Thank you for coming to the [facility] [place] [facility].     Lab Testing:  If you had lab testing today and your results are reassuring or normal they will be mailed to you or sent through [employer] within 7 days. If the lab tests need quick action we will call you with the results. The phone number we will call with results is # [phone]. If this is not the best number please call our clinic and change the number.     Medication Refills:  If you need any refills please call your pharmacy and they will contact us. Our fax number for refills is [phone].   Three business days of notice are needed for general medication refill requests.   Five business days of notice are needed for controlled substance refill requests.   If you need to change to a different pharmacy, please contact the new pharmacy directly. The new pharmacy will help you get your medications transferred.     Contact Us:  Please call [phone] during business hours (8-5:00 M-F).   If you have medication related questions after clinic hours, or on the weekend, please call [phone].     Financial Assistance [phone]   Medical Records [phone]       MENTAL HEALTH CRISIS RESOURCES:  For a emergency help, please call 911 or go to the nearest Emergency Department.     Emergency Walk-In Options:   EmPATH Unit @ [facility] ([place]): [phone] - Specialized mental health emergency area designed to be calming  formerly [facility] ([place]): [phone]  [facility] ([place]): [phone]  [facility]): [phone]    [facility] Crisis Information:   [place]: [phone]  [place]: [phone]  [place] (ANGELA) - Adult: [phone]     Child: [phone]  [place] - Adult: [phone]     Child: [phone]  [place]:  [phone]  List of all [facility] resources:   https://[place].gov/dhs/people-we-serve/adults/health-care/mental-health/resources/crisis-contacts.jsp    National Crisis Information:   Crisis Text Line: Text  [place]  to 607601  Suicide & Crisis Lifeline: 988  National Suicide Prevention Lifeline: 7-443-667-TALK (1-455.770.4076)       For online chat options, visit https://suicidepreventionlifeline.org/chat/  Poison Control Center: 6-572-339-2600  Trans Lifeline: 6-887-042-0552 - Hotline for transgender people of all ages  The [employer] Project: 1-758-878-7055 - Hotline for LGBT youth     For Non-Emergency Support:   Fast Tracker: Mental Health & Substance Use Disorder Resources -   https://www.[facility].org/

## 2025-04-15 ASSESSMENT — PATIENT HEALTH QUESTIONNAIRE - PHQ9: SUM OF ALL RESPONSES TO PHQ QUESTIONS 1-9: 3

## 2025-04-21 NOTE — PROGRESS NOTES
[facility]  MEDICAL PROGRESS NOTE     CARE TEAM:    PCP- Lavonne Zarate  Therapist- None currently.   Cardiology- Dr. Galarza, Endo- Dr. Handley.        Diagnoses     Major depressive disorder, recurrent, currently in remission.   Primary insomnia   Anorexia nervosa, in remission  PTSD     Assessment     Keisha is a 55 year old who uses the pronouns she, her, hers, with psychiatric history of anorexia nervosa (s/p feeding tube), major depressive disorder, sleep disorder and medical history of postural orthostatic tachycardia syndrome (POTS), migraines. Here today for a follow-up visit.      Keisha's mood and overall MH have remained stable since our last visit. She continues to take her prescribed medications, without noticing side effects. There have been no reported changes in appetite or sleep patterns, and no new side effects from her medications have been noted.    Regarding trauma-like symotoms, she has not reported any recent trauma-like symptoms or safety concerns.    Keisha's POTS symptoms appear to be well-managed, with no mention of recent episodes of shaking or heart palpitations. She has an upcoming appointment with her cardiologist on July 24th to continue monitoring this condition.     The patient's anorexia nervosa remains in remission, with no recent issues related to her eating disorder reported, this has remained in remission over many years now,     Regarding her psychiatric care, Keisha is in the process of transferring her care from the current provider to her primary care physician, Dr. Zarate, at [facility]. This transition is planned to occur by the end of the current academic year. Plan for now is to do one more follow-up visit sometime in June to have a clear plan for next steps.       No other questions or concerns today.         Future considerations  -Consider optimizing Zoloft better target anxiety and continue  helping with mood (last moderate-severe depressive episode >5 years ago per Dr. Tena's notes).  -Consider decreasing/tapering gabapentin if/once anxiety is more stable .    -Could cautiously (ideally would need to consult with cards before due to POTS) add an alpha blocker to help with PTSD-sleep disturbances as well with BP  -Consider repeating Sleep study. Consider continue offering CBT-I.       Psychotropic Drug Interactions:    ADDITIVE SEROTONERGIC: Zolonoft, Maxalt, ondansetron  ADDITIVE QTc: Zoloft, ondansetron   ADDITIVE CNS/RESPIRATORY DEPRESSION: Gabapentin, Lamictal, tizanidine  Management: routine monitoring, ongoing medication simplification, limited use of zofran and patient aware of risks.     Risk Statements:   Treatment Risk- Risks, benefits, alternatives and potential adverse effects have been discussed and are understood.   Safety Risk-Keisha did not appear to be an imminent safety risk to self or others.     Plan     1) Medications:   -Continue sertraline (Zoloft) 150 mg daily  -Continue lamotrigine (Lamictal) 400 mg daily   -Continue gabapentin 1500 QHS PRN  (helps some with sleep; Added for hot flashes, works). Most recent Creatinine/GFR WNL.       Per other prescribers PCP, cardiology, GI):  1. Synthroid 150 mcg - she is uncertain of dose.    2. Zofran prn migraines  3. Midodrine 11/16/23  - changed from Dr. Galarza   4. Carafate  5. Tizanidine - 4x/day, last dose 9pm for migraines-one in am , one noo,one at night.  6. Fluorinef- 0.2 mg daily - moved to evening  7. Maxalt for migraines with ibuprofen.  It works very well.    8. Mg-   9. Vit B2  10. Atorvastatin    2) Psychotherapy:   None currently, continue offering.     3) Next due:  Labs- Last obtained 09/2023, will defer further lipid/CBC monitoring to PCP. Routine monitoring is not indicated for current psychotropic medication regimen. Creatinine/GFR WNL  EKG- Last obtained 06/2021,  ms. Follows-up with cardiology (POTS), will  "defer further monitoring to them.     4) Care Coordination / Referrals:     -Writer coordinating  transfer of care to PCP by end of current academic year. Secure Epic message sent to Dr. Zarate. Clinic discharge specialist coordinator (Adel[facility]) also alerted.    5) Follow-up: Return to clinic in 8 weeks for a last visit with writer, or earlier if needed. Patient to transfer care to PCP for next academic year.      Pertinent Background                                                   [most recent eval 08/21/23]     Keisha first experienced MH symptoms since HS, she started experiencing eating disorder that worsened through her 20, severe, at one point needing a feeding tube, leading to multiple hospitalizations. Also experienced trauma x2 as well as emotional trauma growing up. She reports experiencing depression and post-traumatic symptoms as well since around the same age.   Last depressive episode >5 years.   Has been on Focalin for several years, per Dr. Tena notes \"Beneficial for energy, mood and no indications of problematic use. Helps with concentration. Started around >25 years in context of eating disorder.\"    On 1/25/24 we discussed use of stimulant, which she reported had been on for over >25 years, started to help with binge eating/eating disorder. Given that her reported eating disorder had not been a concern over past years we discussed recommendation for discontinuing Focalin and since she has reported noticing anxiety being stable/improved and not noticing other changes or differences in mood, appetite/eating pattern or overall functioning.      Pertinent items include: trauma hx, eating disorder , mutiple psychotropic trials , psych hosp , and major medical problems     Subjective     Since our last visit (1/7/25):     Updates:   -Shares has continued doing well over past couple months since we last met  -Pressing issues: Denies any pressing issues today.   - She continues to take her " "prescribed medications, including sertraline 150 mg daily, Lamictal 400 mg daily, and gabapentin 1500 mg as needed, which she takes most nights.   -Shares POTS symptoms have continued being well-managed, will see her coardiologist July 24 for a follow up visit.   -Mood: \"stable\" and \"been good.\"   -Anxiety: Denies worries out of the ordinary.   -Weight changes: Dneies noticing any/none reported    -No reported changes in appetite or sleep patterns  -BM/constipation: Regular every-other day BM. Denies constipation  -Tremor/abnormal movements: Denies any   -Other symptoms/side effects: Denies Chest pain, palpitations or other current symptoms.  -Perceptual disturbances: Denies AVH  -Safety: Denies SI/HI.    Psychiatric Review of Systems:    Depression: None, as above.  Elevated: None  Psychosis: None   Trauma Related: None  Insomnia: Sleep pattern has remained stable     Other: None      Current Social History:  Living situation/income: (partner, children, pets, etc): Apartment building \"customized living. [place] Has lived there for over 14 years now, likes it.   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 SSD  Feels safe at home: Yes    Pertinent Substance Use:   Alcohol: No   Cannabis: No  Tobacco: No  Caffeine:  No   Opioids: No   Narcan Kit current: N/A  Other substances:  Denies     Medical Review of Systems:   Lightheadedness/orthostasis: Yes, POTS dx, follows with cards.   Headaches: Yes, migraines dx. On average 1 episode ~q/2-3 months.   GI: None     Mental Status Exam     Appearance: awake, alert and adequately groomed  Attitude:  cooperative and friendly  Eye Contact:  good  Mood:  \"stable\" and \"been good.\"   Affect:  appropriate and in normal range  Speech:  clear, coherent  Language: fluent and intact in English  Psychomotor, Gait, Musculoskeletal:  no evidence of tardive dyskinesia, dystonia, or tics  Throught Process:  logical and linear  Associations:  no " loose associations  Thought Content:  no evidence of suicidal ideation or homicidal ideation, no auditory hallucinations present, and no visual hallucinations present  Insight:  good  Judgement:  intact  Oriented to:  time, person, and place  Attention Span and Concentration:  intact  Recent and Remote Memory:  intact  Fund of Knowledge:  appropriate      Past Psych Med Trials        Medication Max Dose (mg) Dates / Duration Helpful? DC Reason / Adverse Effects?   Lamictal        Zoloft        Focalin   ~25 yrs -d/c'd 1/2024  For binge-eating. Risks outweighed benefits.    Lunesta       Ritalin SR 20 BID ~2007     Remeron  7.5      Seroquel   qhs      Ambien 10  ~2007     Prozac 80  ~2007          Treatment Course and Anne Events since  JULY 2023 8/21/23: Transfer of care  from Dr. Tena's patient's panel..   11/21/23.Decrease Focalin from 10 mg  (reported being beneficial for energy, mood and no indications of problematic use)Started around >25 years in context of eating disorder.  to 5 mg daily. Goal is to taper off due to not clear indication concerns for some elevated BP and overall  risks outweighing potential benefits  1/25/2024: Discontinued Focalin.  4/26/2024: Continue offering CBT-I, sleep study. No disordered eating concerns or other concerns with discontinuation of Focalin.   01/07/2025: Started discussion around transfer of care by end of current academic year. Writer coordinating with PCP Dr Zarate.   4/22/2025: Patient's psychiatric care is being transferred to her primary care provider, Dr. Zarate at [facility], by the end of the current academic year. Coordination of this transfer is ongoing.      Vitals     Pulse Readings from Last 3 Encounters:   01/25/24 109   11/21/23 72   11/16/23 79     Wt Readings from Last 3 Encounters:   12/03/24 67.1 kg (148 lb)   10/08/24 70 kg (154 lb 5.2 oz)   11/16/23 68 kg (150 lb)     BP Readings from Last 3 Encounters:   05/09/24 129/67   01/25/24  125/85   11/21/23 137/85         Medical History     ALLERGIES: Patient has no known allergies.    Patient Active Problem List   Diagnosis    Major depressive disorder, recurrent episode, moderate (H)    Anorexia nervosa (H)    Iron deficiency anemia    Skin lesion    Syncope and collapse    Postural orthostatic tachycardia syndrome    Hypothyroidism    Macular degeneration    Migraine headache    Mixed hearing loss, unilateral    Osteopenia    Osteoporosis    PTSD (post-traumatic stress disorder)    Pyelonephritis    S/P ORIF (open reduction internal fixation) fracture    Secondary hyperparathyroidism    Stage 3a chronic kidney disease (H)      Medications   Focalin discontinued today  Current Outpatient Medications   Medication Sig Dispense Refill    acetaminophen (TYLENOL) 500 MG tablet Take 500-1,000 mg by mouth every 6 hours as needed.      atorvastatin (LIPITOR) 10 MG tablet Take by mouth daily Per pt takes this medicine but doesn't know the amount      atorvastatin (LIPITOR) 40 MG tablet       Blood Pressure Monitor KIT Patient should be evaluated in the emergency department if her systolic blood pressure <65 1 kit 0    Calcium Citrate-Vitamin D (CITRUS CALCIUM/VITAMIN D) 200-6.25 MG-MCG TABS Take 1 tablet by mouth daily.      Cholecalciferol (D3 HIGH POTENCY) 25 MCG (1000 UT) CAPS Take 25 mcg by mouth daily.      Cholecalciferol (VITAMIN D PO) Take  by mouth. 50, 000 units every 14 days      docusate sodium (COLACE) 100 MG capsule Take 1 capsule by mouth 2 times daily.      ferrous sulfate 140 (45 Fe) MG TBCR CR tablet Take 1 tablet by mouth 2 times daily      fludrocortisone (FLORINEF) 0.1 MG tablet TAKE 2 TABLETS (0.2MG) BY MOUTH DAILY 180 tablet 3    gabapentin (NEURONTIN) 300 MG capsule Take 5 capsules (1,500 mg) by mouth nightly as needed (sleep). 150 capsule 3    ibuprofen (ADVIL/MOTRIN) 800 MG tablet Take 800 mg by mouth every 8 hours as needed       lamoTRIgine (LAMICTAL) 200 MG tablet Take 2 tablets  (400 mg) by mouth daily. 60 tablet 3    levothyroxine (SYNTHROID/LEVOTHROID) 150 MCG tablet Take 150 mcg by mouth daily.      magnesium 200 MG TABS Take 1 tablet by mouth daily      metoclopramide (REGLAN) 5 MG tablet Take 2.5 mg by mouth 2 times daily.      midodrine (PROAMATINE) 5 MG tablet Take 2 tablets (10 mg) by mouth 3 times daily 180 tablet 11    Multiple Vitamin (DAILY-ENRIQUETA) TABS Take 1 tablet by mouth daily.      Multiple Vitamins-Minerals (PRESERVISION AREDS) CAPS Take 1 capsule by mouth 2 times daily.      ondansetron (ZOFRAN) 4 MG tablet Take 1 pill for nausea at onset of headache, repeat if needed every 8 hours      potassium chloride ER (K-TAB/KLOR-CON) 10 MEQ CR tablet       rizatriptan (MAXALT-MLT) 10 MG ODT DISSOLVE 1 TABLET BY MOUTH AT ONSET OF MIGRAINE (MAY REPEAT IN 2 HOURS IF NEEDED      sertraline (ZOLOFT) 100 MG tablet Take 1.5 tablets (150 mg) by mouth daily. 45 tablet 3    tiZANidine (ZANAFLEX) 2 MG tablet TAKE 1 TABLET BY MOUTH EVERY MORNING, TAKE 1 TABLET EVERY EVENING, AND TAKE 2 TABLETS BY MOUTH EVERY NIGHT AT BEDTIME      vitamin B-2 (RIBOFLAVIN) 100 MG TABS tablet Take 4 tablets by mouth daily      vitamin D2 (ERGOCALCIFEROL) 66635 units (1250 mcg) capsule Take 50,000 Units by mouth every 14 days.          Labs and Data         10/3/2024    10:10 AM 1/7/2025     9:21 AM 4/15/2025     9:42 AM   PROMIS-10 Total Score w/o Sub Scores   PROMIS TOTAL - SUBSCORES 31 31  31        Patient-reported    Proxy-reported          No data to display                  10/8/2024     9:35 AM 1/7/2025     9:19 AM 4/15/2025     9:40 AM   PHQ-9 SCORE   PHQ-9 Total Score MyChart 2 (Minimal depression) 2 (Minimal depression) 3 (Minimal depression)   PHQ-9 Total Score 2 2  3        Patient-reported    Proxy-reported          No data to display                 Per care everywhere October-November 2023:     Vitamin D 25-Hydroxy, Total  Order: [id number]  Component  Ref Range & Units 1 mo ago   Vitamin D, 25-OH,  Total  30 - 80 ng/mL 50   Resulting [facility]          Contains abnormal data TSH  Order: [id number]  Component  Ref Range & Units 3 wk ago   TSH, Sensitive  0.30 - 4.50 uIU/mL 4.74 High    Resulting [facility]         Lipid Panel (Expected: 90 days)  Order: [id number]  Component  Ref Range & Units 1 mo ago Resulting Agency   Cholesterol  0 - 199 mg/dL 202 [facility]  [facility]   Triglyceride  <=149 mg/dL 68 [facility]   HDL Cholesterol  >=40 mg/dL 66 [facility]   LDL, Calculated  <130 mg/dL 122 [facility]   Non HDL Chol, Calculated  <=159 mg/dL 136 [facility]   Cholesterol/HDL Ratio 3.1 [facility]   Hours Fasting  8 - 12 Hours 12.0 [facility]     Basic Metabolic Panel  Order: [id number]  Component  Ref Range & Units 1 mo ago Resulting Agency   Sodium  136 - 145 mmol/L 144 [facility]   Potassium  3.5 - 5.1 mmol/L 3.4 Low  [facility]   Chloride  98 - 109 mmol/L 111 [facility]  [facility]   CO2  20 - 29 mmol/L 23 [facility]   Anion Gap  7 - 16 mmol/L 10 [facility]   Calcium  8.4 - 10.4 mg/dL 9.5 [facility]   BUN  7 - 26 mg/dL 14 [facility]   Creatinine  0.55 - 1.02 mg/dL 0.80 [facility]   Glucose  70 - 100 mg/dL 107 High  [facility]   Comment: The given reference range is for the fasting state. Non-fasting reference range for glucose is 70 - 180 mg/dL.   GFR, Estimated  >60 mL/min/1.73m2 >60 [facility]   Hours Fasting  8 - 12 Hours 12.0 [facility]         ======================  PROVIDER: Navid Cano MD     Level of Medical Decision Making:   - At least 1 chronic problem that is not stable  - Engaged in prescription drug management during visit (discussed any medication benefits, side effects, alternatives, etc.)    The longitudinal plan  of care for the diagnosis(es)/condition(s) as documented were addressed during this visit. Due to the added complexity in care, I will continue to support Keisha in the subsequent management and with ongoing continuity of care.      Patient staffed in clinic with Dr. Guerin, who will sign the note.  Supervisor is Dr. Molina.

## 2025-04-22 ENCOUNTER — VIRTUAL VISIT (OUTPATIENT)
Dept: PSYCHIATRY | Facility: CLINIC | Age: 55
End: 2025-04-22
Attending: PSYCHIATRY & NEUROLOGY
Payer: MEDICARE

## 2025-04-22 DIAGNOSIS — F33.0 MAJOR DEPRESSIVE DISORDER, RECURRENT EPISODE, MILD: Primary | ICD-10-CM

## 2025-04-22 DIAGNOSIS — F43.10 POSTTRAUMATIC STRESS DISORDER: ICD-10-CM

## 2025-04-22 DIAGNOSIS — F51.01 PRIMARY INSOMNIA: ICD-10-CM

## 2025-04-22 DIAGNOSIS — F33.40 RECURRENT MAJOR DEPRESSIVE DISORDER, IN REMISSION: Primary | ICD-10-CM

## 2025-04-22 DIAGNOSIS — F50.00 ANOREXIA NERVOSA IN REMISSION (H): ICD-10-CM

## 2025-04-22 PROCEDURE — G2211 COMPLEX E/M VISIT ADD ON: HCPCS | Mod: 95

## 2025-04-22 PROCEDURE — 98006 SYNCH AUDIO-VIDEO EST MOD 30: CPT | Mod: GC

## 2025-04-22 PROCEDURE — 1126F AMNT PAIN NOTED NONE PRSNT: CPT | Mod: 95

## 2025-04-22 RX ORDER — GABAPENTIN 300 MG/1
1500 CAPSULE ORAL
Qty: 150 CAPSULE | Refills: 3 | Status: SHIPPED | OUTPATIENT
Start: 2025-04-22

## 2025-04-22 RX ORDER — SERTRALINE HYDROCHLORIDE 100 MG/1
150 TABLET, FILM COATED ORAL DAILY
Qty: 45 TABLET | Refills: 3 | Status: SHIPPED | OUTPATIENT
Start: 2025-04-22

## 2025-04-22 RX ORDER — LAMOTRIGINE 200 MG/1
400 TABLET ORAL DAILY
Qty: 60 TABLET | Refills: 3 | Status: SHIPPED | OUTPATIENT
Start: 2025-04-22

## 2025-04-22 NOTE — PATIENT INSTRUCTIONS
**For crisis resources, please see the information at the end of this document**   Patient Education    Thank you for coming to the [facility] [place] [facility].     Lab Testing:  If you had lab testing today and your results are reassuring or normal they will be mailed to you or sent through [employer] within 7 days. If the lab tests need quick action we will call you with the results. The phone number we will call with results is # [phone]. If this is not the best number please call our clinic and change the number.     Medication Refills:  If you need any refills please call your pharmacy and they will contact us. Our fax number for refills is [phone].   Three business days of notice are needed for general medication refill requests.   Five business days of notice are needed for controlled substance refill requests.   If you need to change to a different pharmacy, please contact the new pharmacy directly. The new pharmacy will help you get your medications transferred.     Contact Us:  Please call [phone] during business hours (8-5:00 M-F).   If you have medication related questions after clinic hours, or on the weekend, please call [phone].     Financial Assistance [phone]   Medical Records [phone]       MENTAL HEALTH CRISIS RESOURCES:  For a emergency help, please call 911 or go to the nearest Emergency Department.     Emergency Walk-In Options:   [facility] Unit @ [facility] ([place]): [phone] - Specialized mental health emergency area designed to be calming  [facility] ([place]): [phone]  [facility] ([place]): [phone]  [facility]): [phone]    [facility] Crisis Information:   Tr[place]: [phone]  Juan: [phone]  Jarett (ANGELA) - Adult: [phone]     Child: [phone]  Isaac - Adult: [phone]     Child: [phone]  [place]:  [phone]  List of all [facility] resources:   https://[place].gov/dhs/people-we-serve/adults/health-care/mental-health/resources/crisis-contacts.jsp    National Crisis Information:   Crisis Text Line: Text  [place]  to 536462  Suicide & Crisis Lifeline: 988  National Suicide Prevention Lifeline: 3-912-231-TALK (1-819.178.8470)       For online chat options, visit https://suicidepreventionlifeline.org/chat/  Poison Control Center: 6-668-784-1432  Trans Lifeline: 9-791-763-9098 - Hotline for transgender people of all ages  The [employer] Project: 1-462-204-9770 - Hotline for LGBT youth     For Non-Emergency Support:   Fast Tracker: Mental Health & Substance Use Disorder Resources -   https://www.[place].org/

## 2025-04-22 NOTE — PROGRESS NOTES
Virtual Visit Details    Type of service:  Video Visit   Video Start Time:  1000  Video End Time: 1031    Originating Location (pt. Location): Home  Distant Location (provider location):  On-site  Platform used for Video Visit: [employer]

## 2025-04-22 NOTE — NURSING NOTE
Current patient location: [address]    [facility] [address]    Is the patient currently in the state of [place]? YES    Visit mode: VIDEO    If the visit is dropped, the patient can be reconnected by:VIDEO VISIT: Text to cell phone:   Telephone Information:   Mobile [phone]       Will anyone else be joining the visit? NO  (If patient encounters technical issues they should call [phone])    Are changes needed to the allergy or medication list? No    Are refills needed on medications prescribed by this physician? NO    Rooming Documentation:  Questionnaire(s) completed    Reason for visit: No chief complaint on file.    Vijaya Peng VVF

## 2025-04-23 ASSESSMENT — PATIENT HEALTH QUESTIONNAIRE - PHQ9: SUM OF ALL RESPONSES TO PHQ QUESTIONS 1-9: 1

## 2025-05-04 PROBLEM — G47.00 INSOMNIA: Status: ACTIVE | Noted: 2025-05-04

## 2025-05-22 DIAGNOSIS — G90.A POSTURAL ORTHOSTATIC TACHYCARDIA SYNDROME: ICD-10-CM

## 2025-05-25 NOTE — TELEPHONE ENCOUNTER
Last Written Prescription:  5/23/24  180 : 11  ----------------------  Last Visit Date: 12/3/24  Future Visit Date: 7/24/25    Refill decision: Medication unable to be refilled by RN due to: Medication not included in refill protocol policy     Request from pharmacy:  Requested Prescriptions   Pending Prescriptions Disp Refills    midodrine (PROAMATINE) 5 MG tablet [Pharmacy Med Name: Midodrine HCl 5MG TABS] 180 tablet 11     Sig: TAKE 2 TABLETS (10MG) BY MOUTH THREE TIMES A DAY       There is no refill protocol information for this order

## 2025-05-27 RX ORDER — MIDODRINE HYDROCHLORIDE 5 MG/1
10 TABLET ORAL 3 TIMES DAILY
Qty: 180 TABLET | Refills: 3 | Status: SHIPPED | OUTPATIENT
Start: 2025-05-27

## 2025-06-02 ASSESSMENT — ANXIETY QUESTIONNAIRES
4. TROUBLE RELAXING: SEVERAL DAYS
8. IF YOU CHECKED OFF ANY PROBLEMS, HOW DIFFICULT HAVE THESE MADE IT FOR YOU TO DO YOUR WORK, TAKE CARE OF THINGS AT HOME, OR GET ALONG WITH OTHER PEOPLE?: SOMEWHAT DIFFICULT
1. FEELING NERVOUS, ANXIOUS, OR ON EDGE: NEARLY EVERY DAY
GAD7 TOTAL SCORE: 10
6. BECOMING EASILY ANNOYED OR IRRITABLE: SEVERAL DAYS
GAD7 TOTAL SCORE: 10
2. NOT BEING ABLE TO STOP OR CONTROL WORRYING: SEVERAL DAYS
7. FEELING AFRAID AS IF SOMETHING AWFUL MIGHT HAPPEN: NEARLY EVERY DAY
IF YOU CHECKED OFF ANY PROBLEMS ON THIS QUESTIONNAIRE, HOW DIFFICULT HAVE THESE PROBLEMS MADE IT FOR YOU TO DO YOUR WORK, TAKE CARE OF THINGS AT HOME, OR GET ALONG WITH OTHER PEOPLE: SOMEWHAT DIFFICULT
7. FEELING AFRAID AS IF SOMETHING AWFUL MIGHT HAPPEN: NEARLY EVERY DAY
3. WORRYING TOO MUCH ABOUT DIFFERENT THINGS: SEVERAL DAYS
GAD7 TOTAL SCORE: 10
5. BEING SO RESTLESS THAT IT IS HARD TO SIT STILL: NOT AT ALL

## 2025-06-03 ENCOUNTER — VIRTUAL VISIT (OUTPATIENT)
Dept: PSYCHIATRY | Facility: CLINIC | Age: 55
End: 2025-06-03
Attending: PSYCHIATRY & NEUROLOGY
Payer: MEDICARE

## 2025-06-03 DIAGNOSIS — F43.10 PTSD (POST-TRAUMATIC STRESS DISORDER): Primary | ICD-10-CM

## 2025-06-03 DIAGNOSIS — F50.00 ANOREXIA NERVOSA IN REMISSION (H): ICD-10-CM

## 2025-06-03 DIAGNOSIS — F51.01 PRIMARY INSOMNIA: ICD-10-CM

## 2025-06-03 DIAGNOSIS — F33.40 RECURRENT MAJOR DEPRESSIVE DISORDER, IN REMISSION: ICD-10-CM

## 2025-06-03 DIAGNOSIS — F41.1 GAD (GENERALIZED ANXIETY DISORDER): ICD-10-CM

## 2025-06-03 RX ORDER — LAMOTRIGINE 200 MG/1
400 TABLET ORAL DAILY
Qty: 60 TABLET | Refills: 2 | Status: SHIPPED | OUTPATIENT
Start: 2025-06-03

## 2025-06-03 RX ORDER — GABAPENTIN 300 MG/1
1500 CAPSULE ORAL
Qty: 150 CAPSULE | Refills: 2 | Status: SHIPPED | OUTPATIENT
Start: 2025-06-03

## 2025-06-03 ASSESSMENT — PAIN SCALES - GENERAL: PAINLEVEL_OUTOF10: NO PAIN (0)

## 2025-06-03 NOTE — PROGRESS NOTES
[facility]  MEDICAL PROGRESS NOTE     CARE TEAM:    PCP- Lavonne Zarate  Therapist- None currently.   Cardiology- Dr. Galarza, Endo- Dr. Handley.        Diagnoses     Major depressive disorder, recurrent, currently in remission.   CECELIA  Primary insomnia   Anorexia nervosa, in remission  Posttraumatic stress disorder     Assessment     Keisha is a 55 year old who uses the pronouns she, her, hers, with psychiatric history of anorexia nervosa (s/p feeding tube), major depressive disorder, sleep disorder and medical history of postural orthostatic tachycardia syndrome (POTS), migraines. Here today for a follow-up visit.    #Depression  #PTSD  #Anxiety  Since our last visit, Keisha reports heightened anxiety due to recent stressors, particularly issues with a neighbor being disruptive (she is not being targeted or feels unsafe) have exacerbated her anxiety symptoms and led to hypervigilance and reduced sleep since our last visit (see interim hx below for more details). Given the persistence and worsening of anxiety symptoms despite current sertraline dosage of 150mg, an increase to 175mg is being considered as pt brought up needing a med change to address the heightened anxiety and sleep disturbances which are due to external factors and she is aware of this.     She will explore potential living arrangement options with her CADI CM as well as to resource to supportive parents who live nearby as needed to ensure adequate sleep. The decision to adjust medication is based on the patient's current symptoms and the availability of dosage range up to 200mg, to better target anxiety and trauma-like symptoms, patient aware of timeline of up to 4-8 weeks for her to notice benefits.     For now, will defer anti-adrenergic options due to hx of POTS symptoms which are being monitored, with upcoming cardiology and laboratory consultations scheduled to further  "evaluate and manage this condition. Safety assessment revealed no current risk of self-harm or harm to others, despite ongoing stressors.     We discussed psychotherapy but pt expressed preference of deferring this decision for now, open to continue conversation in the near future.      Of note:   -We discussed OTC: Magnesium glycinate 200 mg BID>>> \"Calm\" brand recommended. Of note, patient reports she is already taking a Mg supplement but does not have it handy and will look into it before implementing the above rec, per her preference.   -Creatinine clearance, original Cockcroft-Gault calculation: 75 mL/min. (Will obtain CMP via PCP)    #Transfer of care  -Regarding transferring care to PCP: Reports her PCP did not want to take over prescribing psychotropics as she considered finding a community psych provider was in pt's best interest. Keisha ok with continuing care in this clinic for now and will look into finding a community provider, likely to consolidate care at [facility]. Pt ideally will transfer to BCT team, writer will coordinate with clinic team.     No other questions or concerns today.     Future considerations  -Consider optimizing Zoloft better target anxiety and continue helping with mood (last moderate-severe depressive episode >5 years ago per Dr. Tena's notes).  -Consider decreasing/tapering gabapentin if/once anxiety is more stable .    -Could cautiously (ideally would need to consult with cards before due to POTS) add an alpha blocker to help with PTSD-sleep disturbances as well with BP  -Consider repeating Sleep study. Consider/continue offering CBT-I/psychotherapy       Psychotropic Drug Interactions:    ADDITIVE SEROTONERGIC: Zolonoft, Maxalt, ondansetron  ADDITIVE QTc: Zoloft, ondansetron   ADDITIVE CNS/RESPIRATORY DEPRESSION: Gabapentin, Lamictal, tizanidine  Management: routine monitoring, ongoing medication simplification, limited use of zofran and patient aware of risks.     Risk " Statements:   Treatment Risk- Risks, benefits, alternatives and potential adverse effects have been discussed and are understood.   Safety Risk-Keisha did not appear to be an imminent safety risk to self or others.     Plan     1) Medications:   -Increase sertraline (Zoloft) to 175 mg daily  -Continue lamotrigine (Lamictal) 400 mg daily   -Continue gabapentin 1500 QHS PRN  (helps some with sleep; Added for hot flashes, works). Most recent Creatinine/GFR WNL.       Per other prescribers PCP, cardiology, GI):  1. Synthroid 150 mcg - she is uncertain of dose.    2. Zofran prn migraines  3. Midodrine 11/16/23  - changed from Dr. Galarza   4. Carafate  5. Tizanidine - 4x/day, last dose 9pm for migraines-one in am , one noo,one at night.  6. Fluorinef- 0.2 mg daily - moved to evening  7. Maxalt for migraines with ibuprofen.  It works very well.    8. Mg-   9. Vit B2  10. Atorvastatin    2) Psychotherapy:   None currently, continue offering.     3) Next due:  Labs- Last obtained 09/2023, will defer further lipid/CBC monitoring to PCP. Routine monitoring is not indicated for current psychotropic medication regimen. Creatinine/GFR WNL  -Will obtain CMP via PCP   EKG- Last obtained 06/2021,  ms. Follows-up with cardiology (POTS), will defer further monitoring to them.     4) Care Coordination / Referrals:     -Writer coordinating updated Layo and Adelina re: Plan for now is for her to return to clinic in ~6 weeks with the new resident in the BCT team while pt establishes with a community psych provider (as reported PCP did not want to take over psychotropic Rx).    5) Follow-up: Return to clinic in ~6 weeks with the new resident in the BCT team.      Pertinent Background                                                   [most recent eval 08/21/23]     Keisha first experienced MH symptoms since HS, she started experiencing eating disorder that worsened through her 20, severe, at one point needing a feeding tube, leading to  "multiple hospitalizations. Also experienced trauma x2 as well as emotional trauma growing up. She reports experiencing depression and post-traumatic symptoms as well since around the same age.   Last depressive episode >5 years.   Has been on Focalin for several years, per Dr. Tena notes \"Beneficial for energy, mood and no indications of problematic use. Helps with concentration. Started around >25 years in context of eating disorder.\"    On 1/25/24 we discussed use of stimulant, which she reported had been on for over >25 years, started to help with binge eating/eating disorder. Given that her reported eating disorder had not been a concern over past years we discussed recommendation for discontinuing Focalin and since she has reported noticing anxiety being stable/improved and not noticing other changes or differences in mood, appetite/eating pattern or overall functioning.      Pertinent items include: trauma hx, eating disorder , mutiple psychotropic trials , psych hosp , and major medical problems     Subjective     Since our last visit (4/22/25):     Updates:   -Pressing issues: Denies any pressing issues today.     -Shares her Dad has been in and out from the hospital, had some cellulitis, on antibiotics, doing better at the moment.  -Keisha reports increased stress and anxiety due to ongoing issues with a neighbor, which have been affecting her sleep over the past couple months, per her report, neighbor comes in late to their place and fights/argues loudly with their family/bangs on walls  causing her reduced sleep hours (reduced to 5 hrs at most, catches up with naps sometimes as able) and hypervigilance.   -Shares her anxiety has been heightened due to these stressors, would like to explore med changes to help.     -Medication updates: She is not reporting any side effects from her medications.     -Outside of the above stressors, Keisha sanders has continued doing well over past couple months since we last " "met. -Denies any mood changes.   -Denies concerns for ED symptoms/behaviors     - She continues to take her prescribed medications, including sertraline 150 mg daily, Lamictal 400 mg daily, and gabapentin 1500 mg as needed, which she takes most nights.   -Shares POTS symptoms have continued being well-managed, will see her coardiologist July 24 for a follow up visit.  -Weight changes: Dneies noticing any/none reported    -BM/constipation: Regular every-other day BM. Denies constipation  -Tremor/abnormal movements: Denies any   -Other symptoms/side effects: Denies Chest pain, palpitations or other current symptoms.  -Perceptual disturbances: Denies AVH  -Safety: Denies SI/HI.      Current Social History:  Living situation/income: (partner, children, pets, etc): Apartment building \"customized living. [place] Has lived there for over 14 years now, likes it.   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 SSD  Feels safe at home: Yes    Pertinent Substance Use:   Alcohol: No   Cannabis: No  Tobacco: No  Caffeine:  No     Medical Review of Systems:   Lightheadedness/orthostasis: Yes, POTS dx, follows with cards.   Headaches: Yes, migraines dx. On average 1 episode ~q/2-3 months.   GI: None       Mental Status Exam     Appearance: awake, alert and adequately groomed  Attitude:  cooperative and friendly  Eye Contact:  good  Mood:  \"unchanged, doing ok\"  Affect:  appropriate and in normal range  Speech:  clear, coherent  Language: fluent and intact in English  Psychomotor, Gait, Musculoskeletal:  no evidence of tardive dyskinesia, dystonia, or tics  Throught Process:  logical and linear  Associations:  no loose associations  Thought Content:  no evidence of suicidal ideation or homicidal ideation, no auditory hallucinations present, and no visual hallucinations present  Insight:  good  Judgement:  intact  Oriented to:  time, person, and place  Attention Span and Concentration:  " "intact  Recent and Remote Memory:  intact  Fund of Knowledge:  appropriate      Past Psych Med Trials        Medication Max Dose (mg) Dates / Duration Helpful? DC Reason / Adverse Effects?   Lamictal        Zoloft   175 mg  Y    Focalin   ~25 yrs -d/c'd 1/2024  For binge-eating. Risks outweighed benefits.    Lunesta       Ritalin SR 20 BID ~2007     Remeron  7.5      Seroquel   qhs      Ambien 10  ~2007     Prozac 80  ~2007          Treatment Course and Anne Events since  JULY 2023 8/21/23: Transfer of care  from Dr. Tena's patient's panel..   11/21/23.Decrease Focalin from 10 mg  (reported being beneficial for energy, mood and no indications of problematic use)Started around >25 years in context of eating disorder.  to 5 mg daily. Goal is to taper off due to not clear indication concerns for some elevated BP and overall  risks outweighing potential benefits  1/25/2024: Discontinued Focalin.  4/26/2024: Continue offering CBT-I, sleep study. No disordered eating concerns or other concerns with discontinuation of Focalin.   01/07/2025: Started discussion around transfer of care by end of current academic year. Writer coordinating with PCP Dr Zarate.   4/22/2025: Patient's psychiatric care is being transferred to her primary care provider, Dr. Zartae at [facility], by the end of the current academic year. Coordination of this transfer is ongoing.   6/3/2025: Increased Zoloft to 175 mg PO qDay to better target heightened anxiety/trauma-like symptoms. Recommended OTC Magnesium glycinate 200 mg BID>>> \"Calm\" brand recommended. Of note, patient reports she is already taking a Mg supplement but does not have it handy and will look into it before implementing the above rec, per her preference. Creatinine clearance, original Cockcroft-Gault calculation: 75 mL/min. (Will obtain CMP via PCP)     Vitals     Pulse Readings from Last 3 Encounters:   01/25/24 109   11/21/23 72   11/16/23 79     Wt Readings from Last 3 " Encounters:   12/03/24 67.1 kg (148 lb)   10/08/24 70 kg (154 lb 5.2 oz)   11/16/23 68 kg (150 lb)     BP Readings from Last 3 Encounters:   05/09/24 129/67   01/25/24 125/85   11/21/23 137/85         Medical History     ALLERGIES: Patient has no known allergies.    Patient Active Problem List   Diagnosis    Major depression, recurrent    Anorexia nervosa (H)    Iron deficiency anemia    Skin lesion    Postural orthostatic tachycardia syndrome    Hypothyroidism    Macular degeneration    Migraine headache    Mixed hearing loss, unilateral    Osteopenia    Osteoporosis    Posttraumatic stress disorder    Pyelonephritis    S/P ORIF (open reduction internal fixation) fracture    Secondary hyperparathyroidism    Stage 3a chronic kidney disease (H)    Insomnia      Medications   Focalin discontinued today  Current Outpatient Medications   Medication Sig Dispense Refill    acetaminophen (TYLENOL) 500 MG tablet Take 500-1,000 mg by mouth every 6 hours as needed.      atorvastatin (LIPITOR) 10 MG tablet Take by mouth daily Per pt takes this medicine but doesn't know the amount      atorvastatin (LIPITOR) 40 MG tablet       Blood Pressure Monitor KIT Patient should be evaluated in the emergency department if her systolic blood pressure <65 1 kit 0    Calcium Citrate-Vitamin D (CITRUS CALCIUM/VITAMIN D) 200-6.25 MG-MCG TABS Take 1 tablet by mouth daily.      Cholecalciferol (D3 HIGH POTENCY) 25 MCG (1000 UT) CAPS Take 25 mcg by mouth daily.      Cholecalciferol (VITAMIN D PO) Take  by mouth. 50, 000 units every 14 days      docusate sodium (COLACE) 100 MG capsule Take 1 capsule by mouth 2 times daily.      ferrous sulfate 140 (45 Fe) MG TBCR CR tablet Take 1 tablet by mouth 2 times daily      fludrocortisone (FLORINEF) 0.1 MG tablet TAKE 2 TABLETS (0.2MG) BY MOUTH DAILY 180 tablet 3    gabapentin (NEURONTIN) 300 MG capsule Take 5 capsules (1,500 mg) by mouth nightly as needed (sleep). 150 capsule 2    ibuprofen (ADVIL/MOTRIN)  800 MG tablet Take 800 mg by mouth every 8 hours as needed       lamoTRIgine (LAMICTAL) 200 MG tablet Take 2 tablets (400 mg) by mouth daily. 60 tablet 2    levothyroxine (SYNTHROID/LEVOTHROID) 150 MCG tablet Take 150 mcg by mouth daily.      magnesium 200 MG TABS Take 1 tablet by mouth daily      metoclopramide (REGLAN) 5 MG tablet Take 2.5 mg by mouth 2 times daily.      midodrine (PROAMATINE) 5 MG tablet TAKE 2 TABLETS (10MG) BY MOUTH THREE TIMES A  tablet 3    Multiple Vitamin (DAILY-ENRIQUETA) TABS Take 1 tablet by mouth daily.      Multiple Vitamins-Minerals (PRESERVISION AREDS) CAPS Take 1 capsule by mouth 2 times daily.      ondansetron (ZOFRAN) 4 MG tablet Take 1 pill for nausea at onset of headache, repeat if needed every 8 hours      potassium chloride ER (K-TAB/KLOR-CON) 10 MEQ CR tablet       rizatriptan (MAXALT-MLT) 10 MG ODT DISSOLVE 1 TABLET BY MOUTH AT ONSET OF MIGRAINE (MAY REPEAT IN 2 HOURS IF NEEDED      sertraline (ZOLOFT) 50 MG tablet Take 3.5 tablets (175 mg) by mouth daily. 105 tablet 2    tiZANidine (ZANAFLEX) 2 MG tablet TAKE 1 TABLET BY MOUTH EVERY MORNING, TAKE 1 TABLET EVERY EVENING, AND TAKE 2 TABLETS BY MOUTH EVERY NIGHT AT BEDTIME      vitamin B-2 (RIBOFLAVIN) 100 MG TABS tablet Take 4 tablets by mouth daily      vitamin D2 (ERGOCALCIFEROL) 35352 units (1250 mcg) capsule Take 50,000 Units by mouth every 14 days.          Labs and Data         1/7/2025     9:21 AM 4/15/2025     9:42 AM 4/22/2025     9:36 AM   PROMIS-10 Total Score w/o Sub Scores   PROMIS TOTAL - SUBSCORES 31  31  31        Patient-reported    Proxy-reported          No data to display                  4/15/2025     9:40 AM 4/22/2025     9:36 AM 6/2/2025    12:36 PM   PHQ-9 SCORE   PHQ-9 Total Score MyChart 3 (Minimal depression) 1 (Minimal depression) 2 (Minimal depression)   PHQ-9 Total Score 3  1  2        Patient-reported         6/2/2025    12:38 PM   CECELIA-7 SCORE   Total Score 10 (moderate anxiety)   Total Score  10        Patient-reported     6/3/25: Creatinine clearance, original Cockcroft-Gault calculation: 75 mL/min.    ======================  PROVIDER: Navid Cano MD     Level of Medical Decision Making:   - At least 1 chronic problem that is not stable  - Engaged in prescription drug management during visit (discussed any medication benefits, side effects, alternatives, etc.)    The longitudinal plan of care for the diagnosis(es)/condition(s) as documented were addressed during this visit. Due to the added complexity in care, I will continue to support Keisha in the subsequent management and with ongoing continuity of care in this clinic.      Patient staffed in clinic with Dr. Maxwell, who will sign the note.  Supervisor is Dr. Molina.

## 2025-06-03 NOTE — NURSING NOTE
Current patient location: [address]   [place] [address]    Is the patient currently in the state of [place]? YES    Visit mode: VIDEO    If the visit is dropped, the patient can be reconnected by:VIDEO VISIT: Text to cell phone:   Telephone Information:   Mobile [phone]       Will anyone else be joining the visit? NO  (If patient encounters technical issues they should call [phone])    Are changes needed to the allergy or medication list? No    Are refills needed on medications prescribed by this physician? NO    Rooming Documentation:  Questionnaire(s) completed    Reason for visit: RECHECK    Jena SMITHF

## 2025-06-03 NOTE — PATIENT INSTRUCTIONS
**For crisis resources, please see the information at the end of this document**   Patient Education    Thank you for coming to the [facility] [place] [facility].     Lab Testing:  If you had lab testing today and your results are reassuring or normal they will be mailed to you or sent through [facility] within 7 days. If the lab tests need quick action we will call you with the results. The phone number we will call with results is # [phone]. If this is not the best number please call our clinic and change the number.     Medication Refills:  If you need any refills please call your pharmacy and they will contact us. Our fax number for refills is [phone].   Three business days of notice are needed for general medication refill requests.   Five business days of notice are needed for controlled substance refill requests.   If you need to change to a different pharmacy, please contact the new pharmacy directly. The new pharmacy will help you get your medications transferred.     Contact Us:  Please call [phone] during business hours (8-5:00 M-F).   If you have medication related questions after clinic hours, or on the weekend, please call [phone].     Financial Assistance [phone]   Medical Records [phone]       MENTAL HEALTH CRISIS RESOURCES:  For a emergency help, please call 911 or go to the nearest Emergency Department.     Emergency Walk-In Options:   EmPATH Unit @ [facility] ([place]): [phone] - Specialized mental health emergency area designed to be calming  [facility] ([place]): [phone]  [facility] ([place]): [phone]  [facility]): [phone]    [facility] Crisis Information:   [place]: [phone]  [place]: [phone]  [place] (ANGELA) - Adult: [phone]     Child: [phone]  [place] - Adult: [phone]     Child: [phone]  [place]:  [phone]  List of all [facility] resources:   https://[place].gov/dhs/people-we-serve/adults/health-care/mental-health/resources/crisis-contacts.jsp    National Crisis Information:   Crisis Text Line: Text  [place]  to 462888  Suicide & Crisis Lifeline: 988  National Suicide Prevention Lifeline: 5-871-747-TALK (1-153.327.2840)       For online chat options, visit https://suicidepreventionlifeline.org/chat/  Poison Control Center: 3-958-048-8378  Trans Lifeline: 7-242-683-1751 - Hotline for transgender people of all ages  The [employer]: 0-498-668-1325 - Hotline for LGBT youth     For Non-Emergency Support:   Fast Tracker: Mental Health & Substance Use Disorder Resources -   https://www.[employer].org/

## 2025-06-03 NOTE — PROGRESS NOTES
Virtual Visit Details    Type of service:  Video Visit   Video Start Time: 0930  Video End Time:1003    Originating Location (pt. Location): Home  Distant Location (provider location):  On-site  Platform used for Video Visit: SaranWell

## 2025-06-04 ENCOUNTER — TELEPHONE (OUTPATIENT)
Dept: CARDIOLOGY | Facility: CLINIC | Age: 55
End: 2025-06-04
Payer: MEDICARE

## 2025-06-04 NOTE — TELEPHONE ENCOUNTER
Left Voicemail (1st Attempt) and Sent Mychart (1st Attempt) for the patient to call back and schedule the following:    Appointment type: Return EP  Provider: DANNIE  Return date: September  Specialty phone number: [phone] opt 1  Additional appointment(s) needed: NA  Additonal Notes: PT APPT with Geovanni needs to be rescheduled to next angélica. Need to also schedule dannie in september

## 2025-06-05 ENCOUNTER — TELEPHONE (OUTPATIENT)
Dept: CARDIOLOGY | Facility: CLINIC | Age: 55
End: 2025-06-05
Payer: MEDICARE

## 2025-06-05 NOTE — TELEPHONE ENCOUNTER
Health Call Center    Phone Message    May a detailed message be left on voicemail: yes     Reason for Call: Other: Patient re-scheduled her appt for 7/2025 with Dr. Galarza (orphaned). There was a note to schedule with an AUGUSTUS in September. Patient would like to know why she needs to see an AUGUSTUS in September. Please call her back to discuss.       Action Taken: Other: cardiology    Travel Screening: Not Applicable   Thank you!  Specialty Access Center      Date of Service:

## 2025-06-05 NOTE — TELEPHONE ENCOUNTER
Called and let her know that she does not have to see a nurse practitioner in September.  We will try to get it switched to virtual as she was under the impression this was the case.    Ziyad Gamez, JALEESAN RN  Electrophysiology Nurse Coordinator

## 2025-07-28 NOTE — PROGRESS NOTES
Virtual Visit Details    Type of service:  Video Visit   Video Start Time: 0951AM  Video End Time:10:24 AM    Originating Location (pt. Location): Home    Distant Location (provider location):  On-site  Platform used for Video Visit: [facility]  MEDICAL PROGRESS NOTE     CARE TEAM:    PCP- Lavonne Zarate  Therapist- None currently.   Cardiology- Dr. Galarza, Endo- Dr. Handley.     Keisha is a 55 year old who uses the pronouns she, her, hers.      Diagnoses     Major depressive disorder, recurrent, currently in remission.   CECELIA  Primary insomnia   Anorexia nervosa, in remission  Posttraumatic stress disorder     Assessment   Keisha is a 55 year old who uses the pronouns she, her, hers, with psychiatric history of anorexia nervosa (s/p feeding tube) in remission, major depressive disorder, sleep disorder and medical history of postural orthostatic tachycardia syndrome (POTS), migraines. Here today for a transfer of care.      #Depression  #PTSD  #Anxiety    Presentation and history are consistent with prior diagnoses of MDD, CECELIA, and insomnia. Her eating disorder is currently in remission and PTSD does not appear to be playing a major role at this time. Substances currently not contributing to her presentation. Medical diagnoses and psychosocial stressors are contributing to her presentation although she currently reports coping well and medications are helping.     Due to ongoing anxiety, sertraline was increased to 175mg at last visit, pt was needing a med change to address the heightened anxiety and sleep disturbances which are due to external factors and she is aware of this. She reports today that this has gone well. She currently is living at an appt with [facility].       For now, will defer anti-adrenergic options due to hx of POTS symptoms which are being monitored, with upcoming cardiology and laboratory  consultations scheduled to further evaluate and manage this condition. Safety assessment revealed no current risk of self-harm or harm to others, despite ongoing stressors. Pt is willing to continue with her current med regimen which is reasonable and working. Could consider changing antidepressant to a different one that can treat or help hot flashes due to menopause if symptoms continue to be severe and not managed well. Pt agreed to address her hot flashes further with primary care at this time. She feels her prn gabapentin is helpful for sleep. Regarding sleep, she discussed mg in the past with her previous resident provider and has OTC.      We discussed psychotherapy but pt expressed preference of deferring this decision for now, open to continue conversation in the near future.  She also notes she gets some therapy with [facility] and feels supported there.     -Creatinine clearance, original Cockcroft-Gault calculation: 75 mL/min. (Will obtain CMP via PCP)        Future considerations  -Consider optimizing Zoloft better target anxiety and continue helping with mood (last moderate-severe depressive episode >5 years ago per Dr. Tena's notes).  -Consider decreasing/tapering gabapentin if/once anxiety is more stable .    -Could cautiously (ideally would need to consult with cards before due to POTS) add an alpha blocker to help with PTSD-sleep disturbances as well with BP  -Consider repeating Sleep study. Consider/continue offering CBT-I/psychotherapy         Psychotropic Drug Interactions:    ADDITIVE SEROTONERGIC: Zolonoft, Maxalt, ondansetron  ADDITIVE QTc: Zoloft, ondansetron   ADDITIVE CNS/RESPIRATORY DEPRESSION: Gabapentin, Lamictal, tizanidine  Management: routine monitoring, ongoing medication simplification, limited use of zofran and patient aware of risks.      Risk Statements:   Treatment Risk- Risks, benefits, alternatives and potential adverse effects have been discussed and are  understood.   Safety Risk-Keisha did not appear to be an imminent safety risk to self or others.      Plan      1) Medications:   -Sertraline (Zoloft) to 175 mg daily  -Continue lamotrigine (Lamictal) 400 mg daily   -Continue gabapentin 1500 QHS PRN  (helps some with sleep; Added for hot flashes, works). Most recent Creatinine/GFR WNL.         Per other prescribers PCP, cardiology, GI, endocrinology):  1. Synthroid 150 mcg - she is uncertain of dose.    2. Zofran prn migraines  3. Midodrine 11/16/23  - changed from Dr. Galarza   4. Carafate  5. Tizanidine - 4x/day, last dose 9pm for migraines-one in am , one noo,one at night.  6. Fluorinef- 0.2 mg daily - moved to evening  7. Maxalt for migraines with ibuprofen.  It works very well.    8. Mg-   9. Vit B2  10. Atorvastatin     2) Psychotherapy:   None currently, continue offering. Has a little bit of therapy through her current mental health housing - [facility].      3) Next due:   Labs- Last obtained fall 2024, will defer further lipid/CBC monitoring to PCP. Routine monitoring is not indicated for current psychotropic medication regimen. Creatinine/GFR WNL  -Will obtain CMP via PCP   EKG- Last obtained 06/2021,  ms. Follows-up with cardiology (POTS), will defer further monitoring to them.      4) Care Coordination / Referrals:   none     5) Follow-up: Return to clinic in 3 months.      Pertinent Background                                                   [most recent eval 08/21/23]     Keisha first experienced MH symptoms since HS, she started experiencing eating disorder that worsened through her 20, severe, at one point needing a feeding tube, leading to multiple hospitalizations. Also experienced trauma x2 as well as emotional trauma growing up. She reports experiencing depression and post-traumatic symptoms as well since around the same age.   Last depressive episode >5 years.   Has been on Focalin for several years, per Dr. Tena notes  "\"Beneficial for energy, mood and no indications of problematic use. Helps with concentration. Started around >25 years in context of eating disorder.\"     On 1/25/24 we discussed use of stimulant, which she reported had been on for over >25 years, started to help with binge eating/eating disorder. Given that her reported eating disorder had not been a concern over past years we discussed recommendation for discontinuing Focalin and since she has reported noticing anxiety being stable/improved and not noticing other changes or differences in mood, appetite/eating pattern or overall functioning.      Pertinent items include: trauma hx, eating disorder , mutiple psychotropic trials , psych hosp , and major medical problems     Subjective     Things have been pretty good. Medications going well, increase in Sertraline helped with anxiety. Has a support system - family close by. Has help from her program where she lives. Lamictal is going well, not missing doses.   -Medication side effects: Feels hot. Constant. Started 5 years ago. Feels may be menopause. Has not gotten worse.   - med changes since last seen - none  - mood, good  - appetite, good  - sleep, fair, needs to work on sleep schedule. Stays up late. More anxiety. Has a neighbor that keeps her awake. Lives in an apartment which is also works with mental health. Doesn't feel very safe around this neighbor, loud music, police involved and has many strangers coming and going. Plans to live there for the time being. Overall likes where she lives and has been living there for some time.   SI/SIB/HI - none  Thought about transferring to primary but she said no.     Recent Psych Symptoms:   Depression:  stable for a long time on her meds  Elevated:  none  Psychosis:  none  Anxiety:  A little better with higher dose of Sertraline.   Trauma Related:  none. Reports baseline sleep disturbances, denies recent/current trauma-like symptoms   Insomnia:  Sleep issues as above, " "working on bedtime routine.   Other:  No    Current Social History:  Living situation/income: (partner, children, pets, etc): Apartment building \"customized living. [place] Has lived there for over 14 years now, likes it. Has a difficult neighbor.   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 SSD   Feels safe at home: Yes     Pertinent Substance Use:   Alcohol: No   Cannabis: No  Tobacco: No  Caffeine:  No      Medical Review of Systems:   Lightheadedness/orthostasis: Yes, POTS dx, follows with cards.   Headaches: Yes, migraines dx. On average 1 episode ~q/2-3 months. Has had two in last week. Humidity can trigger.   Hot flashes for 5 years.   GI: None  Sexual health concerns: none  A comprehensive review of systems was performed and is negative other than noted above.     Mental Status Exam     Alertness: alert  and oriented  Appearance: well groomed  Behavior/Demeanor: cooperative, pleasant, and calm, with good  eye contact   Speech: normal and regular rate and rhythm  Language: intact and no problems  Psychomotor: normal or unremarkable, some shaking of her voice when anxious  Mood: \"good\"  Affect: full range; congruent to: mood- yes, content- yes  Thought Process/Associations: unremarkable  Thought Content:  Reports none;  Denies suicidal & violent ideation and delusions, obsessions , and paranoid ideation  Perception:  Reports none;  Denies auditory hallucinations, visual hallucinations, depersonalization, and derealization  Insight: good  Judgment: good  Cognition: does  appear grossly intact; formal cognitive testing was not done  Gait and Station: N/A (telehealth)      Past Psych Med Trials        Medication Max Dose (mg) Dates / Duration Helpful? DC Reason / Adverse Effects?   Lamictal        Zoloft        Focalin   ~25 years -stopped Jamuary 2024  Started ~25 years ago possible for binge-eating. Currently not clear indication concerns " "for some elevated BP and overall  risks outweighing potential benefits   Lunesta       Ritalin SR 20 mg BID ~2007     Remeron  7.5 mg       Seroquel   mg qhs      Ambien 10 mg  ~2007     Prozac 80 mg  ~2007          Treatment Course and Anne Events since  JULY 2023 8/21/23: Transfer of care  from Dr. Tena's patient's panel..   11/21/23.Decrease Focalin from 10 mg  (reported being beneficial for energy, mood and no indications of problematic use)Started around >25 years in context of eating disorder.  to 5 mg daily. Goal is to taper off due to not clear indication concerns for some elevated BP and overall  risks outweighing potential benefits  1/25/2024: Discontinued Focalin.  4/26/2024: Continue offering CBT-I, sleep study. No disordered eating concerns or other concerns with discontinuation of Focalin.   01/07/2025: Started discussion around transfer of care by end of current academic year. Writer coordinating with PCP Dr Zarate.   4/22/2025: Patient's psychiatric care is being transferred to her primary care provider, Dr. Zarate at [facility], by the end of the current academic year. Coordination of this transfer is ongoing.   6/3/2025: Increased Zoloft to 175 mg PO qDay to better target heightened anxiety/trauma-like symptoms. Recommended OTC Magnesium glycinate 200 mg BID>>> \"Calm\" brand recommended. Of note, patient reports she is already taking a Mg supplement but does not have it handy and will look into it before implementing the above rec, per her preference. Creatinine clearance, original Cockcroft-Gault calculation: 75 mL/min. (Will obtain CMP via PCP)     Vitals     Pulse Readings from Last 3 Encounters:   01/25/24 109   11/21/23 72   11/16/23 79     Wt Readings from Last 3 Encounters:   12/03/24 67.1 kg (148 lb)   10/08/24 70 kg (154 lb 5.2 oz)   11/16/23 68 kg (150 lb)     BP Readings from Last 3 Encounters:   05/09/24 129/67   01/25/24 125/85   11/21/23 137/85         Medical History "     ALLERGIES: Patient has no known allergies.    Patient Active Problem List   Diagnosis    Major depression, recurrent    Anorexia nervosa (H)    Iron deficiency anemia    Skin lesion    Postural orthostatic tachycardia syndrome    Hypothyroidism    Macular degeneration    Migraine headache    Mixed hearing loss, unilateral    Osteopenia    Osteoporosis    Posttraumatic stress disorder    Pyelonephritis    S/P ORIF (open reduction internal fixation) fracture    Secondary hyperparathyroidism    Stage 3a chronic kidney disease (H)    Insomnia      Medications   Focalin discontinued today  Current Outpatient Medications   Medication Sig Dispense Refill    acetaminophen (TYLENOL) 500 MG tablet Take 500-1,000 mg by mouth every 6 hours as needed.      atorvastatin (LIPITOR) 10 MG tablet Take by mouth daily Per pt takes this medicine but doesn't know the amount      atorvastatin (LIPITOR) 40 MG tablet       Blood Pressure Monitor KIT Patient should be evaluated in the emergency department if her systolic blood pressure <65 1 kit 0    Calcium Citrate-Vitamin D (CITRUS CALCIUM/VITAMIN D) 200-6.25 MG-MCG TABS Take 1 tablet by mouth daily.      Cholecalciferol (D3 HIGH POTENCY) 25 MCG (1000 UT) CAPS Take 25 mcg by mouth daily.      Cholecalciferol (VITAMIN D PO) Take  by mouth. 50, 000 units every 14 days      docusate sodium (COLACE) 100 MG capsule Take 1 capsule by mouth 2 times daily.      ferrous sulfate 140 (45 Fe) MG TBCR CR tablet Take 1 tablet by mouth 2 times daily      fludrocortisone (FLORINEF) 0.1 MG tablet TAKE 2 TABLETS (0.2MG) BY MOUTH DAILY 180 tablet 3    gabapentin (NEURONTIN) 300 MG capsule Take 5 capsules (1,500 mg) by mouth nightly as needed (sleep). 150 capsule 2    ibuprofen (ADVIL/MOTRIN) 800 MG tablet Take 800 mg by mouth every 8 hours as needed       lamoTRIgine (LAMICTAL) 200 MG tablet Take 2 tablets (400 mg) by mouth daily. 60 tablet 2    levothyroxine (SYNTHROID/LEVOTHROID) 150 MCG tablet Take  150 mcg by mouth daily.      magnesium 200 MG TABS Take 1 tablet by mouth daily      metoclopramide (REGLAN) 5 MG tablet Take 2.5 mg by mouth 2 times daily.      midodrine (PROAMATINE) 5 MG tablet TAKE 2 TABLETS (10MG) BY MOUTH THREE TIMES A  tablet 3    Multiple Vitamin (DAILY-ENRIQUETA) TABS Take 1 tablet by mouth daily.      Multiple Vitamins-Minerals (PRESERVISION AREDS) CAPS Take 1 capsule by mouth 2 times daily.      ondansetron (ZOFRAN) 4 MG tablet Take 1 pill for nausea at onset of headache, repeat if needed every 8 hours      potassium chloride ER (K-TAB/KLOR-CON) 10 MEQ CR tablet       rizatriptan (MAXALT-MLT) 10 MG ODT DISSOLVE 1 TABLET BY MOUTH AT ONSET OF MIGRAINE (MAY REPEAT IN 2 HOURS IF NEEDED      sertraline (ZOLOFT) 50 MG tablet Take 3.5 tablets (175 mg) by mouth daily. 105 tablet 2    tiZANidine (ZANAFLEX) 2 MG tablet TAKE 1 TABLET BY MOUTH EVERY MORNING, TAKE 1 TABLET EVERY EVENING, AND TAKE 2 TABLETS BY MOUTH EVERY NIGHT AT BEDTIME      vitamin B-2 (RIBOFLAVIN) 100 MG TABS tablet Take 4 tablets by mouth daily      vitamin D2 (ERGOCALCIFEROL) 60872 units (1250 mcg) capsule Take 50,000 Units by mouth every 14 days.          Labs and Data         4/15/2025     9:42 AM 4/22/2025     9:36 AM 7/27/2025     6:38 PM   PROMIS-10 Total Score w/o Sub Scores   PROMIS TOTAL - SUBSCORES 31  31  30        Patient-reported          No data to display                  4/15/2025     9:40 AM 4/22/2025     9:36 AM 6/2/2025    12:36 PM   PHQ-9 SCORE   PHQ-9 Total Score MyChart 3 (Minimal depression) 1 (Minimal depression) 2 (Minimal depression)   PHQ-9 Total Score 3  1  2        Patient-reported         6/2/2025    12:38 PM   CECELIA-7 SCORE   Total Score 10 (moderate anxiety)   Total Score 10        Patient-reported        Per care everywhere October-November 2023:     Vitamin D 25-Hydroxy, Total  Order: [id number]  Component  Ref Range & Units 1 mo ago   Vitamin D, 25-OH, Total  30 - 80 ng/mL 50   Resulting Agency  [facility]          Contains abnormal data TSH  Order: [id number]  Component  Ref Range & Units 3 wk ago   TSH, Sensitive  0.30 - 4.50 uIU/mL 4.74 High    Resulting Agency [facility]         Lipid Panel (Expected: 90 days)  Order: [id number]  Component  Ref Range & Units 1 mo ago Resulting Agency   Cholesterol  0 - 199 mg/dL 202 High  [facility]   Triglyceride  <=149 mg/dL 68 [facility]   HDL Cholesterol  >=40 mg/dL 66 [facility]   LDL, Calculated  <130 mg/dL 122 [facility]   Non HDL Chol, Calculated  <=159 mg/dL 136 [facility]   Cholesterol/HDL Ratio 3.1 [facility]   Hours Fasting  8 - 12 Hours 12.0 [facility]     Basic Metabolic Panel  Order: [id number]  Component  Ref Range & Units 1 mo ago Resulting Agency   Sodium  136 - 145 mmol/L 144 [facility]   Potassium  3.5 - 5.1 mmol/L 3.4 Low  [facility]   Chloride  98 - 109 mmol/L 111 High  [facility]   CO2  20 - 29 mmol/L 23 [facility]   Anion Gap  7 - 16 mmol/L 10 [facility]   Calcium  8.4 - 10.4 mg/dL 9.5 [facility]   BUN  7 - 26 mg/dL 14 [facility]   Creatinine  0.55 - 1.02 mg/dL 0.80 [facility]   Glucose  70 - 100 mg/dL 107 High  [facility]   Comment: The given reference range is for the fasting state. Non-fasting reference range for glucose is 70 - 180 mg/dL.   GFR, Estimated  >60 mL/min/1.73m2 >60 [facility]   Hours Fasting  8 - 12 Hours 12.0 [facility]      EKG - none recent        ======================  PROVIDER:   Radha Gould MD  Psychiatry Resident Physician  [id number]    Patient staffed in clinic with Dr. Guerin, who will sign the note.  Supervisor is Dr. Maxwell.      Answers submitted by the patient for this visit:  Patient Health Questionnaire (Submitted on 7/29/2025)  If  you checked off any problems, how difficult have these problems made it for you to do your work, take care of things at home, or get along with other people?: Not difficult at all  PHQ9 TOTAL SCORE: 1

## 2025-07-29 ENCOUNTER — VIRTUAL VISIT (OUTPATIENT)
Dept: PSYCHIATRY | Facility: CLINIC | Age: 55
End: 2025-07-29
Attending: PSYCHIATRY & NEUROLOGY
Payer: MEDICARE

## 2025-07-29 DIAGNOSIS — F50.00 ANOREXIA NERVOSA (H): ICD-10-CM

## 2025-07-29 DIAGNOSIS — F51.01 PRIMARY INSOMNIA: ICD-10-CM

## 2025-07-29 DIAGNOSIS — F43.10 POSTTRAUMATIC STRESS DISORDER: ICD-10-CM

## 2025-07-29 DIAGNOSIS — F33.40 RECURRENT MAJOR DEPRESSIVE DISORDER, IN REMISSION: Primary | ICD-10-CM

## 2025-07-29 DIAGNOSIS — F41.1 GENERALIZED ANXIETY DISORDER: ICD-10-CM

## 2025-07-29 DIAGNOSIS — F33.40 RECURRENT MAJOR DEPRESSIVE DISORDER, IN REMISSION: ICD-10-CM

## 2025-07-29 PROCEDURE — 1126F AMNT PAIN NOTED NONE PRSNT: CPT | Mod: 95

## 2025-07-29 PROCEDURE — 90792 PSYCH DIAG EVAL W/MED SRVCS: CPT | Mod: 95

## 2025-07-29 RX ORDER — GABAPENTIN 300 MG/1
1500 CAPSULE ORAL
Qty: 150 CAPSULE | Refills: 2 | Status: SHIPPED | OUTPATIENT
Start: 2025-07-29

## 2025-07-29 RX ORDER — LAMOTRIGINE 200 MG/1
400 TABLET ORAL DAILY
Qty: 60 TABLET | Refills: 2 | Status: SHIPPED | OUTPATIENT
Start: 2025-07-29

## 2025-07-29 ASSESSMENT — PATIENT HEALTH QUESTIONNAIRE - PHQ9
SUM OF ALL RESPONSES TO PHQ QUESTIONS 1-9: 1
10. IF YOU CHECKED OFF ANY PROBLEMS, HOW DIFFICULT HAVE THESE PROBLEMS MADE IT FOR YOU TO DO YOUR WORK, TAKE CARE OF THINGS AT HOME, OR GET ALONG WITH OTHER PEOPLE: NOT DIFFICULT AT ALL
SUM OF ALL RESPONSES TO PHQ QUESTIONS 1-9: 1

## 2025-07-29 ASSESSMENT — PAIN SCALES - GENERAL: PAINLEVEL_OUTOF10: NO PAIN (0)

## 2025-07-29 NOTE — NURSING NOTE
Current patient location: [address]   [facility] [address]    Is the patient currently in the state of [place]? YES    Visit mode: VIDEO    If the visit is dropped, the patient can be reconnected by:VIDEO VISIT: Text to cell phone:   Telephone Information:   Mobile [phone]       Will anyone else be joining the visit? NO  (If patient encounters technical issues they should call [phone])    Are changes needed to the allergy or medication list? No    Are refills needed on medications prescribed by this physician? NO    Rooming Documentation:  Questionnaire(s) completed    Reason for visit: RECHECK    Jena SMITHF

## 2025-07-29 NOTE — PATIENT INSTRUCTIONS
Med Changes: no changes today. Refills sent in.   Other: Labs with PCP and cardiology. See primary care regarding hot flashes - the meds that you are on now are unlikely to cause this.   Next Visit : 3 months.     It was good to meet you, and I'm glad to hear the medications are helping.     **For crisis resources, please see the information at the end of this document**   Patient Education    Thank you for coming to the [facility] [place] [facility].     Lab Testing:  If you had lab testing today and your results are reassuring or normal they will be mailed to you or sent through [employer] within 7 days. If the lab tests need quick action we will call you with the results. The phone number we will call with results is # [phone]. If this is not the best number please call our clinic and change the number.     Medication Refills:  If you need any refills please call your pharmacy and they will contact us. Our fax number for refills is [phone].   Three business days of notice are needed for general medication refill requests.   Five business days of notice are needed for controlled substance refill requests.   If you need to change to a different pharmacy, please contact the new pharmacy directly. The new pharmacy will help you get your medications transferred.     Contact Us:  Please call [phone] during business hours (8-5:00 M-F).   If you have medication related questions after clinic hours, or on the weekend, please call [phone].     Financial Assistance [phone]   Medical Records [phone]       MENTAL HEALTH CRISIS RESOURCES:  For a emergency help, please call 911 or go to the nearest Emergency Department.     Emergency Walk-In Options:   [facility] Unit @ [facility] ([place]): [phone] - Specialized mental health emergency area designed to be calming  [facility] ([place]): [phone]  [facility]  ([place]): [phone]  [facility] ([place]): [phone]    [facility] Crisis Information:   [place]: [phone]  [place]: [phone]  [place] (ANGELA) - Adult: [phone]     Child: [phone]  [place] - Adult: [phone]     Child: [phone]  [place]: [phone]  List of all [facility] resources:   https://[email or web address]/dhs/people-[email or web address]-serve/adults/health-care/mental-health/resources/crisis-contacts.jsp    National Crisis Information:   Crisis Text Line: Text  [place]  to 149536  Suicide & Crisis Lifeline: 988  National Suicide Prevention Lifeline: 5-330-129-TALK (1-585.164.2666)       For online chat options, visit https://suicidepreventionlifeline.org/chat/  Poison Control Center: 1-958.906.1343  Trans Lifeline: 1-777.322.2133 - Hotline for transgender people of all ages  The [employer] Project: 6-903-968-9889 - Hotline for LGBT youth     For Non-Emergency Support:   Fast Tracker: Mental Health & Substance Use Disorder Resources -   https://www."[employer]ck[employer]ixn.org/

## 2025-07-29 NOTE — Clinical Note
Molly Garcia, good work on Keisha's assessment, I appreciated the discussion about the POTS and discussion around potential changes.   I did make a few changes, most of which are generally addressed by using the transfer template instead of copying forward notes. This is why we request that notes not be copied forward from previous residents:   - Got rid of the lab section. Was entirely out of date labs from 2023, and also many made reference to labs in terms like 2 months ago, or 3 weeks ago, which are not okay to have in notes. I know these were from Navid, but still important to fix.  - Pertinent background was partially Navid's personal language, referred to discussions he had with patient in first person, also important to fix. I got rid of most, and put the rest in problem list.  - Put Future Considerations section in the plan. It really doesn't make sense to have it in Assessment, especially if reading this as a PCP or another specialist.  - Got rid of summary points, it was very bloated

## 2025-08-16 ENCOUNTER — HEALTH MAINTENANCE LETTER (OUTPATIENT)
Age: 55
End: 2025-08-16